# Patient Record
Sex: FEMALE | Race: WHITE | Employment: OTHER | URBAN - NONMETROPOLITAN AREA
[De-identification: names, ages, dates, MRNs, and addresses within clinical notes are randomized per-mention and may not be internally consistent; named-entity substitution may affect disease eponyms.]

---

## 2017-01-03 RX ORDER — SUCRALFATE 1 G/1
TABLET ORAL
Qty: 120 TABLET | Refills: 0 | Status: SHIPPED | OUTPATIENT
Start: 2017-01-03 | End: 2017-02-04 | Stop reason: SDUPTHER

## 2017-01-09 RX ORDER — ATORVASTATIN CALCIUM 20 MG/1
TABLET, FILM COATED ORAL
Qty: 90 TABLET | Refills: 0 | Status: SHIPPED | OUTPATIENT
Start: 2017-01-09 | End: 2017-03-22 | Stop reason: SDUPTHER

## 2017-01-23 RX ORDER — ERGOCALCIFEROL 1.25 MG/1
CAPSULE ORAL
Qty: 3 CAPSULE | Refills: 3 | Status: ON HOLD | OUTPATIENT
Start: 2017-01-23 | End: 2020-01-01 | Stop reason: ALTCHOICE

## 2017-01-26 ENCOUNTER — CARE COORDINATION (OUTPATIENT)
Dept: CARE COORDINATION | Age: 82
End: 2017-01-26

## 2017-01-26 ENCOUNTER — TELEPHONE (OUTPATIENT)
Dept: FAMILY MEDICINE CLINIC | Age: 82
End: 2017-01-26

## 2017-01-26 RX ORDER — M-VIT,TX,IRON,MINS/CALC/FOLIC 27MG-0.4MG
1 TABLET ORAL DAILY
Qty: 30 TABLET | Refills: 11 | Status: SHIPPED | OUTPATIENT
Start: 2017-01-26 | End: 2018-01-06 | Stop reason: SDUPTHER

## 2017-02-06 RX ORDER — SUCRALFATE 1 G/1
TABLET ORAL
Qty: 120 TABLET | Refills: 0 | Status: SHIPPED | OUTPATIENT
Start: 2017-02-06 | End: 2017-03-04 | Stop reason: SDUPTHER

## 2017-02-07 RX ORDER — LEVOTHYROXINE SODIUM 88 UG/1
TABLET ORAL
Qty: 90 TABLET | Refills: 0 | OUTPATIENT
Start: 2017-02-07

## 2017-02-09 RX ORDER — LEVOTHYROXINE SODIUM 88 UG/1
88 TABLET ORAL DAILY
Qty: 30 TABLET | Refills: 11 | Status: SHIPPED | OUTPATIENT
Start: 2017-02-09 | End: 2017-09-14

## 2017-02-13 RX ORDER — ISOSORBIDE MONONITRATE 30 MG/1
TABLET, EXTENDED RELEASE ORAL
Qty: 90 TABLET | Refills: 0 | Status: SHIPPED | OUTPATIENT
Start: 2017-02-13 | End: 2017-09-25 | Stop reason: SDUPTHER

## 2017-02-27 ENCOUNTER — CARE COORDINATION (OUTPATIENT)
Dept: CARE COORDINATION | Age: 82
End: 2017-02-27

## 2017-03-07 RX ORDER — SUCRALFATE 1 G/1
TABLET ORAL
Qty: 120 TABLET | Refills: 0 | Status: SHIPPED | OUTPATIENT
Start: 2017-03-07 | End: 2017-03-22 | Stop reason: SDUPTHER

## 2017-03-22 ENCOUNTER — OFFICE VISIT (OUTPATIENT)
Dept: FAMILY MEDICINE CLINIC | Age: 82
End: 2017-03-22

## 2017-03-22 VITALS
HEART RATE: 65 BPM | RESPIRATION RATE: 13 BRPM | WEIGHT: 137.2 LBS | SYSTOLIC BLOOD PRESSURE: 116 MMHG | BODY MASS INDEX: 29.6 KG/M2 | HEIGHT: 57 IN | TEMPERATURE: 98.4 F | DIASTOLIC BLOOD PRESSURE: 70 MMHG | OXYGEN SATURATION: 97 %

## 2017-03-22 DIAGNOSIS — R06.02 SOB (SHORTNESS OF BREATH): Primary | ICD-10-CM

## 2017-03-22 DIAGNOSIS — I62.03 SUBDURAL HEMATOMA, CHRONIC (HCC): Chronic | ICD-10-CM

## 2017-03-22 DIAGNOSIS — I50.9 CHRONIC CONGESTIVE HEART FAILURE, UNSPECIFIED CONGESTIVE HEART FAILURE TYPE: ICD-10-CM

## 2017-03-22 DIAGNOSIS — I48.0 PAF (PAROXYSMAL ATRIAL FIBRILLATION) (HCC): ICD-10-CM

## 2017-03-22 DIAGNOSIS — K44.9 HIATAL HERNIA: ICD-10-CM

## 2017-03-22 DIAGNOSIS — Z78.0 POST-MENOPAUSAL: ICD-10-CM

## 2017-03-22 DIAGNOSIS — E03.9 HYPOTHYROIDISM, UNSPECIFIED TYPE: Chronic | ICD-10-CM

## 2017-03-22 DIAGNOSIS — E78.5 HYPERLIPIDEMIA, UNSPECIFIED HYPERLIPIDEMIA TYPE: ICD-10-CM

## 2017-03-22 DIAGNOSIS — I25.10 CORONARY ARTERY DISEASE INVOLVING NATIVE CORONARY ARTERY OF NATIVE HEART WITHOUT ANGINA PECTORIS: Chronic | ICD-10-CM

## 2017-03-22 DIAGNOSIS — R53.81 PHYSICAL DECONDITIONING: ICD-10-CM

## 2017-03-22 DIAGNOSIS — K21.9 GASTROESOPHAGEAL REFLUX DISEASE, ESOPHAGITIS PRESENCE NOT SPECIFIED: ICD-10-CM

## 2017-03-22 PROCEDURE — 1090F PRES/ABSN URINE INCON ASSESS: CPT | Performed by: FAMILY MEDICINE

## 2017-03-22 PROCEDURE — G8427 DOCREV CUR MEDS BY ELIG CLIN: HCPCS | Performed by: FAMILY MEDICINE

## 2017-03-22 PROCEDURE — G8420 CALC BMI NORM PARAMETERS: HCPCS | Performed by: FAMILY MEDICINE

## 2017-03-22 PROCEDURE — 99214 OFFICE O/P EST MOD 30 MIN: CPT | Performed by: FAMILY MEDICINE

## 2017-03-22 PROCEDURE — 1036F TOBACCO NON-USER: CPT | Performed by: FAMILY MEDICINE

## 2017-03-22 PROCEDURE — 4040F PNEUMOC VAC/ADMIN/RCVD: CPT | Performed by: FAMILY MEDICINE

## 2017-03-22 PROCEDURE — 1123F ACP DISCUSS/DSCN MKR DOCD: CPT | Performed by: FAMILY MEDICINE

## 2017-03-22 PROCEDURE — G8598 ASA/ANTIPLAT THER USED: HCPCS | Performed by: FAMILY MEDICINE

## 2017-03-22 PROCEDURE — G8484 FLU IMMUNIZE NO ADMIN: HCPCS | Performed by: FAMILY MEDICINE

## 2017-03-22 RX ORDER — SUCRALFATE 1 G/1
TABLET ORAL
Qty: 120 TABLET | Refills: 0 | Status: SHIPPED | OUTPATIENT
Start: 2017-03-22 | End: 2017-05-09 | Stop reason: SDUPTHER

## 2017-03-22 RX ORDER — ATORVASTATIN CALCIUM 20 MG/1
TABLET, FILM COATED ORAL
Qty: 90 TABLET | Refills: 3 | Status: ON HOLD | OUTPATIENT
Start: 2017-03-22 | End: 2018-02-20 | Stop reason: HOSPADM

## 2017-03-22 ASSESSMENT — ENCOUNTER SYMPTOMS
SORE THROAT: 1
VOICE CHANGE: 1
SHORTNESS OF BREATH: 1
TROUBLE SWALLOWING: 0
GASTROINTESTINAL NEGATIVE: 1
COUGH: 1

## 2017-03-29 ENCOUNTER — CARE COORDINATION (OUTPATIENT)
Dept: CARE COORDINATION | Age: 82
End: 2017-03-29

## 2017-04-18 ENCOUNTER — TELEPHONE (OUTPATIENT)
Dept: FAMILY MEDICINE CLINIC | Age: 82
End: 2017-04-18

## 2017-04-18 RX ORDER — IBANDRONATE SODIUM 150 MG/1
150 TABLET, FILM COATED ORAL
Qty: 1 TABLET | Refills: 11 | Status: SHIPPED | OUTPATIENT
Start: 2017-04-18 | End: 2017-06-27

## 2017-04-21 ENCOUNTER — OFFICE VISIT (OUTPATIENT)
Dept: CARDIOLOGY | Age: 82
End: 2017-04-21

## 2017-04-21 VITALS
HEIGHT: 57 IN | BODY MASS INDEX: 29.04 KG/M2 | HEART RATE: 84 BPM | WEIGHT: 134.6 LBS | DIASTOLIC BLOOD PRESSURE: 60 MMHG | SYSTOLIC BLOOD PRESSURE: 150 MMHG

## 2017-04-21 DIAGNOSIS — I10 ESSENTIAL HYPERTENSION: ICD-10-CM

## 2017-04-21 DIAGNOSIS — E78.00 PURE HYPERCHOLESTEROLEMIA: ICD-10-CM

## 2017-04-21 DIAGNOSIS — I25.10 CORONARY ARTERY DISEASE INVOLVING NATIVE CORONARY ARTERY OF NATIVE HEART WITHOUT ANGINA PECTORIS: ICD-10-CM

## 2017-04-21 DIAGNOSIS — I50.32 CHRONIC DIASTOLIC CONGESTIVE HEART FAILURE (HCC): Primary | ICD-10-CM

## 2017-04-21 DIAGNOSIS — I48.20 CHRONIC ATRIAL FIBRILLATION (HCC): ICD-10-CM

## 2017-04-21 PROCEDURE — 4040F PNEUMOC VAC/ADMIN/RCVD: CPT | Performed by: INTERNAL MEDICINE

## 2017-04-21 PROCEDURE — G8427 DOCREV CUR MEDS BY ELIG CLIN: HCPCS | Performed by: INTERNAL MEDICINE

## 2017-04-21 PROCEDURE — G8598 ASA/ANTIPLAT THER USED: HCPCS | Performed by: INTERNAL MEDICINE

## 2017-04-21 PROCEDURE — 1036F TOBACCO NON-USER: CPT | Performed by: INTERNAL MEDICINE

## 2017-04-21 PROCEDURE — 1123F ACP DISCUSS/DSCN MKR DOCD: CPT | Performed by: INTERNAL MEDICINE

## 2017-04-21 PROCEDURE — 1090F PRES/ABSN URINE INCON ASSESS: CPT | Performed by: INTERNAL MEDICINE

## 2017-04-21 PROCEDURE — 99214 OFFICE O/P EST MOD 30 MIN: CPT | Performed by: INTERNAL MEDICINE

## 2017-04-21 PROCEDURE — G8420 CALC BMI NORM PARAMETERS: HCPCS | Performed by: INTERNAL MEDICINE

## 2017-05-05 ENCOUNTER — CARE COORDINATION (OUTPATIENT)
Dept: CARE COORDINATION | Age: 82
End: 2017-05-05

## 2017-05-09 RX ORDER — SUCRALFATE 1 G/1
TABLET ORAL
Qty: 120 TABLET | Refills: 0 | Status: SHIPPED | OUTPATIENT
Start: 2017-05-09 | End: 2017-05-09 | Stop reason: SDUPTHER

## 2017-05-09 RX ORDER — SUCRALFATE 1 G/1
TABLET ORAL
Qty: 360 TABLET | Refills: 0 | Status: SHIPPED | OUTPATIENT
Start: 2017-05-09 | End: 2017-08-03 | Stop reason: SDUPTHER

## 2017-05-24 ENCOUNTER — TELEPHONE (OUTPATIENT)
Dept: FAMILY MEDICINE CLINIC | Age: 82
End: 2017-05-24

## 2017-06-06 ENCOUNTER — CARE COORDINATION (OUTPATIENT)
Dept: CARE COORDINATION | Age: 82
End: 2017-06-06

## 2017-06-21 RX ORDER — SPIRONOLACTONE 25 MG/1
TABLET ORAL
Qty: 45 TABLET | Refills: 0 | Status: SHIPPED | OUTPATIENT
Start: 2017-06-21 | End: 2017-09-18 | Stop reason: SDUPTHER

## 2017-06-27 ENCOUNTER — CARE COORDINATION (OUTPATIENT)
Dept: CARE COORDINATION | Age: 82
End: 2017-06-27

## 2017-08-04 RX ORDER — SUCRALFATE 1 G/1
TABLET ORAL
Qty: 360 TABLET | Refills: 0 | Status: SHIPPED | OUTPATIENT
Start: 2017-08-04 | End: 2017-11-02 | Stop reason: SDUPTHER

## 2017-08-11 RX ORDER — BUMETANIDE 0.5 MG/1
TABLET ORAL
Qty: 90 TABLET | Refills: 0 | Status: SHIPPED | OUTPATIENT
Start: 2017-08-11 | End: 2017-09-25 | Stop reason: SDUPTHER

## 2017-08-18 RX ORDER — LEVOTHYROXINE SODIUM 0.07 MG/1
75 TABLET ORAL DAILY
Qty: 45 TABLET | Refills: 1 | Status: ON HOLD | OUTPATIENT
Start: 2017-08-18 | End: 2018-06-08

## 2017-09-14 ENCOUNTER — OFFICE VISIT (OUTPATIENT)
Dept: FAMILY MEDICINE CLINIC | Age: 82
End: 2017-09-14
Payer: MEDICARE

## 2017-09-14 VITALS
OXYGEN SATURATION: 97 % | BODY MASS INDEX: 26.41 KG/M2 | HEART RATE: 88 BPM | WEIGHT: 131 LBS | SYSTOLIC BLOOD PRESSURE: 128 MMHG | RESPIRATION RATE: 20 BRPM | HEIGHT: 59 IN | DIASTOLIC BLOOD PRESSURE: 58 MMHG

## 2017-09-14 DIAGNOSIS — R26.81 UNSTABLE GAIT: ICD-10-CM

## 2017-09-14 DIAGNOSIS — R06.02 SOB (SHORTNESS OF BREATH): ICD-10-CM

## 2017-09-14 DIAGNOSIS — K44.9 HIATAL HERNIA: ICD-10-CM

## 2017-09-14 DIAGNOSIS — I48.0 PAF (PAROXYSMAL ATRIAL FIBRILLATION) (HCC): ICD-10-CM

## 2017-09-14 DIAGNOSIS — I25.10 CORONARY ARTERY DISEASE INVOLVING NATIVE CORONARY ARTERY OF NATIVE HEART WITHOUT ANGINA PECTORIS: Chronic | ICD-10-CM

## 2017-09-14 DIAGNOSIS — E03.9 HYPOTHYROIDISM, UNSPECIFIED TYPE: Chronic | ICD-10-CM

## 2017-09-14 DIAGNOSIS — R29.898 WEAKNESS OF BOTH LOWER EXTREMITIES: ICD-10-CM

## 2017-09-14 DIAGNOSIS — K21.9 GASTROESOPHAGEAL REFLUX DISEASE, ESOPHAGITIS PRESENCE NOT SPECIFIED: Primary | ICD-10-CM

## 2017-09-14 DIAGNOSIS — I50.9 CHRONIC CONGESTIVE HEART FAILURE, UNSPECIFIED CONGESTIVE HEART FAILURE TYPE: ICD-10-CM

## 2017-09-14 DIAGNOSIS — I10 ESSENTIAL HYPERTENSION: ICD-10-CM

## 2017-09-14 PROCEDURE — 99214 OFFICE O/P EST MOD 30 MIN: CPT | Performed by: FAMILY MEDICINE

## 2017-09-14 PROCEDURE — 4040F PNEUMOC VAC/ADMIN/RCVD: CPT | Performed by: FAMILY MEDICINE

## 2017-09-14 PROCEDURE — G8417 CALC BMI ABV UP PARAM F/U: HCPCS | Performed by: FAMILY MEDICINE

## 2017-09-14 PROCEDURE — G8598 ASA/ANTIPLAT THER USED: HCPCS | Performed by: FAMILY MEDICINE

## 2017-09-14 PROCEDURE — 1090F PRES/ABSN URINE INCON ASSESS: CPT | Performed by: FAMILY MEDICINE

## 2017-09-14 PROCEDURE — 1036F TOBACCO NON-USER: CPT | Performed by: FAMILY MEDICINE

## 2017-09-14 PROCEDURE — 1123F ACP DISCUSS/DSCN MKR DOCD: CPT | Performed by: FAMILY MEDICINE

## 2017-09-14 PROCEDURE — G8427 DOCREV CUR MEDS BY ELIG CLIN: HCPCS | Performed by: FAMILY MEDICINE

## 2017-09-14 ASSESSMENT — ENCOUNTER SYMPTOMS
WHEEZING: 0
COUGH: 1
SHORTNESS OF BREATH: 1
NAUSEA: 1

## 2017-09-18 RX ORDER — SPIRONOLACTONE 25 MG/1
TABLET ORAL
Qty: 45 TABLET | Refills: 3 | Status: ON HOLD | OUTPATIENT
Start: 2017-09-18 | End: 2018-02-16 | Stop reason: CLARIF

## 2017-09-19 ENCOUNTER — TELEPHONE (OUTPATIENT)
Dept: FAMILY MEDICINE CLINIC | Age: 82
End: 2017-09-19

## 2017-09-25 ENCOUNTER — OFFICE VISIT (OUTPATIENT)
Dept: CARDIOLOGY CLINIC | Age: 82
End: 2017-09-25
Payer: MEDICARE

## 2017-09-25 ENCOUNTER — HOSPITAL ENCOUNTER (OUTPATIENT)
Age: 82
Discharge: HOME | End: 2017-09-25
Payer: MEDICARE

## 2017-09-25 VITALS
SYSTOLIC BLOOD PRESSURE: 118 MMHG | WEIGHT: 129.4 LBS | HEART RATE: 76 BPM | HEIGHT: 57 IN | DIASTOLIC BLOOD PRESSURE: 52 MMHG | BODY MASS INDEX: 27.91 KG/M2

## 2017-09-25 DIAGNOSIS — E78.00 PURE HYPERCHOLESTEROLEMIA: ICD-10-CM

## 2017-09-25 DIAGNOSIS — I48.0 PAF (PAROXYSMAL ATRIAL FIBRILLATION) (HCC): ICD-10-CM

## 2017-09-25 DIAGNOSIS — I50.32 CHRONIC DIASTOLIC CONGESTIVE HEART FAILURE (HCC): Primary | ICD-10-CM

## 2017-09-25 DIAGNOSIS — Z95.1 S/P CABG X 4: ICD-10-CM

## 2017-09-25 DIAGNOSIS — I50.32 CHRONIC DIASTOLIC CONGESTIVE HEART FAILURE (HCC): ICD-10-CM

## 2017-09-25 DIAGNOSIS — R06.02 SOB (SHORTNESS OF BREATH) ON EXERTION: ICD-10-CM

## 2017-09-25 DIAGNOSIS — I48.20 CHRONIC ATRIAL FIBRILLATION (HCC): ICD-10-CM

## 2017-09-25 DIAGNOSIS — I25.10 CORONARY ARTERY DISEASE INVOLVING NATIVE CORONARY ARTERY OF NATIVE HEART WITHOUT ANGINA PECTORIS: ICD-10-CM

## 2017-09-25 LAB
ANION GAP SERPL CALCULATED.3IONS-SCNC: 14 MEQ/L (ref 8–16)
BUN BLDV-MCNC: 18 MG/DL (ref 7–22)
CALCIUM SERPL-MCNC: 10.1 MG/DL (ref 8.5–10.5)
CHLORIDE BLD-SCNC: 90 MEQ/L (ref 98–111)
CO2: 30 MEQ/L (ref 23–33)
CREAT SERPL-MCNC: 1 MG/DL (ref 0.4–1.2)
GFR SERPL CREATININE-BSD FRML MDRD: 52 ML/MIN/1.73M2
GLUCOSE BLD-MCNC: 96 MG/DL (ref 70–108)
HCT VFR BLD CALC: 33.7 % (ref 37–47)
HEMOGLOBIN: 11.8 GM/DL (ref 12–16)
MAGNESIUM: 2 MG/DL (ref 1.6–2.4)
MCH RBC QN AUTO: 29.5 PG (ref 27–31)
MCHC RBC AUTO-ENTMCNC: 34.9 GM/DL (ref 33–37)
MCV RBC AUTO: 84.4 FL (ref 81–99)
PDW BLD-RTO: 16.2 % (ref 11.5–14.5)
PLATELET # BLD: 221 THOU/MM3 (ref 130–400)
PMV BLD AUTO: 8.4 MCM (ref 7.4–10.4)
POTASSIUM SERPL-SCNC: 4 MEQ/L (ref 3.5–5.2)
RBC # BLD: 3.99 MILL/MM3 (ref 4.2–5.4)
SODIUM BLD-SCNC: 134 MEQ/L (ref 135–145)
WBC # BLD: 6.4 THOU/MM3 (ref 4.8–10.8)

## 2017-09-25 PROCEDURE — 83735 ASSAY OF MAGNESIUM: CPT

## 2017-09-25 PROCEDURE — 1123F ACP DISCUSS/DSCN MKR DOCD: CPT | Performed by: INTERNAL MEDICINE

## 2017-09-25 PROCEDURE — G8417 CALC BMI ABV UP PARAM F/U: HCPCS | Performed by: INTERNAL MEDICINE

## 2017-09-25 PROCEDURE — G8598 ASA/ANTIPLAT THER USED: HCPCS | Performed by: INTERNAL MEDICINE

## 2017-09-25 PROCEDURE — 1036F TOBACCO NON-USER: CPT | Performed by: INTERNAL MEDICINE

## 2017-09-25 PROCEDURE — G8427 DOCREV CUR MEDS BY ELIG CLIN: HCPCS | Performed by: INTERNAL MEDICINE

## 2017-09-25 PROCEDURE — 80048 BASIC METABOLIC PNL TOTAL CA: CPT

## 2017-09-25 PROCEDURE — 99214 OFFICE O/P EST MOD 30 MIN: CPT | Performed by: INTERNAL MEDICINE

## 2017-09-25 PROCEDURE — 1090F PRES/ABSN URINE INCON ASSESS: CPT | Performed by: INTERNAL MEDICINE

## 2017-09-25 PROCEDURE — 85027 COMPLETE CBC AUTOMATED: CPT

## 2017-09-25 PROCEDURE — 36415 COLL VENOUS BLD VENIPUNCTURE: CPT

## 2017-09-25 PROCEDURE — 4040F PNEUMOC VAC/ADMIN/RCVD: CPT | Performed by: INTERNAL MEDICINE

## 2017-09-25 RX ORDER — ISOSORBIDE MONONITRATE 30 MG/1
TABLET, EXTENDED RELEASE ORAL
Qty: 90 TABLET | Refills: 3 | Status: SHIPPED | OUTPATIENT
Start: 2017-09-25

## 2017-09-25 RX ORDER — BUMETANIDE 0.5 MG/1
TABLET ORAL
Qty: 90 TABLET | Refills: 3 | Status: ON HOLD | OUTPATIENT
Start: 2017-09-25 | End: 2020-01-01 | Stop reason: SDUPTHER

## 2017-09-29 ENCOUNTER — TELEPHONE (OUTPATIENT)
Dept: CARDIOLOGY CLINIC | Age: 82
End: 2017-09-29

## 2017-09-29 NOTE — TELEPHONE ENCOUNTER
[facility] asking for clearance for EGD and Dilitation? Patient last seen 9-25-17. Please fax EKG with clearnce.

## 2017-10-18 NOTE — TELEPHONE ENCOUNTER
May proceed with low to mod risk  May cont ASA.  May stop ASA for 5 days only if the surgeon insist to stop

## 2017-11-03 RX ORDER — SUCRALFATE 1 G/1
TABLET ORAL
Qty: 360 TABLET | Refills: 0 | Status: ON HOLD | OUTPATIENT
Start: 2017-11-03 | End: 2018-02-20 | Stop reason: HOSPADM

## 2017-11-21 NOTE — PROCEDURES
[address] [address] Endoscopy     EGD Report          Patient: Zach Tariq  : 1930  Acct#: [de-identified]     BRIEF HISTORY AND INDICATIONS:    The patient is a 80 y.o.,  female with significant past medical history of GERD, hiatal hernia and chronic coughing. History of large hiatal hernia and pneumonia. PREMEDICATION:  TIVA anesthesia was used, see Anesthesia note for details. INSTRUMENT:  Olympus GIF H-180 gastroscope    The risks and benefits of upper endoscopy with biopsy and dilation were  described to the patient, including but not limited to bleeding, infection, poking a hole someplace requiring surgery to fix it, having reaction to medication, and death. The patient understood these risks and provided informed consent. The patient was placed in the left lateral decubitus position. Conscious sedation was administered . The patient was continuously monitored to ensure adequate sedation and patient safety. A forward-viewing Olympus endoscope was lubricated and inserted through the mouth into the oropharynx. Under direct visualization, the upper esophagus was intubated. The scope was advanced to the esophagus and stomach to first portion of duodenum. Scope was slowly withdrawn with good views of mucosal surfaces. The scope was retroflexed in the fundus. Findings and maneuvers are listed in impression below. The patient tolerated the procedure well. The scope was removed. The patient was removed to the recovery area. There were no immediate complications. ESOPHAGUS:  Large Hiatal hernia with a majority of the stomach above the diaphragm     STOMACH:  Normal.      DUODENUM:  The pylorus was normal and the duodenal bulb distended well and  appeared normal.  Unable to push the scope into the second portion because of the hiatal hernia. DILATION:  Using a 46 Italian over the wire American dilator, a dilation was performed. IMPRESSION:      1.   Large hiatal hernia with the majority of the stomach located above the diaphragm  . 2. Some mild food debris and pills in the fundus of the stomach  . 3.  Otherwise, normal to the first portion of the duodenum. 4.  Dilation over the wire with 51 Fr dilator of the esophagus . RECOMMENDATIONS:    1. Resume medications. 2. Highly consider Surgical Revision of the hiatal hernia, minimally invasive. 3. Follow up as needed with Dr. Lorri Georges.        Specimens: were not obtained    (The following sections must be completed)  Post-Sedation Vital Signs: Vital signs were reviewed and were stable after the procedure (see flow sheet for vitals)            Post-Sedation Exam: Lungs: clear to auscultation bilaterally and Cardiovascular: regular rate and rhythm           Complications: none        Yao Jimenez MD, [facility]

## 2017-11-21 NOTE — H&P
Facility-Administered Medications:     0.45 % sodium chloride infusion, , Intravenous, Continuous, Jeni Aguilar MD, Last Rate: 75 mL/hr at 11/22/17 0756  Prior to Admission medications    Medication Sig Start Date End Date Taking? Authorizing Provider   sucralfate (CARAFATE) 1 GM tablet TAKE 1 TABLET BY MOUTH FOUR TIMES DAILY BEFORE MEALS AND AT BEDTIME 11/3/17  Yes Orie Lights, DO   isosorbide mononitrate (IMDUR) 30 MG extended release tablet TAKE 1 TABLET BY MOUTH DAILY 9/25/17  Yes Rosy Casper MD   bumetanide (BUMEX) 0.5 MG tablet TAKE 1 TABLET BY MOUTH DAILY 9/25/17  Yes Rosy Casper MD   spironolactone (ALDACTONE) 25 MG tablet TAKE 1/2 TABLET BY MOUTH EVERY DAY 9/18/17  Yes Rosy Casper MD   levothyroxine (SYNTHROID) 75 MCG tablet Take 1 tablet by mouth Daily Indications: alternates with 88mcg every other week. 8/18/17  Yes Oanh Cavazos MD   atorvastatin (LIPITOR) 20 MG tablet TAKE 1 TABLET BY MOUTH NIGHTLY 3/22/17  Yes Orie Lights, DO   Multiple Vitamins-Minerals (THERAPEUTIC MULTIVITAMIN-MINERALS) tablet Take 1 tablet by mouth daily 1/26/17  Yes Orie Lights, DO   vitamin D (ERGOCALCIFEROL) 81655 UNITS CAPS capsule TAKE 1 CAPSULE BY MOUTH EVERY 28 DAYS ON THE 18TH OF EVERY MONTH 1/23/17  Yes Orie Lights, DO   citalopram (CELEXA) 40 MG tablet TAKE 1 TABLET BY MOUTH DAILY 12/29/16  Yes Orie Lights, DO   pantoprazole (PROTONIX) 40 MG tablet TAKE 1 TABLET BY MOUTH TWICE DAILY BEFORE MEALS 12/13/16  Yes Orie Lights, DO   bimatoprost (LUMIGAN) 0.01 % SOLN ophthalmic drops Place 1 drop into both eyes nightly   Yes Historical Provider, MD   calcium carbonate (OSCAL) 500 MG TABS tablet Take 500 mg by mouth daily   Yes Historical Provider, MD   magnesium oxide (MAG-OX) 400 (241.3 MG) MG TABS tablet Take 1 tablet by mouth daily. 1/13/15  Yes Brooklyn Hidalgo MD   polyethylene glycol [facility]) powder Take 17 g by mouth daily.    Yes Historical Provider, MD   Chlorpheniramine-Acetaminophen (CORICIDIN HBP COLD/FLU PO) Take by mouth Indications: takes as needed for cough     Historical Provider, MD   Acetaminophen 650 MG TABS Take 650 mg by mouth every 4 hours as needed 5/20/16   Harper Bocanegra MD   aspirin 81 MG EC tablet Take 81 mg by mouth daily. Historical Provider, MD     Additional information:       PHYSICAL:    vitals were not taken for this visit. Heart:  [x]Regular rate and rhythm  []Other:    Lungs:  [x]Clear    []Other:    Abdomen: [x]Soft    []Other:    Mental Status: [x]Alert & Oriented  []Other:      VITAL SIGNS   No data found. PLANNED PROCEDURE   [x]EGD  []Colonoscopy    Consent: I have discussed with the patient and/or the patient representative the indication, alternatives, and the possible risks and/or complications of the planned procedure and the anesthesia methods. The patient and/or patient representative appear to understand and agree to proceed. SEDATION ( TIVA ANESTHESIA, SEE ANESTHESIA NOTE FOR DETAILS)        ASA Classification: Class 3 - A patient with severe systemic disease that limits activity but is not incapacitating    Airway Assessment: normal    Monitoring and Safety: The patient will be placed on a cardiac monitor and vital signs, pulse oximetry and level of consciousness will be continuously evaluated throughout the procedure. The patient will be closely monitored until recovery from the medications is complete and the patient has returned to baseline status. Respiratory therapy will be on standby during the procedure. [x]Pre-procedure diagnostic studies complete and results available. Comment:    [x]Previous sedation/anesthesia experiences assessed. Comment:    [x]The patient is an appropriate candidate to undergo the planned procedure sedation and anesthesia.  (Refer to nursing sedation/analgesia documentation record)  [x]Formulation and discussion of sedation/procedure plan, risks, and expectations with patient and/or responsible adult completed. [x]Patient examined immediately prior to the procedure.  (Refer to nursing sedation/analgesia documentation record)    Kyle Pabon MD   Electronically signed 11/22/2017 at 9:15 AM

## 2017-11-22 ENCOUNTER — ANESTHESIA EVENT (OUTPATIENT)
Dept: ENDOSCOPY | Age: 82
End: 2017-11-22
Payer: MEDICARE

## 2017-11-22 ENCOUNTER — ANESTHESIA (OUTPATIENT)
Dept: ENDOSCOPY | Age: 82
End: 2017-11-22
Payer: MEDICARE

## 2017-11-22 ENCOUNTER — HOSPITAL ENCOUNTER (OUTPATIENT)
Age: 82
Setting detail: OUTPATIENT SURGERY
Discharge: HOME | End: 2017-11-22
Attending: INTERNAL MEDICINE | Admitting: INTERNAL MEDICINE
Payer: MEDICARE

## 2017-11-22 VITALS
OXYGEN SATURATION: 98 % | DIASTOLIC BLOOD PRESSURE: 61 MMHG | SYSTOLIC BLOOD PRESSURE: 122 MMHG | RESPIRATION RATE: 19 BRPM

## 2017-11-22 VITALS
RESPIRATION RATE: 18 BRPM | SYSTOLIC BLOOD PRESSURE: 116 MMHG | HEART RATE: 73 BPM | DIASTOLIC BLOOD PRESSURE: 73 MMHG | OXYGEN SATURATION: 94 % | TEMPERATURE: 97.5 F

## 2017-11-22 PROCEDURE — 6360000002 HC RX W HCPCS: Performed by: NURSE ANESTHETIST, CERTIFIED REGISTERED

## 2017-11-22 PROCEDURE — 3609017100 HC EGD: Performed by: INTERNAL MEDICINE

## 2017-11-22 PROCEDURE — 7100000000 HC PACU RECOVERY - FIRST 15 MIN: Performed by: INTERNAL MEDICINE

## 2017-11-22 PROCEDURE — 2500000003 HC RX 250 WO HCPCS: Performed by: NURSE ANESTHETIST, CERTIFIED REGISTERED

## 2017-11-22 PROCEDURE — 3700000001 HC ADD 15 MINUTES (ANESTHESIA): Performed by: INTERNAL MEDICINE

## 2017-11-22 PROCEDURE — 2580000003 HC RX 258: Performed by: INTERNAL MEDICINE

## 2017-11-22 PROCEDURE — 3609017700 HC EGD DILATION GASTRIC/DUODENAL STRICTURE: Performed by: INTERNAL MEDICINE

## 2017-11-22 PROCEDURE — 3700000000 HC ANESTHESIA ATTENDED CARE: Performed by: INTERNAL MEDICINE

## 2017-11-22 RX ORDER — SODIUM CHLORIDE 450 MG/100ML
INJECTION, SOLUTION INTRAVENOUS CONTINUOUS
Status: DISCONTINUED | OUTPATIENT
Start: 2017-11-22 | End: 2017-11-22 | Stop reason: HOSPADM

## 2017-11-22 RX ORDER — LIDOCAINE HYDROCHLORIDE 20 MG/ML
INJECTION, SOLUTION INFILTRATION; PERINEURAL PRN
Status: DISCONTINUED | OUTPATIENT
Start: 2017-11-22 | End: 2017-11-22 | Stop reason: SDUPTHER

## 2017-11-22 RX ORDER — PROPOFOL 10 MG/ML
INJECTION, EMULSION INTRAVENOUS PRN
Status: DISCONTINUED | OUTPATIENT
Start: 2017-11-22 | End: 2017-11-22 | Stop reason: SDUPTHER

## 2017-11-22 RX ADMIN — LIDOCAINE HYDROCHLORIDE 100 MG: 20 INJECTION, SOLUTION INFILTRATION; PERINEURAL at 09:16

## 2017-11-22 RX ADMIN — PROPOFOL 50 MG: 10 INJECTION, EMULSION INTRAVENOUS at 09:27

## 2017-11-22 RX ADMIN — SODIUM CHLORIDE: 4.5 INJECTION, SOLUTION INTRAVENOUS at 07:56

## 2017-11-22 RX ADMIN — PROPOFOL 50 MG: 10 INJECTION, EMULSION INTRAVENOUS at 09:16

## 2017-11-22 ASSESSMENT — ENCOUNTER SYMPTOMS: SHORTNESS OF BREATH: 1

## 2017-11-22 NOTE — ANESTHESIA PRE PROCEDURE
Department of Anesthesiology  Preprocedure Note       Name:  Rommel Yoder   Age:  80 y.o.  :  1930                                          MRN:  [id number]         Date:  2017      Surgeon: Henrry Cedeno):  Jaciel Yates MD    Procedure: Procedure(s):  EGD POSS DIL    Medications prior to admission:   Prior to Admission medications    Medication Sig Start Date End Date Taking? Authorizing Provider   sucralfate (CARAFATE) 1 GM tablet TAKE 1 TABLET BY MOUTH FOUR TIMES DAILY BEFORE MEALS AND AT BEDTIME 11/3/17  Yes Nickolas Hahn DO   isosorbide mononitrate (IMDUR) 30 MG extended release tablet TAKE 1 TABLET BY MOUTH DAILY 17  Yes Simon Pritchard MD   bumetanide (BUMEX) 0.5 MG tablet TAKE 1 TABLET BY MOUTH DAILY 17  Yes Simon Pritchard MD   spironolactone (ALDACTONE) 25 MG tablet TAKE 1/2 TABLET BY MOUTH EVERY DAY 17  Yes Simon Pritchard MD   levothyroxine (SYNTHROID) 75 MCG tablet Take 1 tablet by mouth Daily Indications: alternates with 88mcg every other week.  17  Yes Val Smith MD   atorvastatin (LIPITOR) 20 MG tablet TAKE 1 TABLET BY MOUTH NIGHTLY 3/22/17  Yes Nickolas Hahn DO   Multiple Vitamins-Minerals (THERAPEUTIC MULTIVITAMIN-MINERALS) tablet Take 1 tablet by mouth daily 17  Yes Nickolas Hahn DO   vitamin D (ERGOCALCIFEROL) 70512 UNITS CAPS capsule TAKE 1 CAPSULE BY MOUTH EVERY 28 DAYS ON THE  OF EVERY MONTH 17  Yes Nickolas Hahn DO   citalopram (CELEXA) 40 MG tablet TAKE 1 TABLET BY MOUTH DAILY 16  Yes Nickolas Hahn DO   pantoprazole (PROTONIX) 40 MG tablet TAKE 1 TABLET BY MOUTH TWICE DAILY BEFORE MEALS 16  Yes Nickolas Hahn DO   bimatoprost (LUMIGAN) 0.01 % SOLN ophthalmic drops Place 1 drop into both eyes nightly   Yes Historical Provider, MD   calcium carbonate (OSCAL) 500 MG TABS tablet Take 500 mg by mouth daily   Yes Historical Provider, MD   magnesium oxide (MAG-OX) 400 (241.3 MG) MG TABS tablet Take 1 tablet by mouth daily. 1/13/15  Yes Yi Coreas MD   polyethylene glycol [facility]) powder Take 17 g by mouth daily. Yes Historical Provider, MD   Chlorpheniramine-Acetaminophen (CORICIDIN HBP COLD/FLU PO) Take by mouth Indications: takes as needed for cough     Historical Provider, MD   Acetaminophen 650 MG TABS Take 650 mg by mouth every 4 hours as needed 5/20/16   Sowmya Grossman MD   aspirin 81 MG EC tablet Take 81 mg by mouth daily. Historical Provider, MD       Current medications:    No current facility-administered medications for this encounter. Allergies: Allergies   Allergen Reactions    Adhesive Tape     Lisinopril Other (See Comments)    Morphine     Narcan [Naloxone] Other (See Comments)       Problem List:    Patient Active Problem List   Diagnosis Code    hx of NSTEMI (non-ST elevated myocardial infarction) ([facility] I21.4    CAD (coronary artery disease) I25.10    Physical deconditioning R53.81    PAF (paroxysmal atrial fibrillation) ([facility]- chads of at least 4 ( ,TIA hx, age CHF) I55.0    CHF (congestive heart failure) ([facility]) chronic I50.9    Hyperlipidemia E78.5    Hip pain M25.559    Anemia D64.9    Hyponatremia- IMPROVING E87.1    ARF (acute renal failure) ([facility]) N17.9    Atrial fibrillation (HCC) I48.91    Easy bruising R23.8    Hypothyroidism E03.9    Hip pain, acute M25.559    Hematoma of the left foot T14. 8XXA    Hypomagnesemia  E83.42    Dehydration E86.0    Hyponatremia E87.1    ARNOLD (acute kidney injury) ([facility] N17.9    Falls W19. XXXA    Subcutaneous hematoma T14. 8XXA    Subdural hematoma, chronic (HCC) I62.03    Subdural hematoma (HCC) I62.00    Paroxysmal a-fib ([facility]) I48.0    Lower urinary tract infectious disease N39.0    Candida infection B37.9    Hypophosphatemia E83.39    Volume overload E87.70    Dyspnea on exertion R06.09    Acute on chronic diastolic CHF (congestive heart failure), NYHA class 1 ([facility]) I50.33    Hypothyroidism E03.9    Volume depletion E86.9    Dyspnea and respiratory abnormalities R06.00, R06.89    Pneumonia due to organism J18.9    Hyponatremia E87.1    Hypokalemia E87.6    Azotemia R79.89    VHD (valvular heart disease) I38    Tachycardia sinus versus atrial R00.0    Dizziness and at times vertigo when she gets up and walk R42    Junctional rhythm rate 54 and with underlying afib I49.8    Bradycardia R00.1    Dizzy spells R42    Lightheadedness R42    S/P CABG x 4- 2011 at [address] Z95.1    Coronary artery disease involving native coronary artery of native heart without angina pectoris I25.10    Acute pancreatitis K85.90    Leukocytosis D72.829    Chest pain R07.9    Hyperglycemia R73.9    Abdominal pain, epigastric R10.13    Accelerated hypertension I10    Calculus of gallbladder with acute cholecystitis K80.00    SIRS (systemic inflammatory response syndrome) (HCC) R65.10    Pancreatitis due to biliary obstruction K85.90, K83.1    Calculus of gallbladder with acute cholecystitis without obstruction K80.00    Acute biliary pancreatitis K85.10    Pneumonia of both lower lobes due to infectious organism J18.9    Pre-op evaluation Z01.818    Essential hypertension B71    Systolic congestive heart failure (HCC) I50.20    Weakness R53.1    Chronic systolic congestive heart failure (HCC) I50.22    Chronic atrial fibrillation (HCC) I48.2    SOB (shortness of breath) on exertion R06.02    Essential hypertension I10       Past Medical History:        Diagnosis Date    [facility]) [facility]     Atrial fibrillation ([facility]     Blood transfusion reaction     CAD (coronary artery disease)     Severe triple vessel disease    CKD (chronic kidney disease)     Diastolic congestive heart failure (HCC)     GERD (gastroesophageal reflux disease)     Hiatal hernia     History of blood transfusion     Hyperlipidemia     Hypertension     Hypoalbuminemia     Hypothyroidism 09/25/2017    CL 90 09/25/2017    CO2 30 09/25/2017    BUN 18 09/25/2017    CREATININE 1.0 09/25/2017    LABGLOM 52 09/25/2017    GLUCOSE 96 09/25/2017    GLUCOSE 109 11/08/2014    PROT 7.4 04/07/2017    CALCIUM 10.1 09/25/2017    BILITOT 1.0 04/07/2017    ALKPHOS 71 04/07/2017    AST 25 04/07/2017    ALT 14 04/07/2017       POC Tests: No results for input(s): POCGLU, POCNA, POCK, POCCL, POCBUN, POCHEMO, POCHCT in the last 72 hours. Coags:   Lab Results   Component Value Date    INR 1.00 05/07/2016    APTT 31.1 05/07/2016       HCG (If Applicable): No results found for: PREGTESTUR, PREGSERUM, HCG, HCGQUANT     ABGs: No results found for: PHART, PO2ART, CSU4OEH, HZN6OMY, BEART, E2RWXURP     Type & Screen (If Applicable):  Lab Results   Component Value Date    LABRH NEG 07/22/2014       Anesthesia Evaluation  Patient summary reviewed and Nursing notes reviewed no history of anesthetic complications:   Airway: Mallampati: II  TM distance: >3 FB   Neck ROM: limited  Mouth opening: > = 3 FB Dental: normal exam         Pulmonary:normal exam    (+) shortness of breath: chronic,                             Cardiovascular:  Exercise tolerance: poor (<4 METS),   (+) hypertension: no interval change, valvular problems/murmurs:, past MI:, CAD:, CABG/stent:, dysrhythmias: atrial fibrillation, CHF: systolic, GARZA:, hyperlipidemia      ECG reviewed      Echocardiogram reviewed                  Neuro/Psych:   (+) CVA:, neuromuscular disease:,             GI/Hepatic/Renal:   (+) hiatal hernia, GERD: well controlled, renal disease:,           Endo/Other:    (+) hypothyroidism::., .          Pt had no PAT visit       Abdominal:           Vascular: negative vascular ROS. Anesthesia Plan      MAC     ASA 3       Induction: intravenous. Anesthetic plan and risks discussed with patient. Plan discussed with attending.     Attending anesthesiologist reviewed and agrees with Pre Eval

## 2017-12-13 RX ORDER — PANTOPRAZOLE SODIUM 40 MG/1
TABLET, DELAYED RELEASE ORAL
Qty: 180 TABLET | Refills: 0 | Status: SHIPPED | OUTPATIENT
Start: 2017-12-13 | End: 2019-01-01

## 2017-12-29 ENCOUNTER — HOSPITAL ENCOUNTER (OUTPATIENT)
Age: 82
Discharge: HOME | End: 2017-12-29
Payer: MEDICARE

## 2017-12-29 ENCOUNTER — OFFICE VISIT (OUTPATIENT)
Dept: CARDIOLOGY CLINIC | Age: 82
End: 2017-12-29
Payer: MEDICARE

## 2017-12-29 ENCOUNTER — HOSPITAL ENCOUNTER (OUTPATIENT)
Dept: GENERAL RADIOLOGY | Age: 82
Discharge: HOME | End: 2017-12-29
Payer: MEDICARE

## 2017-12-29 VITALS
DIASTOLIC BLOOD PRESSURE: 64 MMHG | WEIGHT: 131 LBS | BODY MASS INDEX: 28.26 KG/M2 | HEART RATE: 83 BPM | SYSTOLIC BLOOD PRESSURE: 130 MMHG | HEIGHT: 57 IN

## 2017-12-29 DIAGNOSIS — J20.9 ACUTE BRONCHITIS, UNSPECIFIED ORGANISM: ICD-10-CM

## 2017-12-29 DIAGNOSIS — I25.10 CORONARY ARTERY DISEASE INVOLVING NATIVE CORONARY ARTERY OF NATIVE HEART WITHOUT ANGINA PECTORIS: Chronic | ICD-10-CM

## 2017-12-29 DIAGNOSIS — N30.00 ACUTE CYSTITIS WITHOUT HEMATURIA: ICD-10-CM

## 2017-12-29 DIAGNOSIS — R60.0 BILATERAL LEG EDEMA: ICD-10-CM

## 2017-12-29 DIAGNOSIS — Z95.1 S/P CABG X 4: ICD-10-CM

## 2017-12-29 DIAGNOSIS — Z01.818 PRE-OP EVALUATION: ICD-10-CM

## 2017-12-29 DIAGNOSIS — I48.20 CHRONIC ATRIAL FIBRILLATION (HCC): ICD-10-CM

## 2017-12-29 DIAGNOSIS — R06.02 SOB (SHORTNESS OF BREATH) ON EXERTION: ICD-10-CM

## 2017-12-29 DIAGNOSIS — E78.00 PURE HYPERCHOLESTEROLEMIA: ICD-10-CM

## 2017-12-29 DIAGNOSIS — I50.22 CHRONIC SYSTOLIC CONGESTIVE HEART FAILURE (HCC): ICD-10-CM

## 2017-12-29 DIAGNOSIS — I50.22 CHRONIC SYSTOLIC CONGESTIVE HEART FAILURE (HCC): Primary | ICD-10-CM

## 2017-12-29 LAB
ANION GAP SERPL CALCULATED.3IONS-SCNC: 11 MEQ/L (ref 8–16)
BACTERIA: ABNORMAL
BILIRUBIN URINE: NEGATIVE
BLOOD, URINE: NEGATIVE
BUN BLDV-MCNC: 24 MG/DL (ref 7–22)
CALCIUM SERPL-MCNC: 9.5 MG/DL (ref 8.5–10.5)
CASTS: ABNORMAL /LPF
CASTS: ABNORMAL /LPF
CHARACTER, URINE: ABNORMAL
CHLORIDE BLD-SCNC: 94 MEQ/L (ref 98–111)
CO2: 30 MEQ/L (ref 23–33)
COLOR: YELLOW
CREAT SERPL-MCNC: 0.9 MG/DL (ref 0.4–1.2)
CRYSTALS: ABNORMAL
EPITHELIAL CELLS, UA: ABNORMAL /HPF
GFR SERPL CREATININE-BSD FRML MDRD: 59 ML/MIN/1.73M2
GLUCOSE BLD-MCNC: 103 MG/DL (ref 70–108)
GLUCOSE, URINE: NEGATIVE MG/DL
HCT VFR BLD CALC: 33.2 % (ref 37–47)
HEMOGLOBIN: 11.3 GM/DL (ref 12–16)
KETONES, URINE: NEGATIVE
LEUKOCYTE EST, POC: ABNORMAL
MCH RBC QN AUTO: 29.6 PG (ref 27–31)
MCHC RBC AUTO-ENTMCNC: 34 GM/DL (ref 33–37)
MCV RBC AUTO: 86.9 FL (ref 81–99)
MISCELLANEOUS LAB TEST RESULT: ABNORMAL
NITRITE, URINE: NEGATIVE
PDW BLD-RTO: 16.6 % (ref 11.5–14.5)
PH UA: 7
PLATELET # BLD: 171 THOU/MM3 (ref 130–400)
PMV BLD AUTO: 8.9 MCM (ref 7.4–10.4)
POTASSIUM SERPL-SCNC: 4.1 MEQ/L (ref 3.5–5.2)
PROTEIN UA: ABNORMAL MG/DL
RBC # BLD: 3.83 MILL/MM3 (ref 4.2–5.4)
RBC URINE: ABNORMAL /HPF
RENAL EPITHELIAL, UA: ABNORMAL
SODIUM BLD-SCNC: 135 MEQ/L (ref 135–145)
SPECIFIC GRAVITY UA: 1.02 (ref 1–1.03)
UROBILINOGEN, URINE: 0.2 EU/DL
WBC # BLD: 7 THOU/MM3 (ref 4.8–10.8)
WBC UA: ABNORMAL /HPF
YEAST: ABNORMAL

## 2017-12-29 PROCEDURE — 80048 BASIC METABOLIC PNL TOTAL CA: CPT

## 2017-12-29 PROCEDURE — G8598 ASA/ANTIPLAT THER USED: HCPCS | Performed by: INTERNAL MEDICINE

## 2017-12-29 PROCEDURE — 85027 COMPLETE CBC AUTOMATED: CPT

## 2017-12-29 PROCEDURE — 4040F PNEUMOC VAC/ADMIN/RCVD: CPT | Performed by: INTERNAL MEDICINE

## 2017-12-29 PROCEDURE — 36415 COLL VENOUS BLD VENIPUNCTURE: CPT

## 2017-12-29 PROCEDURE — 1090F PRES/ABSN URINE INCON ASSESS: CPT | Performed by: INTERNAL MEDICINE

## 2017-12-29 PROCEDURE — G8427 DOCREV CUR MEDS BY ELIG CLIN: HCPCS | Performed by: INTERNAL MEDICINE

## 2017-12-29 PROCEDURE — 99214 OFFICE O/P EST MOD 30 MIN: CPT | Performed by: INTERNAL MEDICINE

## 2017-12-29 PROCEDURE — 1036F TOBACCO NON-USER: CPT | Performed by: INTERNAL MEDICINE

## 2017-12-29 PROCEDURE — 71020 XR CHEST STANDARD TWO VW: CPT

## 2017-12-29 PROCEDURE — 81001 URINALYSIS AUTO W/SCOPE: CPT

## 2017-12-29 PROCEDURE — 1123F ACP DISCUSS/DSCN MKR DOCD: CPT | Performed by: INTERNAL MEDICINE

## 2017-12-29 PROCEDURE — G8484 FLU IMMUNIZE NO ADMIN: HCPCS | Performed by: INTERNAL MEDICINE

## 2017-12-29 PROCEDURE — 93000 ELECTROCARDIOGRAM COMPLETE: CPT | Performed by: INTERNAL MEDICINE

## 2017-12-29 PROCEDURE — G8417 CALC BMI ABV UP PARAM F/U: HCPCS | Performed by: INTERNAL MEDICINE

## 2017-12-29 RX ORDER — LEVOFLOXACIN 500 MG/1
500 TABLET, FILM COATED ORAL DAILY
Qty: 7 TABLET | Refills: 0 | Status: SHIPPED | OUTPATIENT
Start: 2017-12-29 | End: 2018-01-05

## 2017-12-29 RX ORDER — AZITHROMYCIN 250 MG/1
500 TABLET, FILM COATED ORAL DAILY
Qty: 10 TABLET | Refills: 0 | Status: CANCELLED | OUTPATIENT
Start: 2017-12-29 | End: 2018-01-03

## 2017-12-29 RX ORDER — PREDNISONE 20 MG/1
20 TABLET ORAL DAILY
Qty: 5 TABLET | Refills: 0 | Status: SHIPPED | OUTPATIENT
Start: 2017-12-29 | End: 2018-01-03

## 2017-12-29 NOTE — PROGRESS NOTES
injury) ([facility] [address]    Falls    Subcutaneous hematoma    Subdural hematoma, chronic (HCC)    Subdural hematoma (HCC)    Paroxysmal a-fib (HCC)    Lower urinary tract infectious disease    Candida infection    Hypophosphatemia    Volume overload    Dyspnea on exertion    Acute on chronic diastolic CHF (congestive heart failure), NYHA class 1 (HCC)    Hypothyroidism    Volume depletion    Dyspnea and respiratory abnormalities    Pneumonia due to organism    Hyponatremia    Hypokalemia    Azotemia    VHD (valvular heart disease)    Tachycardia sinus versus atrial    Dizziness and at times vertigo when she gets up and walk    Junctional rhythm rate 54 and with underlying afib    Bradycardia    Dizzy spells    Lightheadedness    S/P CABG x 4- 2011 at [facility] Coronary artery disease involving native coronary artery of native heart without angina pectoris    Acute pancreatitis    Leukocytosis    Chest pain    Hyperglycemia    Abdominal pain, epigastric    Accelerated hypertension    Calculus of gallbladder with acute cholecystitis    SIRS (systemic inflammatory response syndrome) (HCC)    Pancreatitis due to biliary obstruction    Calculus of gallbladder with acute cholecystitis without obstruction    Acute biliary pancreatitis    Pneumonia of both lower lobes due to infectious organism    Pre-op evaluation    Essential hypertension    Systolic congestive heart failure (HCC)    Weakness    Chronic systolic congestive heart failure (HCC)    Chronic atrial fibrillation (HCC)    SOB (shortness of breath) on exertion    Essential hypertension    Bilateral leg edema +1    Acute bronchitis    Pre-op evaluation for tooth extraction    Acute cystitis       Past Surgical History:   Procedure Laterality Date    APPENDECTOMY      BACK SURGERY      TERESA HOLE  12/25/2014    drainage of left SDH    CARDIAC SURGERY      COLONOSCOPY      CORONARY ARTERY BYPASS GRAFT  9/27/11    LIMA to mononitrate (IMDUR) 30 MG extended release tablet TAKE 1 TABLET BY MOUTH DAILY 90 tablet 3    bumetanide (BUMEX) 0.5 MG tablet TAKE 1 TABLET BY MOUTH DAILY 90 tablet 3    spironolactone (ALDACTONE) 25 MG tablet TAKE 1/2 TABLET BY MOUTH EVERY DAY 45 tablet 3    levothyroxine (SYNTHROID) 75 MCG tablet Take 1 tablet by mouth Daily Indications: alternates with 88mcg every other week. 45 tablet 1    atorvastatin (LIPITOR) 20 MG tablet TAKE 1 TABLET BY MOUTH NIGHTLY 90 tablet 3    Multiple Vitamins-Minerals (THERAPEUTIC MULTIVITAMIN-MINERALS) tablet Take 1 tablet by mouth daily 30 tablet 11    vitamin D (ERGOCALCIFEROL) 66323 UNITS CAPS capsule TAKE 1 CAPSULE BY MOUTH EVERY 28 DAYS ON THE 18TH OF EVERY MONTH 3 capsule 3    citalopram (CELEXA) 40 MG tablet TAKE 1 TABLET BY MOUTH DAILY 90 tablet 11    Chlorpheniramine-Acetaminophen (CORICIDIN HBP COLD/FLU PO) Take by mouth Indications: takes as needed for cough       Acetaminophen 650 MG TABS Take 650 mg by mouth every 4 hours as needed 120 tablet 3    bimatoprost (LUMIGAN) 0.01 % SOLN ophthalmic drops Place 1 drop into both eyes nightly      calcium carbonate (OSCAL) 500 MG TABS tablet Take 500 mg by mouth daily      aspirin 81 MG EC tablet Take 81 mg by mouth daily.  magnesium oxide (MAG-OX) 400 (241.3 MG) MG TABS tablet Take 1 tablet by mouth daily. 30 tablet 3    polyethylene glycol (GLYCOLAX) powder Take 17 g by mouth daily. No current facility-administered medications for this visit. Review of Systems -     General ROS: negative  Psychological ROS: negative  Hematological and Lymphatic ROS: No history of blood clots or bleeding disorder.    Respiratory ROS: no cough, shortness of breath, or wheezing  Cardiovascular ROS: no chest pain or dyspnea on exertion  Gastrointestinal ROS: negative  Genito-Urinary ROS: no dysuria, trouble voiding, or hematuria  Musculoskeletal ROS: negative  Neurological ROS: no TIA or stroke symptoms  Dermatological ROS: negative      Blood pressure 130/64, pulse 83, height 4' 9.01\" (1.448 m), weight 131 lb (59.4 kg), not currently breastfeeding. Physical Examination:    General appearance - alert, well appearing, and in no distress  Mental status - alert, oriented to person, place, and time  Neck - supple, no significant adenopathy, no JVD, or carotid bruits  Chest - clear to auscultation, no wheezes, rales or rhonchi, symmetric air entry  Heart - normal rate, regular rhythm, normal S1, S2, no murmurs, rubs, clicks or gallops  Abdomen - soft, nontender, nondistended, no masses or organomegaly  Neurological - alert, oriented, normal speech, no focal findings or movement disorder noted  Musculoskeletal - no joint tenderness, deformity or swelling  Extremities - peripheral pulses normal, no pedal edema, no clubbing or cyanosis  Skin - normal coloration and turgor, no rashes, no suspicious skin lesions noted    Lab  No results for input(s): CKTOTAL, CKMB, CKMBINDEX, TROPONINI in the last 72 hours.   CBC:   Lab Results   Component Value Date    WBC 7.0 12/29/2017    RBC 3.83 12/29/2017    HGB 11.3 12/29/2017    HCT 33.2 12/29/2017    MCV 86.9 12/29/2017    MCH 29.6 12/29/2017    MCHC 34.0 12/29/2017    RDW 16.6 12/29/2017     12/29/2017    MPV 8.9 12/29/2017     BMP:    Lab Results   Component Value Date     12/29/2017    K 4.1 12/29/2017    CL 94 12/29/2017    CO2 30 12/29/2017    BUN 24 12/29/2017    LABALBU 4.2 04/07/2017    CREATININE 0.9 12/29/2017    CALCIUM 9.5 12/29/2017    LABGLOM 59 12/29/2017    GLUCOSE 103 12/29/2017    GLUCOSE 109 11/08/2014     Hepatic Function Panel:    Lab Results   Component Value Date    ALKPHOS 71 04/07/2017    ALT 14 04/07/2017    AST 25 04/07/2017    PROT 7.4 04/07/2017    BILITOT 1.0 04/07/2017    BILIDIR <0.2 04/07/2017    LABALBU 4.2 04/07/2017     Magnesium:    Lab Results   Component Value Date    MG 2.0 09/25/2017     Warfarin PT/INR:    No components found for: Renetta Schultz  HgBA1c:    Lab Results   Component Value Date    LABA1C 4.9 05/11/2016     FLP:    Lab Results   Component Value Date    TRIG 53 04/07/2017    HDL 51 04/07/2017    LDLCALC 64 04/07/2017    LABVLDL 19 11/08/2014     TSH:    Lab Results   Component Value Date    TSH 3.430 04/07/2017     ECHO EF 55%    EKG  atrial fibrillation with slow Vent response rate 55 bpm  Low voltage in precordial leads.    - Old  Extensive anterior-lateral and  Old  Inferior infarct  -Left axis secondary to infarct -consider anterior fascicular block. -  Nonspecific T-abnormality. ABNORMAL       CONCLUSION:  1. This is an abnormal Holter monitor finding with underlying atrial  fibrillation and junctional rhythm. The junctional rhythm with  average heart rate of 55 beats per minute, ranging from 45 to 83  beats per minute. 2. No significant pause of more than 1.6 seconds noted. 3. Rare ventricular ectopic beats noted. Rare supraventricular  ectopic beats noted. RECOMMENDATION: The patient is on low dose Lopressor 12.5 twice a day. I  believe it is prudent to stop the Lopressor at this level and consider down  the line repeating Holter monitor. At this level, the patient does not need  a pacemaker, but down the line, the patient is bound to end up in a  pacemaker, so repeating down the line the Holter monitor will be of paramount  importance. Roberto Charles M.D.  D: 11/14/2014 23:16     EKG 5/7/15Undetermined   Tachycardia  - occasional ectopic ventricular beat     -Anterior infarct -age undetermined  -Old inferior-apical infarct  -Left axis secondary to infarct -consider anterior fascicular block. Appears more sinus tachy or atrial tachy  ABNORMAL -    Minimum heart rate was 53 beats per minute, maximum  heart rate was 130 beats per minute. Average heart rate was 77 beats per  minute.  Predominant rhythm is intermittent atrial fibrillation rhythm with  episodes of rapid ventricular with  controlled ventricular rate. No significant tachyarrhythmia. There was 1 episode which was composed of only 8 beats.         Roberto Espino M.D.  Citlali Finleynisha  D: 06/07/2016 17:53 T: 06/07/2016 20:04 als          Assessment      1. Chronic systolic congestive heart failure (HCC)  CBC    Basic Metabolic Panel    XR CHEST STANDARD (2 VW)    Urinalysis    predniSONE (DELTASONE) 20 MG tablet   2. Acute bronchitis, wheezy, unspecified organism  CBC    Basic Metabolic Panel    XR CHEST STANDARD (2 VW)    Urinalysis    predniSONE (DELTASONE) 20 MG tablet   3. Acute cystitis without hematuria     4. Pre-op evaluation for tooth extraction  CBC    Basic Metabolic Panel    XR CHEST STANDARD (2 VW)    Urinalysis    predniSONE (DELTASONE) 20 MG tablet   5. Bilateral leg edema +1  CBC    Basic Metabolic Panel    XR CHEST STANDARD (2 VW)    Urinalysis    predniSONE (DELTASONE) 20 MG tablet   6. Coronary artery disease involving native coronary artery of native heart without angina pectoris  EKG 12 Lead    CBC    Basic Metabolic Panel    XR CHEST STANDARD (2 VW)    Urinalysis    predniSONE (DELTASONE) 20 MG tablet   7. Chronic atrial fibrillation (HCC)  CBC    Basic Metabolic Panel    XR CHEST STANDARD (2 VW)    Urinalysis    predniSONE (DELTASONE) 20 MG tablet   8. Pure hypercholesterolemia  CBC    Basic Metabolic Panel    XR CHEST STANDARD (2 VW)    Urinalysis    predniSONE (DELTASONE) 20 MG tablet   9. SOB (shortness of breath) on exertion  CBC    Basic Metabolic Panel    XR CHEST STANDARD (2 VW)    Urinalysis    predniSONE (DELTASONE) 20 MG tablet   10.  S/P CABG x 4- 2011 at [place]  CBC    Basic Metabolic Panel    XR CHEST STANDARD (2 VW)    Urinalysis    predniSONE (DELTASONE) 20 MG tablet        Depression on celexa recently started and improving      Pre-op  Gallstone pancreatitis  PAF - currently afib cvr- chads2 = 5 (HTN,chf,age,tia) - not on anticoagulation due to fall hx and SDH hx    Hx CAD - s/p CABG x4 2011 at

## 2018-01-08 RX ORDER — DIPHENOXYLATE HYDROCHLORIDE AND ATROPINE SULFATE 2.5; .025 MG/1; MG/1
1 TABLET ORAL DAILY
Qty: 30 TABLET | Refills: 0 | Status: SHIPPED | OUTPATIENT
Start: 2018-01-08 | End: 2018-02-05 | Stop reason: SDUPTHER

## 2018-02-05 RX ORDER — DIPHENOXYLATE HYDROCHLORIDE AND ATROPINE SULFATE 2.5; .025 MG/1; MG/1
1 TABLET ORAL DAILY
Qty: 30 TABLET | Refills: 0 | Status: ON HOLD | OUTPATIENT
Start: 2018-02-05 | End: 2020-01-01

## 2018-02-06 RX ORDER — SUCRALFATE 1 G/1
TABLET ORAL
Qty: 360 TABLET | Refills: 0 | Status: ON HOLD | OUTPATIENT
Start: 2018-02-06 | End: 2018-08-24

## 2018-02-15 ENCOUNTER — APPOINTMENT (OUTPATIENT)
Dept: GENERAL RADIOLOGY | Age: 83
DRG: 189 | End: 2018-02-15
Payer: MEDICARE

## 2018-02-15 ENCOUNTER — HOSPITAL ENCOUNTER (INPATIENT)
Age: 83
LOS: 5 days | Discharge: NURSING, REHAB OR LONG-TERM CARE | DRG: 189 | End: 2018-02-20
Attending: INTERNAL MEDICINE | Admitting: INTERNAL MEDICINE
Payer: MEDICARE

## 2018-02-15 ENCOUNTER — APPOINTMENT (OUTPATIENT)
Dept: CT IMAGING | Age: 83
DRG: 189 | End: 2018-02-15
Payer: MEDICARE

## 2018-02-15 DIAGNOSIS — E87.1 HYPONATREMIA: ICD-10-CM

## 2018-02-15 DIAGNOSIS — R09.02 HYPOXIA: Primary | ICD-10-CM

## 2018-02-15 DIAGNOSIS — R50.9 FEVER, UNSPECIFIED FEVER CAUSE: ICD-10-CM

## 2018-02-15 DIAGNOSIS — I50.9 CONGESTIVE HEART FAILURE, UNSPECIFIED CONGESTIVE HEART FAILURE CHRONICITY, UNSPECIFIED CONGESTIVE HEART FAILURE TYPE: ICD-10-CM

## 2018-02-15 PROBLEM — I50.43 CHF (CONGESTIVE HEART FAILURE), NYHA CLASS I, ACUTE ON CHRONIC, COMBINED (HCC): Status: ACTIVE | Noted: 2018-02-15

## 2018-02-15 LAB
ALBUMIN SERPL-MCNC: 3.8 G/DL (ref 3.5–5.1)
ALLEN TEST: POSITIVE
ALP BLD-CCNC: 58 U/L (ref 38–126)
ALT SERPL-CCNC: 15 U/L (ref 11–66)
ANION GAP SERPL CALCULATED.3IONS-SCNC: 15 MEQ/L (ref 8–16)
ANISOCYTOSIS: ABNORMAL
AST SERPL-CCNC: 27 U/L (ref 5–40)
BACTERIA: ABNORMAL /HPF
BASE EXCESS (CALCULATED): 3.7 MMOL/L (ref -2.5–2.5)
BASOPHILS # BLD: 0.3 %
BASOPHILS ABSOLUTE: 0 THOU/MM3 (ref 0–0.1)
BILIRUB SERPL-MCNC: 0.8 MG/DL (ref 0.3–1.2)
BILIRUBIN URINE: NEGATIVE
BLOOD, URINE: ABNORMAL
BUN BLDV-MCNC: 27 MG/DL (ref 7–22)
CALCIUM SERPL-MCNC: 9 MG/DL (ref 8.5–10.5)
CASTS 2: ABNORMAL /LPF
CASTS UA: ABNORMAL /LPF
CHARACTER, URINE: CLEAR
CHLORIDE BLD-SCNC: 90 MEQ/L (ref 98–111)
CO2: 25 MEQ/L (ref 23–33)
COLLECTED BY:: ABNORMAL
COLOR: YELLOW
CREAT SERPL-MCNC: 0.9 MG/DL (ref 0.4–1.2)
CRYSTALS, UA: ABNORMAL
DEVICE: ABNORMAL
EOSINOPHIL # BLD: 0.1 %
EOSINOPHILS ABSOLUTE: 0 THOU/MM3 (ref 0–0.4)
EPITHELIAL CELLS, UA: ABNORMAL /HPF
FLU A ANTIGEN: NEGATIVE
FLU B ANTIGEN: NEGATIVE
GFR SERPL CREATININE-BSD FRML MDRD: 59 ML/MIN/1.73M2
GLUCOSE BLD-MCNC: 127 MG/DL (ref 70–108)
GLUCOSE URINE: NEGATIVE MG/DL
HCO3: 28 MMOL/L (ref 23–28)
HCT VFR BLD CALC: 33.8 % (ref 37–47)
HEMOGLOBIN: 11.5 GM/DL (ref 12–16)
IFIO2: 3
INR BLD: 1.1 (ref 0.85–1.13)
KETONES, URINE: NEGATIVE
LACTIC ACID: 1.2 MMOL/L (ref 0.5–2.2)
LEUKOCYTE ESTERASE, URINE: NEGATIVE
LYMPHOCYTES # BLD: 9.6 %
LYMPHOCYTES ABSOLUTE: 0.9 THOU/MM3 (ref 1–4.8)
MCH RBC QN AUTO: 29.1 PG (ref 27–31)
MCHC RBC AUTO-ENTMCNC: 33.9 GM/DL (ref 33–37)
MCV RBC AUTO: 85.9 FL (ref 81–99)
MISCELLANEOUS 2: ABNORMAL
MONOCYTES # BLD: 8 %
MONOCYTES ABSOLUTE: 0.7 THOU/MM3 (ref 0.4–1.3)
NITRITE, URINE: NEGATIVE
NUCLEATED RED BLOOD CELLS: 0 /100 WBC
O2 SATURATION: 96 %
OSMOLALITY CALCULATION: 267.5 MOSMOL/KG (ref 275–300)
PCO2: 38 MMHG (ref 35–45)
PDW BLD-RTO: 16.8 % (ref 11.5–14.5)
PH BLOOD GAS: 7.47 (ref 7.35–7.45)
PH UA: 6.5
PLATELET # BLD: 119 THOU/MM3 (ref 130–400)
PMV BLD AUTO: 9 FL (ref 7.4–10.4)
PO2: 75 MMHG (ref 71–104)
POTASSIUM SERPL-SCNC: 3.8 MEQ/L (ref 3.5–5.2)
PRO-BNP: ABNORMAL PG/ML (ref 0–1800)
PROTEIN UA: ABNORMAL
RBC # BLD: 3.94 MILL/MM3 (ref 4.2–5.4)
RBC URINE: ABNORMAL /HPF
RENAL EPITHELIAL, UA: ABNORMAL
SEG NEUTROPHILS: 82 %
SEGMENTED NEUTROPHILS ABSOLUTE COUNT: 7.5 THOU/MM3 (ref 1.8–7.7)
SODIUM BLD-SCNC: 130 MEQ/L (ref 135–145)
SOURCE, BLOOD GAS: ABNORMAL
SPECIFIC GRAVITY, URINE: 1.01 (ref 1–1.03)
TOTAL PROTEIN: 6.7 G/DL (ref 6.1–8)
TROPONIN T: < 0.01 NG/ML
UROBILINOGEN, URINE: 0.2 EU/DL
WBC # BLD: 9.1 THOU/MM3 (ref 4.8–10.8)
WBC UA: ABNORMAL /HPF
YEAST: ABNORMAL

## 2018-02-15 PROCEDURE — 36600 WITHDRAWAL OF ARTERIAL BLOOD: CPT

## 2018-02-15 PROCEDURE — 6360000002 HC RX W HCPCS: Performed by: INTERNAL MEDICINE

## 2018-02-15 PROCEDURE — 93005 ELECTROCARDIOGRAM TRACING: CPT | Performed by: INTERNAL MEDICINE

## 2018-02-15 PROCEDURE — 85025 COMPLETE CBC W/AUTO DIFF WBC: CPT

## 2018-02-15 PROCEDURE — 80053 COMPREHEN METABOLIC PANEL: CPT

## 2018-02-15 PROCEDURE — 82803 BLOOD GASES ANY COMBINATION: CPT

## 2018-02-15 PROCEDURE — 81001 URINALYSIS AUTO W/SCOPE: CPT

## 2018-02-15 PROCEDURE — 87040 BLOOD CULTURE FOR BACTERIA: CPT

## 2018-02-15 PROCEDURE — 2580000003 HC RX 258: Performed by: INTERNAL MEDICINE

## 2018-02-15 PROCEDURE — 6370000000 HC RX 637 (ALT 250 FOR IP): Performed by: INTERNAL MEDICINE

## 2018-02-15 PROCEDURE — 94640 AIRWAY INHALATION TREATMENT: CPT

## 2018-02-15 PROCEDURE — 2140000000 HC CCU INTERMEDIATE R&B

## 2018-02-15 PROCEDURE — 84484 ASSAY OF TROPONIN QUANT: CPT

## 2018-02-15 PROCEDURE — 83605 ASSAY OF LACTIC ACID: CPT

## 2018-02-15 PROCEDURE — 71250 CT THORAX DX C-: CPT

## 2018-02-15 PROCEDURE — 83880 ASSAY OF NATRIURETIC PEPTIDE: CPT

## 2018-02-15 PROCEDURE — 96375 TX/PRO/DX INJ NEW DRUG ADDON: CPT

## 2018-02-15 PROCEDURE — 96374 THER/PROPH/DIAG INJ IV PUSH: CPT

## 2018-02-15 PROCEDURE — 71046 X-RAY EXAM CHEST 2 VIEWS: CPT

## 2018-02-15 PROCEDURE — 6360000002 HC RX W HCPCS: Performed by: HOSPITALIST

## 2018-02-15 PROCEDURE — 99223 1ST HOSP IP/OBS HIGH 75: CPT | Performed by: HOSPITALIST

## 2018-02-15 PROCEDURE — 99285 EMERGENCY DEPT VISIT HI MDM: CPT

## 2018-02-15 PROCEDURE — 85610 PROTHROMBIN TIME: CPT

## 2018-02-15 PROCEDURE — 36415 COLL VENOUS BLD VENIPUNCTURE: CPT

## 2018-02-15 PROCEDURE — 87804 INFLUENZA ASSAY W/OPTIC: CPT

## 2018-02-15 RX ORDER — ATORVASTATIN CALCIUM 20 MG/1
20 TABLET, FILM COATED ORAL NIGHTLY
Status: DISCONTINUED | OUTPATIENT
Start: 2018-02-16 | End: 2018-02-20 | Stop reason: HOSPADM

## 2018-02-15 RX ORDER — FUROSEMIDE 10 MG/ML
20 INJECTION INTRAMUSCULAR; INTRAVENOUS ONCE
Status: COMPLETED | OUTPATIENT
Start: 2018-02-15 | End: 2018-02-15

## 2018-02-15 RX ORDER — ACETAMINOPHEN 325 MG/1
650 TABLET ORAL EVERY 4 HOURS PRN
Status: DISCONTINUED | OUTPATIENT
Start: 2018-02-15 | End: 2018-02-20 | Stop reason: HOSPADM

## 2018-02-15 RX ORDER — LATANOPROST 50 UG/ML
1 SOLUTION/ DROPS OPHTHALMIC NIGHTLY
Status: DISCONTINUED | OUTPATIENT
Start: 2018-02-16 | End: 2018-02-20 | Stop reason: HOSPADM

## 2018-02-15 RX ORDER — LEVOTHYROXINE SODIUM 0.07 MG/1
75 TABLET ORAL DAILY
Status: COMPLETED | OUTPATIENT
Start: 2018-02-16 | End: 2018-02-17

## 2018-02-15 RX ORDER — IPRATROPIUM BROMIDE AND ALBUTEROL SULFATE 2.5; .5 MG/3ML; MG/3ML
1 SOLUTION RESPIRATORY (INHALATION) ONCE
Status: COMPLETED | OUTPATIENT
Start: 2018-02-15 | End: 2018-02-15

## 2018-02-15 RX ORDER — SODIUM CHLORIDE 0.9 % (FLUSH) 0.9 %
10 SYRINGE (ML) INJECTION PRN
Status: DISCONTINUED | OUTPATIENT
Start: 2018-02-15 | End: 2018-02-20 | Stop reason: HOSPADM

## 2018-02-15 RX ORDER — ASPIRIN 81 MG/1
81 TABLET ORAL DAILY
Status: DISCONTINUED | OUTPATIENT
Start: 2018-02-16 | End: 2018-02-18

## 2018-02-15 RX ORDER — ONDANSETRON 2 MG/ML
4 INJECTION INTRAMUSCULAR; INTRAVENOUS EVERY 6 HOURS PRN
Status: DISCONTINUED | OUTPATIENT
Start: 2018-02-15 | End: 2018-02-20 | Stop reason: HOSPADM

## 2018-02-15 RX ORDER — ERGOCALCIFEROL 1.25 MG/1
50000 CAPSULE ORAL WEEKLY
Status: DISCONTINUED | OUTPATIENT
Start: 2018-02-15 | End: 2018-02-16 | Stop reason: ALTCHOICE

## 2018-02-15 RX ORDER — SPIRONOLACTONE 25 MG/1
25 TABLET ORAL DAILY
Status: DISCONTINUED | OUTPATIENT
Start: 2018-02-16 | End: 2018-02-16 | Stop reason: ALTCHOICE

## 2018-02-15 RX ORDER — DILTIAZEM HYDROCHLORIDE 5 MG/ML
10 INJECTION INTRAVENOUS ONCE
Status: COMPLETED | OUTPATIENT
Start: 2018-02-16 | End: 2018-02-16

## 2018-02-15 RX ORDER — CITALOPRAM 40 MG/1
40 TABLET ORAL DAILY
Status: DISCONTINUED | OUTPATIENT
Start: 2018-02-16 | End: 2018-02-16

## 2018-02-15 RX ORDER — ISOSORBIDE MONONITRATE 30 MG/1
30 TABLET, EXTENDED RELEASE ORAL DAILY
Status: DISCONTINUED | OUTPATIENT
Start: 2018-02-16 | End: 2018-02-20 | Stop reason: HOSPADM

## 2018-02-15 RX ORDER — FUROSEMIDE 10 MG/ML
40 INJECTION INTRAMUSCULAR; INTRAVENOUS 2 TIMES DAILY
Status: DISCONTINUED | OUTPATIENT
Start: 2018-02-16 | End: 2018-02-16

## 2018-02-15 RX ORDER — SUCRALFATE 1 G/1
1 TABLET ORAL
Status: DISCONTINUED | OUTPATIENT
Start: 2018-02-16 | End: 2018-02-20 | Stop reason: HOSPADM

## 2018-02-15 RX ORDER — AZITHROMYCIN 250 MG/1
500 TABLET, FILM COATED ORAL ONCE
Status: COMPLETED | OUTPATIENT
Start: 2018-02-15 | End: 2018-02-15

## 2018-02-15 RX ORDER — DIGOXIN 0.25 MG/ML
125 INJECTION INTRAMUSCULAR; INTRAVENOUS ONCE
Status: COMPLETED | OUTPATIENT
Start: 2018-02-15 | End: 2018-02-15

## 2018-02-15 RX ORDER — SODIUM CHLORIDE 0.9 % (FLUSH) 0.9 %
10 SYRINGE (ML) INJECTION EVERY 12 HOURS SCHEDULED
Status: DISCONTINUED | OUTPATIENT
Start: 2018-02-15 | End: 2018-02-20 | Stop reason: HOSPADM

## 2018-02-15 RX ORDER — PANTOPRAZOLE SODIUM 40 MG/10ML
40 INJECTION, POWDER, LYOPHILIZED, FOR SOLUTION INTRAVENOUS DAILY
Status: DISCONTINUED | OUTPATIENT
Start: 2018-02-16 | End: 2018-02-16

## 2018-02-15 RX ADMIN — FUROSEMIDE 20 MG: 10 INJECTION, SOLUTION INTRAMUSCULAR; INTRAVENOUS at 21:20

## 2018-02-15 RX ADMIN — DIGOXIN 125 MCG: 0.25 INJECTION INTRAMUSCULAR; INTRAVENOUS at 23:14

## 2018-02-15 RX ADMIN — WATER 1 G: 1 INJECTION INTRAMUSCULAR; INTRAVENOUS; SUBCUTANEOUS at 21:25

## 2018-02-15 RX ADMIN — IPRATROPIUM BROMIDE AND ALBUTEROL SULFATE 1 AMPULE: .5; 3 SOLUTION RESPIRATORY (INHALATION) at 20:14

## 2018-02-15 RX ADMIN — AZITHROMYCIN 500 MG: 250 TABLET, FILM COATED ORAL at 21:20

## 2018-02-15 ASSESSMENT — ENCOUNTER SYMPTOMS
NAUSEA: 0
CONSTIPATION: 0
RHINORRHEA: 0
CHEST TIGHTNESS: 0
COUGH: 1
BLOOD IN STOOL: 0
BACK PAIN: 0
VOMITING: 0
SHORTNESS OF BREATH: 1
ABDOMINAL PAIN: 0
DIARRHEA: 0
SORE THROAT: 0
WHEEZING: 0

## 2018-02-16 PROBLEM — E44.0 MODERATE MALNUTRITION (HCC): Status: ACTIVE | Noted: 2018-02-16

## 2018-02-16 PROBLEM — D69.6 THROMBOCYTOPENIA (HCC): Status: ACTIVE | Noted: 2018-02-16

## 2018-02-16 PROBLEM — J11.1 INFLUENZA: Status: ACTIVE | Noted: 2018-02-16

## 2018-02-16 PROBLEM — D69.1 THROMBOCYTOPATHIA (HCC): Status: ACTIVE | Noted: 2018-02-16

## 2018-02-16 PROBLEM — I50.32 CHRONIC DIASTOLIC HEART FAILURE (HCC): Status: ACTIVE | Noted: 2018-02-15

## 2018-02-16 PROBLEM — J20.8 ACUTE VIRAL BRONCHITIS: Status: ACTIVE | Noted: 2017-12-29

## 2018-02-16 PROBLEM — J96.01 ACUTE RESPIRATORY FAILURE WITH HYPOXIA (HCC): Status: ACTIVE | Noted: 2018-02-16

## 2018-02-16 LAB
ANION GAP SERPL CALCULATED.3IONS-SCNC: 14 MEQ/L (ref 8–16)
BUN BLDV-MCNC: 26 MG/DL (ref 7–22)
CALCIUM SERPL-MCNC: 8.6 MG/DL (ref 8.5–10.5)
CHLORIDE BLD-SCNC: 89 MEQ/L (ref 98–111)
CO2: 27 MEQ/L (ref 23–33)
CREAT SERPL-MCNC: 1 MG/DL (ref 0.4–1.2)
EKG ATRIAL RATE: 82 BPM
EKG Q-T INTERVAL: 368 MS
EKG QRS DURATION: 124 MS
EKG QTC CALCULATION (BAZETT): 442 MS
EKG R AXIS: -106 DEGREES
EKG T AXIS: 88 DEGREES
EKG VENTRICULAR RATE: 87 BPM
GFR SERPL CREATININE-BSD FRML MDRD: 52 ML/MIN/1.73M2
GLUCOSE BLD-MCNC: 123 MG/DL (ref 70–108)
HCT VFR BLD CALC: 32.5 % (ref 37–47)
HEMOGLOBIN: 11.2 GM/DL (ref 12–16)
LV EF: 53 %
LVEF MODALITY: NORMAL
MAGNESIUM: 1.6 MG/DL (ref 1.6–2.4)
MCH RBC QN AUTO: 29.6 PG (ref 27–31)
MCHC RBC AUTO-ENTMCNC: 34.6 GM/DL (ref 33–37)
MCV RBC AUTO: 85.5 FL (ref 81–99)
PDW BLD-RTO: 16.9 % (ref 11.5–14.5)
PHOSPHORUS: 2.9 MG/DL (ref 2.4–4.7)
PLATELET # BLD: 106 THOU/MM3 (ref 130–400)
PMV BLD AUTO: 9.1 FL (ref 7.4–10.4)
POTASSIUM SERPL-SCNC: 3.2 MEQ/L (ref 3.5–5.2)
PRO-BNP: ABNORMAL PG/ML (ref 0–1800)
RBC # BLD: 3.8 MILL/MM3 (ref 4.2–5.4)
SODIUM BLD-SCNC: 130 MEQ/L (ref 135–145)
TROPONIN T: 0.01 NG/ML
TROPONIN T: < 0.01 NG/ML
TSH SERPL DL<=0.05 MIU/L-ACNC: 3.05 UIU/ML (ref 0.4–4.2)
WBC # BLD: 8 THOU/MM3 (ref 4.8–10.8)

## 2018-02-16 PROCEDURE — 84100 ASSAY OF PHOSPHORUS: CPT

## 2018-02-16 PROCEDURE — 94640 AIRWAY INHALATION TREATMENT: CPT

## 2018-02-16 PROCEDURE — 1200000003 HC TELEMETRY R&B

## 2018-02-16 PROCEDURE — 6370000000 HC RX 637 (ALT 250 FOR IP): Performed by: INTERNAL MEDICINE

## 2018-02-16 PROCEDURE — 2580000003 HC RX 258: Performed by: HOSPITALIST

## 2018-02-16 PROCEDURE — 6370000000 HC RX 637 (ALT 250 FOR IP): Performed by: HOSPITALIST

## 2018-02-16 PROCEDURE — 94660 CPAP INITIATION&MGMT: CPT

## 2018-02-16 PROCEDURE — 6360000002 HC RX W HCPCS: Performed by: HOSPITALIST

## 2018-02-16 PROCEDURE — 2500000003 HC RX 250 WO HCPCS: Performed by: HOSPITALIST

## 2018-02-16 PROCEDURE — 83735 ASSAY OF MAGNESIUM: CPT

## 2018-02-16 PROCEDURE — 36415 COLL VENOUS BLD VENIPUNCTURE: CPT

## 2018-02-16 PROCEDURE — 93306 TTE W/DOPPLER COMPLETE: CPT

## 2018-02-16 PROCEDURE — 6360000002 HC RX W HCPCS: Performed by: INTERNAL MEDICINE

## 2018-02-16 PROCEDURE — 84484 ASSAY OF TROPONIN QUANT: CPT

## 2018-02-16 PROCEDURE — 85027 COMPLETE CBC AUTOMATED: CPT

## 2018-02-16 PROCEDURE — C9113 INJ PANTOPRAZOLE SODIUM, VIA: HCPCS | Performed by: HOSPITALIST

## 2018-02-16 PROCEDURE — 93010 ELECTROCARDIOGRAM REPORT: CPT | Performed by: INTERNAL MEDICINE

## 2018-02-16 PROCEDURE — 2580000003 HC RX 258: Performed by: INTERNAL MEDICINE

## 2018-02-16 PROCEDURE — 99233 SBSQ HOSP IP/OBS HIGH 50: CPT | Performed by: INTERNAL MEDICINE

## 2018-02-16 PROCEDURE — 2700000000 HC OXYGEN THERAPY PER DAY

## 2018-02-16 PROCEDURE — 84443 ASSAY THYROID STIM HORMONE: CPT

## 2018-02-16 PROCEDURE — 83880 ASSAY OF NATRIURETIC PEPTIDE: CPT

## 2018-02-16 PROCEDURE — 80048 BASIC METABOLIC PNL TOTAL CA: CPT

## 2018-02-16 RX ORDER — POTASSIUM CHLORIDE 20 MEQ/1
40 TABLET, EXTENDED RELEASE ORAL PRN
Status: DISCONTINUED | OUTPATIENT
Start: 2018-02-16 | End: 2018-02-20 | Stop reason: HOSPADM

## 2018-02-16 RX ORDER — POTASSIUM CHLORIDE 20 MEQ/1
40 TABLET, EXTENDED RELEASE ORAL ONCE
Status: COMPLETED | OUTPATIENT
Start: 2018-02-16 | End: 2018-02-16

## 2018-02-16 RX ORDER — MAGNESIUM SULFATE IN WATER 40 MG/ML
2 INJECTION, SOLUTION INTRAVENOUS ONCE
Status: COMPLETED | OUTPATIENT
Start: 2018-02-16 | End: 2018-02-16

## 2018-02-16 RX ORDER — POTASSIUM CHLORIDE 7.45 MG/ML
10 INJECTION INTRAVENOUS PRN
Status: DISCONTINUED | OUTPATIENT
Start: 2018-02-16 | End: 2018-02-20 | Stop reason: HOSPADM

## 2018-02-16 RX ORDER — SODIUM CHLORIDE 9 MG/ML
INJECTION, SOLUTION INTRAVENOUS CONTINUOUS
Status: DISCONTINUED | OUTPATIENT
Start: 2018-02-16 | End: 2018-02-17

## 2018-02-16 RX ORDER — ERGOCALCIFEROL 1.25 MG/1
50000 CAPSULE ORAL
Status: DISCONTINUED | OUTPATIENT
Start: 2018-02-18 | End: 2018-02-20 | Stop reason: HOSPADM

## 2018-02-16 RX ORDER — POTASSIUM CHLORIDE 20MEQ/15ML
40 LIQUID (ML) ORAL PRN
Status: DISCONTINUED | OUTPATIENT
Start: 2018-02-16 | End: 2018-02-20 | Stop reason: HOSPADM

## 2018-02-16 RX ORDER — IPRATROPIUM BROMIDE AND ALBUTEROL SULFATE 2.5; .5 MG/3ML; MG/3ML
1 SOLUTION RESPIRATORY (INHALATION)
Status: DISCONTINUED | OUTPATIENT
Start: 2018-02-16 | End: 2018-02-20 | Stop reason: HOSPADM

## 2018-02-16 RX ORDER — LEVOTHYROXINE SODIUM 88 UG/1
88 TABLET ORAL DAILY
Status: DISCONTINUED | OUTPATIENT
Start: 2018-02-18 | End: 2018-02-17

## 2018-02-16 RX ORDER — SPIRONOLACTONE 25 MG/1
12.5 TABLET ORAL DAILY
Status: DISCONTINUED | OUTPATIENT
Start: 2018-02-16 | End: 2018-02-16

## 2018-02-16 RX ORDER — PANTOPRAZOLE SODIUM 40 MG/1
40 TABLET, DELAYED RELEASE ORAL
Status: DISCONTINUED | OUTPATIENT
Start: 2018-02-17 | End: 2018-02-20 | Stop reason: HOSPADM

## 2018-02-16 RX ORDER — METOPROLOL TARTRATE 50 MG/1
50 TABLET, FILM COATED ORAL 2 TIMES DAILY
Status: DISCONTINUED | OUTPATIENT
Start: 2018-02-16 | End: 2018-02-16

## 2018-02-16 RX ORDER — OSELTAMIVIR PHOSPHATE 30 MG/1
30 CAPSULE ORAL 2 TIMES DAILY
Status: DISCONTINUED | OUTPATIENT
Start: 2018-02-16 | End: 2018-02-20 | Stop reason: HOSPADM

## 2018-02-16 RX ORDER — PREDNISONE 20 MG/1
40 TABLET ORAL DAILY
Status: DISCONTINUED | OUTPATIENT
Start: 2018-02-16 | End: 2018-02-20 | Stop reason: HOSPADM

## 2018-02-16 RX ORDER — 0.9 % SODIUM CHLORIDE 0.9 %
500 INTRAVENOUS SOLUTION INTRAVENOUS ONCE
Status: COMPLETED | OUTPATIENT
Start: 2018-02-16 | End: 2018-02-16

## 2018-02-16 RX ORDER — SPIRONOLACTONE 25 MG/1
12.5 TABLET ORAL DAILY
Status: ON HOLD | COMMUNITY
End: 2020-01-01 | Stop reason: SDUPTHER

## 2018-02-16 RX ADMIN — LEVOTHYROXINE SODIUM 75 MCG: 75 TABLET ORAL at 06:48

## 2018-02-16 RX ADMIN — OSELTAMIVIR PHOSPHATE 30 MG: 30 CAPSULE ORAL at 21:39

## 2018-02-16 RX ADMIN — SODIUM CHLORIDE 500 ML: 9 INJECTION, SOLUTION INTRAVENOUS at 13:04

## 2018-02-16 RX ADMIN — IPRATROPIUM BROMIDE AND ALBUTEROL SULFATE 1 AMPULE: .5; 3 SOLUTION RESPIRATORY (INHALATION) at 21:19

## 2018-02-16 RX ADMIN — SODIUM CHLORIDE: 9 INJECTION, SOLUTION INTRAVENOUS at 21:28

## 2018-02-16 RX ADMIN — IPRATROPIUM BROMIDE AND ALBUTEROL SULFATE 1 AMPULE: .5; 3 SOLUTION RESPIRATORY (INHALATION) at 17:10

## 2018-02-16 RX ADMIN — DILTIAZEM HYDROCHLORIDE 5 MG/HR: 5 INJECTION INTRAVENOUS at 00:53

## 2018-02-16 RX ADMIN — PANTOPRAZOLE SODIUM 40 MG: 40 INJECTION, POWDER, FOR SOLUTION INTRAVENOUS at 10:16

## 2018-02-16 RX ADMIN — LATANOPROST 1 DROP: 50 SOLUTION OPHTHALMIC at 01:01

## 2018-02-16 RX ADMIN — SUCRALFATE 1 G: 1 TABLET ORAL at 18:07

## 2018-02-16 RX ADMIN — POTASSIUM CHLORIDE 40 MEQ: 1500 TABLET, EXTENDED RELEASE ORAL at 10:44

## 2018-02-16 RX ADMIN — Medication 10 ML: at 01:08

## 2018-02-16 RX ADMIN — SUCRALFATE 1 G: 1 TABLET ORAL at 21:38

## 2018-02-16 RX ADMIN — OSELTAMIVIR PHOSPHATE 30 MG: 30 CAPSULE ORAL at 14:31

## 2018-02-16 RX ADMIN — SPIRONOLACTONE 12.5 MG: 25 TABLET, FILM COATED ORAL at 09:56

## 2018-02-16 RX ADMIN — ENOXAPARIN SODIUM 40 MG: 40 INJECTION SUBCUTANEOUS at 10:15

## 2018-02-16 RX ADMIN — ATORVASTATIN CALCIUM 20 MG: 20 TABLET, FILM COATED ORAL at 21:38

## 2018-02-16 RX ADMIN — PREDNISONE 40 MG: 20 TABLET ORAL at 14:31

## 2018-02-16 RX ADMIN — DILTIAZEM HYDROCHLORIDE 10 MG: 5 INJECTION INTRAVENOUS at 00:53

## 2018-02-16 RX ADMIN — FUROSEMIDE 40 MG: 10 INJECTION, SOLUTION INTRAMUSCULAR; INTRAVENOUS at 09:57

## 2018-02-16 RX ADMIN — ATORVASTATIN CALCIUM 20 MG: 20 TABLET, FILM COATED ORAL at 00:51

## 2018-02-16 RX ADMIN — METOPROLOL TARTRATE 50 MG: 50 TABLET, FILM COATED ORAL at 09:55

## 2018-02-16 RX ADMIN — MAGNESIUM SULFATE HEPTAHYDRATE 2 G: 40 INJECTION, SOLUTION INTRAVENOUS at 09:55

## 2018-02-16 RX ADMIN — SUCRALFATE 1 G: 1 TABLET ORAL at 00:51

## 2018-02-16 RX ADMIN — CITALOPRAM 40 MG: 40 TABLET, FILM COATED ORAL at 09:57

## 2018-02-16 RX ADMIN — ISOSORBIDE MONONITRATE 30 MG: 30 TABLET ORAL at 09:57

## 2018-02-16 RX ADMIN — SUCRALFATE 1 G: 1 TABLET ORAL at 13:12

## 2018-02-16 RX ADMIN — ASPIRIN 81 MG: 81 TABLET, COATED ORAL at 09:57

## 2018-02-16 RX ADMIN — Medication 10 ML: at 10:16

## 2018-02-16 RX ADMIN — FUROSEMIDE 40 MG: 10 INJECTION, SOLUTION INTRAMUSCULAR; INTRAVENOUS at 00:51

## 2018-02-16 RX ADMIN — ACETAMINOPHEN 650 MG: 325 TABLET ORAL at 10:15

## 2018-02-16 RX ADMIN — LATANOPROST 1 DROP: 50 SOLUTION OPHTHALMIC at 21:40

## 2018-02-16 RX ADMIN — SUCRALFATE 1 G: 1 TABLET ORAL at 09:56

## 2018-02-16 ASSESSMENT — PAIN SCALES - GENERAL
PAINLEVEL_OUTOF10: 0

## 2018-02-16 NOTE — ED PROVIDER NOTES
ms  QRS duration: 124 ms  QTc: 442 ms  P-R-T axes: *, 254, 88  Sinus rhythm with AV dissociation and wide QRS rhythm with premature supraventricular complexes  Inferior infarct, age undetermined  Anterolateral infarct, age undetermined  No STEMI    RADIOLOGY: non-plain film images(s) such as CT, Ultrasound and MRI are read by the radiologist.    XR CHEST STANDARD (2 VW)   Final Result      Large hiatal hernia and possible opacity in the right lower lobe correlate for pneumonia or portion of hernia. Consider chest CT for further evaluation if clinically indicated. Cardiomegaly. Age indeterminate infralateral right rib fracture. Correlate for point tenderness. **This report has been created using voice recognition software. It may contain minor errors which are inherent in voice recognition technology. **      Final report electronically signed by Dr. Julisa Boogie on 2/15/2018 8:25 PM          LABS:   Labs Reviewed   CBC WITH AUTO DIFFERENTIAL - Abnormal; Notable for the following:        Result Value    RBC 3.94 (*)     Hemoglobin 11.5 (*)     Hematocrit 33.8 (*)     RDW 16.8 (*)     Platelets 892 (*)     Lymphocytes # 0.9 (*)     All other components within normal limits   BRAIN NATRIURETIC PEPTIDE - Abnormal; Notable for the following:     Pro-BNP 78975.0 (*)     All other components within normal limits   COMPREHENSIVE METABOLIC PANEL - Abnormal; Notable for the following:     Glucose 127 (*)     BUN 27 (*)     Sodium 130 (*)     Chloride 90 (*)     All other components within normal limits   BLOOD GAS, ARTERIAL - Abnormal; Notable for the following:     pH, Blood Gas 7.47 (*)     Base Excess (Calculated) 3.7 (*)     All other components within normal limits   OSMOLALITY - Abnormal; Notable for the following:     Osmolality Calc 267.5 (*)     All other components within normal limits   GLOMERULAR FILTRATION RATE, ESTIMATED - Abnormal; Notable for the following:     Est, Glom Filt Rate 59 (*)     All other for and in the presence of Rubi Renner MD.    Signed by: Nory Donaldson, 02/15/18 9:29 PM    Provider:  I personally performed the services described in the documentation, reviewed and edited the documentation which was dictated to the scribe in my presence, and it accurately records my words and actions.     Rubi Renner MD 2/15/18 9:29 PM        Rubi Renner MD  02/15/18 [id number]

## 2018-02-16 NOTE — H&P
History & Physical        Patient:  Toño Escamilla  YOB: 1930    MRN: [id number]     Acct: [de-identified]    PCP: Bety Masterson DO    Date of Admission: 2/15/2018    Date of Service: Pt seen/examined on 02/15/2018 and Admitted to Inpatient with expected LOS greater than two midnights due to medical therapy. Chief Complaint:  SOB    History Of Present Illness:      80 y.o. female who presented to [address] [place] [address] with main complaint of SOB, as per daughter at the bedside she had a cough for the past week but today she said she could not breath. She denied other symptoms such as chest pain, palpitations. In the ED she was found to have [place] BNP of 30003 and therefore, admitted for further CHF-E management.      Past Medical History:          Diagnosis Date    ARNOLD (acute kidney injury) ([address]     Atrial fibrillation ([address]     Blood transfusion reaction     CAD (coronary artery disease)     Severe triple vessel disease    CKD (chronic kidney disease)     Diastolic congestive heart failure (HCC)     GERD (gastroesophageal reflux disease)     Hiatal hernia     History of blood transfusion     Hyperlipidemia     Hypertension     Hypoalbuminemia     Hypothyroidism     Mild protein-calorie malnutrition (HCC)     NSTEMI (non-ST elevated myocardial infarction) ([address] 7/2014    Pancreatitis due to biliary obstruction     Pneumonia, organism unspecified(486)     Scoliosis     SDH (subdural hematoma) ([address]     Tarkio hole decompresson on 12/25/2014    Thyroid disease     Unspecified cerebral artery occlusion with cerebral infarction     Uterine cancer ([address] 1966       Past Surgical History:          Procedure Laterality Date    APPENDECTOMY      BACK SURGERY      TERESA HOLE  12/25/2014    drainage of left SDH    CARDIAC SURGERY      COLONOSCOPY      CORONARY ARTERY BYPASS GRAFT  9/27/11    LIMA to LAD, SVG to 1st Marginal, Distal Right Coronary Artery, 1st Diagonal    DILATATION, ESOPHAGUS      ENDOSCOPY, COLON, DIAGNOSTIC      EYE SURGERY      bilateral cataracts    HYSTERECTOMY      MS EGD BALLOON DILATION ESOPHAGUS <30 MM DIAM N/A 11/22/2017    EGD ESOPHAGOGASTRODUODENOSCOPY DILATATION performed by Sang Brannon MD at [facility]    MS ESOPHAGOGASTRODUODENOSCOPY TRANSORAL DIAGNOSTIC  11/22/2017    EGD ESOPHAGOGASTRODUODENOSCOPY performed by Sang Brannon MD at [facility] Left     SKIN BIOPSY      VASCULAR SURGERY      cabg harvests from left leg       Medications Prior to Admission:      Prior to Admission medications    Medication Sig Start Date End Date Taking? Authorizing Provider   sucralfate (CARAFATE) 1 GM tablet TAKE 1 TABLET BY MOUTH FOUR TIMES DAILY BEFORE MEALS AND AT BEDTIME 2/6/18   Jordan Ramirez, DO   Multiple Vitamin (MULTI-VITAMINS) TABS TAKE 1 TABLET BY MOUTH DAILY 2/5/18   Jordan Ramirez, DO   pantoprazole (PROTONIX) 40 MG tablet TAKE 1 TABLET BY MOUTH TWICE DAILY BEFORE MEALS 12/13/17   Jordan Ramirez, DO   sucralfate (CARAFATE) 1 GM tablet TAKE 1 TABLET BY MOUTH FOUR TIMES DAILY BEFORE MEALS AND AT BEDTIME 11/3/17   Jordan Ramirez DO   isosorbide mononitrate (IMDUR) 30 MG extended release tablet TAKE 1 TABLET BY MOUTH DAILY 9/25/17   Ajay Au MD   bumetanide (BUMEX) 0.5 MG tablet TAKE 1 TABLET BY MOUTH DAILY 9/25/17   Ajay Au MD   spironolactone (ALDACTONE) 25 MG tablet TAKE 1/2 TABLET BY MOUTH EVERY DAY 9/18/17   Ajay Au MD   levothyroxine (SYNTHROID) 75 MCG tablet Take 1 tablet by mouth Daily Indications: alternates with 88mcg every other week.  8/18/17   Dawson Arauz MD   atorvastatin (LIPITOR) 20 MG tablet TAKE 1 TABLET BY MOUTH NIGHTLY 3/22/17   Jordan Ramirez DO   vitamin D (ERGOCALCIFEROL) 81073 UNITS CAPS capsule TAKE 1 CAPSULE BY MOUTH EVERY 28 DAYS ON THE 18TH OF EVERY MONTH 1/23/17   Jordan Ramirez DO   citalopram (CELEXA) 40 MG tablet TAKE 1 may contain minor errors which are inherent in voice recognition technology. **      Final report electronically signed by Dr. Amy Hermosillo on 2/15/2018 8:25 PM      CT CHEST WO CONTRAST    (Results Pending)            DVT prophylaxis: [x] Lovenox                                 [] SCDs                                 [] SQ Heparin                                 [] Encourage ambulation           [] Already on Anticoagulation    Code Status: Prior      PT/OT Eval Status: Pending    Disposition:    [x] Home       [] TCU       [] Rehab       [] Psych       [] SNF       [] [place]       [] Other-    ASSESSMENT:    C/Lakeisha Mason 110[address] Problems    Diagnosis Date Noted    Congestive heart failure of unknown etiology ([facility] [address] [I50.9] 02/15/2018       PLAN:    1. Acute on Chronic systolic CHF exacerbation  -Will proceed with aggressive IV diuresis with Lasix 40 mg IV BID with close monitoring of electrolytes and kidney function.   -Strict In/Out, daily wights  -Echo  -Consultation with primary cardiologist Dr. Salomón Moore requested  -Recommend BB prior to discharge  -Will consider NIMV to improve hypoxia and tachycardia    2. Tachycardia  -NIMV if persistent  -Will give a bolus of Digoxin    3. Suspect possible CAP  -Will obtain CT chest, she received abx in the Ed. If CT is positive we will continue abx and add Duonebs    4. Large Hiatal hernia  -PPI    Thank you Navdeep Glasgow DO for the opportunity to be involved in this patient's care.     Electronically signed by Elaine Rhodes MD on 2/15/2018 at 10:03 PM

## 2018-02-16 NOTE — PROGRESS NOTES
Hospital Medicine Progress Note     Date:  2/16/2018    PCP: Ganesh Marmolejo DO (Tel: [phone])    Date of Admission: 2/15/2018      Brief hospital course: Pleasant 88F with hx of chronic diastolic heart failure, atrial fibrillation (not previously on anticoagulation), who presents with complaints of cough, shortness of breath, fever, chills and diffuse body aches. She reports having had lower extremity swelling. She was hypoxic on presentation and was started on supplemental oxygen via NC. Chest imaging did not reveal any acute findings consistent with pneumonia or CHF. She was initially started on diuretics for possible CHF; however, clinical presentation not consistent with CHF. There is significant concern for underlying influenza, despite negative influenza screen with rapid influenza test which carries only about 50% sensitivity. She was then started on tamiflu and this was explained to patient and daughter who is HPOA. She did go into afib with RVR, initially started on cardizem infusion which was later discontinued as she was hypotensive. She was transitioned to PO metoprolol for rate control. Assessment:  Principal Problem:    Influenza bronchitis, suspected  Active Problems:    Acute respiratory failure with hypoxia (HCC)    Atrial fibrillation with rapid ventricular response (HCC)    Acute viral bronchitis    CAD (coronary artery disease)    PAF (paroxysmal atrial fibrillation) (HCC)- chads of at least 4 ( ,TIA hx, age CHF)    Hyponatremia    Hypokalemia    Chronic diastolic heart failure (HCC)    Moderate malnutrition (HCC)    Thrombocytopenia (HCC)  Resolved Problems:    * No resolved hospital problems. *        Plan:  1. Suspected influenza bronchitis. Started on tamiflu, breathing rx, steroid and gentle IV fluid. 2. Hypotension, secondary to medications - was on cardizem infusion, received metoprolol for rate control this morning.  Saline bolus, then maintenance IV fluid ordered

## 2018-02-16 NOTE — ED NOTES
Bed: 002A  Expected date: 2/15/18  Expected time: 7:16 PM  Means of arrival: [facility]  Comments:     Vi Parikh  02/15/18 1929

## 2018-02-16 NOTE — PROGRESS NOTES
[address]  OCCUPATIONAL THERAPY MISSED TREATMENT NOTE  [facility] CCU-STEPDOWN 3B  [id number]      Date: 2018  Patient Name: Fabian Esquivel        CSN: [id number]   : 1930  (80 y.o.)  Gender: female   Referring Practitioner: Dr. Madison Dunn   Diagnosis: CHF         REASON FOR MISSED TREATMENT:  Missed Treat. Pt had low BP earlier nurse reported she needed to recheck prior to session. Nurse having to clean Pt up prior to recheck, will check back at a later date.

## 2018-02-17 PROBLEM — I95.2 HYPOTENSION DUE TO DRUGS: Status: RESOLVED | Noted: 2018-02-17 | Resolved: 2018-02-17

## 2018-02-17 PROBLEM — I95.2 HYPOTENSION DUE TO DRUGS: Status: ACTIVE | Noted: 2018-02-17

## 2018-02-17 LAB
ALBUMIN SERPL-MCNC: 3.6 G/DL (ref 3.5–5.1)
ALP BLD-CCNC: 58 U/L (ref 38–126)
ALT SERPL-CCNC: 21 U/L (ref 11–66)
ANION GAP SERPL CALCULATED.3IONS-SCNC: 13 MEQ/L (ref 8–16)
ANISOCYTOSIS: ABNORMAL
AST SERPL-CCNC: 37 U/L (ref 5–40)
BANDED NEUTROPHILS ABSOLUTE COUNT: 0.2 THOU/MM3
BANDS PRESENT: 4 %
BASOPHILS # BLD: 0 %
BASOPHILS ABSOLUTE: 0 THOU/MM3 (ref 0–0.1)
BILIRUB SERPL-MCNC: 0.4 MG/DL (ref 0.3–1.2)
BUN BLDV-MCNC: 33 MG/DL (ref 7–22)
CALCIUM SERPL-MCNC: 8.7 MG/DL (ref 8.5–10.5)
CHLORIDE BLD-SCNC: 98 MEQ/L (ref 98–111)
CO2: 25 MEQ/L (ref 23–33)
CREAT SERPL-MCNC: 1.1 MG/DL (ref 0.4–1.2)
DIFFERENTIAL, MANUAL: NORMAL
DIGOXIN LEVEL: < 0.3 NG/ML (ref 0.5–2)
EOSINOPHIL # BLD: 0 %
EOSINOPHILS ABSOLUTE: 0 THOU/MM3 (ref 0–0.4)
GFR SERPL CREATININE-BSD FRML MDRD: 47 ML/MIN/1.73M2
GLUCOSE BLD-MCNC: 145 MG/DL (ref 70–108)
HCT VFR BLD CALC: 34 % (ref 37–47)
HEMOGLOBIN: 11.2 GM/DL (ref 12–16)
LYMPHOCYTES # BLD: 13 %
LYMPHOCYTES ABSOLUTE: 0.5 THOU/MM3 (ref 1–4.8)
MAGNESIUM: 2.4 MG/DL (ref 1.6–2.4)
MCH RBC QN AUTO: 28.4 PG (ref 27–31)
MCHC RBC AUTO-ENTMCNC: 32.9 GM/DL (ref 33–37)
MCV RBC AUTO: 86.4 FL (ref 81–99)
MONOCYTES # BLD: 2 %
MONOCYTES ABSOLUTE: 0.1 THOU/MM3 (ref 0.4–1.3)
NUCLEATED RED BLOOD CELLS: 0 /100 WBC
PDW BLD-RTO: 17.8 % (ref 11.5–14.5)
PHOSPHORUS: 3.7 MG/DL (ref 2.4–4.7)
PLATELET # BLD: 109 THOU/MM3 (ref 130–400)
PLATELET ESTIMATE: ABNORMAL
PMV BLD AUTO: 9.6 FL (ref 7.4–10.4)
POIKILOCYTES: SLIGHT
POTASSIUM SERPL-SCNC: 4.2 MEQ/L (ref 3.5–5.2)
RBC # BLD: 3.93 MILL/MM3 (ref 4.2–5.4)
SEG NEUTROPHILS: 81 %
SEGMENTED NEUTROPHILS ABSOLUTE COUNT: 3.1 THOU/MM3 (ref 1.8–7.7)
SODIUM BLD-SCNC: 136 MEQ/L (ref 135–145)
TOTAL PROTEIN: 6.5 G/DL (ref 6.1–8)
WBC # BLD: 3.8 THOU/MM3 (ref 4.8–10.8)

## 2018-02-17 PROCEDURE — 6370000000 HC RX 637 (ALT 250 FOR IP): Performed by: HOSPITALIST

## 2018-02-17 PROCEDURE — 36415 COLL VENOUS BLD VENIPUNCTURE: CPT

## 2018-02-17 PROCEDURE — 94640 AIRWAY INHALATION TREATMENT: CPT

## 2018-02-17 PROCEDURE — 6370000000 HC RX 637 (ALT 250 FOR IP): Performed by: INTERNAL MEDICINE

## 2018-02-17 PROCEDURE — 84100 ASSAY OF PHOSPHORUS: CPT

## 2018-02-17 PROCEDURE — G8988 SELF CARE GOAL STATUS: HCPCS

## 2018-02-17 PROCEDURE — 80053 COMPREHEN METABOLIC PANEL: CPT

## 2018-02-17 PROCEDURE — 2140000000 HC CCU INTERMEDIATE R&B

## 2018-02-17 PROCEDURE — 97535 SELF CARE MNGMENT TRAINING: CPT

## 2018-02-17 PROCEDURE — 97530 THERAPEUTIC ACTIVITIES: CPT

## 2018-02-17 PROCEDURE — G8987 SELF CARE CURRENT STATUS: HCPCS

## 2018-02-17 PROCEDURE — 2700000000 HC OXYGEN THERAPY PER DAY

## 2018-02-17 PROCEDURE — 97166 OT EVAL MOD COMPLEX 45 MIN: CPT

## 2018-02-17 PROCEDURE — 80162 ASSAY OF DIGOXIN TOTAL: CPT

## 2018-02-17 PROCEDURE — 99233 SBSQ HOSP IP/OBS HIGH 50: CPT | Performed by: INTERNAL MEDICINE

## 2018-02-17 PROCEDURE — 83735 ASSAY OF MAGNESIUM: CPT

## 2018-02-17 PROCEDURE — 6360000002 HC RX W HCPCS: Performed by: INTERNAL MEDICINE

## 2018-02-17 PROCEDURE — 1200000003 HC TELEMETRY R&B

## 2018-02-17 PROCEDURE — 85025 COMPLETE CBC W/AUTO DIFF WBC: CPT

## 2018-02-17 PROCEDURE — 94669 MECHANICAL CHEST WALL OSCILL: CPT

## 2018-02-17 RX ORDER — SODIUM CHLORIDE 9 MG/ML
INJECTION, SOLUTION INTRAVENOUS CONTINUOUS
Status: ACTIVE | OUTPATIENT
Start: 2018-02-17 | End: 2018-02-17

## 2018-02-17 RX ORDER — GUAIFENESIN 600 MG/1
600 TABLET, EXTENDED RELEASE ORAL 2 TIMES DAILY
Status: DISCONTINUED | OUTPATIENT
Start: 2018-02-17 | End: 2018-02-20 | Stop reason: HOSPADM

## 2018-02-17 RX ORDER — LEVOTHYROXINE SODIUM 0.07 MG/1
75 TABLET ORAL DAILY
Status: DISCONTINUED | OUTPATIENT
Start: 2018-02-18 | End: 2018-02-20 | Stop reason: HOSPADM

## 2018-02-17 RX ORDER — PROMETHAZINE HYDROCHLORIDE AND CODEINE PHOSPHATE 6.25; 1 MG/5ML; MG/5ML
5 SYRUP ORAL EVERY 4 HOURS PRN
Status: DISCONTINUED | OUTPATIENT
Start: 2018-02-17 | End: 2018-02-20 | Stop reason: HOSPADM

## 2018-02-17 RX ADMIN — IPRATROPIUM BROMIDE AND ALBUTEROL SULFATE 1 AMPULE: .5; 3 SOLUTION RESPIRATORY (INHALATION) at 08:46

## 2018-02-17 RX ADMIN — IPRATROPIUM BROMIDE AND ALBUTEROL SULFATE 1 AMPULE: .5; 3 SOLUTION RESPIRATORY (INHALATION) at 21:36

## 2018-02-17 RX ADMIN — OSELTAMIVIR PHOSPHATE 30 MG: 30 CAPSULE ORAL at 09:16

## 2018-02-17 RX ADMIN — METOPROLOL TARTRATE 25 MG: 25 TABLET ORAL at 09:16

## 2018-02-17 RX ADMIN — ENOXAPARIN SODIUM 30 MG: 30 INJECTION SUBCUTANEOUS at 09:17

## 2018-02-17 RX ADMIN — SUCRALFATE 1 G: 1 TABLET ORAL at 09:16

## 2018-02-17 RX ADMIN — ASPIRIN 81 MG: 81 TABLET, COATED ORAL at 09:16

## 2018-02-17 RX ADMIN — PREDNISONE 40 MG: 20 TABLET ORAL at 09:16

## 2018-02-17 RX ADMIN — SUCRALFATE 1 G: 1 TABLET ORAL at 19:29

## 2018-02-17 RX ADMIN — ATORVASTATIN CALCIUM 20 MG: 20 TABLET, FILM COATED ORAL at 19:27

## 2018-02-17 RX ADMIN — IPRATROPIUM BROMIDE AND ALBUTEROL SULFATE 1 AMPULE: .5; 3 SOLUTION RESPIRATORY (INHALATION) at 14:11

## 2018-02-17 RX ADMIN — GUAIFENESIN 600 MG: 600 TABLET, EXTENDED RELEASE ORAL at 15:31

## 2018-02-17 RX ADMIN — SUCRALFATE 1 G: 1 TABLET ORAL at 15:32

## 2018-02-17 RX ADMIN — OSELTAMIVIR PHOSPHATE 30 MG: 30 CAPSULE ORAL at 19:29

## 2018-02-17 RX ADMIN — IPRATROPIUM BROMIDE AND ALBUTEROL SULFATE 1 AMPULE: .5; 3 SOLUTION RESPIRATORY (INHALATION) at 17:52

## 2018-02-17 RX ADMIN — LEVOTHYROXINE SODIUM 75 MCG: 75 TABLET ORAL at 06:37

## 2018-02-17 RX ADMIN — LATANOPROST 1 DROP: 50 SOLUTION OPHTHALMIC at 19:27

## 2018-02-17 RX ADMIN — PANTOPRAZOLE SODIUM 40 MG: 40 TABLET, DELAYED RELEASE ORAL at 06:37

## 2018-02-17 RX ADMIN — ISOSORBIDE MONONITRATE 30 MG: 30 TABLET ORAL at 11:50

## 2018-02-17 RX ADMIN — SUCRALFATE 1 G: 1 TABLET ORAL at 11:50

## 2018-02-17 RX ADMIN — METOPROLOL TARTRATE 25 MG: 25 TABLET ORAL at 19:29

## 2018-02-17 ASSESSMENT — PAIN SCALES - GENERAL: PAINLEVEL_OUTOF10: 0

## 2018-02-17 NOTE — PROGRESS NOTES
Score: 17  AM-PAC Inpatient ADL T-Scale Score : 37.26  ADL Inpatient CMS 0-100% Score: 50.11  ADL Inpatient CMS G-Code Modifier : CK

## 2018-02-18 ENCOUNTER — APPOINTMENT (OUTPATIENT)
Dept: GENERAL RADIOLOGY | Age: 83
DRG: 189 | End: 2018-02-18
Payer: MEDICARE

## 2018-02-18 ENCOUNTER — APPOINTMENT (OUTPATIENT)
Dept: CT IMAGING | Age: 83
DRG: 189 | End: 2018-02-18
Payer: MEDICARE
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
## 2018-02-18 PROBLEM — S01.81XA LACERATION OF TEMPLE: Status: ACTIVE | Noted: 2018-02-18

## 2018-02-18 PROBLEM — J15.9 BACTERIAL PNEUMONIA: Status: ACTIVE | Noted: 2018-02-18
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
## 2018-02-18 PROBLEM — S40.021S: Status: ACTIVE | Noted: 2018-02-18

## 2018-02-18 LAB
ALBUMIN SERPL-MCNC: 3.6 G/DL (ref 3.5–5.1)
ALP BLD-CCNC: 64 U/L (ref 38–126)
ALT SERPL-CCNC: 26 U/L (ref 11–66)
ANION GAP SERPL CALCULATED.3IONS-SCNC: 10 MEQ/L (ref 8–16)
ANISOCYTOSIS: ABNORMAL
AST SERPL-CCNC: 41 U/L (ref 5–40)
BASOPHILS # BLD: 0.3 %
BASOPHILS ABSOLUTE: 0 THOU/MM3 (ref 0–0.1)
BILIRUB SERPL-MCNC: 0.3 MG/DL (ref 0.3–1.2)
BUN BLDV-MCNC: 36 MG/DL (ref 7–22)
CALCIUM SERPL-MCNC: 9.5 MG/DL (ref 8.5–10.5)
CHLORIDE BLD-SCNC: 98 MEQ/L (ref 98–111)
CO2: 27 MEQ/L (ref 23–33)
CREAT SERPL-MCNC: 1 MG/DL (ref 0.4–1.2)
EOSINOPHIL # BLD: 0.7 %
EOSINOPHILS ABSOLUTE: 0 THOU/MM3 (ref 0–0.4)
GFR SERPL CREATININE-BSD FRML MDRD: 52 ML/MIN/1.73M2
GLUCOSE BLD-MCNC: 130 MG/DL (ref 70–108)
HCT VFR BLD CALC: 32.6 % (ref 37–47)
HEMOGLOBIN: 11 GM/DL (ref 12–16)
LYMPHOCYTES # BLD: 9 %
LYMPHOCYTES ABSOLUTE: 0.6 THOU/MM3 (ref 1–4.8)
MAGNESIUM: 2.3 MG/DL (ref 1.6–2.4)
MCH RBC QN AUTO: 29.4 PG (ref 27–31)
MCHC RBC AUTO-ENTMCNC: 33.8 GM/DL (ref 33–37)
MCV RBC AUTO: 87.1 FL (ref 81–99)
MONOCYTES # BLD: 9.3 %
MONOCYTES ABSOLUTE: 0.6 THOU/MM3 (ref 0.4–1.3)
NUCLEATED RED BLOOD CELLS: 0 /100 WBC
PDW BLD-RTO: 17.2 % (ref 11.5–14.5)
PHOSPHORUS: 2.4 MG/DL (ref 2.4–4.7)
PLATELET # BLD: 143 THOU/MM3 (ref 130–400)
PMV BLD AUTO: 10.2 FL (ref 7.4–10.4)
POTASSIUM SERPL-SCNC: 4.3 MEQ/L (ref 3.5–5.2)
RBC # BLD: 3.74 MILL/MM3 (ref 4.2–5.4)
SEG NEUTROPHILS: 80.7 %
SEGMENTED NEUTROPHILS ABSOLUTE COUNT: 5.6 THOU/MM3 (ref 1.8–7.7)
SODIUM BLD-SCNC: 135 MEQ/L (ref 135–145)
TOTAL PROTEIN: 6.6 G/DL (ref 6.1–8)
WBC # BLD: 6.9 THOU/MM3 (ref 4.8–10.8)

## 2018-02-18 PROCEDURE — 85025 COMPLETE CBC W/AUTO DIFF WBC: CPT

## 2018-02-18 PROCEDURE — 71045 X-RAY EXAM CHEST 1 VIEW: CPT

## 2018-02-18 PROCEDURE — 6370000000 HC RX 637 (ALT 250 FOR IP): Performed by: INTERNAL MEDICINE

## 2018-02-18 PROCEDURE — 94669 MECHANICAL CHEST WALL OSCILL: CPT

## 2018-02-18 PROCEDURE — 99233 SBSQ HOSP IP/OBS HIGH 50: CPT | Performed by: INTERNAL MEDICINE

## 2018-02-18 PROCEDURE — 36415 COLL VENOUS BLD VENIPUNCTURE: CPT

## 2018-02-18 PROCEDURE — 6360000002 HC RX W HCPCS: Performed by: INTERNAL MEDICINE

## 2018-02-18 PROCEDURE — 73030 X-RAY EXAM OF SHOULDER: CPT

## 2018-02-18 PROCEDURE — G8979 MOBILITY GOAL STATUS: HCPCS

## 2018-02-18 PROCEDURE — 70491 CT SOFT TISSUE NECK W/DYE: CPT

## 2018-02-18 PROCEDURE — 80053 COMPREHEN METABOLIC PANEL: CPT

## 2018-02-18 PROCEDURE — 99221 1ST HOSP IP/OBS SF/LOW 40: CPT | Performed by: SURGERY

## 2018-02-18 PROCEDURE — 2700000000 HC OXYGEN THERAPY PER DAY

## 2018-02-18 PROCEDURE — 94640 AIRWAY INHALATION TREATMENT: CPT

## 2018-02-18 PROCEDURE — 76376 3D RENDER W/INTRP POSTPROCES: CPT

## 2018-02-18 PROCEDURE — 70450 CT HEAD/BRAIN W/O DYE: CPT

## 2018-02-18 PROCEDURE — 84100 ASSAY OF PHOSPHORUS: CPT

## 2018-02-18 PROCEDURE — 1200000003 HC TELEMETRY R&B

## 2018-02-18 PROCEDURE — G8978 MOBILITY CURRENT STATUS: HCPCS

## 2018-02-18 PROCEDURE — 6360000004 HC RX CONTRAST MEDICATION: Performed by: INTERNAL MEDICINE

## 2018-02-18 PROCEDURE — 97163 PT EVAL HIGH COMPLEX 45 MIN: CPT

## 2018-02-18 PROCEDURE — 83735 ASSAY OF MAGNESIUM: CPT

## 2018-02-18 PROCEDURE — 2580000003 HC RX 258: Performed by: HOSPITALIST

## 2018-02-18 PROCEDURE — 6370000000 HC RX 637 (ALT 250 FOR IP): Performed by: HOSPITALIST

## 2018-02-18 RX ORDER — ASPIRIN 81 MG/1
81 TABLET ORAL DAILY
Status: DISCONTINUED | OUTPATIENT
Start: 2018-02-18 | End: 2018-02-20 | Stop reason: HOSPADM

## 2018-02-18 RX ORDER — AMOXICILLIN AND CLAVULANATE POTASSIUM 500; 125 MG/1; MG/1
1 TABLET, FILM COATED ORAL EVERY 12 HOURS SCHEDULED
Status: DISCONTINUED | OUTPATIENT
Start: 2018-02-18 | End: 2018-02-20 | Stop reason: HOSPADM

## 2018-02-18 RX ADMIN — IPRATROPIUM BROMIDE AND ALBUTEROL SULFATE 1 AMPULE: .5; 3 SOLUTION RESPIRATORY (INHALATION) at 12:58

## 2018-02-18 RX ADMIN — IPRATROPIUM BROMIDE AND ALBUTEROL SULFATE 1 AMPULE: .5; 3 SOLUTION RESPIRATORY (INHALATION) at 20:53

## 2018-02-18 RX ADMIN — PANTOPRAZOLE SODIUM 40 MG: 40 TABLET, DELAYED RELEASE ORAL at 07:30

## 2018-02-18 RX ADMIN — Medication 10 ML: at 09:44

## 2018-02-18 RX ADMIN — OSELTAMIVIR PHOSPHATE 30 MG: 30 CAPSULE ORAL at 20:32

## 2018-02-18 RX ADMIN — LEVOTHYROXINE SODIUM 75 MCG: 75 TABLET ORAL at 07:30

## 2018-02-18 RX ADMIN — ISOSORBIDE MONONITRATE 30 MG: 30 TABLET ORAL at 09:41

## 2018-02-18 RX ADMIN — PREDNISONE 40 MG: 20 TABLET ORAL at 09:41

## 2018-02-18 RX ADMIN — OSELTAMIVIR PHOSPHATE 30 MG: 30 CAPSULE ORAL at 09:41

## 2018-02-18 RX ADMIN — Medication 10 ML: at 20:48

## 2018-02-18 RX ADMIN — GUAIFENESIN 600 MG: 600 TABLET, EXTENDED RELEASE ORAL at 09:41

## 2018-02-18 RX ADMIN — SUCRALFATE 1 G: 1 TABLET ORAL at 07:30

## 2018-02-18 RX ADMIN — IPRATROPIUM BROMIDE AND ALBUTEROL SULFATE 1 AMPULE: .5; 3 SOLUTION RESPIRATORY (INHALATION) at 17:35

## 2018-02-18 RX ADMIN — SUCRALFATE 1 G: 1 TABLET ORAL at 20:32

## 2018-02-18 RX ADMIN — METOPROLOL TARTRATE 25 MG: 25 TABLET ORAL at 20:32

## 2018-02-18 RX ADMIN — SUCRALFATE 1 G: 1 TABLET ORAL at 12:06

## 2018-02-18 RX ADMIN — IPRATROPIUM BROMIDE AND ALBUTEROL SULFATE 1 AMPULE: .5; 3 SOLUTION RESPIRATORY (INHALATION) at 07:56

## 2018-02-18 RX ADMIN — SUCRALFATE 1 G: 1 TABLET ORAL at 16:09

## 2018-02-18 RX ADMIN — AMOXICILLIN AND CLAVULANATE POTASSIUM 1 TABLET: 500; 125 TABLET, FILM COATED ORAL at 20:32

## 2018-02-18 RX ADMIN — AMOXICILLIN AND CLAVULANATE POTASSIUM 1 TABLET: 500; 125 TABLET, FILM COATED ORAL at 12:07

## 2018-02-18 RX ADMIN — ENOXAPARIN SODIUM 30 MG: 30 INJECTION SUBCUTANEOUS at 12:06

## 2018-02-18 RX ADMIN — ASPIRIN 81 MG: 81 TABLET, COATED ORAL at 12:06

## 2018-02-18 RX ADMIN — METOPROLOL TARTRATE 25 MG: 25 TABLET ORAL at 09:41

## 2018-02-18 RX ADMIN — LATANOPROST 1 DROP: 50 SOLUTION OPHTHALMIC at 20:48

## 2018-02-18 RX ADMIN — ATORVASTATIN CALCIUM 20 MG: 20 TABLET, FILM COATED ORAL at 20:32

## 2018-02-18 RX ADMIN — Medication 10 ML: at 20:32

## 2018-02-18 RX ADMIN — ERGOCALCIFEROL 50000 UNITS: 1.25 CAPSULE ORAL at 09:41

## 2018-02-18 RX ADMIN — IOPAMIDOL 80 ML: 755 INJECTION, SOLUTION INTRAVENOUS at 06:20

## 2018-02-18 RX ADMIN — ACETAMINOPHEN 650 MG: 325 TABLET ORAL at 09:41

## 2018-02-18 RX ADMIN — GUAIFENESIN 600 MG: 600 TABLET, EXTENDED RELEASE ORAL at 20:32

## 2018-02-18 ASSESSMENT — PAIN SCALES - GENERAL: PAINLEVEL_OUTOF10: 2

## 2018-02-18 NOTE — PROGRESS NOTES
performed by Elio Taylor MD at [facility]    AL ESOPHAGOGASTRODUODENOSCOPY TRANSORAL DIAGNOSTIC  11/22/2017    EGD ESOPHAGOGASTRODUODENOSCOPY performed by Elio Taylor MD at [address] [place] [address] Left     SKIN BIOPSY      VASCULAR SURGERY      cabg harvests from left leg       Restrictions/Precautions:  Restrictions/Precautions: General Precautions, Fall Risk            Position Activity Restriction  Other position/activity restrictions: droplet, confused, telesitter         Subjective:  Chart Reviewed: Yes  Patient assessed for rehabilitation services?: Yes  Family / Caregiver Present: Yes  Subjective: Pt in bed, agreeable to PT.RN approved session laos. Pt had fallen getting out of her chair this am on her own. x-rays all negative. Has telesitter now    General:  Overall Orientation Status: Impaired  Follows Commands: Within Functional Limits    Vision: Impaired  Vision Exceptions: Wears glasses at all times    Hearing: Exceptions to [facility]  Hearing Exceptions: Hard of hearing/hearing concerns         Pain:  Denies.           Social/Functional History:    Lives With: Family  Type of Home: House  Home Layout: One level  Home Access: Stairs to enter without rails  Entrance Stairs - Number of Steps: 2 ADAM  Home Equipment: Rolling walker, 4 wheeled walker     Bathroom Shower/Tub: Tub/Shower unit  Bathroom Toilet: Handicap height  Bathroom Accessibility: Accessible    Receives Help From: Family  ADL Assistance: Needs assistance  Homemaking Assistance: Needs assistance  Homemaking Responsibilities: No  Ambulation Assistance: Independent  Transfer Assistance: Independent    Active : No  Occupation: Retired  Additional Comments: uses RW at all times     Objective:       RLE AROM: WFL         LLE AROM : WFL              Strength RLE: Exception  Comment: grossly 4-/5    Strength LLE: Exception  Comment: grossly 4-/5          Sensation  Overall Sensation Status: WFL       Balance  Sitting -

## 2018-02-18 NOTE — CONSULTS
sutures. She does have obvious soft tissue  contusion. The patient is on Lovenox,  Discussed with the nurse, I believe  it is okay to continue due to atrial fib. We will follow. DENVER RIVERA [facility], MD    D: 02/18/2018 11:40:20       T: 02/18/2018 11:44:01     [id number]  Job#: [id number]     Doc#: [id number]    CC:

## 2018-02-18 NOTE — FLOWSHEET NOTE
At approximately 0528. Patient's chair alarmed  sounded, and smack was heard on the floor. Staff immediately ran to room and found patient on the floor face down. Patient was alert and oriented to that of am assessment. Patient was assisted up to bed. Patient's right temple was bleeding, with approximate 2cm laceration, edges of wound approximated and secured  with white medical tape. Right shoulder was found to have a bruise,  Call was made to hospitalist and STAT CT of the head was ordered as well as right shoulder xray. Patient was taken directly to CT for exams. In CT, a purple color  was seen on neck, and order was given from Hospitalist for CTA  Of neck and cervical spine. Patient returned to Unit, where she was seen by hospitalist. General Surgery Consulted, phone call placed and consult accepted. House Supervisor, and patient's daughter notified. Patient's daughter Valencia Ibrahim gave permission to use all 4 side-rails as a safety precaution for her mother.

## 2018-02-19 LAB
ALBUMIN SERPL-MCNC: 3.2 G/DL (ref 3.5–5.1)
ALP BLD-CCNC: 51 U/L (ref 38–126)
ALT SERPL-CCNC: 26 U/L (ref 11–66)
ANION GAP SERPL CALCULATED.3IONS-SCNC: 8 MEQ/L (ref 8–16)
ANISOCYTOSIS: ABNORMAL
AST SERPL-CCNC: 34 U/L (ref 5–40)
BASOPHILS # BLD: 0.2 %
BASOPHILS ABSOLUTE: 0 THOU/MM3 (ref 0–0.1)
BILIRUB SERPL-MCNC: 0.4 MG/DL (ref 0.3–1.2)
BUN BLDV-MCNC: 28 MG/DL (ref 7–22)
CALCIUM SERPL-MCNC: 9.4 MG/DL (ref 8.5–10.5)
CHLORIDE BLD-SCNC: 102 MEQ/L (ref 98–111)
CO2: 26 MEQ/L (ref 23–33)
CREAT SERPL-MCNC: 0.8 MG/DL (ref 0.4–1.2)
EOSINOPHIL # BLD: 0 %
EOSINOPHILS ABSOLUTE: 0 THOU/MM3 (ref 0–0.4)
GFR SERPL CREATININE-BSD FRML MDRD: 68 ML/MIN/1.73M2
GLUCOSE BLD-MCNC: 130 MG/DL (ref 70–108)
HCT VFR BLD CALC: 31.8 % (ref 37–47)
HEMOGLOBIN: 10.7 GM/DL (ref 12–16)
LYMPHOCYTES # BLD: 9.3 %
LYMPHOCYTES ABSOLUTE: 0.5 THOU/MM3 (ref 1–4.8)
MAGNESIUM: 2.2 MG/DL (ref 1.6–2.4)
MCH RBC QN AUTO: 29.1 PG (ref 27–31)
MCHC RBC AUTO-ENTMCNC: 33.5 GM/DL (ref 33–37)
MCV RBC AUTO: 86.7 FL (ref 81–99)
MONOCYTES # BLD: 8.5 %
MONOCYTES ABSOLUTE: 0.5 THOU/MM3 (ref 0.4–1.3)
NUCLEATED RED BLOOD CELLS: 0 /100 WBC
PDW BLD-RTO: 17.4 % (ref 11.5–14.5)
PHOSPHORUS: 2.5 MG/DL (ref 2.4–4.7)
PLATELET # BLD: 131 THOU/MM3 (ref 130–400)
PMV BLD AUTO: 9.5 FL (ref 7.4–10.4)
POTASSIUM SERPL-SCNC: 4.6 MEQ/L (ref 3.5–5.2)
RBC # BLD: 3.67 MILL/MM3 (ref 4.2–5.4)
SEG NEUTROPHILS: 82 %
SEGMENTED NEUTROPHILS ABSOLUTE COUNT: 4.6 THOU/MM3 (ref 1.8–7.7)
SODIUM BLD-SCNC: 136 MEQ/L (ref 135–145)
TOTAL PROTEIN: 6.2 G/DL (ref 6.1–8)
WBC # BLD: 5.6 THOU/MM3 (ref 4.8–10.8)

## 2018-02-19 PROCEDURE — 6370000000 HC RX 637 (ALT 250 FOR IP): Performed by: INTERNAL MEDICINE

## 2018-02-19 PROCEDURE — 84100 ASSAY OF PHOSPHORUS: CPT

## 2018-02-19 PROCEDURE — 2700000000 HC OXYGEN THERAPY PER DAY

## 2018-02-19 PROCEDURE — 6370000000 HC RX 637 (ALT 250 FOR IP): Performed by: HOSPITALIST

## 2018-02-19 PROCEDURE — 94669 MECHANICAL CHEST WALL OSCILL: CPT

## 2018-02-19 PROCEDURE — 2580000003 HC RX 258: Performed by: HOSPITALIST

## 2018-02-19 PROCEDURE — 80053 COMPREHEN METABOLIC PANEL: CPT

## 2018-02-19 PROCEDURE — 36415 COLL VENOUS BLD VENIPUNCTURE: CPT

## 2018-02-19 PROCEDURE — 83735 ASSAY OF MAGNESIUM: CPT

## 2018-02-19 PROCEDURE — APPSS45 APP SPLIT SHARED TIME 31-45 MINUTES: Performed by: PHYSICIAN ASSISTANT

## 2018-02-19 PROCEDURE — 6360000002 HC RX W HCPCS: Performed by: INTERNAL MEDICINE

## 2018-02-19 PROCEDURE — 92610 EVALUATE SWALLOWING FUNCTION: CPT

## 2018-02-19 PROCEDURE — 99233 SBSQ HOSP IP/OBS HIGH 50: CPT | Performed by: SURGERY

## 2018-02-19 PROCEDURE — 1200000003 HC TELEMETRY R&B

## 2018-02-19 PROCEDURE — 99232 SBSQ HOSP IP/OBS MODERATE 35: CPT | Performed by: INTERNAL MEDICINE

## 2018-02-19 PROCEDURE — 85025 COMPLETE CBC W/AUTO DIFF WBC: CPT

## 2018-02-19 PROCEDURE — 94640 AIRWAY INHALATION TREATMENT: CPT

## 2018-02-19 PROCEDURE — 97535 SELF CARE MNGMENT TRAINING: CPT

## 2018-02-19 RX ADMIN — SUCRALFATE 1 G: 1 TABLET ORAL at 09:40

## 2018-02-19 RX ADMIN — OSELTAMIVIR PHOSPHATE 30 MG: 30 CAPSULE ORAL at 20:45

## 2018-02-19 RX ADMIN — METOPROLOL TARTRATE 25 MG: 25 TABLET ORAL at 09:40

## 2018-02-19 RX ADMIN — LEVOTHYROXINE SODIUM 75 MCG: 75 TABLET ORAL at 05:34

## 2018-02-19 RX ADMIN — Medication 10 ML: at 09:41

## 2018-02-19 RX ADMIN — ENOXAPARIN SODIUM 30 MG: 30 INJECTION SUBCUTANEOUS at 11:52

## 2018-02-19 RX ADMIN — ISOSORBIDE MONONITRATE 30 MG: 30 TABLET ORAL at 09:40

## 2018-02-19 RX ADMIN — LATANOPROST 1 DROP: 50 SOLUTION OPHTHALMIC at 20:45

## 2018-02-19 RX ADMIN — GUAIFENESIN 600 MG: 600 TABLET, EXTENDED RELEASE ORAL at 20:45

## 2018-02-19 RX ADMIN — PREDNISONE 40 MG: 20 TABLET ORAL at 09:40

## 2018-02-19 RX ADMIN — GUAIFENESIN 600 MG: 600 TABLET, EXTENDED RELEASE ORAL at 09:40

## 2018-02-19 RX ADMIN — SUCRALFATE 1 G: 1 TABLET ORAL at 16:56

## 2018-02-19 RX ADMIN — AMOXICILLIN AND CLAVULANATE POTASSIUM 1 TABLET: 500; 125 TABLET, FILM COATED ORAL at 20:45

## 2018-02-19 RX ADMIN — ATORVASTATIN CALCIUM 20 MG: 20 TABLET, FILM COATED ORAL at 20:45

## 2018-02-19 RX ADMIN — SUCRALFATE 1 G: 1 TABLET ORAL at 11:52

## 2018-02-19 RX ADMIN — OSELTAMIVIR PHOSPHATE 30 MG: 30 CAPSULE ORAL at 09:40

## 2018-02-19 RX ADMIN — METOPROLOL TARTRATE 25 MG: 25 TABLET ORAL at 20:45

## 2018-02-19 RX ADMIN — IPRATROPIUM BROMIDE AND ALBUTEROL SULFATE 1 AMPULE: .5; 3 SOLUTION RESPIRATORY (INHALATION) at 15:42

## 2018-02-19 RX ADMIN — ASPIRIN 81 MG: 81 TABLET, COATED ORAL at 09:40

## 2018-02-19 RX ADMIN — AMOXICILLIN AND CLAVULANATE POTASSIUM 1 TABLET: 500; 125 TABLET, FILM COATED ORAL at 09:40

## 2018-02-19 RX ADMIN — IPRATROPIUM BROMIDE AND ALBUTEROL SULFATE 1 AMPULE: .5; 3 SOLUTION RESPIRATORY (INHALATION) at 11:01

## 2018-02-19 RX ADMIN — PANTOPRAZOLE SODIUM 40 MG: 40 TABLET, DELAYED RELEASE ORAL at 06:10

## 2018-02-19 RX ADMIN — IPRATROPIUM BROMIDE AND ALBUTEROL SULFATE 1 AMPULE: .5; 3 SOLUTION RESPIRATORY (INHALATION) at 20:15

## 2018-02-19 RX ADMIN — SUCRALFATE 1 G: 1 TABLET ORAL at 20:45

## 2018-02-19 NOTE — CARE COORDINATION
2/19/18, 10:53 AM  Nabila Obando day: 4  Location: [facility]25/025-A Reason for admit: Congestive heart failure of unknown etiology ([facility] [address] [I50.9]  CHF (congestive heart failure), NYHA class I, acute on chronic, combined ([facility] [address] [I50.43]   Clinical update: INT, po antibiotics, confusion and telesitter remain, per report the patient fell yesterday, CT head/neck (-) for any acute findings, weaning O2 today, PT/OT following. Trauma services signed off. Plan is for a swallow eval today. Pneumonia Core measures:  Education-Stop light education given. Discharge plan: Pt from home with her daughter. TCU eval ordered but they recommend and ECF.  following.
aspirin  81 mg Oral Daily    atorvastatin  20 mg Oral Nightly    latanoprost  1 drop Both Eyes Nightly    citalopram  40 mg Oral Daily    isosorbide mononitrate  30 mg Oral Daily    levothyroxine  75 mcg Oral Daily    magnesium oxide  400 mg Oral Daily    sucralfate  1 g Oral 4x Daily WC    sodium chloride flush  10 mL Intravenous 2 times per day    enoxaparin  40 mg Subcutaneous Daily    furosemide  40 mg Intravenous BID    pantoprazole  40 mg Intravenous Daily     Continuous Infusions:   diltiazem (CARDIZEM) 125 mg in dextrose 5% 125 mL infusion 5 mg/hr (02/16/18 0053)      Pertinent Info/Orders/Treatment Plan:  Telemetry, monitor vitals, monitor labs: BNP >19,000, troponin 0.013, Na+ 130, K+ 3.2 with replacement protocol, Cardizem IV At Fib RVR, add BB and wean Cardizem, oxygen 3L, diuresis with accurate I&O, daily weights, Echo pending, Palliative care came and updated code status    Diet: DIET CARDIAC;   DVT Prophylaxis: Lovenox with SCD's ordered  Smoking status:  reports that she has never smoked. She has never used smokeless tobacco.   Influenza Vaccination Screening Completed: yes  Pneumonia Vaccination Screening Completed: yes  Core measures: VTE  PCP: Claudean Speller, DO  Readmission:   no  Risk Score: 29     Discharge Planning  Current Residence:  Private Residence  Living Arrangements:  Family Members, Children   Support Systems:  Family Members, Children  Current Services PTA:     Potential Assistance Needed:  Skilled Nursing Facility  Potential Assistance Purchasing Medications:  No  Does patient want to participate in local refill/ meds to beds program?  N/A  Type of Home Care Services:  None  Patient expects to be discharged to:  home  Expected Discharge date:  02/19/18  Follow Up Appointment: Best Day/ Time: Wednesday PM    Discharge Plan: Unable to speak to patient d/t dementia; daughter here earlier to visit and gone now.  Phone call placed to daughter Luis Lane [phone] to tori

## 2018-02-19 NOTE — PROGRESS NOTES
[address]  Daily Progress Note  Pt Name: Annika Lazar Record Number: [id number]  Date of Birth 2/9/1930   Today's Date: 2/19/2018    HD: # 4    \" I feel well. \"    ASSESSMENT  1. Active Hospital Problems    Diagnosis Date Noted    Fall during current hospitalization, 2/18/2018 [G82. Aylin Blackman, Y92.239] 02/18/2018    Laceration of right temple, occured in hospital 2/18/2018 Genia Moultonel 02/18/2018    Right shoulder bruise during hospitalization 2/18/2018 [S40.021S] 02/18/2018    Bacterial pneumonia, suspected [J15.9] 02/18/2018    Hypoxia [R09.02]     Moderate malnutrition ([address] [E44.0] 02/16/2018     Class: Acute    Influenza bronchitis, suspected [J11.1] 02/16/2018    Acute respiratory failure with hypoxia (HCC) [J96.01] 02/16/2018    Thrombocytopenia (HCC) [D69.6] 02/16/2018    Chronic diastolic heart failure (HCC) [I50.32] 02/15/2018    Acute viral bronchitis [J20.8] 12/29/2017    Atrial fibrillation with rapid ventricular response (HCC) [I48.91] 10/29/2014    PAF (paroxysmal atrial fibrillation) (HCC)- chads of at least 4 ( ,TIA hx, age CHF) [I48.0] 08/25/2014    CAD (coronary artery disease) [I25.10] 07/23/2014         PLAN    Trauma Service will sign off today  Patient's Steri-Strips in place over laceration, no dehiscence or seepage noted  CT neck negative for acute process s/p fall  Medicine managing other problems     SUBJECTIVE  Patient continues on 3B. She states she is doing well today and denies any discomfort. She was only given one dose of Tylenol for pain status post fall and she complains of no further pain. The Steri-Strips are well in place over right temporal laceration. Medicine is managing patient's other medical problems, trauma service will sign off. Thank you for the consult and please call with any further questions or concerns.      Wt Readings from Last 3 Encounters:   02/17/18 122 lb 11.2 oz (55.7 kg)   12/29/17 131 lb (59.4 kg)   09/25/17 129 lb 6.4 oz (58.7 kg)     Temp Readings from Last 3 Encounters:   02/19/18 97.6 °F (36.4 °C) (Axillary)   11/22/17 97.5 °F (36.4 °C) (Oral)   03/22/17 98.4 °F (36.9 °C) (Oral)     BP Readings from Last 3 Encounters:   02/19/18 (!) 167/78   12/29/17 130/64   11/22/17 116/73     Pulse Readings from Last 3 Encounters:   02/19/18 75   12/29/17 83   11/22/17 73       24 HR INTAKE/OUTPUT :   Intake/Output Summary (Last 24 hours) at 02/19/18 0949  Last data filed at 02/18/18 1358   Gross per 24 hour   Intake              240 ml   Output                0 ml   Net              240 ml     DIET CARDIAC; Dietary Nutrition Supplements: Frozen Oral Supplement    OBJECTIVE  CURRENT VITALS BP (!) 167/78   Pulse 75   Temp 97.6 °F (36.4 °C) (Axillary)   Resp 18   Ht 4' 8\" (1.422 m)   Wt 122 lb 11.2 oz (55.7 kg)   LMP  (Exact Date)   SpO2 92%   BMI 27.51 kg/m²   GENERAL: alert, cooperative, no distress  LUNGS: CTAB, no wheezes rales or rhonchi  HEART: ecchymosis over her right thorax extending to right axilla, no crepitus, non-tender to palpation, normal rate, rhythm, no ectopy  WOUNDS: healing well, no dehiscence of right temporal laceration, right shoulder ecchymosis, right periorbital ecchymosis, right thorax/axilla ecchymosis  EXTREMITY: no cyanosis and no clubbing, OLIVA ×4      LABS  CBC : Recent Labs      02/17/18   0525  02/18/18   0352  02/19/18   0433   WBC  3.8*  6.9  5.6   HGB  11.2*  11.0*  10.7*   HCT  34.0*  32.6*  31.8*   MCV  86.4  87.1  86.7   PLT  109*  143  131     BMP: Recent Labs      02/17/18   0525  02/18/18   0525  02/19/18   0433   NA  136  135  136   K  4.2  4.3  4.6   CL  98  98  102   CO2  25  27  26   BUN  33*  36*  28*   CREATININE  1.1  1.0  0.8     COAGS:   Recent Labs      02/17/18   0525  02/18/18   0525  02/19/18   0433   PROT  6.5  6.6  6.2     Pancreas/HFP:  No results for input(s): LIPASE, AMYLASE in the last 72 hours.   Recent Labs      02/17/18   0943 normal.  The hypopharynx is normal.    The epiglottis is normal.  The oral cavity and floor of mouth are normal.        The thyroid gland, submandibular glands and parotid glands are within    acceptable limits.       There is no cervical adenopathy.       There is now bandlike parenchymal opacity in the right lung apex new since    February 15, 2018. No suspicious osseous lesions are present.        Mild mucosal thickening in the ethmoid sinuses. The paranasal sinuses are    clear. The mastoids are clear.         There are no gross abnormalities in the brain parenchyma.       Signed by Corinna Sanchez DO on 02/18/18 2130       Narrative   PROCEDURE: CT SOFT TISSUE NECK W CONTRAST       CLINICAL INFORMATION: SWELLING, NECK, fall.       COMPARISON: CT chest February 15, 2018.       TECHNIQUE: 3 mm axial images were obtained through the neck soft tissues after the administration of intravenous contrast. Sagittal and coronal reconstructions were obtained.       All CT scans at this facility use dose modulation, iterative reconstruction, and/or weight-based dosing when appropriate to reduce radiation dose to as low as reasonably achievable.        FINDINGS:       There is subcutaneous fat stranding starting in the lateral right orbit and extending down over the right zygomatic bone. There is no discrete fluid collection and there is no soft tissue gas. This is shown on axial images 1 through 21.       Further down laterally there is subcutaneous fat stranding with no focal fluid collection or soft tissue gas over the right lateral neck beginning at the inferior margin of the right parotid gland and extending inferiorly for approximately 3.5 cm. This    is shown on axial images 32 through 45.       There is mild thickening and medial deviation of the left false vocal cord axial image 46.       The soft tissues of the nasopharynx are normal.  The oropharynx is within appropriate limits.  The tonsils appear normal.  The hypopharynx is normal. The epiglottis is normal.  The oral cavity and floor of mouth are normal.        The thyroid gland, submandibular glands and parotid glands are within acceptable limits.       There is no cervical adenopathy.       There is now bandlike parenchymal opacity in the right lung apex new since February 15, 2018. No suspicious osseous lesions are present.        Mild mucosal thickening in the ethmoid sinuses. The paranasal sinuses are clear. The mastoids are clear.         There are no gross abnormalities in the brain parenchyma.           Impression   1.   Subcutaneous fat stranding likely ecchymosis right lateral orbit extending down over the right zygomatic bone. No evidence of focal fluid collection such as a hematoma, or soft tissue gas. 2.  Subcutaneous fat stranding likely ecchymosis beginning at the inferior margin of the right parotid gland extending inferiorly for at least 3.5 cm. No evidence of focal fluid collection such as a hematoma, or soft tissue gas.       3.  Bandlike parenchymal opacity in the right upper lobe consistent with atelectasis versus pneumonia. This is new since a recent CT scan of the chest from February 15, 2018.           **This report has been created using voice recognition software. It may contain minor errors which are inherent in voice recognition technology. **       Final report electronically signed by Dr. Tj Valle on 2/18/2018 6:59 AM       Electronically signed by Naya Gonsalez PA-C on 2/19/2018 at 10:15 AM Patient seen and examined independently by me. Above discussed and I agree with CNP. Labs, cultures, and radiographs where available were reviewed. See orders for the updated patient care plan.     Luis Manuel Isaac MD, R Denisple approximated with SS ecchymosis stable pt stii confused discussed with nurse will sign off  2/19/2018   10:33 AM

## 2018-02-19 NOTE — FLOWSHEET NOTE
Subjective: The patient shared that they hope to get better. Objective: The patients body language and demeanor was peaceful and very                            comfortable with my presence. The pt did discuss their physical                            concerns and adjustments do to their illness. Assessment:  Because the patient was going through a life adjustment, I offered                           emotional support, nurtured hope, provided words of encouragement and                           a prayer of healing/comfort to the pt. The patient was thankful for the                           support but is still would like continual emotional and spiritual care. Plan:              I shared with the patient that we will continue to follow up with the patient                          and I gave a spiritual care contact card in the event that there was an                          immediate need during their hospitalization.  Palliative Care will                          follow up with the pt as well.      02/19/18 3538   Encounter Summary   Services provided to: Patient   Referral/Consult From: Jeffrey   Continue Visiting Yes  (2/19/18 Palliative Care pt. )   Complexity of Encounter Moderate   Length of Encounter 15 minutes   Routine   Type Initial   Assessment Approachable   Intervention Nurtured hope   Outcome Expressed gratitude   Spiritual/Muslim   Type Spiritual support

## 2018-02-19 NOTE — PROGRESS NOTES
task incorporated to cross midline and reach outside JEFFERY and > 90 degrees for paper towels X 3 events, min SOB at times     Functional Mobility  Functional - Mobility Device: Rolling Walker  Activity: To/from bathroom  Assist Level: Contact guard assistance  Functional Mobility Comments: min cues for walker safety and placement                              Activity Tolerance:  Activity Tolerance: Patient Tolerated treatment well  Activity Tolerance: min SOB    Assessment:     Performance deficits / Impairments: Decreased functional mobility , Decreased ADL status, Decreased strength, Decreased endurance, Decreased high-level IADLs, Decreased safe awareness  Prognosis: Fair  Discharge Recommendations: Continue to assess pending progress, Patient would benefit from continued therapy after discharge    Patient Education:  Patient Education: OT POC, OT Role, Transfer safety, Walker safety, self cares. Barriers to Learning: Confusion     Equipment Recommendations:  Equipment Needed: No    Safety:  Safety Devices in place: Yes  Type of devices: All fall risk precautions in place, Call light within reach, Gait belt, Patient at risk for falls, Left in chair, Chair alarm in place, Nurse notified    Plan:  Times per week: 3-5x  Current Treatment Recommendations: Strengthening, Endurance Training, Patient/Caregiver Education & Training, Self-Care / ADL, Functional Mobility Training, Equipment Evaluation, Education, & procurement    Goals:  Patient goals : Go To TCU then home    Short term goals  Time Frame for Short term goals: 2 weeks  Short term goal 1: Pt will complete BUE light resistive exercises with min vc for technique to increase indep and safety with all self cares and functional mobility. Short term goal 2: Pt will complete standing tolerance x 4 minutes with SBA and 1-2 hand release to increase indep with grooming tasks.    Short term goal 3: Pt will complete functional moblilty to/from  and [facility] distances with

## 2018-02-19 NOTE — PROGRESS NOTES
[address] [address]  SPEECH THERAPY  STRZ CCU-STEPDOWN 3B  Bedside Swallowing Evaluation      SLP Individual Minutes  Time In: 0930  Time Out: 5256  Minutes: 12          Date: 2018  Patient Name: Keke Sierra      CSN: [id number]   : 1930  (80 y.o.)  Gender: female   Referring Physician:  Dr Carrington Wisdom  Diagnosis: SOB  Secondary Diagnosis:  Dysphagia  History of Present Illness/Injury:Pt is a 88F with hx of chronic diastolic heart failure, atrial fibrillation (not previously on anticoagulation), who presents with complaints of cough, shortness of breath, fever, chills and diffuse body aches. She reports having had lower extremity swelling. She was hypoxic on presentation and was started on supplemental oxygen via NC. Chest imaging did not reveal any acute findings consistent with pneumonia or CHF. Concern for possible aspiration with report of coughing at meals.   Speech to assess swallowing function and determine safest diet textures.       Past Medical History:   Diagnosis Date    ARNOLD (acute kidney injury) ([address]     Atrial fibrillation ([address]     Blood transfusion reaction     CAD (coronary artery disease)     Severe triple vessel disease    CKD (chronic kidney disease)     Diastolic congestive heart failure (HCC)     GERD (gastroesophageal reflux disease)     Hiatal hernia     History of blood transfusion     Hyperlipidemia     Hypertension     Hypoalbuminemia     Hypothyroidism     Mild protein-calorie malnutrition ([address]     NSTEMI (non-ST elevated myocardial infarction) ([address] 2014    Pancreatitis due to biliary obstruction     Pneumonia, organism unspecified(486)     Scoliosis     SDH (subdural hematoma) (HCC)     Dania hole decompresson on 2014    Thyroid disease     Unspecified cerebral artery occlusion with cerebral infarction     Uterine cancer ([address] 1966       Pain:  0/10    Current Diet: Regular and thin    Respiratory Status: [x] Independent     Behavioral

## 2018-02-19 NOTE — PROGRESS NOTES
mcg Oral Daily    vitamin D  50,000 Units Oral Q28 Days    potassium (CARDIAC) replacement protocol   Other RX Placeholder    phosphorus replacement protocol   Other RX Placeholder    pantoprazole  40 mg Oral QAM AC    ipratropium-albuterol  1 ampule Inhalation Q4H WA    predniSONE  40 mg Oral Daily    oseltamivir  30 mg Oral BID    metoprolol tartrate  25 mg Oral BID    atorvastatin  20 mg Oral Nightly    latanoprost  1 drop Both Eyes Nightly    isosorbide mononitrate  30 mg Oral Daily    sucralfate  1 g Oral 4x Daily WC    sodium chloride flush  10 mL Intravenous 2 times per day       Infusions:          PRN Meds: promethazine-codeine, magnesium sulfate, potassium chloride **OR** potassium chloride **OR** potassium chloride, sodium chloride flush, acetaminophen, magnesium hydroxide, ondansetron    Labs/imaging:  CBC:   Recent Labs      02/17/18   0525  02/18/18   0352  02/19/18   0433   WBC  3.8*  6.9  5.6   HGB  11.2*  11.0*  10.7*   PLT  109*  143  131         BMP:    Recent Labs      02/17/18   0525  02/18/18   0525  02/19/18   0433   NA  136  135  136   K  4.2  4.3  4.6   CL  98  98  102   CO2  25  27  26   BUN  33*  36*  28*   CREATININE  1.1  1.0  0.8   GLUCOSE  145*  130*  130*         Hepatic:   Recent Labs      02/17/18   0525  02/18/18   0525  02/19/18   0433   AST  37  41*  34   ALT  21  26  26   BILITOT  0.4  0.3  0.4   ALKPHOS  58  64  51       INR:   No results for input(s): INR in the last 72 hours.       Bertha Roth MD  -------------------------------  Rounding hospitalist

## 2018-02-20 VITALS
WEIGHT: 129.1 LBS | HEART RATE: 72 BPM | BODY MASS INDEX: 29.04 KG/M2 | OXYGEN SATURATION: 95 % | DIASTOLIC BLOOD PRESSURE: 64 MMHG | RESPIRATION RATE: 16 BRPM | HEIGHT: 56 IN | TEMPERATURE: 97.9 F | SYSTOLIC BLOOD PRESSURE: 141 MMHG

## 2018-02-20 PROBLEM — E44.0 MODERATE MALNUTRITION (HCC): Status: RESOLVED | Noted: 2018-02-16 | Resolved: 2018-02-20

## 2018-02-20 PROCEDURE — 6370000000 HC RX 637 (ALT 250 FOR IP): Performed by: INTERNAL MEDICINE

## 2018-02-20 PROCEDURE — 6370000000 HC RX 637 (ALT 250 FOR IP): Performed by: HOSPITALIST

## 2018-02-20 PROCEDURE — 92526 ORAL FUNCTION THERAPY: CPT

## 2018-02-20 PROCEDURE — 2580000003 HC RX 258: Performed by: HOSPITALIST

## 2018-02-20 PROCEDURE — 94640 AIRWAY INHALATION TREATMENT: CPT

## 2018-02-20 PROCEDURE — 97535 SELF CARE MNGMENT TRAINING: CPT

## 2018-02-20 PROCEDURE — 97530 THERAPEUTIC ACTIVITIES: CPT

## 2018-02-20 PROCEDURE — 99239 HOSP IP/OBS DSCHRG MGMT >30: CPT | Performed by: HOSPITALIST

## 2018-02-20 RX ORDER — ATORVASTATIN CALCIUM 20 MG/1
20 TABLET, FILM COATED ORAL NIGHTLY
Qty: 30 TABLET | Refills: 3 | Status: SHIPPED | OUTPATIENT
Start: 2018-02-20

## 2018-02-20 RX ORDER — GUAIFENESIN 600 MG/1
600 TABLET, EXTENDED RELEASE ORAL 2 TIMES DAILY
Qty: 20 TABLET | Refills: 0 | Status: SHIPPED | OUTPATIENT
Start: 2018-02-20 | End: 2019-01-01

## 2018-02-20 RX ORDER — AMOXICILLIN AND CLAVULANATE POTASSIUM 500; 125 MG/1; MG/1
1 TABLET, FILM COATED ORAL EVERY 12 HOURS SCHEDULED
Qty: 9 TABLET | Refills: 0 | Status: SHIPPED | OUTPATIENT
Start: 2018-02-20 | End: 2018-02-25

## 2018-02-20 RX ORDER — ALBUTEROL SULFATE 90 UG/1
2 AEROSOL, METERED RESPIRATORY (INHALATION) EVERY 6 HOURS PRN
Qty: 1 INHALER | Refills: 3 | Status: ON HOLD | OUTPATIENT
Start: 2018-02-20 | End: 2020-01-01

## 2018-02-20 RX ADMIN — PANTOPRAZOLE SODIUM 40 MG: 40 TABLET, DELAYED RELEASE ORAL at 06:49

## 2018-02-20 RX ADMIN — AMOXICILLIN AND CLAVULANATE POTASSIUM 1 TABLET: 500; 125 TABLET, FILM COATED ORAL at 09:37

## 2018-02-20 RX ADMIN — GUAIFENESIN 600 MG: 600 TABLET, EXTENDED RELEASE ORAL at 09:37

## 2018-02-20 RX ADMIN — Medication 10 ML: at 09:39

## 2018-02-20 RX ADMIN — OSELTAMIVIR PHOSPHATE 30 MG: 30 CAPSULE ORAL at 09:37

## 2018-02-20 RX ADMIN — IPRATROPIUM BROMIDE AND ALBUTEROL SULFATE 1 AMPULE: .5; 3 SOLUTION RESPIRATORY (INHALATION) at 08:47

## 2018-02-20 RX ADMIN — SUCRALFATE 1 G: 1 TABLET ORAL at 09:37

## 2018-02-20 RX ADMIN — METOPROLOL TARTRATE 25 MG: 25 TABLET ORAL at 09:37

## 2018-02-20 RX ADMIN — LEVOTHYROXINE SODIUM 75 MCG: 75 TABLET ORAL at 06:49

## 2018-02-20 RX ADMIN — ISOSORBIDE MONONITRATE 30 MG: 30 TABLET ORAL at 09:37

## 2018-02-20 RX ADMIN — ASPIRIN 81 MG: 81 TABLET, COATED ORAL at 09:37

## 2018-02-20 RX ADMIN — SUCRALFATE 1 G: 1 TABLET ORAL at 12:22

## 2018-02-20 RX ADMIN — PREDNISONE 40 MG: 20 TABLET ORAL at 09:37

## 2018-02-20 NOTE — PROGRESS NOTES
CLINICAL PHARMACY: DISCHARGE MED RECONCILIATION/REVIEW    [facility]) Select Patient?: Yes  Total # of Interventions Recommended: 0   -   Total # Interventions Accepted: 0  Intervention Severity:   - Level 1 Intervention Present?: No   - Level 2 #: 0   - Level 3 #: 0   Time Spent (min): 15    Additional Documentation:

## 2018-02-20 NOTE — DISCHARGE SUMMARY
Hospital Discharge Summary    Patient's PCP: Bety Masterson DO  Admit Date: 2/15/2018   Discharge Date: 2/20/2018    Admitting Physician: Dr. Keyla Payton MD  Discharge Physician: Dr. Mitra Rodriguez   Consults: Trauma    Brief HPI/Brief hospital course:     88F with hx of chronic diastolic heart failure, atrial fibrillation (not previously on anticoagulation), who presents with complaints of cough, shortness of breath, fever, chills and diffuse body aches. She reports having had lower extremity swelling. She was hypoxic on presentation and was started on supplemental oxygen via NC. Chest imaging did not reveal any acute findings consistent with pneumonia or CHF. She was initially started on diuretics for possible CHF; however, clinical presentation not consistent with CHF. There is significant concern for underlying influenza, despite negative influenza screen with rapid influenza test which carries only about 50% sensitivity. She was then started on tamiflu and this was explained to patient and daughter who is HPOA. She did go into afib with RVR, initially started on cardizem infusion which was later discontinued as she was hypotensive. She was transitioned to PO metoprolol for rate control. Following inpatient admission, she sustained a fall as she was trying to ambulate in room without calling for assistance; multiple imaging obtained - no acute intracranial bleed. She does have multiple bruises as well as possible ecchymosis right lateral orbit extending down over right zygomatic bone; no evidence of focal fluid collection such as hematoma or soft tissue gas.  There was also noted right upper lung infiltrates, for which we then initiated antibiotics for possible pneumonia with augmentin      Problem list      Influenza bronchitis, suspected--    Acute respiratory failure with hypoxia (HCC)-resolved -weaned to RA    Bacterial pneumonia, suspected RUL- treated with augmentin    Atrial fibrillation Your Medications      These medications were sent to [facility]  [place], [place] - [address]  [place] [phone]  [address], [place] [address]    Hours:  24-hours Phone:  [phone]   · albuterol sulfate  (90 Base) MCG/ACT inhaler  · amoxicillin-clavulanate 500-125 MG per tablet  · atorvastatin 20 MG tablet  · guaiFENesin 600 MG extended release tablet  · metoprolol tartrate 25 MG tablet         Activity: activity as tolerated  Diet: Dietary Nutrition Supplements: Frozen Oral Supplement  DIET CARDIAC; Disposition: SNF  Discharged Condition: Stable  Follow Up:   [address] [address]  [phone]              Code status:  Limited         Total time spent on discharge, finalizing medications, referrals and arranging outpatient follow up was more than 45 minutes      Thank you Dr. Dennys Garg DO for the opportunity to be involved in this patients care.

## 2018-02-20 NOTE — PROGRESS NOTES
Nutrition Assessment    Type and Reason for Visit: Reassess    Nutrition Recommendations:Consider MVI. Malnutrition Assessment:  · Malnutrition Status: At risk for malnutrition    Nutrition Diagnosis:   · Problem: Altered nutrition-related lab values  · Etiology: related to Cardiac dysfunction     Signs and symptoms:  as evidenced by Lab values    Nutrition Assessment:  · Subjective Assessment: Pt. seen & has a telesitter. She mentions good appetite. Denies difficulty chewing /swallowing foods. Nurse mentions pt. consumed all of eggs, toast, 2 juices, & banana at breakfast. Pt. mentions she feels constipated. & I alerted Siria Valentin. Labs include: (2/19) Hemoglobin 10.7. She mentions she likes the magic cups,ONS  intake N/A. · Wound Type: Stage I  · Current Nutrition Therapies:  · Oral Diet Orders: Cardiac   · Oral Diet intake: 26-50%, 51-75%, %  · Oral Nutrition Supplement (ONS) Orders: Frozen Oral Supplement (BID)  · ONS intake: Unable to assess  · Anthropometric Measures:  · Ht: 4' 8\" (142.2 cm)   · Current Body Wt: 129 lb 3 oz (58.6 kg) (no edema per RN)  · Admission Body Wt: 126 lb 6.4 oz (57.3 kg) (2/15, +2 edema)  · Usual Body Wt:  (per pt ~132#; per EMR: 9/17: 129# 6.4 oz, 3/22/17: 137# 3.2 oz (unsure of edema))  · % Weight Change: wt. up 3# since 2/15, no edema now & po is improving,     · Ideal Body Wt: 90 lb (40.8 kg), % Ideal Body 143%  ·    · BMI Classification: BMI 25.0 - 29.9 Overweight  · Comparative Standards (Estimated Nutrition Needs):  · Estimated Daily Total Kcal: ~1350  · Estimated Daily Protein (g): 45-54 gm     Estimated Intake vs Estimated Needs: Intake Improving    Nutrition Risk Level:  Moderate    Nutrition Interventions:   Continue current diet, Continue current ONS  (S) Continued Inpatient Monitoring, Education Completed (2/16 Encouraged ONS, & Reviewed Slashing Sodium Handout,(2/20) Reinforced ONS,  Pt. mental status appears to fluctuate, has a telesitter & does not always

## 2018-02-20 NOTE — PROGRESS NOTES
[facility]  INPATIENT SPEECH THERAPY  STRZ CCU-STEPDOWN 3B    TIME   SLP Individual Minutes  Time In: 1150  Time Out: 1200  Minutes: 10  Timed Code Treatment Minutes: 0 Minutes       [x]Daily Note  []Progress Note  []Discharge Note    Date: 2018  Patient Name: Bolivar Reagan        MRN: [id number]    : 1930  (80 y.o.)  Gender: female   Primary Provider: Aurora Crespo MD  Admitting Diagnosis:  Dr. Alissa Sterling   Secondary Diagnosis: Dysphagia  Precautions: Fall risk  Swallowing Status/Diet: Regular with thin  Swallowing Strategies: General swallowing precautions  DATE of last MBS:  BSE,18  Pain:  0/10    Subjective: Pt alert with confusion; RN reports mentation at baseline. Seen upright in chair upon arrival. Cooperative with clinician. SHORT TERM GOAL #1:  Goal 1: Pt will tolerate regular and thin diet without overt aspiration to ensure safe and adequate nutrition and hydration  INTERVENTIONS: Diet analysis facilitated at lunch with meal tray of established diet level. Physical assist provided with recliner to place feet on floor to permit 90 degree hip flexion to maximize postural placement. Decreased meal navigation and manipulation with utensils exhibited, resulting in mod assist from clinician. Oral phase prolonged, however, pt successful for formation of cohesive bolus for AP transit. Min residuals present, cleared upon liquid assist. Thin viscosity via straw with no overt s/s aspiration x4 trials. With SLP present, pt consumed 25% presented meal with refusal for further intake; poor appetite at date. Recommend continuation of regular with thin liquids with further focus on maximize nutrition/hydration measures to negate potential weight loss. TIMMY Hylton instructed to provide supervision as necessary with PO intake due to deficits seen with meal tray set-up.      SHORT TERM GOAL #2:  Goal 2: Monitor pulmonary status and complete further instrumental assessment as

## 2018-02-20 NOTE — PLAN OF CARE
Problem: Discharge Planning:  Intervention: Assess knowledge level of healthcare   [facility] Palliative Care           Progress Note    Patient Name:  Wily Shen  Medical Record Number:  [id number]  YOB: 1930    Date:  2/16/2018  Time:  2:09 PM  Completed By:  Terry Esparza RN    Reason for Palliative Care Evaluation:  Goals, code status    Current Issues:  []  Pain  [x]  Fatigue  []  Nausea  [x]  Anxiety  []  Depression  []  Shortness of Breath  []  Constipation  []  Appetite  []  Other:    Advance Directives  [] [facility] DNR Form  [] Living Will  [] Medical POA    Current Code Status  [x] Full Resuscitation  [] DNR-Comfort Care-Arrest  [] DNR-Comfort Care  [] Limited   [] No CPR   [] No shock   [] No ET intubation/reintubation   [] No resuscitative medications   [] Other limitation:    Performance Status:  30    Family/Patient Discussion:  Pt sitting up in bed and is alert and oriented to surroundings. It is reported by staff and family that pt is not thinking clearly at intervals. Spoke with pt and daughter about goals and code status. Pt does not offer any conversation during interaction. Daughter states that recently she had gone on vacation and had people come in and care for her mother for 2 weeks. \"Mom prior to me leaving was not doing well, she has slowed down. While I was gone she did well for the people caring for her. Since I've been home she is not eating and drinking well again. \" Daughter offers that at times pt is not clear mentally. We discuss dementia, daughter states that pt has never been diagnosed with this, but she has seen gradual changes over time. We also discuss infection, exhaustion, distress as possible contributors to current condition. We review goals of care including code status options. Daughter states that she got a call during night, \"but I didn't understand everything. I just want to give Mom a chance. \" We reviewed pt current status and options for care including
Problem: Impaired respiratory status  Goal: Lung sounds clear or within normal limits for patient  Outcome: Ongoing  Pt has clear/diminished lung sounds t/o  Pt is on 1 lpm via nc. No signs of respiratory distress noted  Will continue with therapy as ordered to improve aeration.
Problem: Impaired respiratory status  Goal: Lung sounds clear or within normal limits for patient  Outcome: Ongoing  Pt has expiratory wheezes post Tx. Pt is on room air at this time and shows no signs of respiratory distress  Will continue with therapy as ordered to improve lung sounds.
Problem: Impaired respiratory status  Goal: Lung sounds clear or within normal limits for patient  Outcome: Ongoing  Treatment will be continued as ordered to improve aeration.
communicates pain   Outcome: Completed Date Met: 02/17/18  No complaints pain. Problem: Skin Integrity - Impaired:  Goal: Absence of new skin breakdown  Absence of new skin breakdown   Outcome: Completed Date Met: 02/17/18      Problem: Tissue Perfusion - Cardiopulmonary, Altered:  Goal: Absence of angina  Absence of angina   Outcome: Completed Date Met: 02/17/18    Goal: Hemodynamic stability will improve  Hemodynamic stability will improve   Outcome: Completed Date Met: 02/17/18      Problem: Nutrition  Goal: Optimal nutrition therapy  Outcome: Ongoing  Poor appetite. Encouraging to eat with eat meal.    Comments: Care plan reviewed with patient. Patient verbalizes understanding of the care plan and contributed to goal setting.
fall. PRN tylenol given.        Problem: Nutrition  Goal: Optimal nutrition therapy  Outcome: Ongoing  Appetite improved. Encouraging to eat at meal time. Magic cups given with meals.     Comments: Care plan reviewed with patient.   Daughter aware of plan of care

## 2018-02-21 ENCOUNTER — TELEPHONE (OUTPATIENT)
Dept: FAMILY MEDICINE CLINIC | Age: 83
End: 2018-02-21

## 2018-02-21 LAB
BLOOD CULTURE, ROUTINE: NORMAL
BLOOD CULTURE, ROUTINE: NORMAL

## 2018-02-21 NOTE — TELEPHONE ENCOUNTER
[facility]    Call within 2 business days of discharge: Yes    Patient: Hortensia Root Patient : 1930   MRN: [id number]  Reason for Admission: There are no discharge diagnoses documented for the most recent discharge. Discharge Date: 18 RARS: [facility] Risk Score: 12     Spoke with: [address] [place] [address] [place]: [unfilled]    Non-face-to-face services provided:  Scheduled appointment with PCP-3/14/18 with Dr. Julio Samaniego. Patient [facility] residing at [facility]     Have the medications prescribed at discharge been filled? N/A  Does the patient understand what the medications are for? N/A  Has the patient experienced any new symptoms or have previous symptoms worsened? No  Is the patient experiencing any pain? NO If yes, is the pain well controlled? N/A  We want to be sure the patient has the best recovery possible. Does the patient understand all the discharge instructions? Yes  Did someone talk to the patient and/or family about the patient needs prior to being discharged? Yes  Did the patient's doctor communicate well? Yes  Did the patient's nurse communicate well? Yes  Was the patient satisfied with the services and care received at [address] [place] [address]?  Yes, was upset initially due to mixed diagnosis          Follow Up  Future Appointments  Date Time Provider Carloz Perez   3/6/2018 1:45 PM Dorothy Bhat MD [facility]   3/14/2018 2:00 PM Maite Hill DO [address] [place] [address]       Adán Mcginnis LPN

## 2018-02-22 DIAGNOSIS — F32.89 OTHER DEPRESSION: ICD-10-CM

## 2018-02-22 RX ORDER — CITALOPRAM 40 MG/1
TABLET ORAL
Qty: 90 TABLET | Refills: 0 | Status: SHIPPED | OUTPATIENT
Start: 2018-02-22 | End: 2020-01-01 | Stop reason: SDUPTHER

## 2018-03-06 ENCOUNTER — OFFICE VISIT (OUTPATIENT)
Dept: CARDIOLOGY CLINIC | Age: 83
End: 2018-03-06
Payer: MEDICARE

## 2018-03-06 ENCOUNTER — HOSPITAL ENCOUNTER (OUTPATIENT)
Dept: NON INVASIVE DIAGNOSTICS | Age: 83
Discharge: HOME | End: 2018-03-06
Payer: COMMERCIAL

## 2018-03-06 VITALS
SYSTOLIC BLOOD PRESSURE: 105 MMHG | HEIGHT: 57 IN | BODY MASS INDEX: 26.97 KG/M2 | HEART RATE: 69 BPM | WEIGHT: 125 LBS | DIASTOLIC BLOOD PRESSURE: 55 MMHG

## 2018-03-06 DIAGNOSIS — I50.32 CHRONIC DIASTOLIC CONGESTIVE HEART FAILURE (HCC): ICD-10-CM

## 2018-03-06 DIAGNOSIS — Z95.1 S/P CABG X 4: ICD-10-CM

## 2018-03-06 DIAGNOSIS — I48.0 PAF (PAROXYSMAL ATRIAL FIBRILLATION) (HCC): ICD-10-CM

## 2018-03-06 DIAGNOSIS — E78.00 PURE HYPERCHOLESTEROLEMIA: ICD-10-CM

## 2018-03-06 DIAGNOSIS — I25.10 CORONARY ARTERY DISEASE INVOLVING NATIVE CORONARY ARTERY OF NATIVE HEART WITHOUT ANGINA PECTORIS: Primary | Chronic | ICD-10-CM

## 2018-03-06 DIAGNOSIS — I25.10 CORONARY ARTERY DISEASE INVOLVING NATIVE CORONARY ARTERY OF NATIVE HEART WITHOUT ANGINA PECTORIS: Chronic | ICD-10-CM

## 2018-03-06 PROCEDURE — 93225 XTRNL ECG REC<48 HRS REC: CPT

## 2018-03-06 PROCEDURE — G8484 FLU IMMUNIZE NO ADMIN: HCPCS | Performed by: INTERNAL MEDICINE

## 2018-03-06 PROCEDURE — G8598 ASA/ANTIPLAT THER USED: HCPCS | Performed by: INTERNAL MEDICINE

## 2018-03-06 PROCEDURE — 1090F PRES/ABSN URINE INCON ASSESS: CPT | Performed by: INTERNAL MEDICINE

## 2018-03-06 PROCEDURE — 99214 OFFICE O/P EST MOD 30 MIN: CPT | Performed by: INTERNAL MEDICINE

## 2018-03-06 PROCEDURE — 1111F DSCHRG MED/CURRENT MED MERGE: CPT | Performed by: INTERNAL MEDICINE

## 2018-03-06 PROCEDURE — 1036F TOBACCO NON-USER: CPT | Performed by: INTERNAL MEDICINE

## 2018-03-06 PROCEDURE — 4040F PNEUMOC VAC/ADMIN/RCVD: CPT | Performed by: INTERNAL MEDICINE

## 2018-03-06 PROCEDURE — G8427 DOCREV CUR MEDS BY ELIG CLIN: HCPCS | Performed by: INTERNAL MEDICINE

## 2018-03-06 PROCEDURE — 1123F ACP DISCUSS/DSCN MKR DOCD: CPT | Performed by: INTERNAL MEDICINE

## 2018-03-06 PROCEDURE — 93226 XTRNL ECG REC<48 HR SCAN A/R: CPT

## 2018-03-06 PROCEDURE — G8417 CALC BMI ABV UP PARAM F/U: HCPCS | Performed by: INTERNAL MEDICINE

## 2018-03-06 NOTE — PROGRESS NOTES
Chief Complaint   Patient presents with    Check-Up     5 month     Congestive Heart Failure    Hypertension    Atrial Fibrillation    Coronary Artery Disease   Seen and evaluated in the hospital for NSTEMI and CHF- treated for both had cath   And discharged med RX    Patient here to follow up from [facility] changed her levothyroxine to 75 mg daily and she is no longer alternating with 88mg,     In 5/2016 gallstones in her pancreas        Pt here for 5 month follow up. Denied cp or edema    Lost 5 lb compared to previous visi    Sob on exertion stable    Occasional dizziness on getting up      Chronic cough due to hiatal hernia  Marked hital hernia with heart burn and horssness of voice and cough      Patient was told she had a hiatal hernia from Dr. Marck Youssef. On protonix and carafate    During the recent admission for pancreatitis  hospitlist started lopressor 50 bid and decreased T4 from 75/88 to 75 qd  Pat feel weak and HR was low at home   Stopped lopressor for 5 days and still HR good went up from 51 to 88      Patient Seen, Chart, Consults notes, Labs, Radiology studies reviewed.         Patient Active Problem List   Diagnosis    hx of NSTEMI (non-ST elevated myocardial infarction) ([address]    CAD (coronary artery disease)    Physical deconditioning    PAF (paroxysmal atrial fibrillation) ([address]- chads of at least 4 ( ,TIA hx, age CHF)   [facility] CHF (congestive heart failure) ([facility]) chronic    Hyperlipidemia    Hip pain    Anemia    Hyponatremia- IMPROVING    ARF (acute renal failure) (HCC)    Atrial fibrillation with rapid ventricular response (HCC)    Easy bruising    Hypothyroidism    Hip pain, acute    Hematoma of the left foot    Hypomagnesemia     Dehydration    Hyponatremia    ARNOLD (acute kidney injury) ([address]    Falls    Subcutaneous hematoma    Subdural hematoma, chronic (HCC)    Subdural hematoma (HCC)    Paroxysmal a-fib (HCC)    Lower urinary tract infectious disease    Candida infection    Hypophosphatemia    Volume overload    Dyspnea on exertion    Acute on chronic diastolic CHF (congestive heart failure), NYHA class 1 (HCC)    Hypothyroidism    Volume depletion    Dyspnea and respiratory abnormalities    Pneumonia due to organism    Azotemia    VHD (valvular heart disease)    Tachycardia sinus versus atrial    Dizziness and at times vertigo when she gets up and walk    Junctional rhythm rate 54 and with underlying afib    Bradycardia    Dizzy spells    Lightheadedness    S/P CABG x 4- [facility] Coronary artery disease involving native coronary artery of native heart without angina pectoris    Acute pancreatitis    Leukocytosis    Chest pain    Hyperglycemia    Abdominal pain, epigastric    Accelerated hypertension    Calculus of gallbladder with acute cholecystitis    SIRS (systemic inflammatory response syndrome) (HCC)    Pancreatitis due to biliary obstruction    Calculus of gallbladder with acute cholecystitis without obstruction    Acute biliary pancreatitis    Pneumonia of both lower lobes due to infectious organism    Pre-op evaluation    Essential hypertension    Systolic congestive heart failure (HCC)    Weakness    Chronic systolic congestive heart failure (HCC)    Chronic atrial fibrillation (HCC)    SOB (shortness of breath) on exertion    Essential hypertension    Bilateral leg edema +1    Acute viral bronchitis    Pre-op evaluation for tooth extraction    Acute cystitis    Chronic diastolic heart failure (HCC)    Influenza bronchitis, suspected    Acute respiratory failure with hypoxia (HCC)    Thrombocytopenia [facility]    Fall during current hospitalization, 2/18/2018    Laceration of right temple, occured in hospital 2/18/2018    Right shoulder bruise during hospitalization 2/18/2018    Bacterial pneumonia, suspected    Hypoxia       Past Surgical History:   Procedure Laterality Date    APPENDECTOMY      26 02/19/2018    AST 34 02/19/2018    PROT 6.2 02/19/2018    BILITOT 0.4 02/19/2018    BILIDIR <0.2 04/07/2017    LABALBU 3.2 02/19/2018     Magnesium:    Lab Results   Component Value Date    MG 2.2 02/19/2018     Warfarin PT/INR:    No components found for: PTPATWAR,  PTINRWAR  HgBA1c:    Lab Results   Component Value Date    LABA1C 4.9 05/11/2016     FLP:    Lab Results   Component Value Date    TRIG 53 04/07/2017    HDL 51 04/07/2017    LDLCALC 64 04/07/2017    LABVLDL 19 11/08/2014     TSH:    Lab Results   Component Value Date    TSH 3.050 02/16/2018     ECHO EF 55%    EKG  atrial fibrillation with slow Vent response rate 55 bpm  Low voltage in precordial leads.    - Old  Extensive anterior-lateral and  Old  Inferior infarct  -Left axis secondary to infarct -consider anterior fascicular block. -  Nonspecific T-abnormality. ABNORMAL       CONCLUSION:  1. This is an abnormal Holter monitor finding with underlying atrial  fibrillation and junctional rhythm. The junctional rhythm with  average heart rate of 55 beats per minute, ranging from 45 to 83  beats per minute. 2. No significant pause of more than 1.6 seconds noted. 3. Rare ventricular ectopic beats noted. Rare supraventricular  ectopic beats noted. RECOMMENDATION: The patient is on low dose Lopressor 12.5 twice a day. I  believe it is prudent to stop the Lopressor at this level and consider down  the line repeating Holter monitor. At this level, the patient does not need  a pacemaker, but down the line, the patient is bound to end up in a  pacemaker, so repeating down the line the Holter monitor will be of paramount  importance. Roberto Rubio M.D.  D: 11/14/2014 23:16     EKG 5/7/15Undetermined   Tachycardia  - occasional ectopic ventricular beat     -Anterior infarct -age undetermined  -Old inferior-apical infarct  -Left axis secondary to infarct -consider anterior fascicular block.    Appears more sinus tachy or atrial tachy  ABNORMAL decrease lasix dose from 40 to 20 post DC from [facility] and NSTEMI  NSTEMI  Anemia-FOBT ( hgb was 10 gm % at [facility] on 07/15 /14 and on admission was 13)  Abn EKG with deep TW in the anterolateral leads  Recent bowel obstruction - s/p laparoscopy   Hx CAD - s/p CABG x4- 2011 at [facility]  Hx atrial fibrillation/PAF  HLP  Hx TIA  Hx GERD and hiatal hernia  Hx uterine cancer    Plan     Under F/u with GI for symptomatic Hiatal hernia    The current  Lab and meds reviewed and doing well    Continue the current treatment and with constant vigilance to changes in symptoms and also any potential side effects. Return for care or seek medical attention immediately if symptoms got worse and/or develop new symptoms. Dizziness chronic and holter better rate better after Stop lopressor 25 bid  If she need BB -she need Pacer  Holter reviewed   lopressor recently started by hospitlist 25 BID  holter  monitor for 48 hrs and see      Coronary artery disease, seems to be stable.  Denies angina or change in breathing pattern  CAD  Recently started on celexa  By pcp and could not take ranexa with it due to QTC prologation  NO recent Hx of CP  Hence stop ranexa and cont celexa  If there is cp consider imdur      PAF  Recommend no apixaban due hematoma on leg and freq fall recent subdural hematoma after fall  Cont  asa   AFIB PAF on rate control and apixaban  No OA any more per pat and daughter      Tachycardia sinus versus atrial tachy - rate controlled now with lopressor and syntheriod adjustment- resolved  cont  lopressor 25 bid  Cont   syntheriod 75/88 alternate  TSH WNL  holter- afib with Junctional Ryhthem of 45 to 83 bpm with average HR of 55 11/7/14  NO pacer needed at this time  Need close f/u  holter better and junctional resolved      Congestive heart failure: no evidence of fluid overload today, no recent hospitalization for CHF  Double the bumex for 4 days the   Cont  Bumex 0.5 qd fron qd  Cont aldacton to 12.5 po qd    Hyponitremia-corrected well     Hyperlipidemia: on statins, followed periodically. Patient need periodic lipid and liver profile. Discussed use, benefit, and side effects of prescribed medications. All patient questions answered. Pt voiced understanding. Instructed to continue current medications, diet and exercise. Continue risk factor modification and medical management. Patient agreed with treatment plan. Follow up as directed.     RTC in 3 months    [facility]

## 2018-03-13 NOTE — PROCEDURES
[address] [place] [address]                                  HOLTER MONITOR    PATIENT NAME: Olga Castleman                   :        1930  MED REC NO:   [id number]                           ROOM:  ACCOUNT NO:   [de-identified]                           ADMIT DATE: 2018  PROVIDER:     Jamison Payne. DALI Clark STUDY:  2018    INDICATION:  Cardiac arrhythmia. DURATION:  48 hours. FINDINGS:  The patient is in complete atrial fibrillation. Average heart  rate 65 beats per minute, ranging from 35 to 127 beats per minute. Maximum  R to R interval is 2.3 seconds at 02:40 a.m. There are 28 ventricular  ectopic beats, all isolated. No supraventricular ectopic beats. Diary presented and no entry noted. CONCLUSION:  The patient is in complete atrial fibrillation. Average heart  rate 65 beats per minute ranging from 35 to 127 beats per minute, 35 beats  per minute happened at 04:53 a.m. No significant pause of more than 2.3  seconds, which happened at 02:40 a.m. Rare ventricular ectopic beats,  total of 28, all isolated. No other form of arrhythmia. Needs clinical  correlation. The patient's average heart rate is good. It seems to be  well controlled atrial fibrillation. The pause of 2.3 seconds and minimum  heart of 35 beats per minute was happening in the night, probably while the  patient was sleeping and so at this level, the patient's heart rate seems  to be well controlled AFib. Needs clinical correlation. Shaun Ledesma.  Ramo Cruz M.D.    D: 2018 21:03:44       T: 2018 22:37:45     [email or web address]  Job#: [id number]     Doc#: [id number]    CC:

## 2018-03-14 ENCOUNTER — OFFICE VISIT (OUTPATIENT)
Dept: FAMILY MEDICINE CLINIC | Age: 83
End: 2018-03-14
Payer: MEDICARE

## 2018-03-14 VITALS
HEIGHT: 56 IN | HEART RATE: 68 BPM | BODY MASS INDEX: 28.25 KG/M2 | SYSTOLIC BLOOD PRESSURE: 116 MMHG | OXYGEN SATURATION: 94 % | RESPIRATION RATE: 20 BRPM | WEIGHT: 125.6 LBS | DIASTOLIC BLOOD PRESSURE: 56 MMHG

## 2018-03-14 DIAGNOSIS — R60.0 BILATERAL LEG EDEMA: ICD-10-CM

## 2018-03-14 DIAGNOSIS — I48.0 PAROXYSMAL A-FIB (HCC): ICD-10-CM

## 2018-03-14 DIAGNOSIS — I50.20 SYSTOLIC CONGESTIVE HEART FAILURE, UNSPECIFIED CONGESTIVE HEART FAILURE CHRONICITY: ICD-10-CM

## 2018-03-14 DIAGNOSIS — E78.49 OTHER HYPERLIPIDEMIA: ICD-10-CM

## 2018-03-14 DIAGNOSIS — I25.10 CORONARY ARTERY DISEASE INVOLVING NATIVE CORONARY ARTERY OF NATIVE HEART WITHOUT ANGINA PECTORIS: Primary | Chronic | ICD-10-CM

## 2018-03-14 DIAGNOSIS — D69.6 THROMBOCYTOPENIA (HCC): ICD-10-CM

## 2018-03-14 DIAGNOSIS — R53.81 PHYSICAL DECONDITIONING: ICD-10-CM

## 2018-03-14 DIAGNOSIS — I48.0 PAF (PAROXYSMAL ATRIAL FIBRILLATION) (HCC): ICD-10-CM

## 2018-03-14 DIAGNOSIS — E03.9 HYPOTHYROIDISM, UNSPECIFIED TYPE: Chronic | ICD-10-CM

## 2018-03-14 PROCEDURE — 1090F PRES/ABSN URINE INCON ASSESS: CPT | Performed by: FAMILY MEDICINE

## 2018-03-14 PROCEDURE — 4040F PNEUMOC VAC/ADMIN/RCVD: CPT | Performed by: FAMILY MEDICINE

## 2018-03-14 PROCEDURE — 1036F TOBACCO NON-USER: CPT | Performed by: FAMILY MEDICINE

## 2018-03-14 PROCEDURE — G8427 DOCREV CUR MEDS BY ELIG CLIN: HCPCS | Performed by: FAMILY MEDICINE

## 2018-03-14 PROCEDURE — G8598 ASA/ANTIPLAT THER USED: HCPCS | Performed by: FAMILY MEDICINE

## 2018-03-14 PROCEDURE — G0444 DEPRESSION SCREEN ANNUAL: HCPCS | Performed by: FAMILY MEDICINE

## 2018-03-14 PROCEDURE — 1111F DSCHRG MED/CURRENT MED MERGE: CPT | Performed by: FAMILY MEDICINE

## 2018-03-14 PROCEDURE — G8417 CALC BMI ABV UP PARAM F/U: HCPCS | Performed by: FAMILY MEDICINE

## 2018-03-14 PROCEDURE — G8482 FLU IMMUNIZE ORDER/ADMIN: HCPCS | Performed by: FAMILY MEDICINE

## 2018-03-14 PROCEDURE — 99214 OFFICE O/P EST MOD 30 MIN: CPT | Performed by: FAMILY MEDICINE

## 2018-03-14 PROCEDURE — 1123F ACP DISCUSS/DSCN MKR DOCD: CPT | Performed by: FAMILY MEDICINE

## 2018-03-14 ASSESSMENT — PATIENT HEALTH QUESTIONNAIRE - PHQ9
6. FEELING BAD ABOUT YOURSELF - OR THAT YOU ARE A FAILURE OR HAVE LET YOURSELF OR YOUR FAMILY DOWN: 0
3. TROUBLE FALLING OR STAYING ASLEEP: 0
10. IF YOU CHECKED OFF ANY PROBLEMS, HOW DIFFICULT HAVE THESE PROBLEMS MADE IT FOR YOU TO DO YOUR WORK, TAKE CARE OF THINGS AT HOME, OR GET ALONG WITH OTHER PEOPLE: 0
SUM OF ALL RESPONSES TO PHQ9 QUESTIONS 1 & 2: 0
8. MOVING OR SPEAKING SO SLOWLY THAT OTHER PEOPLE COULD HAVE NOTICED. OR THE OPPOSITE, BEING SO FIGETY OR RESTLESS THAT YOU HAVE BEEN MOVING AROUND A LOT MORE THAN USUAL: 0
SUM OF ALL RESPONSES TO PHQ QUESTIONS 1-9: 0
1. LITTLE INTEREST OR PLEASURE IN DOING THINGS: 0
9. THOUGHTS THAT YOU WOULD BE BETTER OFF DEAD, OR OF HURTING YOURSELF: 0
2. FEELING DOWN, DEPRESSED OR HOPELESS: 0
7. TROUBLE CONCENTRATING ON THINGS, SUCH AS READING THE NEWSPAPER OR WATCHING TELEVISION: 0
5. POOR APPETITE OR OVEREATING: 0
4. FEELING TIRED OR HAVING LITTLE ENERGY: 0

## 2018-03-14 NOTE — PROGRESS NOTES
02/16/2018       Lab Results   Component Value Date    WBC 5.6 02/19/2018    HGB 10.7 (L) 02/19/2018    HCT 31.8 (L) 02/19/2018    MCV 86.7 02/19/2018     02/19/2018         Health Maintenance   Topic Date Due    DTaP/Tdap/Td vaccine (1 - Tdap) 02/09/1949    Shingles Vaccine (1 of 2 - 2 Dose Series) 02/09/1980    TSH testing  02/16/2019    Potassium monitoring  02/19/2019    Creatinine monitoring  02/19/2019    Flu vaccine  Completed    Pneumococcal low/med risk  Completed       Immunization History   Administered Date(s) Administered    Influenza Vaccine, unspecified formulation 10/01/2016    Influenza Virus Vaccine 10/01/2015    Influenza, High Dose 10/18/2017    PPD Test 07/26/2014, 11/03/2014, 03/19/2015, 05/19/2016    Pneumococcal 13-valent Conjugate ([id number]) 10/07/2015    Pneumococcal Polysaccharide ([id number]) 10/01/2015           Review of Systems   Constitutional: Negative. HENT: Negative. Respiratory: Positive for shortness of breath. Cardiovascular: Negative. Gastrointestinal: Negative. Musculoskeletal: Negative. All other systems reviewed and are negative. Objective:   Physical Exam   Constitutional: She is oriented to person, place, and time. She appears well-developed and well-nourished. HENT:   Head: Normocephalic and atraumatic. Right Ear: Tympanic membrane normal.   Left Ear: Tympanic membrane normal.   Mouth/Throat: Oropharynx is clear and moist and mucous membranes are normal.   Cardiovascular: Normal rate, regular rhythm and normal heart sounds. No murmur heard. Pulmonary/Chest: Effort normal and breath sounds normal.   Abdominal: Soft. Bowel sounds are normal.   Musculoskeletal: She exhibits no edema. Neurological: She is alert and oriented to person, place, and time. Skin: Skin is warm and dry. Psychiatric: She has a normal mood and affect. Her behavior is normal.   Nursing note and vitals reviewed. Assessment:      1.  Coronary

## 2018-03-20 ASSESSMENT — ENCOUNTER SYMPTOMS
GASTROINTESTINAL NEGATIVE: 1
SHORTNESS OF BREATH: 1

## 2018-03-22 DIAGNOSIS — R53.81 PHYSICAL DECONDITIONING: ICD-10-CM

## 2018-03-22 DIAGNOSIS — R53.1 GENERAL WEAKNESS: Primary | ICD-10-CM

## 2018-04-11 PROBLEM — Z01.818 PRE-OP EVALUATION: Status: RESOLVED | Noted: 2017-12-29 | Resolved: 2018-04-11

## 2018-06-07 ENCOUNTER — APPOINTMENT (OUTPATIENT)
Dept: GENERAL RADIOLOGY | Age: 83
DRG: 291 | End: 2018-06-07
Payer: MEDICARE

## 2018-06-07 ENCOUNTER — HOSPITAL ENCOUNTER (INPATIENT)
Age: 83
LOS: 11 days | Discharge: NURSING, REHAB OR LONG-TERM CARE | DRG: 291 | End: 2018-06-18
Attending: INTERNAL MEDICINE | Admitting: HOSPITALIST
Payer: MEDICARE

## 2018-06-07 ENCOUNTER — APPOINTMENT (OUTPATIENT)
Dept: CT IMAGING | Age: 83
DRG: 291 | End: 2018-06-07
Payer: MEDICARE
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
## 2018-06-07 DIAGNOSIS — N39.0 URINARY TRACT INFECTION WITHOUT HEMATURIA, SITE UNSPECIFIED: ICD-10-CM

## 2018-06-07 DIAGNOSIS — S09.90XA CLOSED HEAD INJURY, INITIAL ENCOUNTER: ICD-10-CM

## 2018-06-07 PROBLEM — N17.9 ACUTE KIDNEY INJURY (HCC): Status: ACTIVE | Noted: 2018-06-07

## 2018-06-07 LAB
ALBUMIN SERPL-MCNC: 3.8 G/DL (ref 3.5–5.1)
ALP BLD-CCNC: 58 U/L (ref 38–126)
ALT SERPL-CCNC: 17 U/L (ref 11–66)
ANION GAP SERPL CALCULATED.3IONS-SCNC: 10 MEQ/L (ref 8–16)
ANISOCYTOSIS: ABNORMAL
AST SERPL-CCNC: 30 U/L (ref 5–40)
BACTERIA: ABNORMAL /HPF
BASOPHILS # BLD: 0.6 %
BASOPHILS ABSOLUTE: 0.1 THOU/MM3 (ref 0–0.1)
BILIRUB SERPL-MCNC: 0.7 MG/DL (ref 0.3–1.2)
BILIRUBIN URINE: NEGATIVE
BLOOD, URINE: NEGATIVE
BUN BLDV-MCNC: 40 MG/DL (ref 7–22)
CALCIUM SERPL-MCNC: 9.1 MG/DL (ref 8.5–10.5)
CASTS 2: ABNORMAL /LPF
CASTS UA: ABNORMAL /LPF
CHARACTER, URINE: ABNORMAL
CHLORIDE BLD-SCNC: 98 MEQ/L (ref 98–111)
CO2: 28 MEQ/L (ref 23–33)
COLOR: YELLOW
CREAT SERPL-MCNC: 1.4 MG/DL (ref 0.4–1.2)
CRYSTALS, UA: ABNORMAL
EKG ATRIAL RATE: 49 BPM
EKG Q-T INTERVAL: 500 MS
EKG QRS DURATION: 118 MS
EKG QTC CALCULATION (BAZETT): 478 MS
EKG R AXIS: -98 DEGREES
EKG T AXIS: 84 DEGREES
EKG VENTRICULAR RATE: 55 BPM
EOSINOPHIL # BLD: 0.7 %
EOSINOPHILS ABSOLUTE: 0.1 THOU/MM3 (ref 0–0.4)
EPITHELIAL CELLS, UA: ABNORMAL /HPF
GFR SERPL CREATININE-BSD FRML MDRD: 35 ML/MIN/1.73M2
GLUCOSE BLD-MCNC: 126 MG/DL (ref 70–108)
GLUCOSE URINE: NEGATIVE MG/DL
HCT VFR BLD CALC: 32.8 % (ref 37–47)
HEMOGLOBIN: 11 GM/DL (ref 12–16)
INR BLD: 1.08 (ref 0.85–1.13)
KETONES, URINE: NEGATIVE
LEUKOCYTE ESTERASE, URINE: ABNORMAL
LYMPHOCYTES # BLD: 16.3 %
LYMPHOCYTES ABSOLUTE: 1.4 THOU/MM3 (ref 1–4.8)
MCH RBC QN AUTO: 30 PG (ref 27–31)
MCHC RBC AUTO-ENTMCNC: 33.5 GM/DL (ref 33–37)
MCV RBC AUTO: 89.5 FL (ref 81–99)
MISCELLANEOUS 2: ABNORMAL
MONOCYTES # BLD: 9.5 %
MONOCYTES ABSOLUTE: 0.8 THOU/MM3 (ref 0.4–1.3)
NITRITE, URINE: NEGATIVE
NUCLEATED RED BLOOD CELLS: 0 /100 WBC
OSMOLALITY CALCULATION: 283.2 MOSMOL/KG (ref 275–300)
PDW BLD-RTO: 15.1 % (ref 11.5–14.5)
PH UA: 6
PLATELET # BLD: 163 THOU/MM3 (ref 130–400)
PMV BLD AUTO: 8.5 FL (ref 7.4–10.4)
POTASSIUM SERPL-SCNC: 4.8 MEQ/L (ref 3.5–5.2)
PROTEIN UA: NEGATIVE
RBC # BLD: 3.67 MILL/MM3 (ref 4.2–5.4)
RBC URINE: ABNORMAL /HPF
RENAL EPITHELIAL, UA: ABNORMAL
SEG NEUTROPHILS: 72.9 %
SEGMENTED NEUTROPHILS ABSOLUTE COUNT: 6.2 THOU/MM3 (ref 1.8–7.7)
SODIUM BLD-SCNC: 136 MEQ/L (ref 135–145)
SPECIFIC GRAVITY, URINE: 1.01 (ref 1–1.03)
TOTAL PROTEIN: 6.4 G/DL (ref 6.1–8)
TROPONIN T: < 0.01 NG/ML
UROBILINOGEN, URINE: 0.2 EU/DL
WBC # BLD: 8.5 THOU/MM3 (ref 4.8–10.8)
WBC UA: ABNORMAL /HPF
YEAST: ABNORMAL

## 2018-06-07 PROCEDURE — 93010 ELECTROCARDIOGRAM REPORT: CPT | Performed by: INTERNAL MEDICINE

## 2018-06-07 PROCEDURE — 70450 CT HEAD/BRAIN W/O DYE: CPT

## 2018-06-07 PROCEDURE — 93005 ELECTROCARDIOGRAM TRACING: CPT | Performed by: INTERNAL MEDICINE

## 2018-06-07 PROCEDURE — 6370000000 HC RX 637 (ALT 250 FOR IP): Performed by: HOSPITALIST

## 2018-06-07 PROCEDURE — 73590 X-RAY EXAM OF LOWER LEG: CPT

## 2018-06-07 PROCEDURE — 84484 ASSAY OF TROPONIN QUANT: CPT

## 2018-06-07 PROCEDURE — 80053 COMPREHEN METABOLIC PANEL: CPT

## 2018-06-07 PROCEDURE — 73502 X-RAY EXAM HIP UNI 2-3 VIEWS: CPT

## 2018-06-07 PROCEDURE — 87077 CULTURE AEROBIC IDENTIFY: CPT

## 2018-06-07 PROCEDURE — 70486 CT MAXILLOFACIAL W/O DYE: CPT

## 2018-06-07 PROCEDURE — 85610 PROTHROMBIN TIME: CPT

## 2018-06-07 PROCEDURE — 99222 1ST HOSP IP/OBS MODERATE 55: CPT | Performed by: HOSPITALIST

## 2018-06-07 PROCEDURE — 87186 SC STD MICRODIL/AGAR DIL: CPT

## 2018-06-07 PROCEDURE — 36415 COLL VENOUS BLD VENIPUNCTURE: CPT

## 2018-06-07 PROCEDURE — 87184 SC STD DISK METHOD PER PLATE: CPT

## 2018-06-07 PROCEDURE — 99285 EMERGENCY DEPT VISIT HI MDM: CPT

## 2018-06-07 PROCEDURE — 87086 URINE CULTURE/COLONY COUNT: CPT

## 2018-06-07 PROCEDURE — 2580000003 HC RX 258: Performed by: HOSPITALIST

## 2018-06-07 PROCEDURE — 85025 COMPLETE CBC W/AUTO DIFF WBC: CPT

## 2018-06-07 PROCEDURE — 1200000003 HC TELEMETRY R&B

## 2018-06-07 PROCEDURE — 72125 CT NECK SPINE W/O DYE: CPT

## 2018-06-07 PROCEDURE — 81001 URINALYSIS AUTO W/SCOPE: CPT

## 2018-06-07 RX ORDER — ISOSORBIDE MONONITRATE 30 MG/1
30 TABLET, EXTENDED RELEASE ORAL DAILY
Status: DISCONTINUED | OUTPATIENT
Start: 2018-06-07 | End: 2018-06-18 | Stop reason: HOSPADM

## 2018-06-07 RX ORDER — LATANOPROST 50 UG/ML
1 SOLUTION/ DROPS OPHTHALMIC NIGHTLY
Status: DISCONTINUED | OUTPATIENT
Start: 2018-06-07 | End: 2018-06-18 | Stop reason: HOSPADM

## 2018-06-07 RX ORDER — ASPIRIN 81 MG/1
81 TABLET ORAL DAILY
Status: DISCONTINUED | OUTPATIENT
Start: 2018-06-07 | End: 2018-06-18 | Stop reason: HOSPADM

## 2018-06-07 RX ORDER — ERGOCALCIFEROL 1.25 MG/1
50000 CAPSULE ORAL WEEKLY
Status: DISCONTINUED | OUTPATIENT
Start: 2018-06-07 | End: 2018-06-07 | Stop reason: ALTCHOICE

## 2018-06-07 RX ORDER — ATORVASTATIN CALCIUM 20 MG/1
20 TABLET, FILM COATED ORAL NIGHTLY
Status: DISCONTINUED | OUTPATIENT
Start: 2018-06-07 | End: 2018-06-18 | Stop reason: HOSPADM

## 2018-06-07 RX ORDER — POTASSIUM CHLORIDE 20 MEQ/1
40 TABLET, EXTENDED RELEASE ORAL PRN
Status: DISCONTINUED | OUTPATIENT
Start: 2018-06-07 | End: 2018-06-18 | Stop reason: HOSPADM

## 2018-06-07 RX ORDER — ONDANSETRON 2 MG/ML
4 INJECTION INTRAMUSCULAR; INTRAVENOUS EVERY 6 HOURS PRN
Status: DISCONTINUED | OUTPATIENT
Start: 2018-06-07 | End: 2018-06-18 | Stop reason: HOSPADM

## 2018-06-07 RX ORDER — POLYETHYLENE GLYCOL 3350 17 G/17G
17 POWDER, FOR SOLUTION ORAL DAILY
Status: DISCONTINUED | OUTPATIENT
Start: 2018-06-07 | End: 2018-06-18 | Stop reason: HOSPADM

## 2018-06-07 RX ORDER — ACETAMINOPHEN 325 MG/1
650 TABLET ORAL EVERY 4 HOURS PRN
Status: DISCONTINUED | OUTPATIENT
Start: 2018-06-07 | End: 2018-06-18 | Stop reason: HOSPADM

## 2018-06-07 RX ORDER — CITALOPRAM 40 MG/1
40 TABLET ORAL DAILY
Status: DISCONTINUED | OUTPATIENT
Start: 2018-06-07 | End: 2018-06-18 | Stop reason: HOSPADM

## 2018-06-07 RX ORDER — GUAIFENESIN 600 MG/1
600 TABLET, EXTENDED RELEASE ORAL 2 TIMES DAILY
Status: DISCONTINUED | OUTPATIENT
Start: 2018-06-07 | End: 2018-06-18 | Stop reason: HOSPADM

## 2018-06-07 RX ORDER — SODIUM CHLORIDE 0.9 % (FLUSH) 0.9 %
10 SYRINGE (ML) INJECTION EVERY 12 HOURS SCHEDULED
Status: DISCONTINUED | OUTPATIENT
Start: 2018-06-07 | End: 2018-06-18 | Stop reason: HOSPADM

## 2018-06-07 RX ORDER — SODIUM CHLORIDE 9 MG/ML
INJECTION, SOLUTION INTRAVENOUS CONTINUOUS
Status: DISCONTINUED | OUTPATIENT
Start: 2018-06-07 | End: 2018-06-09

## 2018-06-07 RX ORDER — ALBUTEROL SULFATE 90 UG/1
2 AEROSOL, METERED RESPIRATORY (INHALATION) EVERY 6 HOURS PRN
Status: DISCONTINUED | OUTPATIENT
Start: 2018-06-07 | End: 2018-06-18 | Stop reason: HOSPADM

## 2018-06-07 RX ORDER — SUCRALFATE 1 G/1
1 TABLET ORAL
Status: DISCONTINUED | OUTPATIENT
Start: 2018-06-07 | End: 2018-06-18 | Stop reason: HOSPADM

## 2018-06-07 RX ORDER — LEVOTHYROXINE SODIUM 0.07 MG/1
75 TABLET ORAL DAILY
Status: DISCONTINUED | OUTPATIENT
Start: 2018-06-07 | End: 2018-06-08

## 2018-06-07 RX ORDER — CALCIUM CARBONATE 500(1250)
500 TABLET ORAL DAILY
Status: DISCONTINUED | OUTPATIENT
Start: 2018-06-07 | End: 2018-06-08

## 2018-06-07 RX ORDER — SODIUM CHLORIDE 0.9 % (FLUSH) 0.9 %
10 SYRINGE (ML) INJECTION PRN
Status: DISCONTINUED | OUTPATIENT
Start: 2018-06-07 | End: 2018-06-18 | Stop reason: HOSPADM

## 2018-06-07 RX ORDER — POTASSIUM CHLORIDE 7.45 MG/ML
10 INJECTION INTRAVENOUS PRN
Status: DISCONTINUED | OUTPATIENT
Start: 2018-06-07 | End: 2018-06-18 | Stop reason: HOSPADM

## 2018-06-07 RX ORDER — PANTOPRAZOLE SODIUM 40 MG/1
40 TABLET, DELAYED RELEASE ORAL
Status: DISCONTINUED | OUTPATIENT
Start: 2018-06-08 | End: 2018-06-18 | Stop reason: HOSPADM

## 2018-06-07 RX ORDER — DOCUSATE SODIUM 100 MG/1
100 CAPSULE, LIQUID FILLED ORAL 2 TIMES DAILY
Status: DISCONTINUED | OUTPATIENT
Start: 2018-06-07 | End: 2018-06-18 | Stop reason: HOSPADM

## 2018-06-07 RX ORDER — SULFAMETHOXAZOLE AND TRIMETHOPRIM 800; 160 MG/1; MG/1
1 TABLET ORAL 2 TIMES DAILY
Qty: 14 TABLET | Refills: 0 | Status: SHIPPED | OUTPATIENT
Start: 2018-06-07 | End: 2018-06-18

## 2018-06-07 RX ORDER — POTASSIUM CHLORIDE 20MEQ/15ML
40 LIQUID (ML) ORAL PRN
Status: DISCONTINUED | OUTPATIENT
Start: 2018-06-07 | End: 2018-06-18 | Stop reason: HOSPADM

## 2018-06-07 RX ORDER — SULFAMETHOXAZOLE AND TRIMETHOPRIM 800; 160 MG/1; MG/1
1 TABLET ORAL ONCE
Status: DISCONTINUED | OUTPATIENT
Start: 2018-06-07 | End: 2018-06-07 | Stop reason: HOSPADM

## 2018-06-07 RX ADMIN — Medication 10 ML: at 22:19

## 2018-06-07 RX ADMIN — ATORVASTATIN CALCIUM 20 MG: 20 TABLET, FILM COATED ORAL at 22:18

## 2018-06-07 RX ADMIN — SODIUM CHLORIDE: 9 INJECTION, SOLUTION INTRAVENOUS at 22:12

## 2018-06-07 RX ADMIN — GUAIFENESIN 600 MG: 600 TABLET, EXTENDED RELEASE ORAL at 22:18

## 2018-06-07 RX ADMIN — SUCRALFATE 1 G: 1 TABLET ORAL at 22:18

## 2018-06-07 RX ADMIN — LATANOPROST 1 DROP: 50 SOLUTION/ DROPS OPHTHALMIC at 22:18

## 2018-06-07 ASSESSMENT — PAIN DESCRIPTION - PAIN TYPE: TYPE: ACUTE PAIN

## 2018-06-07 ASSESSMENT — ENCOUNTER SYMPTOMS
DIARRHEA: 0
EYE PAIN: 0
RHINORRHEA: 0
VOMITING: 0
ABDOMINAL PAIN: 0
SHORTNESS OF BREATH: 0
WHEEZING: 0
EYE DISCHARGE: 0
SORE THROAT: 0
COUGH: 0
BACK PAIN: 0
NAUSEA: 0

## 2018-06-07 ASSESSMENT — PAIN SCALES - GENERAL
PAINLEVEL_OUTOF10: 2
PAINLEVEL_OUTOF10: 2

## 2018-06-07 ASSESSMENT — PAIN DESCRIPTION - ORIENTATION: ORIENTATION: RIGHT

## 2018-06-07 ASSESSMENT — PAIN DESCRIPTION - LOCATION: LOCATION: FACE

## 2018-06-08 LAB
ANION GAP SERPL CALCULATED.3IONS-SCNC: 11 MEQ/L (ref 8–16)
BUN BLDV-MCNC: 31 MG/DL (ref 7–22)
CALCIUM SERPL-MCNC: 8.9 MG/DL (ref 8.5–10.5)
CHLORIDE BLD-SCNC: 98 MEQ/L (ref 98–111)
CO2: 28 MEQ/L (ref 23–33)
CREAT SERPL-MCNC: 1.2 MG/DL (ref 0.4–1.2)
GFR SERPL CREATININE-BSD FRML MDRD: 42 ML/MIN/1.73M2
GLUCOSE BLD-MCNC: 100 MG/DL (ref 70–108)
POTASSIUM REFLEX MAGNESIUM: 4.3 MEQ/L (ref 3.5–5.2)
SODIUM BLD-SCNC: 137 MEQ/L (ref 135–145)

## 2018-06-08 PROCEDURE — 6370000000 HC RX 637 (ALT 250 FOR IP): Performed by: HOSPITALIST

## 2018-06-08 PROCEDURE — 80048 BASIC METABOLIC PNL TOTAL CA: CPT

## 2018-06-08 PROCEDURE — 97166 OT EVAL MOD COMPLEX 45 MIN: CPT

## 2018-06-08 PROCEDURE — 97110 THERAPEUTIC EXERCISES: CPT

## 2018-06-08 PROCEDURE — 99233 SBSQ HOSP IP/OBS HIGH 50: CPT | Performed by: FAMILY MEDICINE

## 2018-06-08 PROCEDURE — G8988 SELF CARE GOAL STATUS: HCPCS

## 2018-06-08 PROCEDURE — G8978 MOBILITY CURRENT STATUS: HCPCS

## 2018-06-08 PROCEDURE — 1200000003 HC TELEMETRY R&B

## 2018-06-08 PROCEDURE — G8987 SELF CARE CURRENT STATUS: HCPCS

## 2018-06-08 PROCEDURE — G8979 MOBILITY GOAL STATUS: HCPCS

## 2018-06-08 PROCEDURE — 97162 PT EVAL MOD COMPLEX 30 MIN: CPT

## 2018-06-08 PROCEDURE — 2580000003 HC RX 258: Performed by: HOSPITALIST

## 2018-06-08 PROCEDURE — 6360000002 HC RX W HCPCS: Performed by: HOSPITALIST

## 2018-06-08 PROCEDURE — 97535 SELF CARE MNGMENT TRAINING: CPT

## 2018-06-08 PROCEDURE — 36415 COLL VENOUS BLD VENIPUNCTURE: CPT

## 2018-06-08 RX ORDER — LEVOTHYROXINE SODIUM 88 UG/1
88 TABLET ORAL EVERY OTHER DAY
Status: DISCONTINUED | OUTPATIENT
Start: 2018-06-09 | End: 2018-06-08

## 2018-06-08 RX ORDER — OYSTER SHELL CALCIUM WITH VITAMIN D 500; 200 MG/1; [IU]/1
1 TABLET, FILM COATED ORAL DAILY
Status: DISCONTINUED | OUTPATIENT
Start: 2018-06-09 | End: 2018-06-18 | Stop reason: HOSPADM

## 2018-06-08 RX ORDER — LEVOTHYROXINE SODIUM 88 UG/1
88 TABLET ORAL EVERY OTHER DAY
Status: ON HOLD | COMMUNITY
End: 2018-07-13 | Stop reason: HOSPADM

## 2018-06-08 RX ORDER — LEVOTHYROXINE SODIUM 0.07 MG/1
75 TABLET ORAL EVERY OTHER DAY
Status: ON HOLD | COMMUNITY
End: 2018-07-13 | Stop reason: HOSPADM

## 2018-06-08 RX ORDER — LEVOTHYROXINE SODIUM 88 UG/1
88 TABLET ORAL EVERY OTHER DAY
Status: DISCONTINUED | OUTPATIENT
Start: 2018-06-09 | End: 2018-06-18 | Stop reason: HOSPADM

## 2018-06-08 RX ORDER — LEVOTHYROXINE SODIUM 0.07 MG/1
75 TABLET ORAL EVERY OTHER DAY
Status: DISCONTINUED | OUTPATIENT
Start: 2018-06-10 | End: 2018-06-18 | Stop reason: HOSPADM

## 2018-06-08 RX ORDER — LEVOTHYROXINE SODIUM 0.07 MG/1
75 TABLET ORAL EVERY OTHER DAY
Status: DISCONTINUED | OUTPATIENT
Start: 2018-06-08 | End: 2018-06-08

## 2018-06-08 RX ORDER — ALBUTEROL SULFATE 0.63 MG/3ML
1 SOLUTION RESPIRATORY (INHALATION) 4 TIMES DAILY PRN
Status: ON HOLD | COMMUNITY
End: 2020-01-01

## 2018-06-08 RX ORDER — ACETAMINOPHEN 325 MG/1
650 TABLET ORAL EVERY 4 HOURS PRN
COMMUNITY

## 2018-06-08 RX ADMIN — ATORVASTATIN CALCIUM 20 MG: 20 TABLET, FILM COATED ORAL at 20:01

## 2018-06-08 RX ADMIN — DOCUSATE SODIUM 100 MG: 100 CAPSULE, LIQUID FILLED ORAL at 20:01

## 2018-06-08 RX ADMIN — ENOXAPARIN SODIUM 30 MG: 30 INJECTION SUBCUTANEOUS at 10:01

## 2018-06-08 RX ADMIN — ISOSORBIDE MONONITRATE 30 MG: 30 TABLET ORAL at 09:20

## 2018-06-08 RX ADMIN — GUAIFENESIN 600 MG: 600 TABLET, EXTENDED RELEASE ORAL at 20:01

## 2018-06-08 RX ADMIN — SUCRALFATE 1 G: 1 TABLET ORAL at 11:00

## 2018-06-08 RX ADMIN — CITALOPRAM 40 MG: 40 TABLET, FILM COATED ORAL at 09:20

## 2018-06-08 RX ADMIN — CALCIUM 500 MG: 500 TABLET ORAL at 09:20

## 2018-06-08 RX ADMIN — LATANOPROST 1 DROP: 50 SOLUTION/ DROPS OPHTHALMIC at 20:02

## 2018-06-08 RX ADMIN — METOPROLOL TARTRATE 12.5 MG: 25 TABLET ORAL at 09:20

## 2018-06-08 RX ADMIN — PANTOPRAZOLE SODIUM 40 MG: 40 TABLET, DELAYED RELEASE ORAL at 15:59

## 2018-06-08 RX ADMIN — LEVOTHYROXINE SODIUM 75 MCG: 75 TABLET ORAL at 09:20

## 2018-06-08 RX ADMIN — MAGNESIUM GLUCONATE 500 MG ORAL TABLET 400 MG: 500 TABLET ORAL at 09:20

## 2018-06-08 RX ADMIN — SUCRALFATE 1 G: 1 TABLET ORAL at 20:01

## 2018-06-08 RX ADMIN — GUAIFENESIN 600 MG: 600 TABLET, EXTENDED RELEASE ORAL at 09:20

## 2018-06-08 RX ADMIN — SUCRALFATE 1 G: 1 TABLET ORAL at 05:17

## 2018-06-08 RX ADMIN — ASPIRIN 81 MG: 81 TABLET ORAL at 09:20

## 2018-06-08 RX ADMIN — PANTOPRAZOLE SODIUM 40 MG: 40 TABLET, DELAYED RELEASE ORAL at 05:17

## 2018-06-08 RX ADMIN — CEFTRIAXONE SODIUM 1 G: 1 INJECTION, POWDER, FOR SOLUTION INTRAMUSCULAR; INTRAVENOUS at 00:30

## 2018-06-08 ASSESSMENT — PAIN DESCRIPTION - LOCATION: LOCATION: FACE

## 2018-06-08 ASSESSMENT — PAIN DESCRIPTION - DESCRIPTORS: DESCRIPTORS: ACHING

## 2018-06-08 ASSESSMENT — PAIN DESCRIPTION - ORIENTATION: ORIENTATION: RIGHT

## 2018-06-08 ASSESSMENT — PAIN DESCRIPTION - PAIN TYPE
TYPE: ACUTE PAIN
TYPE: ACUTE PAIN

## 2018-06-08 ASSESSMENT — PAIN SCALES - GENERAL: PAINLEVEL_OUTOF10: 2

## 2018-06-08 ASSESSMENT — PAIN DESCRIPTION - FREQUENCY: FREQUENCY: INTERMITTENT

## 2018-06-08 ASSESSMENT — PAIN SCALES - WONG BAKER: WONGBAKER_NUMERICALRESPONSE: 4

## 2018-06-09 ENCOUNTER — APPOINTMENT (OUTPATIENT)
Dept: GENERAL RADIOLOGY | Age: 83
DRG: 291 | End: 2018-06-09
Payer: MEDICARE

## 2018-06-09 LAB
ANION GAP SERPL CALCULATED.3IONS-SCNC: 13 MEQ/L (ref 8–16)
BUN BLDV-MCNC: 27 MG/DL (ref 7–22)
CALCIUM SERPL-MCNC: 8.8 MG/DL (ref 8.5–10.5)
CHLORIDE BLD-SCNC: 100 MEQ/L (ref 98–111)
CO2: 23 MEQ/L (ref 23–33)
CREAT SERPL-MCNC: 1 MG/DL (ref 0.4–1.2)
GFR SERPL CREATININE-BSD FRML MDRD: 52 ML/MIN/1.73M2
GLUCOSE BLD-MCNC: 108 MG/DL (ref 70–108)
HCT VFR BLD CALC: 30.9 % (ref 37–47)
HEMOGLOBIN: 10.3 GM/DL (ref 12–16)
MCH RBC QN AUTO: 30.2 PG (ref 27–31)
MCHC RBC AUTO-ENTMCNC: 33.3 GM/DL (ref 33–37)
MCV RBC AUTO: 90.5 FL (ref 81–99)
ORGANISM: ABNORMAL
PDW BLD-RTO: 15.1 % (ref 11.5–14.5)
PLATELET # BLD: 134 THOU/MM3 (ref 130–400)
PMV BLD AUTO: 8.8 FL (ref 7.4–10.4)
POTASSIUM SERPL-SCNC: 4.6 MEQ/L (ref 3.5–5.2)
RBC # BLD: 3.41 MILL/MM3 (ref 4.2–5.4)
SODIUM BLD-SCNC: 136 MEQ/L (ref 135–145)
URINE CULTURE REFLEX: ABNORMAL
WBC # BLD: 5.9 THOU/MM3 (ref 4.8–10.8)

## 2018-06-09 PROCEDURE — 6370000000 HC RX 637 (ALT 250 FOR IP): Performed by: HOSPITALIST

## 2018-06-09 PROCEDURE — 80048 BASIC METABOLIC PNL TOTAL CA: CPT

## 2018-06-09 PROCEDURE — 6370000000 HC RX 637 (ALT 250 FOR IP): Performed by: FAMILY MEDICINE

## 2018-06-09 PROCEDURE — 6360000002 HC RX W HCPCS: Performed by: HOSPITALIST

## 2018-06-09 PROCEDURE — 85027 COMPLETE CBC AUTOMATED: CPT

## 2018-06-09 PROCEDURE — 1200000003 HC TELEMETRY R&B

## 2018-06-09 PROCEDURE — 99232 SBSQ HOSP IP/OBS MODERATE 35: CPT | Performed by: FAMILY MEDICINE

## 2018-06-09 PROCEDURE — 36415 COLL VENOUS BLD VENIPUNCTURE: CPT

## 2018-06-09 PROCEDURE — 71046 X-RAY EXAM CHEST 2 VIEWS: CPT

## 2018-06-09 PROCEDURE — 2580000003 HC RX 258: Performed by: HOSPITALIST

## 2018-06-09 RX ORDER — LIDOCAINE 50 MG/G
2 PATCH TOPICAL DAILY
Status: DISCONTINUED | OUTPATIENT
Start: 2018-06-09 | End: 2018-06-17

## 2018-06-09 RX ADMIN — PANTOPRAZOLE SODIUM 40 MG: 40 TABLET, DELAYED RELEASE ORAL at 07:04

## 2018-06-09 RX ADMIN — LEVOTHYROXINE SODIUM 88 MCG: 88 TABLET ORAL at 08:42

## 2018-06-09 RX ADMIN — MAGNESIUM GLUCONATE 500 MG ORAL TABLET 400 MG: 500 TABLET ORAL at 08:42

## 2018-06-09 RX ADMIN — ATORVASTATIN CALCIUM 20 MG: 20 TABLET, FILM COATED ORAL at 19:55

## 2018-06-09 RX ADMIN — SUCRALFATE 1 G: 1 TABLET ORAL at 12:10

## 2018-06-09 RX ADMIN — ISOSORBIDE MONONITRATE 30 MG: 30 TABLET ORAL at 08:42

## 2018-06-09 RX ADMIN — METOPROLOL TARTRATE 12.5 MG: 25 TABLET ORAL at 08:42

## 2018-06-09 RX ADMIN — SUCRALFATE 1 G: 1 TABLET ORAL at 15:47

## 2018-06-09 RX ADMIN — CITALOPRAM 40 MG: 40 TABLET, FILM COATED ORAL at 08:42

## 2018-06-09 RX ADMIN — GUAIFENESIN 600 MG: 600 TABLET, EXTENDED RELEASE ORAL at 08:42

## 2018-06-09 RX ADMIN — SUCRALFATE 1 G: 1 TABLET ORAL at 19:55

## 2018-06-09 RX ADMIN — DOCUSATE SODIUM 100 MG: 100 CAPSULE, LIQUID FILLED ORAL at 08:43

## 2018-06-09 RX ADMIN — ENOXAPARIN SODIUM 30 MG: 30 INJECTION SUBCUTANEOUS at 08:43

## 2018-06-09 RX ADMIN — CEFTRIAXONE SODIUM 1 G: 1 INJECTION, POWDER, FOR SOLUTION INTRAMUSCULAR; INTRAVENOUS at 00:30

## 2018-06-09 RX ADMIN — Medication 10 ML: at 19:55

## 2018-06-09 RX ADMIN — LATANOPROST 1 DROP: 50 SOLUTION/ DROPS OPHTHALMIC at 19:55

## 2018-06-09 RX ADMIN — GUAIFENESIN 600 MG: 600 TABLET, EXTENDED RELEASE ORAL at 19:55

## 2018-06-09 RX ADMIN — CALCIUM CARBONATE-VITAMIN D TAB 500 MG-200 UNIT 1 TABLET: 500-200 TAB at 08:42

## 2018-06-09 RX ADMIN — PANTOPRAZOLE SODIUM 40 MG: 40 TABLET, DELAYED RELEASE ORAL at 15:43

## 2018-06-09 RX ADMIN — METOPROLOL TARTRATE 12.5 MG: 25 TABLET ORAL at 19:54

## 2018-06-09 RX ADMIN — ASPIRIN 81 MG: 81 TABLET ORAL at 08:42

## 2018-06-09 RX ADMIN — POLYETHYLENE GLYCOL 3350 17 G: 17 POWDER, FOR SOLUTION ORAL at 08:43

## 2018-06-09 RX ADMIN — SUCRALFATE 1 G: 1 TABLET ORAL at 07:04

## 2018-06-10 LAB
ALBUMIN SERPL-MCNC: 3.8 G/DL (ref 3.5–5.1)
ALP BLD-CCNC: 53 U/L (ref 38–126)
ALT SERPL-CCNC: 16 U/L (ref 11–66)
ANION GAP SERPL CALCULATED.3IONS-SCNC: 12 MEQ/L (ref 8–16)
AST SERPL-CCNC: 26 U/L (ref 5–40)
BILIRUB SERPL-MCNC: 0.8 MG/DL (ref 0.3–1.2)
BUN BLDV-MCNC: 18 MG/DL (ref 7–22)
CALCIUM SERPL-MCNC: 9.4 MG/DL (ref 8.5–10.5)
CHLORIDE BLD-SCNC: 96 MEQ/L (ref 98–111)
CO2: 25 MEQ/L (ref 23–33)
CREAT SERPL-MCNC: 1 MG/DL (ref 0.4–1.2)
FOLATE: 14.7 NG/ML (ref 4.8–24.2)
GFR SERPL CREATININE-BSD FRML MDRD: 52 ML/MIN/1.73M2
GLUCOSE BLD-MCNC: 122 MG/DL (ref 70–108)
POTASSIUM SERPL-SCNC: 4.5 MEQ/L (ref 3.5–5.2)
SODIUM BLD-SCNC: 133 MEQ/L (ref 135–145)
TOTAL PROTEIN: 6.6 G/DL (ref 6.1–8)
TSH SERPL DL<=0.05 MIU/L-ACNC: 3.74 UIU/ML (ref 0.4–4.2)
VITAMIN B-12: 1040 PG/ML (ref 211–911)
VITAMIN D 25-HYDROXY: 45 NG/ML (ref 30–100)

## 2018-06-10 PROCEDURE — 99232 SBSQ HOSP IP/OBS MODERATE 35: CPT | Performed by: FAMILY MEDICINE

## 2018-06-10 PROCEDURE — 6370000000 HC RX 637 (ALT 250 FOR IP): Performed by: FAMILY MEDICINE

## 2018-06-10 PROCEDURE — 2580000003 HC RX 258: Performed by: HOSPITALIST

## 2018-06-10 PROCEDURE — 36415 COLL VENOUS BLD VENIPUNCTURE: CPT

## 2018-06-10 PROCEDURE — 6360000002 HC RX W HCPCS: Performed by: HOSPITALIST

## 2018-06-10 PROCEDURE — A4614 HAND-HELD PEFR METER: HCPCS

## 2018-06-10 PROCEDURE — 82746 ASSAY OF FOLIC ACID SERUM: CPT

## 2018-06-10 PROCEDURE — 1200000003 HC TELEMETRY R&B

## 2018-06-10 PROCEDURE — 6370000000 HC RX 637 (ALT 250 FOR IP): Performed by: HOSPITALIST

## 2018-06-10 PROCEDURE — 82607 VITAMIN B-12: CPT

## 2018-06-10 PROCEDURE — 84443 ASSAY THYROID STIM HORMONE: CPT

## 2018-06-10 PROCEDURE — 82306 VITAMIN D 25 HYDROXY: CPT

## 2018-06-10 PROCEDURE — 80053 COMPREHEN METABOLIC PANEL: CPT

## 2018-06-10 RX ADMIN — SUCRALFATE 1 G: 1 TABLET ORAL at 21:34

## 2018-06-10 RX ADMIN — METOPROLOL TARTRATE 12.5 MG: 25 TABLET ORAL at 21:34

## 2018-06-10 RX ADMIN — DOCUSATE SODIUM 100 MG: 100 CAPSULE, LIQUID FILLED ORAL at 21:37

## 2018-06-10 RX ADMIN — Medication 10 ML: at 08:54

## 2018-06-10 RX ADMIN — DOCUSATE SODIUM 100 MG: 100 CAPSULE, LIQUID FILLED ORAL at 08:54

## 2018-06-10 RX ADMIN — Medication 10 ML: at 21:34

## 2018-06-10 RX ADMIN — LATANOPROST 1 DROP: 50 SOLUTION/ DROPS OPHTHALMIC at 21:34

## 2018-06-10 RX ADMIN — PANTOPRAZOLE SODIUM 40 MG: 40 TABLET, DELAYED RELEASE ORAL at 06:13

## 2018-06-10 RX ADMIN — ONDANSETRON 4 MG: 2 INJECTION INTRAMUSCULAR; INTRAVENOUS at 13:56

## 2018-06-10 RX ADMIN — LEVOTHYROXINE SODIUM 75 MCG: 75 TABLET ORAL at 06:12

## 2018-06-10 RX ADMIN — CALCIUM CARBONATE-VITAMIN D TAB 500 MG-200 UNIT 1 TABLET: 500-200 TAB at 08:54

## 2018-06-10 RX ADMIN — ISOSORBIDE MONONITRATE 30 MG: 30 TABLET ORAL at 08:54

## 2018-06-10 RX ADMIN — GUAIFENESIN 600 MG: 600 TABLET, EXTENDED RELEASE ORAL at 08:54

## 2018-06-10 RX ADMIN — MAGNESIUM GLUCONATE 500 MG ORAL TABLET 400 MG: 500 TABLET ORAL at 08:54

## 2018-06-10 RX ADMIN — ASPIRIN 81 MG: 81 TABLET ORAL at 08:54

## 2018-06-10 RX ADMIN — SUCRALFATE 1 G: 1 TABLET ORAL at 06:13

## 2018-06-10 RX ADMIN — CEFTRIAXONE SODIUM 1 G: 1 INJECTION, POWDER, FOR SOLUTION INTRAMUSCULAR; INTRAVENOUS at 03:30

## 2018-06-10 RX ADMIN — SUCRALFATE 1 G: 1 TABLET ORAL at 17:38

## 2018-06-10 RX ADMIN — PANTOPRAZOLE SODIUM 40 MG: 40 TABLET, DELAYED RELEASE ORAL at 17:16

## 2018-06-10 RX ADMIN — POLYETHYLENE GLYCOL 3350 17 G: 17 POWDER, FOR SOLUTION ORAL at 09:00

## 2018-06-10 RX ADMIN — ATORVASTATIN CALCIUM 20 MG: 20 TABLET, FILM COATED ORAL at 21:34

## 2018-06-10 RX ADMIN — ENOXAPARIN SODIUM 30 MG: 30 INJECTION SUBCUTANEOUS at 08:54

## 2018-06-10 RX ADMIN — GUAIFENESIN 600 MG: 600 TABLET, EXTENDED RELEASE ORAL at 21:34

## 2018-06-10 RX ADMIN — CITALOPRAM 40 MG: 40 TABLET, FILM COATED ORAL at 08:54

## 2018-06-10 RX ADMIN — METOPROLOL TARTRATE 12.5 MG: 25 TABLET ORAL at 08:54

## 2018-06-10 ASSESSMENT — PAIN SCALES - GENERAL: PAINLEVEL_OUTOF10: 0

## 2018-06-11 LAB
ANION GAP SERPL CALCULATED.3IONS-SCNC: 12 MEQ/L (ref 8–16)
BUN BLDV-MCNC: 15 MG/DL (ref 7–22)
CALCIUM SERPL-MCNC: 8.9 MG/DL (ref 8.5–10.5)
CHLORIDE BLD-SCNC: 94 MEQ/L (ref 98–111)
CO2: 22 MEQ/L (ref 23–33)
CREAT SERPL-MCNC: 1 MG/DL (ref 0.4–1.2)
GFR SERPL CREATININE-BSD FRML MDRD: 52 ML/MIN/1.73M2
GLUCOSE BLD-MCNC: 122 MG/DL (ref 70–108)
HCT VFR BLD CALC: 30.5 % (ref 37–47)
HEMOGLOBIN: 10.2 GM/DL (ref 12–16)
POTASSIUM SERPL-SCNC: 4.6 MEQ/L (ref 3.5–5.2)
SODIUM BLD-SCNC: 128 MEQ/L (ref 135–145)

## 2018-06-11 PROCEDURE — 36415 COLL VENOUS BLD VENIPUNCTURE: CPT

## 2018-06-11 PROCEDURE — 80048 BASIC METABOLIC PNL TOTAL CA: CPT

## 2018-06-11 PROCEDURE — 6370000000 HC RX 637 (ALT 250 FOR IP): Performed by: HOSPITALIST

## 2018-06-11 PROCEDURE — 97116 GAIT TRAINING THERAPY: CPT

## 2018-06-11 PROCEDURE — 1200000003 HC TELEMETRY R&B

## 2018-06-11 PROCEDURE — 2580000003 HC RX 258: Performed by: HOSPITALIST

## 2018-06-11 PROCEDURE — 85014 HEMATOCRIT: CPT

## 2018-06-11 PROCEDURE — 99232 SBSQ HOSP IP/OBS MODERATE 35: CPT | Performed by: INTERNAL MEDICINE

## 2018-06-11 PROCEDURE — 6360000002 HC RX W HCPCS: Performed by: HOSPITALIST

## 2018-06-11 PROCEDURE — 6370000000 HC RX 637 (ALT 250 FOR IP): Performed by: FAMILY MEDICINE

## 2018-06-11 PROCEDURE — 97110 THERAPEUTIC EXERCISES: CPT

## 2018-06-11 PROCEDURE — 85018 HEMOGLOBIN: CPT

## 2018-06-11 RX ADMIN — CITALOPRAM 40 MG: 40 TABLET, FILM COATED ORAL at 10:45

## 2018-06-11 RX ADMIN — METOPROLOL TARTRATE 12.5 MG: 25 TABLET ORAL at 19:39

## 2018-06-11 RX ADMIN — Medication 10 ML: at 19:39

## 2018-06-11 RX ADMIN — SUCRALFATE 1 G: 1 TABLET ORAL at 06:07

## 2018-06-11 RX ADMIN — MAGNESIUM GLUCONATE 500 MG ORAL TABLET 400 MG: 500 TABLET ORAL at 10:44

## 2018-06-11 RX ADMIN — LATANOPROST 1 DROP: 50 SOLUTION/ DROPS OPHTHALMIC at 19:40

## 2018-06-11 RX ADMIN — ISOSORBIDE MONONITRATE 30 MG: 30 TABLET ORAL at 10:44

## 2018-06-11 RX ADMIN — CEFTRIAXONE SODIUM 1 G: 1 INJECTION, POWDER, FOR SOLUTION INTRAMUSCULAR; INTRAVENOUS at 22:47

## 2018-06-11 RX ADMIN — PANTOPRAZOLE SODIUM 40 MG: 40 TABLET, DELAYED RELEASE ORAL at 17:04

## 2018-06-11 RX ADMIN — PANTOPRAZOLE SODIUM 40 MG: 40 TABLET, DELAYED RELEASE ORAL at 06:09

## 2018-06-11 RX ADMIN — DOCUSATE SODIUM 100 MG: 100 CAPSULE, LIQUID FILLED ORAL at 19:40

## 2018-06-11 RX ADMIN — CALCIUM CARBONATE-VITAMIN D TAB 500 MG-200 UNIT 1 TABLET: 500-200 TAB at 10:44

## 2018-06-11 RX ADMIN — SUCRALFATE 1 G: 1 TABLET ORAL at 19:38

## 2018-06-11 RX ADMIN — SUCRALFATE 1 G: 1 TABLET ORAL at 17:04

## 2018-06-11 RX ADMIN — ASPIRIN 81 MG: 81 TABLET ORAL at 10:45

## 2018-06-11 RX ADMIN — POLYETHYLENE GLYCOL 3350 17 G: 17 POWDER, FOR SOLUTION ORAL at 10:45

## 2018-06-11 RX ADMIN — CEFTRIAXONE SODIUM 1 G: 1 INJECTION, POWDER, FOR SOLUTION INTRAMUSCULAR; INTRAVENOUS at 00:00

## 2018-06-11 RX ADMIN — ENOXAPARIN SODIUM 30 MG: 30 INJECTION SUBCUTANEOUS at 10:45

## 2018-06-11 RX ADMIN — GUAIFENESIN 600 MG: 600 TABLET, EXTENDED RELEASE ORAL at 19:39

## 2018-06-11 RX ADMIN — DOCUSATE SODIUM 100 MG: 100 CAPSULE, LIQUID FILLED ORAL at 10:45

## 2018-06-11 RX ADMIN — METOPROLOL TARTRATE 12.5 MG: 25 TABLET ORAL at 10:45

## 2018-06-11 RX ADMIN — ATORVASTATIN CALCIUM 20 MG: 20 TABLET, FILM COATED ORAL at 19:39

## 2018-06-11 RX ADMIN — Medication 10 ML: at 19:38

## 2018-06-11 RX ADMIN — Medication 10 ML: at 10:30

## 2018-06-11 RX ADMIN — GUAIFENESIN 600 MG: 600 TABLET, EXTENDED RELEASE ORAL at 10:45

## 2018-06-11 RX ADMIN — SUCRALFATE 1 G: 1 TABLET ORAL at 10:44

## 2018-06-11 RX ADMIN — LEVOTHYROXINE SODIUM 88 MCG: 88 TABLET ORAL at 10:44

## 2018-06-11 ASSESSMENT — PAIN SCALES - GENERAL
PAINLEVEL_OUTOF10: 0
PAINLEVEL_OUTOF10: 0

## 2018-06-12 LAB
ANION GAP SERPL CALCULATED.3IONS-SCNC: 13 MEQ/L (ref 8–16)
BUN BLDV-MCNC: 17 MG/DL (ref 7–22)
CALCIUM SERPL-MCNC: 8.6 MG/DL (ref 8.5–10.5)
CHLORIDE BLD-SCNC: 91 MEQ/L (ref 98–111)
CO2: 23 MEQ/L (ref 23–33)
CREAT SERPL-MCNC: 1 MG/DL (ref 0.4–1.2)
GFR SERPL CREATININE-BSD FRML MDRD: 52 ML/MIN/1.73M2
GLUCOSE BLD-MCNC: 120 MG/DL (ref 70–108)
HCT VFR BLD CALC: 29.4 % (ref 37–47)
HEMOGLOBIN: 9.9 GM/DL (ref 12–16)
MAGNESIUM: 1.9 MG/DL (ref 1.6–2.4)
MCH RBC QN AUTO: 30 PG (ref 27–31)
MCHC RBC AUTO-ENTMCNC: 33.6 GM/DL (ref 33–37)
MCV RBC AUTO: 89.5 FL (ref 81–99)
PDW BLD-RTO: 14.9 % (ref 11.5–14.5)
PLATELET # BLD: 133 THOU/MM3 (ref 130–400)
PMV BLD AUTO: 9.1 FL (ref 7.4–10.4)
POTASSIUM SERPL-SCNC: 4.8 MEQ/L (ref 3.5–5.2)
RBC # BLD: 3.29 MILL/MM3 (ref 4.2–5.4)
SODIUM BLD-SCNC: 127 MEQ/L (ref 135–145)
TOTAL CK: 56 U/L (ref 30–135)
WBC # BLD: 6 THOU/MM3 (ref 4.8–10.8)

## 2018-06-12 PROCEDURE — 2580000003 HC RX 258: Performed by: INTERNAL MEDICINE

## 2018-06-12 PROCEDURE — 85027 COMPLETE CBC AUTOMATED: CPT

## 2018-06-12 PROCEDURE — 1200000003 HC TELEMETRY R&B

## 2018-06-12 PROCEDURE — 82550 ASSAY OF CK (CPK): CPT

## 2018-06-12 PROCEDURE — 99232 SBSQ HOSP IP/OBS MODERATE 35: CPT | Performed by: INTERNAL MEDICINE

## 2018-06-12 PROCEDURE — 6370000000 HC RX 637 (ALT 250 FOR IP): Performed by: FAMILY MEDICINE

## 2018-06-12 PROCEDURE — 80048 BASIC METABOLIC PNL TOTAL CA: CPT

## 2018-06-12 PROCEDURE — 36415 COLL VENOUS BLD VENIPUNCTURE: CPT

## 2018-06-12 PROCEDURE — 83735 ASSAY OF MAGNESIUM: CPT

## 2018-06-12 PROCEDURE — 6370000000 HC RX 637 (ALT 250 FOR IP): Performed by: HOSPITALIST

## 2018-06-12 PROCEDURE — 2580000003 HC RX 258: Performed by: HOSPITALIST

## 2018-06-12 PROCEDURE — 6360000002 HC RX W HCPCS: Performed by: HOSPITALIST

## 2018-06-12 RX ORDER — SODIUM CHLORIDE 9 MG/ML
INJECTION, SOLUTION INTRAVENOUS CONTINUOUS
Status: DISCONTINUED | OUTPATIENT
Start: 2018-06-12 | End: 2018-06-13

## 2018-06-12 RX ADMIN — CALCIUM CARBONATE-VITAMIN D TAB 500 MG-200 UNIT 1 TABLET: 500-200 TAB at 09:59

## 2018-06-12 RX ADMIN — ATORVASTATIN CALCIUM 20 MG: 20 TABLET, FILM COATED ORAL at 21:28

## 2018-06-12 RX ADMIN — SODIUM CHLORIDE: 9 INJECTION, SOLUTION INTRAVENOUS at 09:30

## 2018-06-12 RX ADMIN — CITALOPRAM 40 MG: 40 TABLET, FILM COATED ORAL at 09:59

## 2018-06-12 RX ADMIN — PANTOPRAZOLE SODIUM 40 MG: 40 TABLET, DELAYED RELEASE ORAL at 06:29

## 2018-06-12 RX ADMIN — SUCRALFATE 1 G: 1 TABLET ORAL at 06:30

## 2018-06-12 RX ADMIN — SUCRALFATE 1 G: 1 TABLET ORAL at 10:29

## 2018-06-12 RX ADMIN — ACETAMINOPHEN 650 MG: 325 TABLET ORAL at 00:44

## 2018-06-12 RX ADMIN — SUCRALFATE 1 G: 1 TABLET ORAL at 21:28

## 2018-06-12 RX ADMIN — CEFTRIAXONE SODIUM 1 G: 1 INJECTION, POWDER, FOR SOLUTION INTRAMUSCULAR; INTRAVENOUS at 23:14

## 2018-06-12 RX ADMIN — GUAIFENESIN 600 MG: 600 TABLET, EXTENDED RELEASE ORAL at 10:00

## 2018-06-12 RX ADMIN — MAGNESIUM GLUCONATE 500 MG ORAL TABLET 400 MG: 500 TABLET ORAL at 09:59

## 2018-06-12 RX ADMIN — SUCRALFATE 1 G: 1 TABLET ORAL at 18:10

## 2018-06-12 RX ADMIN — ENOXAPARIN SODIUM 30 MG: 30 INJECTION SUBCUTANEOUS at 10:00

## 2018-06-12 RX ADMIN — METOPROLOL TARTRATE 12.5 MG: 25 TABLET ORAL at 10:00

## 2018-06-12 RX ADMIN — LATANOPROST 1 DROP: 50 SOLUTION/ DROPS OPHTHALMIC at 21:29

## 2018-06-12 RX ADMIN — GUAIFENESIN 600 MG: 600 TABLET, EXTENDED RELEASE ORAL at 21:28

## 2018-06-12 RX ADMIN — METOPROLOL TARTRATE 12.5 MG: 25 TABLET ORAL at 21:28

## 2018-06-12 RX ADMIN — ISOSORBIDE MONONITRATE 30 MG: 30 TABLET ORAL at 09:59

## 2018-06-12 RX ADMIN — PANTOPRAZOLE SODIUM 40 MG: 40 TABLET, DELAYED RELEASE ORAL at 18:10

## 2018-06-12 RX ADMIN — Medication 10 ML: at 10:00

## 2018-06-12 RX ADMIN — ASPIRIN 81 MG: 81 TABLET ORAL at 10:00

## 2018-06-12 RX ADMIN — LEVOTHYROXINE SODIUM 75 MCG: 75 TABLET ORAL at 06:29

## 2018-06-12 RX ADMIN — SODIUM CHLORIDE: 9 INJECTION, SOLUTION INTRAVENOUS at 19:25

## 2018-06-12 RX ADMIN — POLYETHYLENE GLYCOL 3350 17 G: 17 POWDER, FOR SOLUTION ORAL at 10:00

## 2018-06-12 RX ADMIN — DOCUSATE SODIUM 100 MG: 100 CAPSULE, LIQUID FILLED ORAL at 09:59

## 2018-06-12 ASSESSMENT — PAIN SCALES - GENERAL
PAINLEVEL_OUTOF10: 0
PAINLEVEL_OUTOF10: 0
PAINLEVEL_OUTOF10: 6
PAINLEVEL_OUTOF10: 0

## 2018-06-13 ENCOUNTER — APPOINTMENT (OUTPATIENT)
Dept: GENERAL RADIOLOGY | Age: 83
DRG: 291 | End: 2018-06-13
Payer: MEDICARE

## 2018-06-13 ENCOUNTER — APPOINTMENT (OUTPATIENT)
Dept: CT IMAGING | Age: 83
DRG: 291 | End: 2018-06-13
Payer: MEDICARE

## 2018-06-13 LAB
ANION GAP SERPL CALCULATED.3IONS-SCNC: 14 MEQ/L (ref 8–16)
BUN BLDV-MCNC: 16 MG/DL (ref 7–22)
CALCIUM SERPL-MCNC: 8.6 MG/DL (ref 8.5–10.5)
CHLORIDE BLD-SCNC: 95 MEQ/L (ref 98–111)
CO2: 22 MEQ/L (ref 23–33)
CREAT SERPL-MCNC: 0.9 MG/DL (ref 0.4–1.2)
GFR SERPL CREATININE-BSD FRML MDRD: 59 ML/MIN/1.73M2
GLUCOSE BLD-MCNC: 123 MG/DL (ref 70–108)
POTASSIUM SERPL-SCNC: 4.5 MEQ/L (ref 3.5–5.2)
SODIUM BLD-SCNC: 131 MEQ/L (ref 135–145)

## 2018-06-13 PROCEDURE — 6370000000 HC RX 637 (ALT 250 FOR IP): Performed by: HOSPITALIST

## 2018-06-13 PROCEDURE — 94640 AIRWAY INHALATION TREATMENT: CPT

## 2018-06-13 PROCEDURE — 71045 X-RAY EXAM CHEST 1 VIEW: CPT

## 2018-06-13 PROCEDURE — 71250 CT THORAX DX C-: CPT

## 2018-06-13 PROCEDURE — 6360000002 HC RX W HCPCS: Performed by: INTERNAL MEDICINE

## 2018-06-13 PROCEDURE — 80048 BASIC METABOLIC PNL TOTAL CA: CPT

## 2018-06-13 PROCEDURE — 6370000000 HC RX 637 (ALT 250 FOR IP): Performed by: FAMILY MEDICINE

## 2018-06-13 PROCEDURE — 2580000003 HC RX 258: Performed by: HOSPITALIST

## 2018-06-13 PROCEDURE — 36415 COLL VENOUS BLD VENIPUNCTURE: CPT

## 2018-06-13 PROCEDURE — 1200000003 HC TELEMETRY R&B

## 2018-06-13 PROCEDURE — 97110 THERAPEUTIC EXERCISES: CPT

## 2018-06-13 PROCEDURE — 97530 THERAPEUTIC ACTIVITIES: CPT

## 2018-06-13 PROCEDURE — 6360000002 HC RX W HCPCS: Performed by: HOSPITALIST

## 2018-06-13 PROCEDURE — 99232 SBSQ HOSP IP/OBS MODERATE 35: CPT | Performed by: INTERNAL MEDICINE

## 2018-06-13 PROCEDURE — 2580000003 HC RX 258: Performed by: INTERNAL MEDICINE

## 2018-06-13 RX ORDER — FUROSEMIDE 10 MG/ML
10 INJECTION INTRAMUSCULAR; INTRAVENOUS ONCE
Status: DISCONTINUED | OUTPATIENT
Start: 2018-06-13 | End: 2018-06-13

## 2018-06-13 RX ORDER — FUROSEMIDE 10 MG/ML
20 INJECTION INTRAMUSCULAR; INTRAVENOUS ONCE
Status: COMPLETED | OUTPATIENT
Start: 2018-06-13 | End: 2018-06-13

## 2018-06-13 RX ADMIN — PANTOPRAZOLE SODIUM 40 MG: 40 TABLET, DELAYED RELEASE ORAL at 17:46

## 2018-06-13 RX ADMIN — Medication 2 PUFF: at 08:46

## 2018-06-13 RX ADMIN — ISOSORBIDE MONONITRATE 30 MG: 30 TABLET ORAL at 10:22

## 2018-06-13 RX ADMIN — Medication 10 ML: at 20:29

## 2018-06-13 RX ADMIN — GUAIFENESIN 600 MG: 600 TABLET, EXTENDED RELEASE ORAL at 10:21

## 2018-06-13 RX ADMIN — MAGNESIUM GLUCONATE 500 MG ORAL TABLET 400 MG: 500 TABLET ORAL at 10:22

## 2018-06-13 RX ADMIN — LATANOPROST 1 DROP: 50 SOLUTION/ DROPS OPHTHALMIC at 20:28

## 2018-06-13 RX ADMIN — ATORVASTATIN CALCIUM 20 MG: 20 TABLET, FILM COATED ORAL at 20:28

## 2018-06-13 RX ADMIN — ASPIRIN 81 MG: 81 TABLET ORAL at 10:22

## 2018-06-13 RX ADMIN — SUCRALFATE 1 G: 1 TABLET ORAL at 17:46

## 2018-06-13 RX ADMIN — LEVOTHYROXINE SODIUM 88 MCG: 88 TABLET ORAL at 10:22

## 2018-06-13 RX ADMIN — SUCRALFATE 1 G: 1 TABLET ORAL at 06:00

## 2018-06-13 RX ADMIN — METOPROLOL TARTRATE 12.5 MG: 25 TABLET ORAL at 20:28

## 2018-06-13 RX ADMIN — CALCIUM CARBONATE-VITAMIN D TAB 500 MG-200 UNIT 1 TABLET: 500-200 TAB at 10:22

## 2018-06-13 RX ADMIN — FUROSEMIDE 20 MG: 10 INJECTION, SOLUTION INTRAMUSCULAR; INTRAVENOUS at 19:06

## 2018-06-13 RX ADMIN — POLYETHYLENE GLYCOL 3350 17 G: 17 POWDER, FOR SOLUTION ORAL at 10:21

## 2018-06-13 RX ADMIN — PANTOPRAZOLE SODIUM 40 MG: 40 TABLET, DELAYED RELEASE ORAL at 06:00

## 2018-06-13 RX ADMIN — SUCRALFATE 1 G: 1 TABLET ORAL at 20:28

## 2018-06-13 RX ADMIN — CEFTRIAXONE 2 G: 2 INJECTION, POWDER, FOR SOLUTION INTRAMUSCULAR; INTRAVENOUS at 23:28

## 2018-06-13 RX ADMIN — DOCUSATE SODIUM 100 MG: 100 CAPSULE, LIQUID FILLED ORAL at 10:21

## 2018-06-13 RX ADMIN — ENOXAPARIN SODIUM 30 MG: 30 INJECTION SUBCUTANEOUS at 10:21

## 2018-06-13 RX ADMIN — METOPROLOL TARTRATE 12.5 MG: 25 TABLET ORAL at 10:21

## 2018-06-13 RX ADMIN — GUAIFENESIN 600 MG: 600 TABLET, EXTENDED RELEASE ORAL at 20:28

## 2018-06-13 RX ADMIN — SODIUM CHLORIDE: 9 INJECTION, SOLUTION INTRAVENOUS at 10:11

## 2018-06-13 RX ADMIN — Medication 2 PUFF: at 16:07

## 2018-06-13 RX ADMIN — SUCRALFATE 1 G: 1 TABLET ORAL at 10:21

## 2018-06-13 RX ADMIN — CITALOPRAM 40 MG: 40 TABLET, FILM COATED ORAL at 10:37

## 2018-06-13 ASSESSMENT — PAIN SCALES - GENERAL: PAINLEVEL_OUTOF10: 0

## 2018-06-14 ENCOUNTER — APPOINTMENT (OUTPATIENT)
Dept: CT IMAGING | Age: 83
DRG: 291 | End: 2018-06-14
Payer: MEDICARE

## 2018-06-14 PROBLEM — E43 SEVERE MALNUTRITION (HCC): Status: ACTIVE | Noted: 2018-06-14

## 2018-06-14 LAB
ANION GAP SERPL CALCULATED.3IONS-SCNC: 16 MEQ/L (ref 8–16)
BUN BLDV-MCNC: 19 MG/DL (ref 7–22)
CALCIUM SERPL-MCNC: 8.9 MG/DL (ref 8.5–10.5)
CHLORIDE BLD-SCNC: 92 MEQ/L (ref 98–111)
CO2: 20 MEQ/L (ref 23–33)
CREAT SERPL-MCNC: 0.9 MG/DL (ref 0.4–1.2)
GFR SERPL CREATININE-BSD FRML MDRD: 59 ML/MIN/1.73M2
GLUCOSE BLD-MCNC: 115 MG/DL (ref 70–108)
HCT VFR BLD CALC: 32.1 % (ref 37–47)
HEMOGLOBIN: 10.7 GM/DL (ref 12–16)
MAGNESIUM: 2 MG/DL (ref 1.6–2.4)
MCH RBC QN AUTO: 29.9 PG (ref 27–31)
MCHC RBC AUTO-ENTMCNC: 33.3 GM/DL (ref 33–37)
MCV RBC AUTO: 89.8 FL (ref 81–99)
PDW BLD-RTO: 15 % (ref 11.5–14.5)
PLATELET # BLD: 157 THOU/MM3 (ref 130–400)
PMV BLD AUTO: 8.9 FL (ref 7.4–10.4)
POTASSIUM SERPL-SCNC: 4.3 MEQ/L (ref 3.5–5.2)
RBC # BLD: 3.57 MILL/MM3 (ref 4.2–5.4)
SODIUM BLD-SCNC: 128 MEQ/L (ref 135–145)
SODIUM BLD-SCNC: 132 MEQ/L (ref 135–145)
WBC # BLD: 9.1 THOU/MM3 (ref 4.8–10.8)

## 2018-06-14 PROCEDURE — 6370000000 HC RX 637 (ALT 250 FOR IP): Performed by: FAMILY MEDICINE

## 2018-06-14 PROCEDURE — 6360000002 HC RX W HCPCS: Performed by: INTERNAL MEDICINE

## 2018-06-14 PROCEDURE — 36415 COLL VENOUS BLD VENIPUNCTURE: CPT

## 2018-06-14 PROCEDURE — 99232 SBSQ HOSP IP/OBS MODERATE 35: CPT | Performed by: INTERNAL MEDICINE

## 2018-06-14 PROCEDURE — 6370000000 HC RX 637 (ALT 250 FOR IP): Performed by: INTERNAL MEDICINE

## 2018-06-14 PROCEDURE — 6360000002 HC RX W HCPCS: Performed by: HOSPITALIST

## 2018-06-14 PROCEDURE — 85027 COMPLETE CBC AUTOMATED: CPT

## 2018-06-14 PROCEDURE — 2580000003 HC RX 258: Performed by: HOSPITALIST

## 2018-06-14 PROCEDURE — 92610 EVALUATE SWALLOWING FUNCTION: CPT

## 2018-06-14 PROCEDURE — 6370000000 HC RX 637 (ALT 250 FOR IP): Performed by: HOSPITALIST

## 2018-06-14 PROCEDURE — 84295 ASSAY OF SERUM SODIUM: CPT

## 2018-06-14 PROCEDURE — 83735 ASSAY OF MAGNESIUM: CPT

## 2018-06-14 PROCEDURE — 1200000003 HC TELEMETRY R&B

## 2018-06-14 PROCEDURE — 70450 CT HEAD/BRAIN W/O DYE: CPT

## 2018-06-14 PROCEDURE — 80048 BASIC METABOLIC PNL TOTAL CA: CPT

## 2018-06-14 PROCEDURE — 94640 AIRWAY INHALATION TREATMENT: CPT

## 2018-06-14 RX ORDER — SODIUM CHLORIDE 1000 MG
1 TABLET, SOLUBLE MISCELLANEOUS 2 TIMES DAILY WITH MEALS
Status: DISCONTINUED | OUTPATIENT
Start: 2018-06-14 | End: 2018-06-16

## 2018-06-14 RX ORDER — BUMETANIDE 1 MG/1
0.5 TABLET ORAL DAILY
Status: DISCONTINUED | OUTPATIENT
Start: 2018-06-14 | End: 2018-06-18 | Stop reason: HOSPADM

## 2018-06-14 RX ORDER — ALBUTEROL SULFATE 2.5 MG/3ML
2.5 SOLUTION RESPIRATORY (INHALATION) ONCE
Status: COMPLETED | OUTPATIENT
Start: 2018-06-14 | End: 2018-06-14

## 2018-06-14 RX ORDER — ALBUTEROL SULFATE 0.63 MG/3ML
1 SOLUTION RESPIRATORY (INHALATION) 4 TIMES DAILY PRN
Status: CANCELLED | OUTPATIENT
Start: 2018-06-14

## 2018-06-14 RX ORDER — HALOPERIDOL 5 MG/ML
0.5 INJECTION INTRAMUSCULAR EVERY 8 HOURS
Status: DISCONTINUED | OUTPATIENT
Start: 2018-06-14 | End: 2018-06-15

## 2018-06-14 RX ORDER — ALBUTEROL SULFATE 2.5 MG/3ML
2.5 SOLUTION RESPIRATORY (INHALATION) EVERY 6 HOURS PRN
Status: DISCONTINUED | OUTPATIENT
Start: 2018-06-14 | End: 2018-06-18 | Stop reason: HOSPADM

## 2018-06-14 RX ADMIN — GUAIFENESIN 600 MG: 600 TABLET, EXTENDED RELEASE ORAL at 20:52

## 2018-06-14 RX ADMIN — SODIUM CHLORIDE TAB 1 GM 1 G: 1 TAB at 17:34

## 2018-06-14 RX ADMIN — SUCRALFATE 1 G: 1 TABLET ORAL at 11:08

## 2018-06-14 RX ADMIN — SUCRALFATE 1 G: 1 TABLET ORAL at 17:34

## 2018-06-14 RX ADMIN — HALOPERIDOL LACTATE 0.5 MG: 5 INJECTION, SOLUTION INTRAMUSCULAR at 20:53

## 2018-06-14 RX ADMIN — ENOXAPARIN SODIUM 30 MG: 30 INJECTION SUBCUTANEOUS at 11:24

## 2018-06-14 RX ADMIN — Medication 10 ML: at 11:24

## 2018-06-14 RX ADMIN — ALBUTEROL SULFATE 2.5 MG: 2.5 SOLUTION RESPIRATORY (INHALATION) at 12:15

## 2018-06-14 RX ADMIN — METOPROLOL TARTRATE 12.5 MG: 25 TABLET ORAL at 11:08

## 2018-06-14 RX ADMIN — CITALOPRAM 40 MG: 40 TABLET, FILM COATED ORAL at 11:08

## 2018-06-14 RX ADMIN — CALCIUM CARBONATE-VITAMIN D TAB 500 MG-200 UNIT 1 TABLET: 500-200 TAB at 11:08

## 2018-06-14 RX ADMIN — PANTOPRAZOLE SODIUM 40 MG: 40 TABLET, DELAYED RELEASE ORAL at 17:34

## 2018-06-14 RX ADMIN — LATANOPROST 1 DROP: 50 SOLUTION/ DROPS OPHTHALMIC at 20:52

## 2018-06-14 RX ADMIN — ISOSORBIDE MONONITRATE 30 MG: 30 TABLET ORAL at 11:08

## 2018-06-14 RX ADMIN — ASPIRIN 81 MG: 81 TABLET ORAL at 11:08

## 2018-06-14 RX ADMIN — Medication 2 PUFF: at 16:00

## 2018-06-14 RX ADMIN — SUCRALFATE 1 G: 1 TABLET ORAL at 20:52

## 2018-06-14 RX ADMIN — ACETAMINOPHEN 650 MG: 325 TABLET ORAL at 22:03

## 2018-06-14 RX ADMIN — Medication 10 ML: at 20:55

## 2018-06-14 RX ADMIN — DOCUSATE SODIUM 100 MG: 100 CAPSULE, LIQUID FILLED ORAL at 20:55

## 2018-06-14 RX ADMIN — METOPROLOL TARTRATE 12.5 MG: 25 TABLET ORAL at 20:52

## 2018-06-14 RX ADMIN — ATORVASTATIN CALCIUM 20 MG: 20 TABLET, FILM COATED ORAL at 20:52

## 2018-06-14 RX ADMIN — BUMETANIDE 0.5 MG: 1 TABLET ORAL at 11:12

## 2018-06-14 RX ADMIN — GUAIFENESIN 600 MG: 600 TABLET, EXTENDED RELEASE ORAL at 11:08

## 2018-06-14 RX ADMIN — MAGNESIUM GLUCONATE 500 MG ORAL TABLET 400 MG: 500 TABLET ORAL at 11:08

## 2018-06-14 RX ADMIN — LEVOTHYROXINE SODIUM 75 MCG: 75 TABLET ORAL at 06:12

## 2018-06-14 RX ADMIN — SODIUM CHLORIDE TAB 1 GM 1 G: 1 TAB at 13:46

## 2018-06-14 ASSESSMENT — PAIN SCALES - GENERAL
PAINLEVEL_OUTOF10: 0
PAINLEVEL_OUTOF10: 0
PAINLEVEL_OUTOF10: 5
PAINLEVEL_OUTOF10: 0

## 2018-06-15 LAB
ANION GAP SERPL CALCULATED.3IONS-SCNC: 14 MEQ/L (ref 8–16)
BUN BLDV-MCNC: 20 MG/DL (ref 7–22)
CALCIUM SERPL-MCNC: 9 MG/DL (ref 8.5–10.5)
CHLORIDE BLD-SCNC: 96 MEQ/L (ref 98–111)
CO2: 24 MEQ/L (ref 23–33)
CREAT SERPL-MCNC: 0.8 MG/DL (ref 0.4–1.2)
GFR SERPL CREATININE-BSD FRML MDRD: 68 ML/MIN/1.73M2
GLUCOSE BLD-MCNC: 99 MG/DL (ref 70–108)
POTASSIUM SERPL-SCNC: 4 MEQ/L (ref 3.5–5.2)
SODIUM BLD-SCNC: 134 MEQ/L (ref 135–145)

## 2018-06-15 PROCEDURE — 6360000002 HC RX W HCPCS: Performed by: HOSPITALIST

## 2018-06-15 PROCEDURE — 6360000002 HC RX W HCPCS: Performed by: INTERNAL MEDICINE

## 2018-06-15 PROCEDURE — 6370000000 HC RX 637 (ALT 250 FOR IP): Performed by: HOSPITALIST

## 2018-06-15 PROCEDURE — 2580000003 HC RX 258: Performed by: HOSPITALIST

## 2018-06-15 PROCEDURE — 94660 CPAP INITIATION&MGMT: CPT

## 2018-06-15 PROCEDURE — 36415 COLL VENOUS BLD VENIPUNCTURE: CPT

## 2018-06-15 PROCEDURE — 94640 AIRWAY INHALATION TREATMENT: CPT

## 2018-06-15 PROCEDURE — 6370000000 HC RX 637 (ALT 250 FOR IP): Performed by: FAMILY MEDICINE

## 2018-06-15 PROCEDURE — 6370000000 HC RX 637 (ALT 250 FOR IP): Performed by: INTERNAL MEDICINE

## 2018-06-15 PROCEDURE — 1200000003 HC TELEMETRY R&B

## 2018-06-15 PROCEDURE — 80048 BASIC METABOLIC PNL TOTAL CA: CPT

## 2018-06-15 PROCEDURE — 99233 SBSQ HOSP IP/OBS HIGH 50: CPT | Performed by: INTERNAL MEDICINE

## 2018-06-15 RX ORDER — ALBUTEROL SULFATE 2.5 MG/3ML
2.5 SOLUTION RESPIRATORY (INHALATION) EVERY 6 HOURS PRN
Status: DISCONTINUED | OUTPATIENT
Start: 2018-06-15 | End: 2018-06-15

## 2018-06-15 RX ORDER — ALBUTEROL SULFATE 2.5 MG/3ML
2.5 SOLUTION RESPIRATORY (INHALATION)
Status: DISCONTINUED | OUTPATIENT
Start: 2018-06-15 | End: 2018-06-16

## 2018-06-15 RX ORDER — HALOPERIDOL 5 MG/ML
0.5 INJECTION INTRAMUSCULAR DAILY PRN
Status: DISCONTINUED | OUTPATIENT
Start: 2018-06-15 | End: 2018-06-18 | Stop reason: HOSPADM

## 2018-06-15 RX ADMIN — POLYETHYLENE GLYCOL 3350 17 G: 17 POWDER, FOR SOLUTION ORAL at 15:15

## 2018-06-15 RX ADMIN — ALBUTEROL SULFATE 2.5 MG: 2.5 SOLUTION RESPIRATORY (INHALATION) at 09:58

## 2018-06-15 RX ADMIN — LATANOPROST 1 DROP: 50 SOLUTION/ DROPS OPHTHALMIC at 22:07

## 2018-06-15 RX ADMIN — ENOXAPARIN SODIUM 30 MG: 30 INJECTION SUBCUTANEOUS at 12:25

## 2018-06-15 RX ADMIN — Medication 10 ML: at 12:25

## 2018-06-15 RX ADMIN — Medication 10 ML: at 22:07

## 2018-06-15 RX ADMIN — METOPROLOL TARTRATE 12.5 MG: 25 TABLET ORAL at 20:04

## 2018-06-15 RX ADMIN — SUCRALFATE 1 G: 1 TABLET ORAL at 20:04

## 2018-06-15 RX ADMIN — MAGNESIUM GLUCONATE 500 MG ORAL TABLET 400 MG: 500 TABLET ORAL at 15:09

## 2018-06-15 RX ADMIN — HALOPERIDOL LACTATE 0.5 MG: 5 INJECTION, SOLUTION INTRAMUSCULAR at 04:40

## 2018-06-15 RX ADMIN — GUAIFENESIN 600 MG: 600 TABLET, EXTENDED RELEASE ORAL at 15:08

## 2018-06-15 RX ADMIN — ALBUTEROL SULFATE 2.5 MG: 2.5 SOLUTION RESPIRATORY (INHALATION) at 14:05

## 2018-06-15 RX ADMIN — CALCIUM CARBONATE-VITAMIN D TAB 500 MG-200 UNIT 1 TABLET: 500-200 TAB at 15:09

## 2018-06-15 RX ADMIN — BUMETANIDE 0.5 MG: 1 TABLET ORAL at 15:09

## 2018-06-15 RX ADMIN — PANTOPRAZOLE SODIUM 40 MG: 40 TABLET, DELAYED RELEASE ORAL at 07:06

## 2018-06-15 RX ADMIN — SUCRALFATE 1 G: 1 TABLET ORAL at 07:06

## 2018-06-15 RX ADMIN — CITALOPRAM 40 MG: 40 TABLET, FILM COATED ORAL at 15:09

## 2018-06-15 RX ADMIN — SODIUM CHLORIDE TAB 1 GM 1 G: 1 TAB at 16:02

## 2018-06-15 RX ADMIN — GUAIFENESIN 600 MG: 600 TABLET, EXTENDED RELEASE ORAL at 22:06

## 2018-06-15 RX ADMIN — PANTOPRAZOLE SODIUM 40 MG: 40 TABLET, DELAYED RELEASE ORAL at 16:02

## 2018-06-15 RX ADMIN — SUCRALFATE 1 G: 1 TABLET ORAL at 15:08

## 2018-06-15 RX ADMIN — DOCUSATE SODIUM 100 MG: 100 CAPSULE, LIQUID FILLED ORAL at 15:09

## 2018-06-15 RX ADMIN — ISOSORBIDE MONONITRATE 30 MG: 30 TABLET ORAL at 15:05

## 2018-06-15 RX ADMIN — LEVOTHYROXINE SODIUM 88 MCG: 88 TABLET ORAL at 15:08

## 2018-06-15 RX ADMIN — ASPIRIN 81 MG: 81 TABLET ORAL at 15:09

## 2018-06-15 RX ADMIN — ALBUTEROL SULFATE 2.5 MG: 2.5 SOLUTION RESPIRATORY (INHALATION) at 19:59

## 2018-06-15 RX ADMIN — DOCUSATE SODIUM 100 MG: 100 CAPSULE, LIQUID FILLED ORAL at 22:06

## 2018-06-15 RX ADMIN — ATORVASTATIN CALCIUM 20 MG: 20 TABLET, FILM COATED ORAL at 22:06

## 2018-06-15 ASSESSMENT — PAIN SCALES - WONG BAKER: WONGBAKER_NUMERICALRESPONSE: 0

## 2018-06-16 LAB
ANION GAP SERPL CALCULATED.3IONS-SCNC: 11 MEQ/L (ref 8–16)
BUN BLDV-MCNC: 20 MG/DL (ref 7–22)
CALCIUM SERPL-MCNC: 8.6 MG/DL (ref 8.5–10.5)
CHLORIDE BLD-SCNC: 98 MEQ/L (ref 98–111)
CO2: 25 MEQ/L (ref 23–33)
CREAT SERPL-MCNC: 0.9 MG/DL (ref 0.4–1.2)
GFR SERPL CREATININE-BSD FRML MDRD: 59 ML/MIN/1.73M2
GLUCOSE BLD-MCNC: 87 MG/DL (ref 70–108)
MAGNESIUM: 1.8 MG/DL (ref 1.6–2.4)
POTASSIUM SERPL-SCNC: 3.7 MEQ/L (ref 3.5–5.2)
SODIUM BLD-SCNC: 134 MEQ/L (ref 135–145)

## 2018-06-16 PROCEDURE — 1200000003 HC TELEMETRY R&B

## 2018-06-16 PROCEDURE — 6370000000 HC RX 637 (ALT 250 FOR IP): Performed by: HOSPITALIST

## 2018-06-16 PROCEDURE — 6370000000 HC RX 637 (ALT 250 FOR IP): Performed by: INTERNAL MEDICINE

## 2018-06-16 PROCEDURE — 83735 ASSAY OF MAGNESIUM: CPT

## 2018-06-16 PROCEDURE — 36415 COLL VENOUS BLD VENIPUNCTURE: CPT

## 2018-06-16 PROCEDURE — 2580000003 HC RX 258: Performed by: HOSPITALIST

## 2018-06-16 PROCEDURE — 6370000000 HC RX 637 (ALT 250 FOR IP): Performed by: FAMILY MEDICINE

## 2018-06-16 PROCEDURE — 6360000002 HC RX W HCPCS: Performed by: HOSPITALIST

## 2018-06-16 PROCEDURE — 99232 SBSQ HOSP IP/OBS MODERATE 35: CPT | Performed by: INTERNAL MEDICINE

## 2018-06-16 PROCEDURE — 94660 CPAP INITIATION&MGMT: CPT

## 2018-06-16 PROCEDURE — 2700000000 HC OXYGEN THERAPY PER DAY

## 2018-06-16 PROCEDURE — 80048 BASIC METABOLIC PNL TOTAL CA: CPT

## 2018-06-16 RX ORDER — SODIUM CHLORIDE 1000 MG
1 TABLET, SOLUBLE MISCELLANEOUS DAILY
Status: DISCONTINUED | OUTPATIENT
Start: 2018-06-16 | End: 2018-06-17

## 2018-06-16 RX ADMIN — Medication 10 ML: at 08:20

## 2018-06-16 RX ADMIN — ATORVASTATIN CALCIUM 20 MG: 20 TABLET, FILM COATED ORAL at 20:30

## 2018-06-16 RX ADMIN — MAGNESIUM GLUCONATE 500 MG ORAL TABLET 400 MG: 500 TABLET ORAL at 08:16

## 2018-06-16 RX ADMIN — DOCUSATE SODIUM 100 MG: 100 CAPSULE, LIQUID FILLED ORAL at 20:29

## 2018-06-16 RX ADMIN — BUMETANIDE 0.5 MG: 1 TABLET ORAL at 08:17

## 2018-06-16 RX ADMIN — METOPROLOL TARTRATE 12.5 MG: 25 TABLET ORAL at 08:17

## 2018-06-16 RX ADMIN — METOPROLOL TARTRATE 12.5 MG: 25 TABLET ORAL at 20:30

## 2018-06-16 RX ADMIN — Medication 10 ML: at 20:30

## 2018-06-16 RX ADMIN — SUCRALFATE 1 G: 1 TABLET ORAL at 06:54

## 2018-06-16 RX ADMIN — ASPIRIN 81 MG: 81 TABLET ORAL at 08:17

## 2018-06-16 RX ADMIN — DOCUSATE SODIUM 100 MG: 100 CAPSULE, LIQUID FILLED ORAL at 08:16

## 2018-06-16 RX ADMIN — PANTOPRAZOLE SODIUM 40 MG: 40 TABLET, DELAYED RELEASE ORAL at 16:18

## 2018-06-16 RX ADMIN — SODIUM CHLORIDE TAB 1 GM 1 G: 1 TAB at 12:14

## 2018-06-16 RX ADMIN — POLYETHYLENE GLYCOL 3350 17 G: 17 POWDER, FOR SOLUTION ORAL at 08:20

## 2018-06-16 RX ADMIN — GUAIFENESIN 600 MG: 600 TABLET, EXTENDED RELEASE ORAL at 08:16

## 2018-06-16 RX ADMIN — LEVOTHYROXINE SODIUM 75 MCG: 75 TABLET ORAL at 06:54

## 2018-06-16 RX ADMIN — CITALOPRAM 40 MG: 40 TABLET, FILM COATED ORAL at 08:16

## 2018-06-16 RX ADMIN — SUCRALFATE 1 G: 1 TABLET ORAL at 00:13

## 2018-06-16 RX ADMIN — GUAIFENESIN 600 MG: 600 TABLET, EXTENDED RELEASE ORAL at 20:29

## 2018-06-16 RX ADMIN — SUCRALFATE 1 G: 1 TABLET ORAL at 16:18

## 2018-06-16 RX ADMIN — SUCRALFATE 1 G: 1 TABLET ORAL at 12:13

## 2018-06-16 RX ADMIN — ISOSORBIDE MONONITRATE 30 MG: 30 TABLET ORAL at 08:16

## 2018-06-16 RX ADMIN — SUCRALFATE 1 G: 1 TABLET ORAL at 20:29

## 2018-06-16 RX ADMIN — ENOXAPARIN SODIUM 30 MG: 30 INJECTION SUBCUTANEOUS at 08:20

## 2018-06-16 RX ADMIN — LATANOPROST 1 DROP: 50 SOLUTION/ DROPS OPHTHALMIC at 20:30

## 2018-06-16 RX ADMIN — CALCIUM CARBONATE-VITAMIN D TAB 500 MG-200 UNIT 1 TABLET: 500-200 TAB at 08:16

## 2018-06-16 RX ADMIN — PANTOPRAZOLE SODIUM 40 MG: 40 TABLET, DELAYED RELEASE ORAL at 06:54

## 2018-06-16 ASSESSMENT — PAIN SCALES - GENERAL
PAINLEVEL_OUTOF10: 0

## 2018-06-17 LAB
ANION GAP SERPL CALCULATED.3IONS-SCNC: 13 MEQ/L (ref 8–16)
BUN BLDV-MCNC: 19 MG/DL (ref 7–22)
CALCIUM SERPL-MCNC: 8.7 MG/DL (ref 8.5–10.5)
CHLORIDE BLD-SCNC: 95 MEQ/L (ref 98–111)
CO2: 28 MEQ/L (ref 23–33)
CREAT SERPL-MCNC: 0.8 MG/DL (ref 0.4–1.2)
GFR SERPL CREATININE-BSD FRML MDRD: 68 ML/MIN/1.73M2
GLUCOSE BLD-MCNC: 110 MG/DL (ref 70–108)
POTASSIUM SERPL-SCNC: 4.3 MEQ/L (ref 3.5–5.2)
SODIUM BLD-SCNC: 136 MEQ/L (ref 135–145)

## 2018-06-17 PROCEDURE — 36415 COLL VENOUS BLD VENIPUNCTURE: CPT

## 2018-06-17 PROCEDURE — 6370000000 HC RX 637 (ALT 250 FOR IP): Performed by: HOSPITALIST

## 2018-06-17 PROCEDURE — 2580000003 HC RX 258: Performed by: HOSPITALIST

## 2018-06-17 PROCEDURE — 6370000000 HC RX 637 (ALT 250 FOR IP): Performed by: FAMILY MEDICINE

## 2018-06-17 PROCEDURE — 99232 SBSQ HOSP IP/OBS MODERATE 35: CPT | Performed by: INTERNAL MEDICINE

## 2018-06-17 PROCEDURE — 80048 BASIC METABOLIC PNL TOTAL CA: CPT

## 2018-06-17 PROCEDURE — 6360000002 HC RX W HCPCS: Performed by: HOSPITALIST

## 2018-06-17 PROCEDURE — 1200000003 HC TELEMETRY R&B

## 2018-06-17 PROCEDURE — 6370000000 HC RX 637 (ALT 250 FOR IP): Performed by: INTERNAL MEDICINE

## 2018-06-17 RX ORDER — SPIRONOLACTONE 25 MG/1
12.5 TABLET ORAL DAILY
Status: DISCONTINUED | OUTPATIENT
Start: 2018-06-18 | End: 2018-06-18 | Stop reason: HOSPADM

## 2018-06-17 RX ORDER — ACETAMINOPHEN 325 MG/1
650 TABLET ORAL EVERY 6 HOURS
Status: DISCONTINUED | OUTPATIENT
Start: 2018-06-17 | End: 2018-06-18 | Stop reason: HOSPADM

## 2018-06-17 RX ORDER — LIDOCAINE 50 MG/G
1 PATCH TOPICAL NIGHTLY
Status: DISCONTINUED | OUTPATIENT
Start: 2018-06-17 | End: 2018-06-18 | Stop reason: HOSPADM

## 2018-06-17 RX ORDER — SODIUM CHLORIDE 1000 MG
1 TABLET, SOLUBLE MISCELLANEOUS 2 TIMES DAILY WITH MEALS
Status: DISCONTINUED | OUTPATIENT
Start: 2018-06-17 | End: 2018-06-17

## 2018-06-17 RX ADMIN — ENOXAPARIN SODIUM 30 MG: 30 INJECTION SUBCUTANEOUS at 09:17

## 2018-06-17 RX ADMIN — SUCRALFATE 1 G: 1 TABLET ORAL at 17:29

## 2018-06-17 RX ADMIN — GUAIFENESIN 600 MG: 600 TABLET, EXTENDED RELEASE ORAL at 09:17

## 2018-06-17 RX ADMIN — DOCUSATE SODIUM 100 MG: 100 CAPSULE, LIQUID FILLED ORAL at 21:19

## 2018-06-17 RX ADMIN — BUMETANIDE 0.5 MG: 1 TABLET ORAL at 09:17

## 2018-06-17 RX ADMIN — CALCIUM CARBONATE-VITAMIN D TAB 500 MG-200 UNIT 1 TABLET: 500-200 TAB at 09:19

## 2018-06-17 RX ADMIN — LATANOPROST 1 DROP: 50 SOLUTION/ DROPS OPHTHALMIC at 21:19

## 2018-06-17 RX ADMIN — Medication 10 ML: at 09:20

## 2018-06-17 RX ADMIN — Medication 10 ML: at 21:21

## 2018-06-17 RX ADMIN — POLYETHYLENE GLYCOL 3350 17 G: 17 POWDER, FOR SOLUTION ORAL at 09:18

## 2018-06-17 RX ADMIN — GUAIFENESIN 600 MG: 600 TABLET, EXTENDED RELEASE ORAL at 21:18

## 2018-06-17 RX ADMIN — CITALOPRAM 40 MG: 40 TABLET, FILM COATED ORAL at 09:17

## 2018-06-17 RX ADMIN — SUCRALFATE 1 G: 1 TABLET ORAL at 11:00

## 2018-06-17 RX ADMIN — PANTOPRAZOLE SODIUM 40 MG: 40 TABLET, DELAYED RELEASE ORAL at 09:17

## 2018-06-17 RX ADMIN — MAGNESIUM GLUCONATE 500 MG ORAL TABLET 400 MG: 500 TABLET ORAL at 09:20

## 2018-06-17 RX ADMIN — ATORVASTATIN CALCIUM 20 MG: 20 TABLET, FILM COATED ORAL at 21:16

## 2018-06-17 RX ADMIN — METOPROLOL TARTRATE 12.5 MG: 25 TABLET ORAL at 21:16

## 2018-06-17 RX ADMIN — SUCRALFATE 1 G: 1 TABLET ORAL at 21:18

## 2018-06-17 RX ADMIN — METOPROLOL TARTRATE 12.5 MG: 25 TABLET ORAL at 09:17

## 2018-06-17 RX ADMIN — LEVOTHYROXINE SODIUM 88 MCG: 88 TABLET ORAL at 09:18

## 2018-06-17 RX ADMIN — DOCUSATE SODIUM 100 MG: 100 CAPSULE, LIQUID FILLED ORAL at 09:19

## 2018-06-17 RX ADMIN — PANTOPRAZOLE SODIUM 40 MG: 40 TABLET, DELAYED RELEASE ORAL at 17:29

## 2018-06-17 RX ADMIN — ACETAMINOPHEN 650 MG: 325 TABLET ORAL at 09:17

## 2018-06-17 RX ADMIN — SODIUM CHLORIDE TAB 1 GM 1 G: 1 TAB at 09:18

## 2018-06-17 RX ADMIN — ASPIRIN 81 MG: 81 TABLET ORAL at 09:17

## 2018-06-17 RX ADMIN — SUCRALFATE 1 G: 1 TABLET ORAL at 09:18

## 2018-06-17 RX ADMIN — ISOSORBIDE MONONITRATE 30 MG: 30 TABLET ORAL at 09:17

## 2018-06-17 ASSESSMENT — PAIN SCALES - GENERAL
PAINLEVEL_OUTOF10: 3
PAINLEVEL_OUTOF10: 0

## 2018-06-18 ENCOUNTER — HOSPITAL ENCOUNTER (INPATIENT)
Age: 83
LOS: 26 days | Discharge: NURSING, REHAB OR LONG-TERM CARE | DRG: 091 | End: 2018-07-14
Attending: FAMILY MEDICINE | Admitting: FAMILY MEDICINE
Payer: MEDICARE

## 2018-06-18 VITALS
OXYGEN SATURATION: 93 % | BODY MASS INDEX: 28.75 KG/M2 | HEIGHT: 56 IN | SYSTOLIC BLOOD PRESSURE: 165 MMHG | HEART RATE: 66 BPM | RESPIRATION RATE: 18 BRPM | TEMPERATURE: 98.1 F | DIASTOLIC BLOOD PRESSURE: 77 MMHG | WEIGHT: 127.8 LBS

## 2018-06-18 DIAGNOSIS — M25.552 PAIN OF LEFT HIP JOINT: Primary | ICD-10-CM

## 2018-06-18 DIAGNOSIS — M25.552 ACUTE PAIN OF LEFT HIP: ICD-10-CM
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
## 2018-06-18 LAB
ANION GAP SERPL CALCULATED.3IONS-SCNC: 13 MEQ/L (ref 8–16)
BUN BLDV-MCNC: 19 MG/DL (ref 7–22)
CALCIUM SERPL-MCNC: 9.4 MG/DL (ref 8.5–10.5)
CHLORIDE BLD-SCNC: 95 MEQ/L (ref 98–111)
CO2: 27 MEQ/L (ref 23–33)
CREAT SERPL-MCNC: 0.7 MG/DL (ref 0.4–1.2)
GFR SERPL CREATININE-BSD FRML MDRD: 79 ML/MIN/1.73M2
GLUCOSE BLD-MCNC: 115 MG/DL (ref 70–108)
POTASSIUM SERPL-SCNC: 4.1 MEQ/L (ref 3.5–5.2)
SODIUM BLD-SCNC: 135 MEQ/L (ref 135–145)

## 2018-06-18 PROCEDURE — 6370000000 HC RX 637 (ALT 250 FOR IP): Performed by: FAMILY MEDICINE

## 2018-06-18 PROCEDURE — 2580000003 HC RX 258: Performed by: HOSPITALIST

## 2018-06-18 PROCEDURE — 6370000000 HC RX 637 (ALT 250 FOR IP): Performed by: INTERNAL MEDICINE

## 2018-06-18 PROCEDURE — 36415 COLL VENOUS BLD VENIPUNCTURE: CPT

## 2018-06-18 PROCEDURE — 0220000000 HC SKILLED NURSING FACILITY

## 2018-06-18 PROCEDURE — 97116 GAIT TRAINING THERAPY: CPT

## 2018-06-18 PROCEDURE — 1290000000 HC SEMI PRIVATE OTHER R&B

## 2018-06-18 PROCEDURE — 6360000002 HC RX W HCPCS: Performed by: HOSPITALIST

## 2018-06-18 PROCEDURE — 92523 SPEECH SOUND LANG COMPREHEN: CPT

## 2018-06-18 PROCEDURE — 6370000000 HC RX 637 (ALT 250 FOR IP): Performed by: HOSPITALIST

## 2018-06-18 PROCEDURE — 80048 BASIC METABOLIC PNL TOTAL CA: CPT

## 2018-06-18 PROCEDURE — 99239 HOSP IP/OBS DSCHRG MGMT >30: CPT | Performed by: INTERNAL MEDICINE

## 2018-06-18 PROCEDURE — 97530 THERAPEUTIC ACTIVITIES: CPT

## 2018-06-18 RX ORDER — ALBUTEROL SULFATE 90 UG/1
2 AEROSOL, METERED RESPIRATORY (INHALATION) EVERY 6 HOURS PRN
Status: DISCONTINUED | OUTPATIENT
Start: 2018-06-18 | End: 2018-07-03

## 2018-06-18 RX ORDER — LIDOCAINE 50 MG/G
1 PATCH TOPICAL NIGHTLY
Status: CANCELLED | OUTPATIENT
Start: 2018-06-18

## 2018-06-18 RX ORDER — LEVOTHYROXINE SODIUM 0.07 MG/1
75 TABLET ORAL EVERY OTHER DAY
Status: DISCONTINUED | OUTPATIENT
Start: 2018-06-20 | End: 2018-06-30

## 2018-06-18 RX ORDER — POLYETHYLENE GLYCOL 3350 17 G/17G
17 POWDER, FOR SOLUTION ORAL DAILY
Status: DISCONTINUED | OUTPATIENT
Start: 2018-06-19 | End: 2018-07-14 | Stop reason: HOSPADM

## 2018-06-18 RX ORDER — ASPIRIN 81 MG/1
81 TABLET ORAL DAILY
Status: DISCONTINUED | OUTPATIENT
Start: 2018-06-19 | End: 2018-07-14 | Stop reason: HOSPADM

## 2018-06-18 RX ORDER — PANTOPRAZOLE SODIUM 40 MG/1
40 TABLET, DELAYED RELEASE ORAL
Status: CANCELLED | OUTPATIENT
Start: 2018-06-18

## 2018-06-18 RX ORDER — LATANOPROST 50 UG/ML
1 SOLUTION/ DROPS OPHTHALMIC NIGHTLY
Status: CANCELLED | OUTPATIENT
Start: 2018-06-18

## 2018-06-18 RX ORDER — SUCRALFATE 1 G/1
1 TABLET ORAL
Status: CANCELLED | OUTPATIENT
Start: 2018-06-18

## 2018-06-18 RX ORDER — ISOSORBIDE MONONITRATE 30 MG/1
30 TABLET, EXTENDED RELEASE ORAL DAILY
Status: CANCELLED | OUTPATIENT
Start: 2018-06-19

## 2018-06-18 RX ORDER — ALBUTEROL SULFATE 90 UG/1
2 AEROSOL, METERED RESPIRATORY (INHALATION) EVERY 6 HOURS PRN
Status: CANCELLED | OUTPATIENT
Start: 2018-06-18

## 2018-06-18 RX ORDER — LATANOPROST 50 UG/ML
1 SOLUTION/ DROPS OPHTHALMIC NIGHTLY
Status: DISCONTINUED | OUTPATIENT
Start: 2018-06-18 | End: 2018-07-14 | Stop reason: HOSPADM

## 2018-06-18 RX ORDER — BUMETANIDE 1 MG/1
0.5 TABLET ORAL DAILY
Status: CANCELLED | OUTPATIENT
Start: 2018-06-19

## 2018-06-18 RX ORDER — DOCUSATE SODIUM 100 MG/1
100 CAPSULE, LIQUID FILLED ORAL 2 TIMES DAILY
Status: DISCONTINUED | OUTPATIENT
Start: 2018-06-18 | End: 2018-07-14 | Stop reason: HOSPADM

## 2018-06-18 RX ORDER — POLYETHYLENE GLYCOL 3350 17 G/17G
17 POWDER, FOR SOLUTION ORAL DAILY
Status: CANCELLED | OUTPATIENT
Start: 2018-06-19

## 2018-06-18 RX ORDER — DOCUSATE SODIUM 100 MG/1
100 CAPSULE, LIQUID FILLED ORAL 2 TIMES DAILY
Status: CANCELLED | OUTPATIENT
Start: 2018-06-18

## 2018-06-18 RX ORDER — OYSTER SHELL CALCIUM WITH VITAMIN D 500; 200 MG/1; [IU]/1
1 TABLET, FILM COATED ORAL DAILY
Status: DISCONTINUED | OUTPATIENT
Start: 2018-06-19 | End: 2018-07-14 | Stop reason: HOSPADM

## 2018-06-18 RX ORDER — ALBUTEROL SULFATE 2.5 MG/3ML
2.5 SOLUTION RESPIRATORY (INHALATION) EVERY 6 HOURS PRN
Status: DISCONTINUED | OUTPATIENT
Start: 2018-06-18 | End: 2018-07-03

## 2018-06-18 RX ORDER — ERGOCALCIFEROL 1.25 MG/1
50000 CAPSULE ORAL ONCE
Status: COMPLETED | OUTPATIENT
Start: 2018-06-18 | End: 2018-06-18

## 2018-06-18 RX ORDER — ATORVASTATIN CALCIUM 20 MG/1
20 TABLET, FILM COATED ORAL NIGHTLY
Status: CANCELLED | OUTPATIENT
Start: 2018-06-18

## 2018-06-18 RX ORDER — LEVOTHYROXINE SODIUM 0.07 MG/1
75 TABLET ORAL EVERY OTHER DAY
Status: CANCELLED | OUTPATIENT
Start: 2018-06-20

## 2018-06-18 RX ORDER — ASPIRIN 81 MG/1
81 TABLET ORAL DAILY
Status: CANCELLED | OUTPATIENT
Start: 2018-06-19

## 2018-06-18 RX ORDER — ACETAMINOPHEN 325 MG/1
650 TABLET ORAL EVERY 6 HOURS
Status: CANCELLED | OUTPATIENT
Start: 2018-06-18

## 2018-06-18 RX ORDER — BUMETANIDE 1 MG/1
0.5 TABLET ORAL DAILY
Status: DISCONTINUED | OUTPATIENT
Start: 2018-06-19 | End: 2018-07-14 | Stop reason: HOSPADM

## 2018-06-18 RX ORDER — OYSTER SHELL CALCIUM WITH VITAMIN D 500; 200 MG/1; [IU]/1
1 TABLET, FILM COATED ORAL DAILY
Status: CANCELLED | OUTPATIENT
Start: 2018-06-19

## 2018-06-18 RX ORDER — SPIRONOLACTONE 25 MG/1
12.5 TABLET ORAL DAILY
Status: CANCELLED | OUTPATIENT
Start: 2018-06-19

## 2018-06-18 RX ORDER — CITALOPRAM 40 MG/1
40 TABLET ORAL DAILY
Status: CANCELLED | OUTPATIENT
Start: 2018-06-19

## 2018-06-18 RX ORDER — ISOSORBIDE MONONITRATE 30 MG/1
30 TABLET, EXTENDED RELEASE ORAL DAILY
Status: DISCONTINUED | OUTPATIENT
Start: 2018-06-19 | End: 2018-07-14 | Stop reason: HOSPADM

## 2018-06-18 RX ORDER — CITALOPRAM 40 MG/1
40 TABLET ORAL DAILY
Status: DISCONTINUED | OUTPATIENT
Start: 2018-06-19 | End: 2018-07-14 | Stop reason: HOSPADM

## 2018-06-18 RX ORDER — LEVOTHYROXINE SODIUM 88 UG/1
88 TABLET ORAL EVERY OTHER DAY
Status: DISCONTINUED | OUTPATIENT
Start: 2018-06-19 | End: 2018-06-30

## 2018-06-18 RX ORDER — ATORVASTATIN CALCIUM 20 MG/1
20 TABLET, FILM COATED ORAL NIGHTLY
Status: DISCONTINUED | OUTPATIENT
Start: 2018-06-18 | End: 2018-07-14 | Stop reason: HOSPADM

## 2018-06-18 RX ORDER — SUCRALFATE 1 G/1
1 TABLET ORAL
Status: DISCONTINUED | OUTPATIENT
Start: 2018-06-19 | End: 2018-07-14 | Stop reason: HOSPADM

## 2018-06-18 RX ORDER — ALBUTEROL SULFATE 2.5 MG/3ML
2.5 SOLUTION RESPIRATORY (INHALATION) EVERY 6 HOURS PRN
Status: CANCELLED | OUTPATIENT
Start: 2018-06-18

## 2018-06-18 RX ORDER — SPIRONOLACTONE 25 MG/1
12.5 TABLET ORAL DAILY
Status: DISCONTINUED | OUTPATIENT
Start: 2018-06-19 | End: 2018-07-14 | Stop reason: HOSPADM

## 2018-06-18 RX ORDER — ACETAMINOPHEN 325 MG/1
650 TABLET ORAL EVERY 6 HOURS
Status: DISCONTINUED | OUTPATIENT
Start: 2018-06-18 | End: 2018-07-14 | Stop reason: HOSPADM

## 2018-06-18 RX ORDER — GUAIFENESIN 600 MG/1
600 TABLET, EXTENDED RELEASE ORAL 2 TIMES DAILY
Status: DISCONTINUED | OUTPATIENT
Start: 2018-06-18 | End: 2018-07-14 | Stop reason: HOSPADM

## 2018-06-18 RX ORDER — LEVOTHYROXINE SODIUM 88 UG/1
88 TABLET ORAL EVERY OTHER DAY
Status: CANCELLED | OUTPATIENT
Start: 2018-06-19

## 2018-06-18 RX ORDER — GUAIFENESIN 600 MG/1
600 TABLET, EXTENDED RELEASE ORAL 2 TIMES DAILY
Status: CANCELLED | OUTPATIENT
Start: 2018-06-18

## 2018-06-18 RX ORDER — LIDOCAINE 50 MG/G
1 PATCH TOPICAL NIGHTLY
Status: DISCONTINUED | OUTPATIENT
Start: 2018-06-18 | End: 2018-07-12

## 2018-06-18 RX ORDER — PANTOPRAZOLE SODIUM 40 MG/1
40 TABLET, DELAYED RELEASE ORAL
Status: DISCONTINUED | OUTPATIENT
Start: 2018-06-19 | End: 2018-07-14 | Stop reason: HOSPADM

## 2018-06-18 RX ADMIN — MAGNESIUM GLUCONATE 500 MG ORAL TABLET 400 MG: 500 TABLET ORAL at 08:34

## 2018-06-18 RX ADMIN — PANTOPRAZOLE SODIUM 40 MG: 40 TABLET, DELAYED RELEASE ORAL at 06:07

## 2018-06-18 RX ADMIN — GUAIFENESIN 600 MG: 600 TABLET, EXTENDED RELEASE ORAL at 23:11

## 2018-06-18 RX ADMIN — DOCUSATE SODIUM 100 MG: 100 CAPSULE, LIQUID FILLED ORAL at 23:11

## 2018-06-18 RX ADMIN — ASPIRIN 81 MG: 81 TABLET ORAL at 08:35

## 2018-06-18 RX ADMIN — Medication 10 ML: at 08:34

## 2018-06-18 RX ADMIN — PANTOPRAZOLE SODIUM 40 MG: 40 TABLET, DELAYED RELEASE ORAL at 17:31

## 2018-06-18 RX ADMIN — CALCIUM CARBONATE-VITAMIN D TAB 500 MG-200 UNIT 1 TABLET: 500-200 TAB at 08:35

## 2018-06-18 RX ADMIN — ENOXAPARIN SODIUM 30 MG: 30 INJECTION SUBCUTANEOUS at 08:35

## 2018-06-18 RX ADMIN — GUAIFENESIN 600 MG: 600 TABLET, EXTENDED RELEASE ORAL at 08:34

## 2018-06-18 RX ADMIN — SUCRALFATE 1 G: 1 TABLET ORAL at 06:07

## 2018-06-18 RX ADMIN — LATANOPROST 1 DROP: 50 SOLUTION OPHTHALMIC at 23:12

## 2018-06-18 RX ADMIN — ATORVASTATIN CALCIUM 20 MG: 20 TABLET, FILM COATED ORAL at 23:11

## 2018-06-18 RX ADMIN — CITALOPRAM 40 MG: 40 TABLET, FILM COATED ORAL at 08:35

## 2018-06-18 RX ADMIN — LEVOTHYROXINE SODIUM 75 MCG: 75 TABLET ORAL at 06:07

## 2018-06-18 RX ADMIN — ISOSORBIDE MONONITRATE 30 MG: 30 TABLET ORAL at 08:34

## 2018-06-18 RX ADMIN — SUCRALFATE 1 G: 1 TABLET ORAL at 17:31

## 2018-06-18 RX ADMIN — METOPROLOL TARTRATE 12.5 MG: 25 TABLET ORAL at 08:35

## 2018-06-18 RX ADMIN — ACETAMINOPHEN 650 MG: 325 TABLET ORAL at 08:34

## 2018-06-18 RX ADMIN — ERGOCALCIFEROL 50000 UNITS: 1.25 CAPSULE ORAL at 17:31

## 2018-06-18 RX ADMIN — DOCUSATE SODIUM 100 MG: 100 CAPSULE, LIQUID FILLED ORAL at 08:34

## 2018-06-18 RX ADMIN — SPIRONOLACTONE 12.5 MG: 25 TABLET ORAL at 08:35

## 2018-06-18 RX ADMIN — METOPROLOL TARTRATE 12.5 MG: 25 TABLET ORAL at 23:11

## 2018-06-18 RX ADMIN — ACETAMINOPHEN 650 MG: 325 TABLET ORAL at 23:11

## 2018-06-18 RX ADMIN — BUMETANIDE 0.5 MG: 1 TABLET ORAL at 08:34

## 2018-06-18 RX ADMIN — SUCRALFATE 1 G: 1 TABLET ORAL at 12:50

## 2018-06-18 ASSESSMENT — PAIN SCALES - GENERAL
PAINLEVEL_OUTOF10: 0
PAINLEVEL_OUTOF10: 2
PAINLEVEL_OUTOF10: 0
PAINLEVEL_OUTOF10: 5
PAINLEVEL_OUTOF10: 0

## 2018-06-18 ASSESSMENT — PAIN DESCRIPTION - ORIENTATION: ORIENTATION: RIGHT

## 2018-06-18 ASSESSMENT — PAIN DESCRIPTION - LOCATION: LOCATION: FACE;LEG

## 2018-06-18 ASSESSMENT — PAIN DESCRIPTION - PAIN TYPE: TYPE: ACUTE PAIN

## 2018-06-19 ENCOUNTER — APPOINTMENT (OUTPATIENT)
Dept: GENERAL RADIOLOGY | Age: 83
DRG: 091 | End: 2018-06-19
Attending: FAMILY MEDICINE
Payer: MEDICARE

## 2018-06-19 PROBLEM — R29.6 RECURRENT FALLS: Status: ACTIVE | Noted: 2018-06-19

## 2018-06-19 PROBLEM — I67.9 CEREBROVASCULAR DISEASE: Status: ACTIVE | Noted: 2018-06-19

## 2018-06-19 PROBLEM — N18.2 CKD (CHRONIC KIDNEY DISEASE) STAGE 2, GFR 60-89 ML/MIN: Status: ACTIVE | Noted: 2018-06-19

## 2018-06-19 PROBLEM — I63.9 CVA (CEREBRAL VASCULAR ACCIDENT) (HCC): Status: ACTIVE | Noted: 2018-06-19

## 2018-06-19 PROBLEM — E66.3 OVERWEIGHT (BMI 25.0-29.9): Status: ACTIVE | Noted: 2018-06-19

## 2018-06-19 PROBLEM — M47.812 DJD (DEGENERATIVE JOINT DISEASE), CERVICAL: Status: ACTIVE | Noted: 2018-06-19

## 2018-06-19 PROBLEM — E43 SEVERE MALNUTRITION (HCC): Status: ACTIVE | Noted: 2018-06-19

## 2018-06-19 LAB
ANISOCYTOSIS: ABNORMAL
BASOPHILS # BLD: 0.4 %
BASOPHILS ABSOLUTE: 0 THOU/MM3 (ref 0–0.1)
EKG ATRIAL RATE: 64 BPM
EKG Q-T INTERVAL: 478 MS
EKG QRS DURATION: 120 MS
EKG QTC CALCULATION (BAZETT): 485 MS
EKG R AXIS: -94 DEGREES
EKG T AXIS: 114 DEGREES
EKG VENTRICULAR RATE: 62 BPM
EOSINOPHIL # BLD: 1.4 %
EOSINOPHILS ABSOLUTE: 0.1 THOU/MM3 (ref 0–0.4)
GLUCOSE BLD-MCNC: 190 MG/DL (ref 70–108)
HCT VFR BLD CALC: 31.4 % (ref 37–47)
HEMOGLOBIN: 10.5 GM/DL (ref 12–16)
LYMPHOCYTES # BLD: 14 %
LYMPHOCYTES ABSOLUTE: 1.1 THOU/MM3 (ref 1–4.8)
MCH RBC QN AUTO: 30.1 PG (ref 27–31)
MCHC RBC AUTO-ENTMCNC: 33.4 GM/DL (ref 33–37)
MCV RBC AUTO: 90 FL (ref 81–99)
MONOCYTES # BLD: 8.8 %
MONOCYTES ABSOLUTE: 0.7 THOU/MM3 (ref 0.4–1.3)
NUCLEATED RED BLOOD CELLS: 0 /100 WBC
PDW BLD-RTO: 16.4 % (ref 11.5–14.5)
PLATELET # BLD: 211 THOU/MM3 (ref 130–400)
PMV BLD AUTO: 8.1 FL (ref 7.4–10.4)
PRO-BNP: ABNORMAL PG/ML (ref 0–1800)
RBC # BLD: 3.48 MILL/MM3 (ref 4.2–5.4)
SEG NEUTROPHILS: 75.4 %
SEGMENTED NEUTROPHILS ABSOLUTE COUNT: 6 THOU/MM3 (ref 1.8–7.7)
TROPONIN T: < 0.01 NG/ML
WBC # BLD: 8 THOU/MM3 (ref 4.8–10.8)

## 2018-06-19 PROCEDURE — 92523 SPEECH SOUND LANG COMPREHEN: CPT

## 2018-06-19 PROCEDURE — 83880 ASSAY OF NATRIURETIC PEPTIDE: CPT

## 2018-06-19 PROCEDURE — 97163 PT EVAL HIGH COMPLEX 45 MIN: CPT

## 2018-06-19 PROCEDURE — 6370000000 HC RX 637 (ALT 250 FOR IP): Performed by: INTERNAL MEDICINE

## 2018-06-19 PROCEDURE — 85025 COMPLETE CBC W/AUTO DIFF WBC: CPT

## 2018-06-19 PROCEDURE — 71045 X-RAY EXAM CHEST 1 VIEW: CPT

## 2018-06-19 PROCEDURE — 82948 REAGENT STRIP/BLOOD GLUCOSE: CPT

## 2018-06-19 PROCEDURE — 84238 ASSAY NONENDOCRINE RECEPTOR: CPT

## 2018-06-19 PROCEDURE — 6360000002 HC RX W HCPCS: Performed by: FAMILY MEDICINE

## 2018-06-19 PROCEDURE — 2500000003 HC RX 250 WO HCPCS: Performed by: INTERNAL MEDICINE

## 2018-06-19 PROCEDURE — 93005 ELECTROCARDIOGRAM TRACING: CPT | Performed by: FAMILY MEDICINE

## 2018-06-19 PROCEDURE — 92610 EVALUATE SWALLOWING FUNCTION: CPT

## 2018-06-19 PROCEDURE — 94640 AIRWAY INHALATION TREATMENT: CPT

## 2018-06-19 PROCEDURE — 84484 ASSAY OF TROPONIN QUANT: CPT

## 2018-06-19 PROCEDURE — 1290000000 HC SEMI PRIVATE OTHER R&B

## 2018-06-19 PROCEDURE — 97535 SELF CARE MNGMENT TRAINING: CPT

## 2018-06-19 PROCEDURE — 97530 THERAPEUTIC ACTIVITIES: CPT

## 2018-06-19 PROCEDURE — 93010 ELECTROCARDIOGRAM REPORT: CPT | Performed by: INTERNAL MEDICINE

## 2018-06-19 PROCEDURE — 36415 COLL VENOUS BLD VENIPUNCTURE: CPT

## 2018-06-19 PROCEDURE — 6360000002 HC RX W HCPCS: Performed by: INTERNAL MEDICINE

## 2018-06-19 PROCEDURE — 97166 OT EVAL MOD COMPLEX 45 MIN: CPT

## 2018-06-19 RX ORDER — BUMETANIDE 0.25 MG/ML
1 INJECTION, SOLUTION INTRAMUSCULAR; INTRAVENOUS ONCE
Status: DISCONTINUED | OUTPATIENT
Start: 2018-06-19 | End: 2018-06-19 | Stop reason: CLARIF

## 2018-06-19 RX ORDER — FUROSEMIDE 10 MG/ML
40 INJECTION INTRAMUSCULAR; INTRAVENOUS ONCE
Status: COMPLETED | OUTPATIENT
Start: 2018-06-19 | End: 2018-06-19

## 2018-06-19 RX ORDER — SENNA PLUS 8.6 MG/1
1 TABLET ORAL NIGHTLY PRN
Status: DISCONTINUED | OUTPATIENT
Start: 2018-06-19 | End: 2018-07-14 | Stop reason: HOSPADM

## 2018-06-19 RX ADMIN — LEVOTHYROXINE SODIUM 88 MCG: 88 TABLET ORAL at 06:06

## 2018-06-19 RX ADMIN — ASPIRIN 81 MG: 81 TABLET ORAL at 09:54

## 2018-06-19 RX ADMIN — SPIRONOLACTONE 12.5 MG: 25 TABLET ORAL at 09:53

## 2018-06-19 RX ADMIN — METOPROLOL TARTRATE 12.5 MG: 25 TABLET ORAL at 21:06

## 2018-06-19 RX ADMIN — PANTOPRAZOLE SODIUM 40 MG: 40 TABLET, DELAYED RELEASE ORAL at 17:46

## 2018-06-19 RX ADMIN — ACETAMINOPHEN 650 MG: 325 TABLET ORAL at 17:46

## 2018-06-19 RX ADMIN — ACETAMINOPHEN 650 MG: 325 TABLET ORAL at 09:53

## 2018-06-19 RX ADMIN — METOPROLOL TARTRATE 12.5 MG: 25 TABLET ORAL at 09:54

## 2018-06-19 RX ADMIN — ATORVASTATIN CALCIUM 20 MG: 20 TABLET, FILM COATED ORAL at 21:05

## 2018-06-19 RX ADMIN — FUROSEMIDE 40 MG: 10 INJECTION, SOLUTION INTRAMUSCULAR; INTRAVENOUS at 18:50

## 2018-06-19 RX ADMIN — GUAIFENESIN 600 MG: 600 TABLET, EXTENDED RELEASE ORAL at 09:54

## 2018-06-19 RX ADMIN — DOCUSATE SODIUM 100 MG: 100 CAPSULE, LIQUID FILLED ORAL at 09:53

## 2018-06-19 RX ADMIN — BUMETANIDE 0.5 MG: 1 TABLET ORAL at 09:54

## 2018-06-19 RX ADMIN — SUCRALFATE 1 G: 1 TABLET ORAL at 13:41

## 2018-06-19 RX ADMIN — ALBUTEROL SULFATE 2.5 MG: 2.5 SOLUTION RESPIRATORY (INHALATION) at 18:38

## 2018-06-19 RX ADMIN — ISOSORBIDE MONONITRATE 30 MG: 30 TABLET ORAL at 09:54

## 2018-06-19 RX ADMIN — DOCUSATE SODIUM 100 MG: 100 CAPSULE, LIQUID FILLED ORAL at 21:06

## 2018-06-19 RX ADMIN — SUCRALFATE 1 G: 1 TABLET ORAL at 21:05

## 2018-06-19 RX ADMIN — POLYETHYLENE GLYCOL (3350) 17 G: 17 POWDER, FOR SOLUTION ORAL at 09:54

## 2018-06-19 RX ADMIN — MAGNESIUM GLUCONATE 500 MG ORAL TABLET 400 MG: 500 TABLET ORAL at 09:54

## 2018-06-19 RX ADMIN — LATANOPROST 1 DROP: 50 SOLUTION OPHTHALMIC at 23:06

## 2018-06-19 RX ADMIN — SUCRALFATE 1 G: 1 TABLET ORAL at 17:46

## 2018-06-19 RX ADMIN — Medication 5 UNITS: at 15:54

## 2018-06-19 RX ADMIN — ACETAMINOPHEN 650 MG: 325 TABLET ORAL at 21:06

## 2018-06-19 RX ADMIN — ACETAMINOPHEN 650 MG: 325 TABLET ORAL at 04:03

## 2018-06-19 RX ADMIN — CALCIUM CARBONATE-VITAMIN D TAB 500 MG-200 UNIT 1 TABLET: 500-200 TAB at 09:54

## 2018-06-19 RX ADMIN — CITALOPRAM 40 MG: 40 TABLET, FILM COATED ORAL at 09:54

## 2018-06-19 RX ADMIN — GUAIFENESIN 600 MG: 600 TABLET, EXTENDED RELEASE ORAL at 21:06

## 2018-06-19 RX ADMIN — SUCRALFATE 1 G: 1 TABLET ORAL at 06:06

## 2018-06-19 RX ADMIN — PANTOPRAZOLE SODIUM 40 MG: 40 TABLET, DELAYED RELEASE ORAL at 06:06

## 2018-06-19 ASSESSMENT — PAIN SCALES - GENERAL
PAINLEVEL_OUTOF10: 0
PAINLEVEL_OUTOF10: 3
PAINLEVEL_OUTOF10: 2
PAINLEVEL_OUTOF10: 2
PAINLEVEL_OUTOF10: 0

## 2018-06-19 ASSESSMENT — PAIN SCALES - WONG BAKER: WONGBAKER_NUMERICALRESPONSE: 2

## 2018-06-19 NOTE — FLOWSHEET NOTE
06/19/18 1911   Provider Notification   Reason for Communication New orders   Provider Name Dr. Damian Griffin   Provider Notification Physician   Method of Communication Call   Response See orders   Notification Time 1845   New onset of dyspnea at rest, crackles noted in bilateral lung bases. STAT orders received. Respiratory therapist at bedside.

## 2018-06-19 NOTE — PROGRESS NOTES
handrails)  Sit to Supine: Unable to assess (not formally assessed; anticipate CGA to Gay to raise BLE onto bed)  Scooting: Contact guard assistance (to scoot EOB and back in bedside chair)    Transfers  Sit to Stand: Contact guard assistance, Minimal Assistance (CGA from EOB and bedside chair; minAx1 from standard toilet seat; verbal cues for hand placement)  Stand to sit: Contact guard assistance (verbal cues for hand placement for safety)     Ambulation 1  Surface: level tile  Device: Rolling Walker  Assistance: Contact guard assistance  Quality of Gait: decreased lew and velocity, shortened B step length, fair- B heel strike, fair B foot clearance, mild path deviations (typically to the L), kyphotic posture  Distance: 15'x2  Comments: pt c/o dizziness and lightheadedness with ambulation and demonstrates SOB; verbal cues for deep breathing and to take rest breaks until dizziness/lightheadedness subside; O2 sats monitored following ambulation, ranging from 83-90 with deep breathing; pt able to increase O2 sats to 90 within 30 seconds with deep breathing     ELDERLY MOBILITY SCALE   LYING TO SITTING 1 - Needs help of 1 person   SITTING TO LYING 1 - Needs help of 1 person   SIT TO STAND 1 - Needs help of 1 person (verbal or physical)   STANDING 1 - Stands but requires support   GAIT 0 - Needs physical help to walk or constant supervision   TIMED WALK (6 meters) 2 - 16 - 30 seconds   FUNCTIONAL REACH 2 - 10 - 20 cm       TOTAL SCORE 8/20     Scores under 10 - generally these patients are dependent for mobility and require help with basic ADL's.   Scores between 10 and 13 - generally these patients are borderline in terms of safe mobility and ADL independence  Scores over 14 - generally these patients are able to perform mobility independently and safely and are independent in basic ADL's.    *Results are general interpretations that do not take into account cognition, safety awareness and other factors that may

## 2018-06-19 NOTE — H&P
(LOPRESSOR) tablet 12.5 mg, 12.5 mg, Oral, BID  pantoprazole (PROTONIX) tablet 40 mg, 40 mg, Oral, BID AC  polyethylene glycol (GLYCOLAX) packet 17 g, 17 g, Oral, Daily  spironolactone (ALDACTONE) tablet 12.5 mg, 12.5 mg, Oral, Daily  sucralfate (CARAFATE) tablet 1 g, 1 g, Oral, 4x Daily AC & HS     Resident is taking an antipsychotic medication? Yes,celexa     If yes, was Gradual Dose Reduction (GDR) attempted? No     No- GDR is clinically contraindicated due to the fact that tapering the medication  would not achieve the desired therapeutic effects and the current dose is  necessary to maintain or improve the resident's function, well-being, safety, and  quality of life. Social History:  Social History     Social History    Marital status:       Spouse name: N/A    Number of children: 3    Years of education: N/A     Occupational History          Social History Main Topics    Smoking status: Never Smoker    Smokeless tobacco: Never Used    Alcohol use No    Drug use: No    Sexual activity: No     Other Topics Concern    Not on file     Social History Narrative    No narrative on file           Family History:       Problem Relation Age of Onset    Heart Disease Mother     Heart Disease Father     Cancer Brother      lymphoma    Heart Disease Brother     Heart Disease Daughter     Heart Disease Son     Heart Disease Brother        Review of Systems:  CONSTITUTIONAL:  positive for  fatigue and malaise  EYES:  positive for  glasses  HEENT:  negative for  nasal congestion  RESPIRATORY:  positive for  dyspnea  CARDIOVASCULAR:  negative for  chest pain  GASTROINTESTINAL:  negative for abdominal pain  GENITOURINARY:  negative for dysuria  SKIN:  negative  HEMATOLOGIC/LYMPHATIC:  positive for easy bruising  MUSCULOSKELETAL:  positive for  myalgias, arthralgias, stiff joints, decreased range of motion, muscle weakness and bone pain  NEUROLOGICAL:  positive for gait problems and

## 2018-06-19 NOTE — PROGRESS NOTES
[address]  INPATIENT OCCUPATIONAL THERAPY  [facility] TCU 8E  EVALUATION    Time:  Time In: 1101  Time Out: 1143  Timed Code Treatment Minutes: 25 Minutes  Minutes: 42          Date: 2018  Patient Name: Claudetta Pole,   Gender: female      MRN: [id number]  : 1930  (80 y.o.)  Referring Practitioner: Leah Chan MD  Diagnosis: s/p fall   Additional Pertinent Hx: Pt admitted 6-7 after a fall OOB at [facility]. Pt fell onto right side ,presetning with soreness throughout that side.  CT of head and neck with no acute process, xrays of right tibia/fibula and hip/pelvis  negative    Restrictions/Precautions:  General Precautions, Fall Risk                    Other position/activity restrictions: confused       Past Medical History:   Diagnosis Date    ARNOLD (acute kidney injury) ([address]     Atrial fibrillation ([address]     Blood transfusion reaction     CAD (coronary artery disease)     Severe triple vessel disease    Cerebrovascular disease 2018    CKD (chronic kidney disease)     Diastolic congestive heart failure (HCC)     DJD (degenerative joint disease), cervical 2018    GERD (gastroesophageal reflux disease)     Hiatal hernia     History of blood transfusion     Hyperlipidemia     Hypertension     Hypoalbuminemia     Hypothyroidism     Mild protein-calorie malnutrition (HCC)     NSTEMI (non-ST elevated myocardial infarction) ([address] 2014    Pancreatitis due to biliary obstruction     Pneumonia, organism unspecified(486)     Scoliosis     SDH (subdural hematoma) ([address]     Teresa hole decompresson on 2014    Thyroid disease     Unspecified cerebral artery occlusion with cerebral infarction     Uterine cancer ([address] 1966     Past Surgical History:   Procedure Laterality Date    APPENDECTOMY      BACK SURGERY      TERESA HOLE  2014    drainage of left SDH    CARDIAC SURGERY      COLONOSCOPY      CORONARY ARTERY BYPASS GRAFT  11    LIMA to LAD, SVG to 1st Marginal, Distal Right Coronary Artery, 1st Diagonal    DILATATION, ESOPHAGUS      ENDOSCOPY, COLON, DIAGNOSTIC      EYE SURGERY      bilateral cataracts    HYSTERECTOMY      MT EGD BALLOON DILATION ESOPHAGUS <30 MM DIAM N/A 11/22/2017    EGD ESOPHAGOGASTRODUODENOSCOPY DILATATION performed by Barbie Butterfield MD at [address] End[facility]    MT ESOPHAGOGASTRODUODENOSCOPY TRANSORAL DIAGNOSTIC  11/22/2017    EGD ESOPHAGOGASTRODUODENOSCOPY performed by Barbie Butterfield MD at [facility] Left     SKIN BIOPSY      VASCULAR SURGERY      cabg harvests from left leg           Subjective  Chart Reviewed: Yes (completed medical chart reivew)  Patient assessed for rehabilitation services?: Yes  Family / Caregiver Present: No    Subjective: Pt sleeping in recliner upon arrival. Easily awoken. Pt lethargic throughout, states she is \"so tired\". Requiring cues to attend to task/conversation at times, then reporting \"I almost fell asleep\". General:  Overall Orientation Status: Impaired  Orientation Level: Disoriented to situation, Disoriented to place    Vision: Impaired  Vision Exceptions: Wears glasses for reading    Hearing: Within functional limits  Hearing Exceptions: Hard of hearing/hearing concerns         Pain:  Pain Assessment  Patient Currently in Pain: Yes  Walters-Robison Pain Rating: Hurts a little bit       Social/Functional History:  Lives With: Alone  Type of Home: Assisted living  Home Layout: One level  Home Access: Level entry  Home Equipment: Rolling walker     Bathroom Shower/Tub: Walk-in shower  Bathroom Toilet: Handicap height  Bathroom Accessibility: Accessible    Receives Help From: Family, Personal care attendant  ADL Assistance: Independent  Homemaking Assistance: Needs assistance       Ambulation Assistance: Independent  Transfer Assistance: Independent          Additional Comments: Pt reports living at [place] [address] [facility] for \"a couple months. \" Pt using  PTA for mobility.  Getting SOB noted throughout session, before/after activity. Sp02: measured at rest was 93%, after walking from bathroom to chair was 89%. Pt reporting fatigue several times, requiring motivation to participate. Treatment Initiated:  OT tiffanieal completed     Assessment:  Assessment: Pt s/p fall on right side/ no fx, presents with decreased balance, endurance, strength, coordination, and cognitiona which limits her ability to safely and independently complete ADLs and functional mobility. Pt would benefit from skilled OT services to improve strength, endurance, balance, and coordination and icrease pt's safety and independence in ADL routine. Performance deficits / Impairments: Decreased safe awareness, Decreased balance, Decreased ADL status, Decreased endurance, Decreased strength, Decreased functional mobility , Decreased cognition, Decreased coordination  Prognosis: Fair  Discharge Recommendations: Patient would benefit from continued therapy after discharge, Subacute/Skilled Nursing Facility    Clinical Decision Making: Clinical Decision making was of Moderate Complexity as the result of analysis of data from a detailed assessment, a consideration of several treatment options, the presence of comorbidities affecting the plan of care and the need for minimal to moderate modifications or assistance required to complete the evaluation. Patient Education:  Patient Education: safety with transfers, walker safety     Equipment Recommendations: Other: Continue to monitor need for equipment. Pt owns RW. Safety:  Safety Devices in place: Yes  Type of devices:  All fall risk precautions in place, Call light within reach, Chair alarm in place, Patient at risk for falls, Gait belt, Left in chair    Plan:  Times per week: 6x  Current Treatment Recommendations: Strengthening, Endurance Training, Patient/Caregiver Education & Training, Self-Care / ADL, Functional Mobility Training, Balance Training, Safety Education & Training    Goals:  Patient goals : feel less tired     Short term goals  Time Frame for Short term goals: 1 week   Short term goal 1: Pt will complete functional transfers to/from chair/toilet with CGA and 0-2 cues for safety to increase safety with toileting routine. Short term goal 2: Pt will complete dynamic standing > 3 min with SBA and 1-2 hand release to increase ability to reach into closet or cupbaords for ADL objects   Short term goal 3: Pt will complete functional mobilty > HH distances with CGA and RW and 0-2 cues for safety to increase endurance needed for ADL routine. Short term goal 4: Pt will complete light UE AROM HEP with 0-2 cues for technique to increase strength needed for bathing. Short term goal 5: Pt will complete LE ADLs with Min A and LHAE prn. Long term goals  Time Frame for Long term goals : 2 weeks  Long term goal 1: Pt will complete BADL routine with Mod A and AE prn and 0-2 cues for sequencing/attending to task. Evaluation Complexity: Based on the findings of patient history, examination, clinical presentation, and decision making during this evaluation, this patient is of medium complexity.

## 2018-06-20 LAB
ANION GAP SERPL CALCULATED.3IONS-SCNC: 11 MEQ/L (ref 8–16)
BILIRUBIN URINE: NEGATIVE
BLOOD, URINE: NEGATIVE
BUN BLDV-MCNC: 23 MG/DL (ref 7–22)
CALCIUM SERPL-MCNC: 9.4 MG/DL (ref 8.5–10.5)
CHARACTER, URINE: CLEAR
CHLORIDE BLD-SCNC: 93 MEQ/L (ref 98–111)
CO2: 30 MEQ/L (ref 23–33)
COLOR: YELLOW
CREAT SERPL-MCNC: 0.9 MG/DL (ref 0.4–1.2)
GFR SERPL CREATININE-BSD FRML MDRD: 59 ML/MIN/1.73M2
GLUCOSE BLD-MCNC: 115 MG/DL (ref 70–108)
GLUCOSE URINE: NEGATIVE MG/DL
KETONES, URINE: NEGATIVE
LEUKOCYTE ESTERASE, URINE: NEGATIVE
NITRITE, URINE: NEGATIVE
PH UA: 7.5
POTASSIUM SERPL-SCNC: 4 MEQ/L (ref 3.5–5.2)
PROTEIN UA: NEGATIVE
SODIUM BLD-SCNC: 134 MEQ/L (ref 135–145)
SOLUBLE TRANSFERRIN RECEPT: 4.2 MG/L (ref 1.9–4.4)
SPECIFIC GRAVITY, URINE: 1.01 (ref 1–1.03)
TROPONIN T: < 0.01 NG/ML
UROBILINOGEN, URINE: 0.2 EU/DL

## 2018-06-20 PROCEDURE — 97530 THERAPEUTIC ACTIVITIES: CPT

## 2018-06-20 PROCEDURE — 80048 BASIC METABOLIC PNL TOTAL CA: CPT

## 2018-06-20 PROCEDURE — 97110 THERAPEUTIC EXERCISES: CPT

## 2018-06-20 PROCEDURE — 36415 COLL VENOUS BLD VENIPUNCTURE: CPT

## 2018-06-20 PROCEDURE — 6370000000 HC RX 637 (ALT 250 FOR IP): Performed by: FAMILY MEDICINE

## 2018-06-20 PROCEDURE — 97116 GAIT TRAINING THERAPY: CPT

## 2018-06-20 PROCEDURE — 84484 ASSAY OF TROPONIN QUANT: CPT

## 2018-06-20 PROCEDURE — 6370000000 HC RX 637 (ALT 250 FOR IP): Performed by: INTERNAL MEDICINE

## 2018-06-20 PROCEDURE — 1290000000 HC SEMI PRIVATE OTHER R&B

## 2018-06-20 PROCEDURE — 97535 SELF CARE MNGMENT TRAINING: CPT

## 2018-06-20 PROCEDURE — 81003 URINALYSIS AUTO W/O SCOPE: CPT

## 2018-06-20 RX ADMIN — SUCRALFATE 1 G: 1 TABLET ORAL at 22:33

## 2018-06-20 RX ADMIN — ISOSORBIDE MONONITRATE 30 MG: 30 TABLET ORAL at 09:30

## 2018-06-20 RX ADMIN — ASPIRIN 81 MG: 81 TABLET ORAL at 09:30

## 2018-06-20 RX ADMIN — ACETAMINOPHEN 650 MG: 325 TABLET ORAL at 22:39

## 2018-06-20 RX ADMIN — ACETAMINOPHEN 650 MG: 325 TABLET ORAL at 05:32

## 2018-06-20 RX ADMIN — LATANOPROST 1 DROP: 50 SOLUTION OPHTHALMIC at 22:34

## 2018-06-20 RX ADMIN — ATORVASTATIN CALCIUM 20 MG: 20 TABLET, FILM COATED ORAL at 22:34

## 2018-06-20 RX ADMIN — SUCRALFATE 1 G: 1 TABLET ORAL at 17:32

## 2018-06-20 RX ADMIN — SUCRALFATE 1 G: 1 TABLET ORAL at 05:32

## 2018-06-20 RX ADMIN — SENNA 8.6 MG: 8.6 TABLET, COATED ORAL at 22:34

## 2018-06-20 RX ADMIN — ACETAMINOPHEN 650 MG: 325 TABLET ORAL at 09:30

## 2018-06-20 RX ADMIN — GUAIFENESIN 600 MG: 600 TABLET, EXTENDED RELEASE ORAL at 22:33

## 2018-06-20 RX ADMIN — PANTOPRAZOLE SODIUM 40 MG: 40 TABLET, DELAYED RELEASE ORAL at 05:32

## 2018-06-20 RX ADMIN — BUMETANIDE 0.5 MG: 1 TABLET ORAL at 09:30

## 2018-06-20 RX ADMIN — METOPROLOL TARTRATE 12.5 MG: 25 TABLET ORAL at 22:34

## 2018-06-20 RX ADMIN — SPIRONOLACTONE 12.5 MG: 25 TABLET ORAL at 09:30

## 2018-06-20 RX ADMIN — METOPROLOL TARTRATE 12.5 MG: 25 TABLET ORAL at 09:30

## 2018-06-20 RX ADMIN — ACETAMINOPHEN 650 MG: 325 TABLET ORAL at 17:31

## 2018-06-20 RX ADMIN — PANTOPRAZOLE SODIUM 40 MG: 40 TABLET, DELAYED RELEASE ORAL at 17:31

## 2018-06-20 ASSESSMENT — PAIN SCALES - GENERAL
PAINLEVEL_OUTOF10: 0
PAINLEVEL_OUTOF10: 1
PAINLEVEL_OUTOF10: 2

## 2018-06-20 NOTE — PROGRESS NOTES
[address] [address]  INPATIENT PHYSICAL THERAPY  DAILY NOTE  [facility] TCU 8E - 8E-61/061-A    Time In: 1305  Time Out: 1528  Timed Code Treatment Minutes: 68 Minutes  Minutes: 68          Date: 2018  Patient Name: Aurelia Bueno,  Gender:  female        MRN: [id number]  : 1930  (80 y.o.)  Referral Date : 18  Referring Practitioner: Ordering: Bonita Son MD  Attending: JOSE ROBERTO Alcantar MD  Diagnosis: s/p fall  Additional Pertinent Hx: Pt admitted 6-7 after a fall OOB at [facility]. Pt fell onto right side ,presetning with soreness throughout that side. CT of head and neck with no acute process, xrays of right tibia/fibula and hip/pelvis  negative. To TCU on .      Past Medical History:   Diagnosis Date    ARNOLD (acute kidney injury) ([address]     Atrial fibrillation ([address]     Blood transfusion reaction     CAD (coronary artery disease)     Severe triple vessel disease    Cerebrovascular disease 2018    CKD (chronic kidney disease)     Diastolic congestive heart failure (HCC)     DJD (degenerative joint disease), cervical 2018    GERD (gastroesophageal reflux disease)     Hiatal hernia     History of blood transfusion     Hyperlipidemia     Hypertension     Hypoalbuminemia     Hypothyroidism     Mild protein-calorie malnutrition (HCC)     NSTEMI (non-ST elevated myocardial infarction) ([address] 2014    Pancreatitis due to biliary obstruction     Pneumonia, organism unspecified(486)     Scoliosis     SDH (subdural hematoma) ([address]     Teresa hole decompresson on 2014    Thyroid disease     Unspecified cerebral artery occlusion with cerebral infarction     Uterine cancer ([address] 1966     Past Surgical History:   Procedure Laterality Date    APPENDECTOMY      BACK SURGERY      TERESA HOLE  2014    drainage of left SDH    CARDIAC SURGERY      COLONOSCOPY      CORONARY ARTERY BYPASS GRAFT  11    LIMA to LAD, SVG to 1st Marginal, Distal Right Coronary Artery, 1st Diagonal    DILATATION, ESOPHAGUS      ENDOSCOPY, COLON, DIAGNOSTIC      EYE SURGERY      bilateral cataracts    HYSTERECTOMY      [place] EGD BALLOON DILATION ESOPHAGUS <30 MM DIAM N/A 11/22/2017    EGD ESOPHAGOGASTRODUODENOSCOPY DILATATION performed by Azam Arredondo MD at [facility]    AZ ESOPHAGOGASTRODUODENOSCOPY TRANSORAL DIAGNOSTIC  11/22/2017    EGD ESOPHAGOGASTRODUODENOSCOPY performed by Azam Arredondo MD at [address] [place] [address] Left     SKIN BIOPSY      VASCULAR SURGERY      cabg harvests from left leg       Restrictions/Precautions:  General Precautions, Fall Risk      Other position/activity restrictions: confused       Prior Level of Function:  ADL Assistance: Independent  Homemaking Assistance: Needs assistance  Ambulation Assistance: Independent  Transfer Assistance: Independent  Additional Comments: Pt reports living at [place] [address] [facility] for \"a couple months. \" Pt using RW PTA for mobility. Getting assistance for meals. Indep with ADLs. Subjective:  Response To Previous Treatment: Patient with no complaints from previous session. Family / Caregiver Present: No  Comments: pt. very quiet throughout session  Subjective: Pt resting in bed upon arrival, pleasant, and somewhat agreeable to PT session. Pt very confused    Pain: no pain reported   .    Social/Functional:  Lives With: Alone  Type of Home: Assisted living  Home Layout: One level  Home Access: Level entry  Home Equipment: Rolling walker     Objective:  Rolling to Left: Contact guard assistance (with vc's and min a of rail)  Supine to Sit: Contact guard assistance (hob up ~20 degrees with min a of rail into bed and min a with trunk and b le's on mat)  Sit to Supine: Minimal assistance ( with b le's mat and bed)  Scooting: Contact guard assistance (vc to scoot to edge of sitting surfaces)    Transfers  Sit to Stand: Contact guard assistance;Minimal Assistance (fluctuated from cga-> min a vc for hand placement eob, w/c per week: 6x  Times per day: Daily  Specific instructions for Next Treatment: gait, balance, endurance, therex  Current Treatment Recommendations: Strengthening, Balance Training, Functional Mobility Training, Transfer Training, Endurance Training, Gait Training, Home Exercise Program, Safety Education & Training, Patient/Caregiver Education & Training    Goals:  Patient goals : get stronger    Short term goals  Time Frame for Short term goals: 10 days  Short term goal 1: Pt to perform all bed mobility at [facility] for getting in and out of bed. Short term goal 2: Pt to transfer from various surfaces at [facility] for getting to/from chairs and bed. Short term goal 3: Pt to ambulate 48' with RW at [facility] for increased mobility at [address] [place] [address]. Short term goal 4: Pt to improve Elderly Mobility Index to 11/20 for improved functional mobility    Long term goals  Time Frame for Long term goals : 3 weeks  Long term goal 1: Pt to perform all bed mobility at S for getting in and out of bed. Long term goal 2: Pt to transfer from various surfaces at S for getting to/from chairs and bed. Long term goal 3: Pt to ambulate 100' with RW at [facility] for increased mobility at [address] [place] [address]. Long term goal 4: Pt to improve Elderly Mobility Index to 14 for improved functional mobility. Long term goal 5: Pt to perform car transfer at [facility] for safe transfer to [address] [place] [address].

## 2018-06-20 NOTE — PROGRESS NOTES
[address]  INPATIENT OCCUPATIONAL THERAPY  [facility] TCU 8E  DAILY NOTE    Time:  Time In: 0845  Time Out: 9254  Timed Code Treatment Minutes: 43 Minutes  Minutes: 42          Date: 2018  Patient Name: Darcy Oneal,   Gender: female      Room: [facility]61/061-A  MRN: [id number]  : 1930  (80 y.o.)  Referring Practitioner: Rachelle Reyes MD  Diagnosis: s/p fall   Additional Pertinent Hx: Pt admitted 6-7 after a fall OOB at [facility]. Pt fell onto right side ,presetning with soreness throughout that side.  CT of head and neck with no acute process, xrays of right tibia/fibula and hip/pelvis  negative    Past Medical History:   Diagnosis Date    ARNOLD (acute kidney injury) ([address]     Atrial fibrillation ([address]     Blood transfusion reaction     CAD (coronary artery disease)     Severe triple vessel disease    Cerebrovascular disease 2018    CKD (chronic kidney disease)     Diastolic congestive heart failure (HCC)     DJD (degenerative joint disease), cervical 2018    GERD (gastroesophageal reflux disease)     Hiatal hernia     History of blood transfusion     Hyperlipidemia     Hypertension     Hypoalbuminemia     Hypothyroidism     Mild protein-calorie malnutrition (HCC)     NSTEMI (non-ST elevated myocardial infarction) ([address] 2014    Pancreatitis due to biliary obstruction     Pneumonia, organism unspecified(486)     Scoliosis     SDH (subdural hematoma) ([address]     Teresa hole decompresson on 2014    Thyroid disease     Unspecified cerebral artery occlusion with cerebral infarction     Uterine cancer ([address] 1966     Past Surgical History:   Procedure Laterality Date    APPENDECTOMY      BACK SURGERY      TERESA HOLE  2014    drainage of left SDH    CARDIAC SURGERY      COLONOSCOPY      CORONARY ARTERY BYPASS GRAFT  11    LIMA to LAD, SVG to 1st Marginal, Distal Right Coronary Artery, 1st Diagonal    DILATATION, ESOPHAGUS      ENDOSCOPY, COLON, DIAGNOSTIC  EYE SURGERY      bilateral cataracts    HYSTERECTOMY      [place] EGD BALLOON DILATION ESOPHAGUS <30 MM DIAM N/A 11/22/2017    EGD ESOPHAGOGASTRODUODENOSCOPY DILATATION performed by Stefania Sam MD at [facility]    MA ESOPHAGOGASTRODUODENOSCOPY TRANSORAL DIAGNOSTIC  11/22/2017    EGD ESOPHAGOGASTRODUODENOSCOPY performed by Stefania Sam MD at [address] [place] [address] Left     SKIN BIOPSY      VASCULAR SURGERY      cabg harvests from left leg       Restrictions/Precautions:  General Precautions, Fall Risk                    Other position/activity restrictions: confused       Prior Level of Function:  ADL Assistance: Independent  Homemaking Assistance: Needs assistance  Ambulation Assistance: Independent  Transfer Assistance: Independent  Additional Comments: Pt reports living at [place] [address] [facility] for \"a couple months. \" Pt using RW PTA for mobility. Getting assistance for meals. Indep with ADLs. Subjective       Subjective: Patient lying in bed upon arrival- pt very difficulty to arouse this AM. Pt very lethargic throught session having difficulty keeping eyes opened - stating \"I'm so tired. \"     Overall Orientation Status: Impaired         Pain:          Objective  Overall Cognitive Status: Exceptions  Arousal/Alertness: Inconsistent responses to stimuli  Attention Span: Difficulty attending to directions  Memory: Decreased recall of recent events  Safety Judgement: Decreased awareness of need for safety  Insights: Decreased awareness of deficits  Initiation: Requires cues for all  Sequencing: Requires cues for all      ADL  Grooming: Stand by assistance; Increased time to complete (seated EOB to comb hair, wash face, and apply chapstick)  LE Dressing: Maximum assistance (to don B socks seated EOB)          Bed mobility  Rolling to Left: Contact guard assistance  Rolling to Right: Contact guard assistance  Supine to Sit: Contact guard assistance  Sit to Supine: Minimal assistance (BLEs into bed)  Scooting: Contact guard assistance    Transfers  Sit to stand: Contact guard assistance (from EOB)  Stand to sit: Contact guard assistance (to EOB)  Transfer Comments: Minimal cueing provided for proper hand placement during transfers       Balance  Sitting Balance: Stand by assistance  Standing Balance: Minimal assistance (Gay-CGA)     Time: ~1 minute 15 seconds x1 trial; ~45 seconds x1 trial  Activity: Static standing using BUE support on walker - requiring Gay-CGA for balance with no LOB noted. Pt slightly \"shaky\" at times during standing. Required lengthy seated rest break after each trial of standing. Completed to increase activity tolerance required for ADLs       Type of ROM/Therapeutic Exercise: AROM  Comment: Pt completed BUE AROM exercises x8-10 reps x1 set in all joints/planes seated upright in bed. Pt completed at very slow pace and very shaky with movement. Required lengthy rest breaks throughout exercises. Pt required cueing for redirection to task as pt had difficulty keeping eyes open. Completed to help increase activity tolerance required for ADLs          Activity Tolerance:  Activity Tolerance: Patient limited by fatigue    Assessment:     Performance deficits / Impairments: Decreased safe awareness, Decreased balance, Decreased ADL status, Decreased endurance, Decreased strength, Decreased functional mobility , Decreased cognition, Decreased coordination  Prognosis: Fair  Discharge Recommendations: Patient would benefit from continued therapy after discharge, [address]    Patient Education:  Patient Education: safety with transfers, AROM    Equipment Recommendations: Other: Continue to monitor need for equipment. Pt owns RW.      Safety:  Safety Devices in place: Yes  Type of devices: Call light within reach, Left in bed, Gait belt, Bed alarm in place    Plan:  Times per week: 6x  Current Treatment Recommendations: Strengthening, Endurance Training, Patient/Caregiver Education & Training, Self-Care / ADL, Functional Mobility Training, Balance Training, Safety Education & Training    Goals:  Patient goals : feel less tired     Short term goals  Time Frame for Short term goals: 1 week   Short term goal 1: Pt will complete functional transfers to/from chair/toilet with CGA and 0-2 cues for safety to increase safety with toileting routine. Short term goal 2: Pt will complete dynamic standing > 3 min with SBA and 1-2 hand release to increase ability to reach into closet or cupbaords for ADL objects   Short term goal 3: Pt will complete functional mobilty > HH distances with CGA and RW and 0-2 cues for safety to increase endurance needed for ADL routine. Short term goal 4: Pt will complete light UE AROM HEP with 0-2 cues for technique to increase strength needed for bathing. Short term goal 5: Pt will complete LE ADLs with Min A and LHAE prn. Long term goals  Time Frame for Long term goals : 2 weeks  Long term goal 1: Pt will complete BADL routine with Mod A and AE prn and 0-2 cues for sequencing/attending to task.

## 2018-06-21 PROCEDURE — 97116 GAIT TRAINING THERAPY: CPT

## 2018-06-21 PROCEDURE — 97110 THERAPEUTIC EXERCISES: CPT

## 2018-06-21 PROCEDURE — 97530 THERAPEUTIC ACTIVITIES: CPT

## 2018-06-21 PROCEDURE — 97127 HC SP THER IVNTJ W/FOCUS COG FUNCJ: CPT | Performed by: SPEECH-LANGUAGE PATHOLOGIST

## 2018-06-21 PROCEDURE — 6370000000 HC RX 637 (ALT 250 FOR IP): Performed by: INTERNAL MEDICINE

## 2018-06-21 PROCEDURE — 1290000000 HC SEMI PRIVATE OTHER R&B

## 2018-06-21 RX ADMIN — CITALOPRAM 40 MG: 40 TABLET, FILM COATED ORAL at 08:32

## 2018-06-21 RX ADMIN — LATANOPROST 1 DROP: 50 SOLUTION OPHTHALMIC at 21:50

## 2018-06-21 RX ADMIN — DOCUSATE SODIUM 100 MG: 100 CAPSULE, LIQUID FILLED ORAL at 08:32

## 2018-06-21 RX ADMIN — SUCRALFATE 1 G: 1 TABLET ORAL at 21:49

## 2018-06-21 RX ADMIN — BUMETANIDE 0.5 MG: 1 TABLET ORAL at 08:32

## 2018-06-21 RX ADMIN — GUAIFENESIN 600 MG: 600 TABLET, EXTENDED RELEASE ORAL at 21:48

## 2018-06-21 RX ADMIN — ACETAMINOPHEN 650 MG: 325 TABLET ORAL at 15:55

## 2018-06-21 RX ADMIN — ACETAMINOPHEN 650 MG: 325 TABLET ORAL at 04:08

## 2018-06-21 RX ADMIN — ACETAMINOPHEN 650 MG: 325 TABLET ORAL at 08:33

## 2018-06-21 RX ADMIN — SUCRALFATE 1 G: 1 TABLET ORAL at 05:45

## 2018-06-21 RX ADMIN — ATORVASTATIN CALCIUM 20 MG: 20 TABLET, FILM COATED ORAL at 21:48

## 2018-06-21 RX ADMIN — METOPROLOL TARTRATE 12.5 MG: 25 TABLET ORAL at 21:48

## 2018-06-21 RX ADMIN — SUCRALFATE 1 G: 1 TABLET ORAL at 11:04

## 2018-06-21 RX ADMIN — SUCRALFATE 1 G: 1 TABLET ORAL at 15:56

## 2018-06-21 RX ADMIN — ACETAMINOPHEN 650 MG: 325 TABLET ORAL at 21:49

## 2018-06-21 RX ADMIN — DOCUSATE SODIUM 100 MG: 100 CAPSULE, LIQUID FILLED ORAL at 21:48

## 2018-06-21 RX ADMIN — MAGNESIUM GLUCONATE 500 MG ORAL TABLET 400 MG: 500 TABLET ORAL at 08:32

## 2018-06-21 RX ADMIN — POLYETHYLENE GLYCOL (3350) 17 G: 17 POWDER, FOR SOLUTION ORAL at 08:34

## 2018-06-21 RX ADMIN — CALCIUM CARBONATE-VITAMIN D TAB 500 MG-200 UNIT 1 TABLET: 500-200 TAB at 08:32

## 2018-06-21 RX ADMIN — PANTOPRAZOLE SODIUM 40 MG: 40 TABLET, DELAYED RELEASE ORAL at 15:55

## 2018-06-21 RX ADMIN — GUAIFENESIN 600 MG: 600 TABLET, EXTENDED RELEASE ORAL at 08:32

## 2018-06-21 RX ADMIN — SPIRONOLACTONE 12.5 MG: 25 TABLET ORAL at 08:33

## 2018-06-21 RX ADMIN — PANTOPRAZOLE SODIUM 40 MG: 40 TABLET, DELAYED RELEASE ORAL at 05:46

## 2018-06-21 RX ADMIN — ASPIRIN 81 MG: 81 TABLET ORAL at 08:33

## 2018-06-21 RX ADMIN — ISOSORBIDE MONONITRATE 30 MG: 30 TABLET ORAL at 08:32

## 2018-06-21 RX ADMIN — LEVOTHYROXINE SODIUM 88 MCG: 88 TABLET ORAL at 05:46

## 2018-06-21 ASSESSMENT — PAIN SCALES - GENERAL
PAINLEVEL_OUTOF10: 0
PAINLEVEL_OUTOF10: 1
PAINLEVEL_OUTOF10: 1
PAINLEVEL_OUTOF10: 0

## 2018-06-21 ASSESSMENT — PAIN DESCRIPTION - PAIN TYPE: TYPE: ACUTE PAIN

## 2018-06-21 NOTE — PROGRESS NOTES
semantic cues, 1/3 with multiple choice cues  -Recall following a 13 minute delay- 1/3 independently, 1/3 with semantic cues, 1/3 with multiple choice cues    *Decreased recall of living at [facility] prior to hospitalization. SHORT TERM GOAL #4:  Goal 4: Pt will complete basic safety, problem solving, reasoning, and sequencing tasks with 60% accuracy given mod cues to improve awareness for integration into current and future settings. INTERVENTIONS:Problem solving:  -Where to look to determine the date or time of day- Total assist to utilize newspaper, calendar and clock in room. - Use of call light- Pt initially stated \"I would get up and go on my own if I could\" when asked what she would do if she needed to use the restroom. Reviewed safety concerns and need for patient to notify nursing to obtain assistance. Question carry over of recommendations, placing patient at increased risk for falls. Pt was able to locate call light and demonstrate the button she would push for a nurse when prompted. - Emergency call- Pt stated she would call daughter first. No recall of the number for '911.'  -Spilled water on the floor- Pt stated \"I would get a mop and mop it up. \" Again discussed limited mobility and balance issues and concern for falls. Poor safety awareness and insight.  -Too cold- Pt unable to problem solve through a solution. Offered several suggestions such as asking for a blanket or asking to have the heat turned up. SHORT TERM GOAL #5:  Goal 5: Pt will complete spoken langauge expression (speech naming) and comprehension (moderately complex yes/no questions, 2-3 step commands) tasks with 70% accuracy given mod cues to improve communicative efficacy.    INTERVENTIONS:[place] Divergent naming:  -Animals- 2 indep, 1 with min cues, 2 with additional time and cues/60seconds  *Slow processing  *Required cues for subcategories to improve lexical retrieval.          ASSESSMENT:  Assessment: [x]Progressing

## 2018-06-21 NOTE — PROGRESS NOTES
[facility]  INPATIENT PHYSICAL THERAPY  DAILY NOTE  [facility] TCU 8E - 8E-61/061-A    Time In: 8140  Time Out: 1532  Timed Code Treatment Minutes: 40 Minutes  Minutes: 44          Date: 2018  Patient Name: Damian Baeza,  Gender:  female        MRN: [id number]  : 1930  (80 y.o.)  Referral Date : 18  Referring Practitioner: Ordering: Alecia Herrera MD  Attending: JOSE ROBERTO Mckeon MD  Diagnosis: s/p fall  Additional Pertinent Hx: Pt admitted 6-7 after a fall OOB at [facility]. Pt fell onto right side ,presetning with soreness throughout that side. CT of head and neck with no acute process, xrays of right tibia/fibula and hip/pelvis  negative. To TCU on .      Past Medical History:   Diagnosis Date    ARNOLD (acute kidney injury) ([address]     Atrial fibrillation ([address]     Blood transfusion reaction     CAD (coronary artery disease)     Severe triple vessel disease    Cerebrovascular disease 2018    CKD (chronic kidney disease)     Diastolic congestive heart failure (HCC)     DJD (degenerative joint disease), cervical 2018    GERD (gastroesophageal reflux disease)     Hiatal hernia     History of blood transfusion     Hyperlipidemia     Hypertension     Hypoalbuminemia     Hypothyroidism     Mild protein-calorie malnutrition (HCC)     NSTEMI (non-ST elevated myocardial infarction) ([address] 2014    Pancreatitis due to biliary obstruction     Pneumonia, organism unspecified(486)     Scoliosis     SDH (subdural hematoma) ([address]     [place] hole decompresson on 2014    Thyroid disease     Unspecified cerebral artery occlusion with cerebral infarction     Uterine cancer ([address] 1966     Past Surgical History:   Procedure Laterality Date    APPENDECTOMY      BACK SURGERY      TERESA HOLE  2014    drainage of left SDH    CARDIAC SURGERY      COLONOSCOPY      CORONARY ARTERY BYPASS GRAFT  11    LIMA to LAD, SVG to 1st Marginal, Distal Right Coronary Artery, 1st assistance  Stand to sit: Contact guard assistance       Ambulation 1  Surface: level tile  Device: Rolling Walker  Assistance: Contact guard assistance;Stand by assistance (cga-> close sba)  Quality of Gait: decreased lew and velocity, shortened B step length, fair- B heel strike, fair B foot clearance, mild path deviations (typically to the L), kyphotic posture, vc to amb. closer to walker  Distance: 130' x 2  Comments: no c/o's dizziness today. no standing rest with each bout of amb. Balance  Comments: standing without ue support pt. completed ring toss, feet isabel~3\" apart, reaching for rings within-> edge of isabel with sba x 2 trials  Exercises:  Exercises  Comments: seated on mat pt. completed 15 reps each b le heel-toe raises, laq, resistance t-band  marching, hip abd/add, ham curls with occasional vc's , supine on mat 15 reps each b le ap's, quad/glute sets, saq, heelslides, hip abd/add with occasional vc's needed     Activity Tolerance:  Activity Tolerance: Patient Tolerated treatment well  Activity Tolerance: good motivation this pm    Assessment: Body structures, Functions, Activity limitations: Decreased functional mobility , Decreased strength, Decreased cognition, Decreased endurance, Decreased balance  Assessment: Pt tolerated therapy session fair. Pt presents with recurrent falls and limited ability to ambulate secondary to SOB and c/o dizziness and lightheadedness. Pt pleasantly confused throughout session. Pt would benefit from continued skilled PT to increase LE strength and improve balance and endurance for return to PLOF and increase independence. Prognosis: Good  Discharge Recommendations: Continue to assess pending progress    Patient Education:  Patient Education: POC, safety awareness, breathing technique, therex, gait    Equipment Recommendations:  Equipment Needed: No    Safety:  Type of devices:  All fall risk precautions in place, Patient at risk for falls, Call light within reach, Left in chair, Chair alarm in place, Gait belt (dtr. and friend present)  Restraints  Initially in place: No    Plan:  Times per week: 6x  Times per day: Daily  Specific instructions for Next Treatment: gait, balance, endurance, therex  Current Treatment Recommendations: Strengthening, Balance Training, Functional Mobility Training, Transfer Training, Endurance Training, Gait Training, Home Exercise Program, Safety Education & Training, Patient/Caregiver Education & Training    Goals:  Patient goals : get stronger    Short term goals  Time Frame for Short term goals: 10 days  Short term goal 1: Pt to perform all bed mobility at [facility] for getting in and out of bed. Short term goal 2: Pt to transfer from various surfaces at [place] [address] for getting to/from chairs and bed. Short term goal 3: Pt to ambulate 48' with RW at [facility] for increased mobility at [address] [place] [address]. Short term goal 4: Pt to improve Elderly Mobility Index to 11/20 for improved functional mobility    Long term goals  Time Frame for Long term goals : 3 weeks  Long term goal 1: Pt to perform all bed mobility at S for getting in and out of bed. Long term goal 2: Pt to transfer from various surfaces at S for getting to/from chairs and bed. Long term goal 3: Pt to ambulate 100' with RW at [place] [address] for increased mobility at [address] [place] [address]. Long term goal 4: Pt to improve Elderly Mobility Index to 14 for improved functional mobility. Long term goal 5: Pt to perform car transfer at [place] [address] for safe transfer to [address] [place] [address].

## 2018-06-21 NOTE — PLAN OF CARE
Problem: Nutrition  Goal: Optimal nutrition therapy  Outcome: Ongoing  Nutrition Problem: Severe malnutrition, in context of acute illness or injury  Intervention: Food and/or Nutrient Delivery: Continue current diet, Modify current ONS, Mineral Supplement  Nutritional Goals: Pt. will  consume 75% or more at meals during LOS.

## 2018-06-21 NOTE — PROGRESS NOTES
Completed to increase dynamic standing balance and activity tolerance required for ADLs     Functional Mobility  Functional - Mobility Device: Rolling Walker  Activity: Other  Assist Level: Contact guard assistance  Functional Mobility Comments: To/from therapy gym at slow pace. No LOB noted, slightly unsteady at times. Required seated rest break after each trial of mobility  Apparatus Needs: Wheelchair follow       Type of ROM/Therapeutic Exercise: Free weights  Comment: Completed BUE exercises x15 reps x1 set in all joints/planes using 1# dumb bell seated in chair. Required rest breaks after each exercise. Completed exercises to increase strength and activity tolerance required for ADLs and toilet transfers       Activity Tolerance:  Activity Tolerance: Patient limited by fatigue    Assessment:     Performance deficits / Impairments: Decreased safe awareness, Decreased balance, Decreased ADL status, Decreased endurance, Decreased strength, Decreased functional mobility , Decreased cognition, Decreased coordination  Prognosis: Fair  Discharge Recommendations: Patient would benefit from continued therapy after discharge, [address]    Patient Education:  Patient Education: safety with transfers and mobility, BUE exercises, deep breathing    Equipment Recommendations: Other: Continue to monitor need for equipment. Pt owns RW.      Safety:  Safety Devices in place: Yes  Type of devices: Call light within reach, Chair alarm in place, Left in chair, Gait belt    Plan:  Times per week: 6x  Current Treatment Recommendations: Strengthening, Endurance Training, Patient/Caregiver Education & Training, Self-Care / ADL, Functional Mobility Training, Balance Training, Safety Education & Training    Goals:  Patient goals : feel less tired     Short term goals  Time Frame for Short term goals: 1 week   Short term goal 1: Pt will complete functional transfers to/from chair/toilet with CGA and 0-2 cues for

## 2018-06-22 LAB
ANION GAP SERPL CALCULATED.3IONS-SCNC: 9 MEQ/L (ref 8–16)
BUN BLDV-MCNC: 15 MG/DL (ref 7–22)
CALCIUM SERPL-MCNC: 9 MG/DL (ref 8.5–10.5)
CHLORIDE BLD-SCNC: 92 MEQ/L (ref 98–111)
CO2: 29 MEQ/L (ref 23–33)
CREAT SERPL-MCNC: 0.8 MG/DL (ref 0.4–1.2)
GFR SERPL CREATININE-BSD FRML MDRD: 68 ML/MIN/1.73M2
GLUCOSE BLD-MCNC: 119 MG/DL (ref 70–108)
POTASSIUM SERPL-SCNC: 4.1 MEQ/L (ref 3.5–5.2)
SODIUM BLD-SCNC: 130 MEQ/L (ref 135–145)

## 2018-06-22 PROCEDURE — 36415 COLL VENOUS BLD VENIPUNCTURE: CPT

## 2018-06-22 PROCEDURE — 97110 THERAPEUTIC EXERCISES: CPT

## 2018-06-22 PROCEDURE — 6370000000 HC RX 637 (ALT 250 FOR IP): Performed by: INTERNAL MEDICINE

## 2018-06-22 PROCEDURE — 97116 GAIT TRAINING THERAPY: CPT

## 2018-06-22 PROCEDURE — 97127 HC SP THER IVNTJ W/FOCUS COG FUNCJ: CPT | Performed by: SPEECH-LANGUAGE PATHOLOGIST

## 2018-06-22 PROCEDURE — 80048 BASIC METABOLIC PNL TOTAL CA: CPT

## 2018-06-22 PROCEDURE — 92526 ORAL FUNCTION THERAPY: CPT | Performed by: SPEECH-LANGUAGE PATHOLOGIST

## 2018-06-22 PROCEDURE — 97535 SELF CARE MNGMENT TRAINING: CPT

## 2018-06-22 PROCEDURE — 2580000003 HC RX 258: Performed by: FAMILY MEDICINE

## 2018-06-22 PROCEDURE — 1290000000 HC SEMI PRIVATE OTHER R&B

## 2018-06-22 RX ORDER — SODIUM CHLORIDE 0.9 % (FLUSH) 0.9 %
10 SYRINGE (ML) INJECTION 2 TIMES DAILY
Status: DISCONTINUED | OUTPATIENT
Start: 2018-06-22 | End: 2018-07-08 | Stop reason: ALTCHOICE

## 2018-06-22 RX ADMIN — ACETAMINOPHEN 650 MG: 325 TABLET ORAL at 03:51

## 2018-06-22 RX ADMIN — BUMETANIDE 0.5 MG: 1 TABLET ORAL at 09:07

## 2018-06-22 RX ADMIN — DOCUSATE SODIUM 100 MG: 100 CAPSULE, LIQUID FILLED ORAL at 09:07

## 2018-06-22 RX ADMIN — POLYETHYLENE GLYCOL (3350) 17 G: 17 POWDER, FOR SOLUTION ORAL at 09:07

## 2018-06-22 RX ADMIN — SUCRALFATE 1 G: 1 TABLET ORAL at 17:04

## 2018-06-22 RX ADMIN — ASPIRIN 81 MG: 81 TABLET ORAL at 09:08

## 2018-06-22 RX ADMIN — ACETAMINOPHEN 650 MG: 325 TABLET ORAL at 12:02

## 2018-06-22 RX ADMIN — SPIRONOLACTONE 12.5 MG: 25 TABLET ORAL at 09:07

## 2018-06-22 RX ADMIN — Medication 10 ML: at 21:05

## 2018-06-22 RX ADMIN — METOPROLOL TARTRATE 12.5 MG: 25 TABLET ORAL at 09:07

## 2018-06-22 RX ADMIN — PANTOPRAZOLE SODIUM 40 MG: 40 TABLET, DELAYED RELEASE ORAL at 17:05

## 2018-06-22 RX ADMIN — SUCRALFATE 1 G: 1 TABLET ORAL at 13:57

## 2018-06-22 RX ADMIN — MAGNESIUM GLUCONATE 500 MG ORAL TABLET 400 MG: 500 TABLET ORAL at 09:08

## 2018-06-22 RX ADMIN — Medication 10 ML: at 12:03

## 2018-06-22 RX ADMIN — ACETAMINOPHEN 650 MG: 325 TABLET ORAL at 21:04

## 2018-06-22 RX ADMIN — CITALOPRAM 40 MG: 40 TABLET, FILM COATED ORAL at 09:07

## 2018-06-22 RX ADMIN — LATANOPROST 1 DROP: 50 SOLUTION OPHTHALMIC at 21:05

## 2018-06-22 RX ADMIN — ISOSORBIDE MONONITRATE 30 MG: 30 TABLET ORAL at 09:08

## 2018-06-22 RX ADMIN — GUAIFENESIN 600 MG: 600 TABLET, EXTENDED RELEASE ORAL at 21:05

## 2018-06-22 RX ADMIN — PANTOPRAZOLE SODIUM 40 MG: 40 TABLET, DELAYED RELEASE ORAL at 05:38

## 2018-06-22 RX ADMIN — ATORVASTATIN CALCIUM 20 MG: 20 TABLET, FILM COATED ORAL at 21:05

## 2018-06-22 RX ADMIN — GUAIFENESIN 600 MG: 600 TABLET, EXTENDED RELEASE ORAL at 09:07

## 2018-06-22 RX ADMIN — DOCUSATE SODIUM 100 MG: 100 CAPSULE, LIQUID FILLED ORAL at 21:05

## 2018-06-22 RX ADMIN — LEVOTHYROXINE SODIUM 75 MCG: 75 TABLET ORAL at 05:38

## 2018-06-22 RX ADMIN — SUCRALFATE 1 G: 1 TABLET ORAL at 05:38

## 2018-06-22 RX ADMIN — CALCIUM CARBONATE-VITAMIN D TAB 500 MG-200 UNIT 1 TABLET: 500-200 TAB at 09:07

## 2018-06-22 RX ADMIN — SUCRALFATE 1 G: 1 TABLET ORAL at 21:05

## 2018-06-22 ASSESSMENT — PAIN SCALES - GENERAL
PAINLEVEL_OUTOF10: 0
PAINLEVEL_OUTOF10: 0
PAINLEVEL_OUTOF10: 1
PAINLEVEL_OUTOF10: 0

## 2018-06-22 NOTE — PROGRESS NOTES
[facility]  INPATIENT PHYSICAL THERAPY  DAILY NOTE  [facility] TCU 8E - 8E-61/061-A    Time In: 1410  Time Out: 1457  Timed Code Treatment Minutes: 52 Minutes  Minutes: 47          Date: 2018  Patient Name: Eva Pittman,  Gender:  female        MRN: [id number]  : 1930  (80 y.o.)  Referral Date : 18  Referring Practitioner: Ordering: Renato Gil MD  Attending: JOSE ROBERTO Santana MD  Diagnosis: s/p fall  Additional Pertinent Hx: Pt admitted 6-7 after a fall OOB at [facility]. Pt fell onto right side ,presetning with soreness throughout that side. CT of head and neck with no acute process, xrays of right tibia/fibula and hip/pelvis  negative. To TCU on .      Past Medical History:   Diagnosis Date    ARNOLD (acute kidney injury) ([address]     Atrial fibrillation ([address]     Blood transfusion reaction     CAD (coronary artery disease)     Severe triple vessel disease    Cerebrovascular disease 2018    CKD (chronic kidney disease)     Diastolic congestive heart failure (HCC)     DJD (degenerative joint disease), cervical 2018    GERD (gastroesophageal reflux disease)     Hiatal hernia     History of blood transfusion     Hyperlipidemia     Hypertension     Hypoalbuminemia     Hypothyroidism     Mild protein-calorie malnutrition (HCC)     NSTEMI (non-ST elevated myocardial infarction) ([address] 2014    Pancreatitis due to biliary obstruction     Pneumonia, organism unspecified(486)     Scoliosis     SDH (subdural hematoma) ([address]     [place] hole decompresson on 2014    Thyroid disease     Unspecified cerebral artery occlusion with cerebral infarction     Uterine cancer ([address] 1966     Past Surgical History:   Procedure Laterality Date    APPENDECTOMY      BACK SURGERY      TERESA HOLE  2014    drainage of left SDH    CARDIAC SURGERY      COLONOSCOPY      CORONARY ARTERY BYPASS GRAFT  11    LIMA to LAD, SVG to 1st Marginal, Distal Right Coronary Artery, 1st

## 2018-06-22 NOTE — PROGRESS NOTES
[address] [address]  INPATIENT SPEECH THERAPY  [facility] TCU 8E    TIME   SLP Individual Minutes  Time In: 1100  Time Out: 1130  Minutes: 30  Timed Code Treatment Minutes: 15 Minutes       [x]Daily Note  []Progress Note  []Discharge Note    Date: 2018  Patient Name: Mary Stewart        MRN: [id number]    : 1930  (80 y.o.)  Gender: female   Primary Provider: Miles Bae MD  Admitting Diagnosis:  s/p fall   Secondary Diagnosis: Dysphagia; Cognitive-linguistic deficits  Precautions: Fall risk, aspiration precautions; Close pulmonary monitoring   Swallowing Status/Diet: Dysphagia level 2 with thin liquids   Swallowing Strategies: Monitor for alertness, upright positioning, small bites/sips, slwo rate, 1:1 direct supervision   DATE of last BSE: 18  Pain:  0/10    Subjective: Pt sitting upright in recliner watching tv when SLP entered the room. Pt alert and pleasant throughout session. Hoarse/raspy vocal quality noted. SHORT TERM GOAL #1:  Goal 1: Pt will safely toleration dysphagia I diet with thin liquids with no overt s/s aspiration to maximize nutrition/hydration measures. GOAL MET   REVISED GOAL:Pt will safely toleration dysphagia 2 diet with thin liquids (with direct supervision) with no overt s/s aspiration to maximize nutrition/hydration measures. INTERVENTIONS:See goal #2 for details    SHORT TERM GOAL #2:  Goal 2: Pt will safely tolerate advanced texture trials 10/10 times to permit potential diet advancement. INTERVENTIONS: Completed advanced texture analysis with soft solids (peaches). Pt demonstrating slightly increased mastication time, but overall effective oral bolus management. Pt effectively cleared oral cavity with lingual sweep. Throat clear noted x1, but no other s/s of aspiration identified. Pt with slightly delayed swallow onset and mildly reduced anterior hyolaryngeal excursion, but appropriate elevation. Pt denies globus sensation following swallow.  Recommend diet advancement to dysphagia 2 with thin liquids with direct 1:1 supervision from staff. SHORT TERM GOAL #3:  Goal 3: Pt will complete orientation and functional recall/carryover tasks with 60% accuracy given mod cues to improve retention of novel information.l  INTERVENTIONS:Orientation:  Month- Indep  Date- Min cues to use calendar in room  [facility]. Day of the week- Indep  Time of day- Indep  Location- Indep    *Improved success with orientation this session. SHORT TERM GOAL #4:  Goal 4: Pt will complete basic safety, problem solving, reasoning, and sequencing tasks with 60% accuracy given mod cues to improve awareness for integration into current and future settings. INTERVENTIONS:Problem solving:  *Independently looked to clock to determine time of day. Yesterday min cues were needed. SHORT TERM GOAL #5:  Goal 5: Pt will complete spoken langauge expression (speech naming) and comprehension (moderately complex yes/no questions, 2-3 step commands) tasks with 70% accuracy given mod cues to improve communicative efficacy. INTERVENTIONS:[place] Divergent naming:  -States- 3/60 seconds, cueing needed to think of states surrounding [place]  -Fruit- 1 independently, 2 with min cues, 3 with mod cues/60 seconds  -Items in a bathroom- 3 independently, 3 with min cues/60 seconds  *Pt continues to demonstrate significant difficulty with lexical retrieval and slow processing speed.            ASSESSMENT:  Assessment: [x]Progressing towards goals          []Not Progressing towards goals       Patient Tolerance of Treatment:   [x]Tolerated well []Tolerated fair []Required rest breaks []Fatigued  Plan for Next Session: Diet monitor, sequencing, short term memory  Education:  Learner:  [x]Patient          []Significant Other          []Other  Education provided regarding:  [x]Goals and POC   []Diet and swallowing precautions    []Home Exercise Program  []Progress and/or discharge information  Method of

## 2018-06-23 ENCOUNTER — APPOINTMENT (OUTPATIENT)
Dept: GENERAL RADIOLOGY | Age: 83
DRG: 091 | End: 2018-06-23
Attending: FAMILY MEDICINE
Payer: MEDICARE

## 2018-06-23 LAB
ALBUMIN SERPL-MCNC: 3.7 G/DL (ref 3.5–5.1)
ALP BLD-CCNC: 82 U/L (ref 38–126)
ALT SERPL-CCNC: 21 U/L (ref 11–66)
ANION GAP SERPL CALCULATED.3IONS-SCNC: 9 MEQ/L (ref 8–16)
ANISOCYTOSIS: ABNORMAL
AST SERPL-CCNC: 31 U/L (ref 5–40)
BASOPHILS # BLD: 0.6 %
BASOPHILS ABSOLUTE: 0 THOU/MM3 (ref 0–0.1)
BILIRUB SERPL-MCNC: 0.6 MG/DL (ref 0.3–1.2)
BUN BLDV-MCNC: 22 MG/DL (ref 7–22)
CALCIUM SERPL-MCNC: 9.6 MG/DL (ref 8.5–10.5)
CHLORIDE BLD-SCNC: 91 MEQ/L (ref 98–111)
CO2: 33 MEQ/L (ref 23–33)
CREAT SERPL-MCNC: 1 MG/DL (ref 0.4–1.2)
EOSINOPHIL # BLD: 1.7 %
EOSINOPHILS ABSOLUTE: 0.1 THOU/MM3 (ref 0–0.4)
GFR SERPL CREATININE-BSD FRML MDRD: 52 ML/MIN/1.73M2
GLUCOSE BLD-MCNC: 108 MG/DL (ref 70–108)
HCT VFR BLD CALC: 32.2 % (ref 37–47)
HEMOGLOBIN: 10.7 GM/DL (ref 12–16)
LYMPHOCYTES # BLD: 22.7 %
LYMPHOCYTES ABSOLUTE: 1.2 THOU/MM3 (ref 1–4.8)
MCH RBC QN AUTO: 29.9 PG (ref 27–31)
MCHC RBC AUTO-ENTMCNC: 33.1 GM/DL (ref 33–37)
MCV RBC AUTO: 90.2 FL (ref 81–99)
MONOCYTES # BLD: 11.8 %
MONOCYTES ABSOLUTE: 0.6 THOU/MM3 (ref 0.4–1.3)
NUCLEATED RED BLOOD CELLS: 0 /100 WBC
PDW BLD-RTO: 16.5 % (ref 11.5–14.5)
PLATELET # BLD: 209 THOU/MM3 (ref 130–400)
PMV BLD AUTO: 7.9 FL (ref 7.4–10.4)
POTASSIUM SERPL-SCNC: 4.8 MEQ/L (ref 3.5–5.2)
PRO-BNP: 6151 PG/ML (ref 0–1800)
RBC # BLD: 3.57 MILL/MM3 (ref 4.2–5.4)
SEG NEUTROPHILS: 63.2 %
SEGMENTED NEUTROPHILS ABSOLUTE COUNT: 3.2 THOU/MM3 (ref 1.8–7.7)
SODIUM BLD-SCNC: 133 MEQ/L (ref 135–145)
TOTAL PROTEIN: 6.5 G/DL (ref 6.1–8)
WBC # BLD: 5.1 THOU/MM3 (ref 4.8–10.8)

## 2018-06-23 PROCEDURE — 2580000003 HC RX 258: Performed by: FAMILY MEDICINE

## 2018-06-23 PROCEDURE — 36415 COLL VENOUS BLD VENIPUNCTURE: CPT

## 2018-06-23 PROCEDURE — 6370000000 HC RX 637 (ALT 250 FOR IP): Performed by: INTERNAL MEDICINE

## 2018-06-23 PROCEDURE — 85025 COMPLETE CBC W/AUTO DIFF WBC: CPT

## 2018-06-23 PROCEDURE — 80053 COMPREHEN METABOLIC PANEL: CPT

## 2018-06-23 PROCEDURE — 83880 ASSAY OF NATRIURETIC PEPTIDE: CPT

## 2018-06-23 PROCEDURE — 1290000000 HC SEMI PRIVATE OTHER R&B

## 2018-06-23 PROCEDURE — 71046 X-RAY EXAM CHEST 2 VIEWS: CPT

## 2018-06-23 RX ADMIN — CITALOPRAM 40 MG: 40 TABLET, FILM COATED ORAL at 11:03

## 2018-06-23 RX ADMIN — LATANOPROST 1 DROP: 50 SOLUTION OPHTHALMIC at 20:32

## 2018-06-23 RX ADMIN — BUMETANIDE 0.5 MG: 1 TABLET ORAL at 11:03

## 2018-06-23 RX ADMIN — ASPIRIN 81 MG: 81 TABLET ORAL at 11:04

## 2018-06-23 RX ADMIN — PANTOPRAZOLE SODIUM 40 MG: 40 TABLET, DELAYED RELEASE ORAL at 17:20

## 2018-06-23 RX ADMIN — LEVOTHYROXINE SODIUM 88 MCG: 88 TABLET ORAL at 11:03

## 2018-06-23 RX ADMIN — PANTOPRAZOLE SODIUM 40 MG: 40 TABLET, DELAYED RELEASE ORAL at 05:33

## 2018-06-23 RX ADMIN — SUCRALFATE 1 G: 1 TABLET ORAL at 17:21

## 2018-06-23 RX ADMIN — MAGNESIUM GLUCONATE 500 MG ORAL TABLET 400 MG: 500 TABLET ORAL at 11:03

## 2018-06-23 RX ADMIN — METOPROLOL TARTRATE 12.5 MG: 25 TABLET ORAL at 11:03

## 2018-06-23 RX ADMIN — ACETAMINOPHEN 650 MG: 325 TABLET ORAL at 17:20

## 2018-06-23 RX ADMIN — GUAIFENESIN 600 MG: 600 TABLET, EXTENDED RELEASE ORAL at 20:33

## 2018-06-23 RX ADMIN — POLYETHYLENE GLYCOL (3350) 17 G: 17 POWDER, FOR SOLUTION ORAL at 11:04

## 2018-06-23 RX ADMIN — ATORVASTATIN CALCIUM 20 MG: 20 TABLET, FILM COATED ORAL at 20:33

## 2018-06-23 RX ADMIN — DOCUSATE SODIUM 100 MG: 100 CAPSULE, LIQUID FILLED ORAL at 11:04

## 2018-06-23 RX ADMIN — DOCUSATE SODIUM 100 MG: 100 CAPSULE, LIQUID FILLED ORAL at 20:32

## 2018-06-23 RX ADMIN — SUCRALFATE 1 G: 1 TABLET ORAL at 20:33

## 2018-06-23 RX ADMIN — SUCRALFATE 1 G: 1 TABLET ORAL at 11:02

## 2018-06-23 RX ADMIN — SPIRONOLACTONE 12.5 MG: 25 TABLET ORAL at 11:04

## 2018-06-23 RX ADMIN — Medication 10 ML: at 11:15

## 2018-06-23 RX ADMIN — CALCIUM CARBONATE-VITAMIN D TAB 500 MG-200 UNIT 1 TABLET: 500-200 TAB at 11:03

## 2018-06-23 RX ADMIN — ACETAMINOPHEN 650 MG: 325 TABLET ORAL at 20:32

## 2018-06-23 RX ADMIN — GUAIFENESIN 600 MG: 600 TABLET, EXTENDED RELEASE ORAL at 11:02

## 2018-06-23 RX ADMIN — ACETAMINOPHEN 650 MG: 325 TABLET ORAL at 05:33

## 2018-06-23 RX ADMIN — ISOSORBIDE MONONITRATE 30 MG: 30 TABLET ORAL at 11:03

## 2018-06-23 RX ADMIN — ACETAMINOPHEN 650 MG: 325 TABLET ORAL at 11:04

## 2018-06-23 RX ADMIN — Medication 10 ML: at 20:32

## 2018-06-23 ASSESSMENT — PAIN SCALES - GENERAL
PAINLEVEL_OUTOF10: 0
PAINLEVEL_OUTOF10: 2
PAINLEVEL_OUTOF10: 0
PAINLEVEL_OUTOF10: 0
PAINLEVEL_OUTOF10: 3
PAINLEVEL_OUTOF10: 2

## 2018-06-23 NOTE — PROGRESS NOTES
BID    pantoprazole  40 mg Oral BID AC    polyethylene glycol  17 g Oral Daily    spironolactone  12.5 mg Oral Daily    sucralfate  1 g Oral 4x Daily AC & HS     Continuous Infusions:  PRN Meds:magnesium hydroxide, senna, albuterol, albuterol sulfate HFA    Review of Systems  Pertinent items are noted in HPI. Objective:     Patient Vitals for the past 8 hrs:   BP Temp Temp src Pulse Resp SpO2   06/23/18 1345 (!) 118/47 - - 51 - -   06/23/18 0830 (!) 185/76 97.4 °F (36.3 °C) Oral 58 18 97 %     I/O last 3 completed shifts: In: 600 [P.O.:600]  Out: -   No intake/output data recorded. BP (!) 118/47   Pulse 51   Temp 97.4 °F (36.3 °C) (Oral)   Resp 18   Ht 4' 8\" (1.422 m)   Wt 122 lb 12.8 oz (55.7 kg)   LMP  (Exact Date)   SpO2 97%   BMI 27.53 kg/m²     BP (!) 118/47   Pulse 51   Temp 97.4 °F (36.3 °C) (Oral)   Resp 18   Ht 4' 8\" (1.422 m)   Wt 122 lb 12.8 oz (55.7 kg)   LMP  (Exact Date)   SpO2 97%   BMI 27.53 kg/m²   General appearance: alert, appears stated age and cooperative  Head: Normocephalic, without obvious abnormality, atraumatic  Lungs: clear to auscultation bilaterally  Heart S1S2 without murmur  Abd soft, non tender normoactive BS and no mass  Ext with resolving bruising 1-2+ edema bilaterally  Neuro weak  Psy cooperative    Data Review:   Results for Manuel Garcia (MRN [id number]) as of 6/23/2018 16:16   Ref.  Range 6/19/2018 19:06 6/20/2018 00:36 6/20/2018 00:58 6/22/2018 08:49   Sodium Latest Ref Range: 135 - 145 meq/L   134 (L) 130 (L)   Potassium Latest Ref Range: 3.5 - 5.2 meq/L   4.0 4.1   Chloride Latest Ref Range: 98 - 111 meq/L   93 (L) 92 (L)   CO2 Latest Ref Range: 23 - 33 meq/L   30 29   BUN Latest Ref Range: 7 - 22 mg/dL   23 (H) 15   Creatinine Latest Ref Range: 0.4 - 1.2 mg/dL   0.9 0.8   Anion Gap Latest Ref Range: 8.0 - 16.0 meq/L   11.0 9.0   Est, Glom Filt Rate Latest Units: ml/min/1.73m2   59 (A) 68 (A)   Glucose Latest Ref Range: 70 - 108 mg/dL   115 (H)

## 2018-06-24 PROCEDURE — 97530 THERAPEUTIC ACTIVITIES: CPT

## 2018-06-24 PROCEDURE — 2580000003 HC RX 258: Performed by: FAMILY MEDICINE

## 2018-06-24 PROCEDURE — 1290000000 HC SEMI PRIVATE OTHER R&B

## 2018-06-24 PROCEDURE — 97116 GAIT TRAINING THERAPY: CPT

## 2018-06-24 PROCEDURE — 97110 THERAPEUTIC EXERCISES: CPT

## 2018-06-24 PROCEDURE — 6370000000 HC RX 637 (ALT 250 FOR IP): Performed by: INTERNAL MEDICINE

## 2018-06-24 RX ADMIN — ATORVASTATIN CALCIUM 20 MG: 20 TABLET, FILM COATED ORAL at 20:10

## 2018-06-24 RX ADMIN — GUAIFENESIN 600 MG: 600 TABLET, EXTENDED RELEASE ORAL at 20:10

## 2018-06-24 RX ADMIN — MAGNESIUM GLUCONATE 500 MG ORAL TABLET 400 MG: 500 TABLET ORAL at 10:23

## 2018-06-24 RX ADMIN — METOPROLOL TARTRATE 12.5 MG: 25 TABLET ORAL at 10:24

## 2018-06-24 RX ADMIN — ACETAMINOPHEN 650 MG: 325 TABLET ORAL at 20:10

## 2018-06-24 RX ADMIN — Medication 10 ML: at 10:23

## 2018-06-24 RX ADMIN — LATANOPROST 1 DROP: 50 SOLUTION OPHTHALMIC at 20:10

## 2018-06-24 RX ADMIN — DOCUSATE SODIUM 100 MG: 100 CAPSULE, LIQUID FILLED ORAL at 10:22

## 2018-06-24 RX ADMIN — SUCRALFATE 1 G: 1 TABLET ORAL at 17:05

## 2018-06-24 RX ADMIN — ASPIRIN 81 MG: 81 TABLET ORAL at 10:23

## 2018-06-24 RX ADMIN — CALCIUM CARBONATE-VITAMIN D TAB 500 MG-200 UNIT 1 TABLET: 500-200 TAB at 10:23

## 2018-06-24 RX ADMIN — PANTOPRAZOLE SODIUM 40 MG: 40 TABLET, DELAYED RELEASE ORAL at 17:04

## 2018-06-24 RX ADMIN — CITALOPRAM 40 MG: 40 TABLET, FILM COATED ORAL at 10:23

## 2018-06-24 RX ADMIN — ACETAMINOPHEN 650 MG: 325 TABLET ORAL at 10:22

## 2018-06-24 RX ADMIN — BUMETANIDE 0.5 MG: 1 TABLET ORAL at 10:23

## 2018-06-24 RX ADMIN — DOCUSATE SODIUM 100 MG: 100 CAPSULE, LIQUID FILLED ORAL at 20:10

## 2018-06-24 RX ADMIN — ISOSORBIDE MONONITRATE 30 MG: 30 TABLET ORAL at 10:23

## 2018-06-24 RX ADMIN — SUCRALFATE 1 G: 1 TABLET ORAL at 10:23

## 2018-06-24 RX ADMIN — ACETAMINOPHEN 650 MG: 325 TABLET ORAL at 17:04

## 2018-06-24 RX ADMIN — LEVOTHYROXINE SODIUM 75 MCG: 75 TABLET ORAL at 10:23

## 2018-06-24 RX ADMIN — PANTOPRAZOLE SODIUM 40 MG: 40 TABLET, DELAYED RELEASE ORAL at 05:56

## 2018-06-24 RX ADMIN — SPIRONOLACTONE 12.5 MG: 25 TABLET ORAL at 10:23

## 2018-06-24 RX ADMIN — METOPROLOL TARTRATE 12.5 MG: 25 TABLET ORAL at 20:10

## 2018-06-24 RX ADMIN — ACETAMINOPHEN 650 MG: 325 TABLET ORAL at 05:56

## 2018-06-24 RX ADMIN — SUCRALFATE 1 G: 1 TABLET ORAL at 20:10

## 2018-06-24 RX ADMIN — GUAIFENESIN 600 MG: 600 TABLET, EXTENDED RELEASE ORAL at 10:22

## 2018-06-24 RX ADMIN — POLYETHYLENE GLYCOL (3350) 17 G: 17 POWDER, FOR SOLUTION ORAL at 10:36

## 2018-06-24 ASSESSMENT — PAIN SCALES - GENERAL
PAINLEVEL_OUTOF10: 0
PAINLEVEL_OUTOF10: 0
PAINLEVEL_OUTOF10: 3
PAINLEVEL_OUTOF10: 0

## 2018-06-24 NOTE — PLAN OF CARE
Problem: Falls - Risk of:  Goal: Will remain free from falls  Will remain free from falls   Outcome: Ongoing  Hourly rounding. Call light and bedside table within reach. Problem: Risk for Impaired Skin Integrity  Goal: Tissue integrity - skin and mucous membranes  Structural intactness and normal physiological function of skin and  mucous membranes. Outcome: Ongoing  Pillow support in bed. Waffle cushion in chair.  Turn every 2hr    Problem: Pain:  Goal: Pain level will decrease  Pain level will decrease   Outcome: Ongoing  Pt denies pain    Problem: Nutritional:  Goal: Nutritional status will improve  Nutritional status will improve   Outcome: Ongoing  Encourage supplement intake    Problem: DISCHARGE BARRIERS  Goal: Patient's continuum of care needs are met  Outcome: Ongoing  Team conference held every tuesday

## 2018-06-24 NOTE — PROGRESS NOTES
[facility]  INPATIENT PHYSICAL THERAPY  DAILY NOTE  [facility] TCU 8E - 8E-61/061-A    Time In: 0745  Time Out: 4003  Timed Code Treatment Minutes: 37 Minutes  Minutes: 43          Date: 2018  Patient Name: Eva Pittman,  Gender:  female        MRN: [id number]  : 1930  (80 y.o.)  Referral Date : 18  Referring Practitioner: Ordering: Renato Gil MD  Attending: JOSE ROBERTO Santana MD  Diagnosis: s/p fall  Additional Pertinent Hx: Pt admitted 6-7 after a fall OOB at [facility]. Pt fell onto right side ,presetning with soreness throughout that side. CT of head and neck with no acute process, xrays of right tibia/fibula and hip/pelvis  negative. To TCU on .      Past Medical History:   Diagnosis Date    ARNOLD (acute kidney injury) ([address]     Atrial fibrillation ([address]     Blood transfusion reaction     CAD (coronary artery disease)     Severe triple vessel disease    Cerebrovascular disease 2018    CKD (chronic kidney disease)     Diastolic congestive heart failure (HCC)     DJD (degenerative joint disease), cervical 2018    GERD (gastroesophageal reflux disease)     Hiatal hernia     History of blood transfusion     Hyperlipidemia     Hypertension     Hypoalbuminemia     Hypothyroidism     Mild protein-calorie malnutrition (HCC)     NSTEMI (non-ST elevated myocardial infarction) ([address] 2014    Pancreatitis due to biliary obstruction     Pneumonia, organism unspecified(486)     Scoliosis     SDH (subdural hematoma) ([address]     Teresa hole decompresson on 2014    Thyroid disease     Unspecified cerebral artery occlusion with cerebral infarction     Uterine cancer ([address] 1966     Past Surgical History:   Procedure Laterality Date    APPENDECTOMY      BACK SURGERY      TERESA HOLE  2014    drainage of left SDH    CARDIAC SURGERY      COLONOSCOPY      CORONARY ARTERY BYPASS GRAFT  11    LIMA to LAD, SVG to 1st Marginal, Distal Right Coronary Artery, 1st Diagonal    DILATATION, ESOPHAGUS      ENDOSCOPY, COLON, DIAGNOSTIC      EYE SURGERY      bilateral cataracts    HYSTERECTOMY      IA EGD BALLOON DILATION ESOPHAGUS <30 MM DIAM N/A 11/22/2017    EGD ESOPHAGOGASTRODUODENOSCOPY DILATATION performed by Madison Powers MD at [facility]    IA ESOPHAGOGASTRODUODENOSCOPY TRANSORAL DIAGNOSTIC  11/22/2017    EGD ESOPHAGOGASTRODUODENOSCOPY performed by Madison Powers MD at [address] [place] [address] Left     SKIN BIOPSY      VASCULAR SURGERY      cabg harvests from left leg       Restrictions/Precautions:  General Precautions, Fall Risk                    Other position/activity restrictions: confused       Prior Level of Function:  ADL Assistance: Independent  Homemaking Assistance: Needs assistance  Ambulation Assistance: Independent  Transfer Assistance: Independent  Additional Comments: Pt reports living at [place] [address] PTA for \"a couple months. \" Pt using RW PTA for mobility. Getting assistance for meals. Indep with ADLs. Subjective:  Response To Previous Treatment: Patient with no complaints from previous session. Family / Caregiver Present: No  Comments: pt. requesting to go to the bathroom   Subjective: Pt resting in bed upon arrival, pleasant,  very co-operative. Pain: no   .          Social/Functional:  Lives With: Alone  Type of Home: Assisted living  Home Layout: One level  Home Access: Level entry  Home Equipment: Rolling walker     Objective:  Rolling to Right: Supervision  Supine to Sit: Supervision  Sit to Supine: Unable to assess  Scooting: Modified independent    Transfers  Sit to Stand: Stand by assistance  Stand to sit: Contact guard assistance (freq. vc for hand placement)  Toilet transfer withy rolling walker, with sba-> lt.  Cga, assist needed with hygiene following a BM and with clothing management       Ambulation 1  Surface: level tile  Device: Rolling Walker  Assistance: Stand by assistance;Contact guard assistance (sba-> occasional lt. cga)  Quality of Gait: decreased lew and velocity, shortened B step length, fair- B heel strike, fair B foot clearance, mild path deviations (typically to the L), kyphotic posture, vc to amb. closer to walker  Distance: 220' x 1,130' x 1  Comments: no c/o's dizziness today. no standing rest with each bout of amb. Exercises:  Exercises  Comments: seated,  pt. completed 15 reps each b le heel-toe raises, laq, resistance t-band ham curls, marching, hip abd/add with occasional vc's. 15 reps each b le standing heelraises, marching, hip abd/add, str. leg hip flexion, partial squats,with b ue support with sba with occasional vc's needed throughout ex's   Activity Tolerance:  Activity Tolerance: Patient Tolerated treatment well  Activity Tolerance: good motivation this am    Assessment: Body structures, Functions, Activity limitations: Decreased functional mobility , Decreased strength, Decreased cognition, Decreased endurance, Decreased balance  Assessment: Pt tolerated therapy session fair. Pt presents with recurrent falls and limited ability to ambulate secondary to SOB and c/o dizziness and lightheadedness. Pt pleasantly confused throughout session. Pt would benefit from continued skilled PT to increase LE strength and improve balance and endurance for return to PLOF and increase independence. Prognosis: Good  Discharge Recommendations: Continue to assess pending progress    Patient Education:  Patient Education: POC, safety awareness, breathing technique, therex, gait    Equipment Recommendations:  Equipment Needed: No    Safety:  Type of devices:  All fall risk precautions in place, Patient at risk for falls, Call light within reach, Left in chair, Chair alarm in place, Gait belt (breakfast present)  Restraints  Initially in place: No    Plan:  Times per week: 6x  Times per day: Daily  Specific instructions for Next Treatment: gait, balance, endurance, therex  Current Treatment Recommendations: Strengthening, Balance Training, Functional Mobility Training, Transfer Training, Endurance Training, Gait Training, Home Exercise Program, Safety Education & Training, Patient/Caregiver Education & Training    Goals:  Patient goals : get stronger    Short term goals  Time Frame for Short term goals: 10 days  Short term goal 1: Pt to perform all bed mobility at A for getting in and out of bed. Short term goal 2: Pt to transfer from various surfaces at [place] [address] for getting to/from chairs and bed. Short term goal 3: Pt to ambulate 48' with RW at [facility] for increased mobility at [address] [place] [address]. Short term goal 4: Pt to improve Elderly Mobility Index to 11/20 for improved functional mobility    Long term goals  Time Frame for Long term goals : 3 weeks  Long term goal 1: Pt to perform all bed mobility at S for getting in and out of bed. Long term goal 2: Pt to transfer from various surfaces at S for getting to/from chairs and bed. Long term goal 3: Pt to ambulate 100' with RW at [place] [address] for increased mobility at [address] [place] [address]. Long term goal 4: Pt to improve Elderly Mobility Index to 14 for improved functional mobility. Long term goal 5: Pt to perform car transfer at [place] [address] for safe transfer to [address] [place] [address].

## 2018-06-24 NOTE — PROGRESS NOTES
Performance deficits / Impairments: Decreased safe awareness, Decreased balance, Decreased ADL status, Decreased endurance, Decreased strength, Decreased functional mobility , Decreased cognition, Decreased coordination  Prognosis: Fair  Discharge Recommendations: Patient would benefit from continued therapy after discharge, [address]    Patient Education:  Patient Education: safety with transfers, standing posture    Equipment Recommendations: Other: Continue to monitor need for equipment. Pt owns RW. Safety:  Safety Devices in place: Yes  Type of devices: Chair alarm in place, Left in chair, Gait belt, Call light within reach    Plan:  Times per week: 6x  Current Treatment Recommendations: Strengthening, Endurance Training, Patient/Caregiver Education & Training, Self-Care / ADL, Functional Mobility Training, Balance Training, Safety Education & Training    Goals:  Patient goals : feel less tired     Short term goals  Time Frame for Short term goals: 1 week   Short term goal 1: Pt will complete functional transfers to/from chair/toilet with CGA and 0-2 cues for safety to increase safety with toileting routine. Short term goal 2: Pt will complete dynamic standing > 3 min with SBA and 1-2 hand release to increase ability to reach into closet or cupbaords for ADL objects   Short term goal 3: Pt will complete functional mobilty > HH distances with CGA and RW and 0-2 cues for safety to increase endurance needed for ADL routine. Short term goal 4: Pt will complete light UE AROM HEP with 0-2 cues for technique to increase strength needed for bathing. Short term goal 5: Pt will complete LE ADLs with Min A and LHAE prn. Long term goals  Time Frame for Long term goals : 2 weeks  Long term goal 1: Pt will complete BADL routine with Mod A and AE prn and 0-2 cues for sequencing/attending to task.

## 2018-06-25 PROCEDURE — 92507 TX SP LANG VOICE COMM INDIV: CPT

## 2018-06-25 PROCEDURE — 2580000003 HC RX 258: Performed by: FAMILY MEDICINE

## 2018-06-25 PROCEDURE — 97535 SELF CARE MNGMENT TRAINING: CPT

## 2018-06-25 PROCEDURE — 97530 THERAPEUTIC ACTIVITIES: CPT

## 2018-06-25 PROCEDURE — 0220000000 HC SKILLED NURSING FACILITY

## 2018-06-25 PROCEDURE — 97110 THERAPEUTIC EXERCISES: CPT

## 2018-06-25 PROCEDURE — 97116 GAIT TRAINING THERAPY: CPT

## 2018-06-25 PROCEDURE — 6370000000 HC RX 637 (ALT 250 FOR IP): Performed by: INTERNAL MEDICINE

## 2018-06-25 PROCEDURE — 1290000000 HC SEMI PRIVATE OTHER R&B

## 2018-06-25 RX ADMIN — PANTOPRAZOLE SODIUM 40 MG: 40 TABLET, DELAYED RELEASE ORAL at 17:54

## 2018-06-25 RX ADMIN — ACETAMINOPHEN 650 MG: 325 TABLET ORAL at 11:51

## 2018-06-25 RX ADMIN — METOPROLOL TARTRATE 12.5 MG: 25 TABLET ORAL at 08:34

## 2018-06-25 RX ADMIN — DOCUSATE SODIUM 100 MG: 100 CAPSULE, LIQUID FILLED ORAL at 22:01

## 2018-06-25 RX ADMIN — GUAIFENESIN 600 MG: 600 TABLET, EXTENDED RELEASE ORAL at 22:01

## 2018-06-25 RX ADMIN — LEVOTHYROXINE SODIUM 88 MCG: 88 TABLET ORAL at 06:29

## 2018-06-25 RX ADMIN — LATANOPROST 1 DROP: 50 SOLUTION OPHTHALMIC at 22:02

## 2018-06-25 RX ADMIN — SUCRALFATE 1 G: 1 TABLET ORAL at 17:54

## 2018-06-25 RX ADMIN — CITALOPRAM 40 MG: 40 TABLET, FILM COATED ORAL at 08:34

## 2018-06-25 RX ADMIN — ACETAMINOPHEN 650 MG: 325 TABLET ORAL at 17:54

## 2018-06-25 RX ADMIN — ATORVASTATIN CALCIUM 20 MG: 20 TABLET, FILM COATED ORAL at 22:01

## 2018-06-25 RX ADMIN — Medication 10 ML: at 08:34

## 2018-06-25 RX ADMIN — GUAIFENESIN 600 MG: 600 TABLET, EXTENDED RELEASE ORAL at 08:34

## 2018-06-25 RX ADMIN — ASPIRIN 81 MG: 81 TABLET ORAL at 08:34

## 2018-06-25 RX ADMIN — POLYETHYLENE GLYCOL (3350) 17 G: 17 POWDER, FOR SOLUTION ORAL at 08:43

## 2018-06-25 RX ADMIN — SUCRALFATE 1 G: 1 TABLET ORAL at 06:29

## 2018-06-25 RX ADMIN — BUMETANIDE 0.5 MG: 1 TABLET ORAL at 08:34

## 2018-06-25 RX ADMIN — SPIRONOLACTONE 12.5 MG: 25 TABLET ORAL at 08:34

## 2018-06-25 RX ADMIN — SUCRALFATE 1 G: 1 TABLET ORAL at 11:51

## 2018-06-25 RX ADMIN — MAGNESIUM GLUCONATE 500 MG ORAL TABLET 400 MG: 500 TABLET ORAL at 08:34

## 2018-06-25 RX ADMIN — ACETAMINOPHEN 650 MG: 325 TABLET ORAL at 22:01

## 2018-06-25 RX ADMIN — DOCUSATE SODIUM 100 MG: 100 CAPSULE, LIQUID FILLED ORAL at 08:43

## 2018-06-25 RX ADMIN — ISOSORBIDE MONONITRATE 30 MG: 30 TABLET ORAL at 08:34

## 2018-06-25 RX ADMIN — SUCRALFATE 1 G: 1 TABLET ORAL at 22:02

## 2018-06-25 RX ADMIN — PANTOPRAZOLE SODIUM 40 MG: 40 TABLET, DELAYED RELEASE ORAL at 08:34

## 2018-06-25 RX ADMIN — CALCIUM CARBONATE-VITAMIN D TAB 500 MG-200 UNIT 1 TABLET: 500-200 TAB at 08:34

## 2018-06-25 ASSESSMENT — PAIN SCALES - GENERAL
PAINLEVEL_OUTOF10: 0
PAINLEVEL_OUTOF10: 5
PAINLEVEL_OUTOF10: 0
PAINLEVEL_OUTOF10: 0
PAINLEVEL_OUTOF10: 3
PAINLEVEL_OUTOF10: 0

## 2018-06-25 NOTE — PLAN OF CARE
Problem: Nutrition  Goal: Optimal nutrition therapy  Outcome: Ongoing  Nutrition Problem: Inadequate oral intake  Intervention: Food and/or Nutrient Delivery: Start oral diet, Continue current ONS  Nutritional Goals: Pt. will  consume 75% or more at meals during LOS.

## 2018-06-25 NOTE — PATIENT CARE CONFERENCE
assistance  Stand Pivot Transfers: Minimal Assistance (without ad from w/c-> mat and w/c-> eob)  Ambulation 1  Surface: level tile  Device: Rolling Walker  Assistance: Contact guard assistance  Quality of Gait: mostly SBA x 1 on straight paths ; cues for pathfinding and to keep straight path, pt tends to veer to left   Distance: approx 140 feet x 2 and 15 feet x 4   Comments: no c/o's dizziness today. no standing rest with each bout of amb. Stairs  # Steps : 4  Stairs Height: 6\"  Rails: Bilateral  Assistance: Contact guard assistance  Comment: cues to go slowly and one step at a time   PT Equipment Recommendations  Equipment Needed: No    Occupational  Therapy:   ADL  Grooming: Contact guard assistance (at sink with oral care and washing face and hands)  UE Bathing: Supervision (seated at sink)  LE Bathing: Contact guard assistance (washing jaylen area/bottom while standing with BUE release from walker; Able to wash BLE below knees including B feet with SBA)  UE Dressing: Supervision (to doff gown and don tshirt seated in chair)  LE Dressing: Minimal assistance (to don B shoes and socksl; Able to thread BLE into pants with extended time/SBA and pull up with BUE release from walker requiring CGA-close SBA)  Toileting: Contact guard assistance (CGA-close SBA for hygiene and clothing management)       Speech Therapy:       Nutrition:    Weight: 120 lb 11.2 oz (54.7 kg) / Body mass index is 27.06 kg/m². Please see nutrition note for details.     Family Education: No family available for education  Fall Risk:  Falling star program initiated    Goal Achievement:   Patient Continues to progress toward goal achievement    Discharge Plan   Estimated Length of Stay: re eval  Destination: discharge home with supervision  Services at Discharge: [facility] and Occupational Therapy 3x week  Equipment at Discharge: No equipment needs  Factors facilitating achievement of predicted outcomes: Family support, Motivated, Cooperative and Pleasant  Barriers to the achievement of predicted outcomes: Cognitive deficit and Decreased endurance    Readmission Risk              Risk of Unplanned Readmission:        26           High (20-31)        Team Members Present at Conference:  Physician: Dr. Virgil Holter, MD  Nurse: Ruslan Marroquin RN, Pamela RN  :  MARTIN Melendez  Occupational Therapist:  JONES Baird  Physical Therapist:   PATTI  Speech Therapist: Speech Therapist: N/A. All team members have reviewed and updated as necessary and appropriate the established interdisciplinary plan of care [facility]. Pt's team conference attended (see above). Pt seen on rounds with LSW, to review the results with patient and family. Discussed length of stay as above. Pt's team conference attended (see above.)  Pt seen on rounds with LSW, to review the results with patient and family. Discussed length of stay as above.     Physical Exam:  General:  Alert and oriented  Vitals:   Vitals:    06/25/18 2201   BP: (!) 121/50   Pulse: 55   Resp: 16   Temp: 97.9 °F (36.6 °C)   SpO2: 94%     Heart:  Regular rate and rhythm  Lungs:  Clear to auscultation  Abdomen:  soft with positive bowel sounds     Assessment:  Progressing in full rehabilitation program (see above)    Plan:  Continue Rehab            Continue current medications            Spent 35 minutes today in patient management and care    Electronically signed by Fuad Maynard MD on 6/26/2018 at 8:49 AM

## 2018-06-25 NOTE — PROGRESS NOTES
[facility]  INPATIENT OCCUPATIONAL THERAPY  [facility] TCU 8E  DAILY NOTE    Time:  Time In: 1128  Time Out: 1215  Timed Code Treatment Minutes: 52 Minutes  Minutes: 47     Date: 2018  Patient Name: Dione Ferrer,   Gender: female      Room: [facility]61/061-A  MRN: [id number]  : 1930  (80 y.o.)  Referring Practitioner: Denice Walsh MD  Diagnosis: s/p fall   Additional Pertinent Hx: Pt admitted 6-7 after a fall OOB at [facility]. Pt fell onto right side ,presetning with soreness throughout that side.  CT of head and neck with no acute process, xrays of right tibia/fibula and hip/pelvis  negative    Past Medical History:   Diagnosis Date    ARNOLD (acute kidney injury) ([address]     Atrial fibrillation ([address]     Blood transfusion reaction     CAD (coronary artery disease)     Severe triple vessel disease    Cerebrovascular disease 2018    CKD (chronic kidney disease)     Diastolic congestive heart failure (HCC)     DJD (degenerative joint disease), cervical 2018    GERD (gastroesophageal reflux disease)     Hiatal hernia     History of blood transfusion     Hyperlipidemia     Hypertension     Hypoalbuminemia     Hypothyroidism     Mild protein-calorie malnutrition (HCC)     NSTEMI (non-ST elevated myocardial infarction) ([address] 2014    Pancreatitis due to biliary obstruction     Pneumonia, organism unspecified(486)     Scoliosis     SDH (subdural hematoma) ([address]     Charlotte hole decompresson on 2014    Thyroid disease     Unspecified cerebral artery occlusion with cerebral infarction     Uterine cancer ([address] 1966     Past Surgical History:   Procedure Laterality Date    APPENDECTOMY      BACK SURGERY      TERESA HOLE  2014    drainage of left SDH    CARDIAC SURGERY      COLONOSCOPY      CORONARY ARTERY BYPASS GRAFT  11    LIMA to LAD, SVG to 1st Marginal, Distal Right Coronary Artery, 1st Diagonal    DILATATION, ESOPHAGUS      ENDOSCOPY, COLON, DIAGNOSTIC

## 2018-06-25 NOTE — PROGRESS NOTES
Nutrition Assessment    Type and Reason for Visit: Reassess    Nutrition Recommendations:   Continue to encourage oral intake of meals and supplements  Honor food prefs as able    Malnutrition Assessment:  · Malnutrition Status: At risk for malnutrition  · Context: Acute illness or injury    Nutrition Diagnosis:   · Problem: Inadequate oral intake  · Etiology: related to Insufficient energy/nutrient consumption     Signs and symptoms:  as evidenced by Diet history of poor intake    Nutrition Assessment:  · Subjective Assessment: Follow up for oral intake and PO diet tolerance. Pt s/p fall at [facility], ongoing therapies for strenghening. Continues to work with SLP, 6/22 was upgraded to Dysphagia 2 + thins. Reports she has been eating better since then. Also reports she has been drinking some Ensure and eating the 'ice cream cups'. · Wound Type: Stage I (Back wound)  · Current Nutrition Therapies:  · Oral Diet Orders: Dysphagia 2   · Oral Diet intake: %, 51-75%  · Oral Nutrition Supplement (ONS) Orders: Standard High Calorie Oral Supplement, Frozen Oral Supplement (Ensure Enlive BID + Magic Cup BID)  · ONS intake: 51-75%, %  · Anthropometric Measures:  · Ht: 4' 8\" (142.2 cm)   · Current Body Wt: 121 lb (54.9 kg) (6/25/18 +1 pitting)  · Admission Body Wt: 126 lb 15.8 oz (57.6 kg) ((6/19))  · Usual Body Wt:  (132# per pt. report, 129lb 1.6oz on 2/15/18; 129lb 6.4 oz on 9/25/17)  · % Weight Change: Noted weight changes since admit,  almost 10 months  · Ideal Body Wt: 90 lb (40.8 kg), % Ideal Body 140%  · Adjusted Body Wt: 99 lb (44.9 kg),  · BMI Classification: BMI 25.0 - 29.9 Overweight  · Comparative Standards (Estimated Nutrition Needs):  · Estimated Daily Total Kcal: 8341-2381  · Estimated Daily Protein (g): 45-54 grams as renal function tolerates    Estimated Intake vs Estimated Needs: Intake Improving    Nutrition Risk Level:  Moderate    Nutrition Interventions:   Start oral diet, Continue current ONS  Continued Inpatient Monitoring, Education not appropriate at this time    Nutrition Evaluation:   · Evaluation: Progressing toward goals   · Goals: Pt. will  consume 75% or more at meals during LOS. · Monitoring: Meal Intake, Supplement Intake, Weight    See Adult Nutrition Doc Flowsheet for more detail.      Electronically signed by Richa Rosa RD LD [address]  on 6/25/18 at 1:07 PM    Contact Number: [phone]

## 2018-06-25 NOTE — PROGRESS NOTES
Patient Name: Eva Pittman        MRN: [id number]    : 1930  (80 y.o.)  Gender: female   Principal Problem: <principal problem not specified>    Section F  Preferences for Customary Routine Activities   . Should Interview for Daily and Activity Preferences be Conducted?  Attempt to interview all residents able to communicate. If resident is unable to complete, attempt to complete interview with family member or significant other. Enter Code    1 0. No (resident is rarely/never understood and family/significant other not available) à Skip to and complete , Staff Assessment of Daily and Activity Preferences  1. Yes à Continue to Merl Scheuermann for Daily Preferences   . Interview for Daily Preferences  Show resident the response options and say: Juliana Love you are in this facility           [place]  Very Important  2  Somewhat Important  3  Not very important  4  Not important at all  5  Important, but cant do or no choice  6  No response or non-responsive Enter Codes in Boxes    1 A. How important is it to you to choose what clothes to wear?    1 B. How important is it to you to take care of your personal belongings or things?    1 C. How important is it to you to choose between a tub bath, shower, bed bath or sponge bath?    4 D. How important is it to you to have snacks available between meals?    1 E. How important is it to you to choose your own bedtime?    1 F. How important is it to you to have your family or a close friend involved in discussions about your care?    1 G. How important is it to you to be able to use the phone in private?    4 H. How important is it to you to have a place to lock your things to keep them safe? .  Interview for Activity Preferences   Show resident the response options and say: Juliana Love you are in this facility           Codin  Very Important  2  Somewhat Important  3  Not very important  4  Not important at all  5  Important,

## 2018-06-25 NOTE — PROGRESS NOTES
[address] - 8E-61/061-A    Time In: 0700  Time Out: 0745  Timed Code Treatment Minutes: 39 Minutes  Minutes: 45          Date: 2018  Patient Name: Adrian Delgado,  Gender:  female        MRN: [id number]  : 1930  (80 y.o.)  Referral Date : 18  Referring Practitioner: Ordering: Vandana Angel MD  Attending: JOSE ROBERTO Dee MD  Diagnosis: s/p fall  Additional Pertinent Hx: Pt admitted 6-7 after a fall OOB at [facility]. Pt fell onto right side ,presetning with soreness throughout that side. CT of head and neck with no acute process, xrays of right tibia/fibula and hip/pelvis  negative. To TCU on .      Past Medical History:   Diagnosis Date    ARNOLD (acute kidney injury) ([address]     Atrial fibrillation ([address]     Blood transfusion reaction     CAD (coronary artery disease)     Severe triple vessel disease    Cerebrovascular disease 2018    CKD (chronic kidney disease)     Diastolic congestive heart failure (HCC)     DJD (degenerative joint disease), cervical 2018    GERD (gastroesophageal reflux disease)     Hiatal hernia     History of blood transfusion     Hyperlipidemia     Hypertension     Hypoalbuminemia     Hypothyroidism     Mild protein-calorie malnutrition (HCC)     NSTEMI (non-ST elevated myocardial infarction) ([address] 2014    Pancreatitis due to biliary obstruction     Pneumonia, organism unspecified(486)     Scoliosis     SDH (subdural hematoma) ([address]     Teresa hole decompresson on 2014    Thyroid disease     Unspecified cerebral artery occlusion with cerebral infarction     Uterine cancer ([address] 1966     Past Surgical History:   Procedure Laterality Date    APPENDECTOMY      BACK SURGERY      TERESA HOLE  2014    drainage of left SDH    CARDIAC SURGERY      COLONOSCOPY      CORONARY ARTERY BYPASS GRAFT  11    LIMA to LAD, SVG to 1st Marginal, Distal Right Coronary Artery, 1st to/from chairs and bed. Short term goal 3: Pt to ambulate 48' with RW at [facility] for increased mobility at [address] [place] [address]. Short term goal 4: Pt to improve Elderly Mobility Index to 11/20 for improved functional mobility    Long term goals  Time Frame for Long term goals : 3 weeks  Long term goal 1: Pt to perform all bed mobility at S for getting in and out of bed. Long term goal 2: Pt to transfer from various surfaces at S for getting to/from chairs and bed. Long term goal 3: Pt to ambulate 100' with RW at [facility] for increased mobility at [address] [place] [address]. Long term goal 4: Pt to improve Elderly Mobility Index to 14 for improved functional mobility. Long term goal 5: Pt to perform car transfer at [facility] for safe transfer to [address] [place] [address].

## 2018-06-26 ENCOUNTER — APPOINTMENT (OUTPATIENT)
Dept: GENERAL RADIOLOGY | Age: 83
DRG: 091 | End: 2018-06-26
Attending: FAMILY MEDICINE
Payer: MEDICARE

## 2018-06-26 PROBLEM — F32.A ANXIETY AND DEPRESSION: Status: ACTIVE | Noted: 2018-06-26

## 2018-06-26 PROBLEM — F41.9 ANXIETY AND DEPRESSION: Status: ACTIVE | Noted: 2018-06-26

## 2018-06-26 LAB
ALBUMIN SERPL-MCNC: 3.8 G/DL (ref 3.5–5.1)
ALLEN TEST: POSITIVE
ALP BLD-CCNC: 88 U/L (ref 38–126)
ALT SERPL-CCNC: 23 U/L (ref 11–66)
ANION GAP SERPL CALCULATED.3IONS-SCNC: 11 MEQ/L (ref 8–16)
AST SERPL-CCNC: 36 U/L (ref 5–40)
BASE EXCESS (CALCULATED): 6.5 MMOL/L (ref -2.5–2.5)
BASOPHILS # BLD: 0.6 %
BASOPHILS ABSOLUTE: 0 THOU/MM3 (ref 0–0.1)
BILIRUB SERPL-MCNC: 0.7 MG/DL (ref 0.3–1.2)
BUN BLDV-MCNC: 27 MG/DL (ref 7–22)
CALCIUM SERPL-MCNC: 9.5 MG/DL (ref 8.5–10.5)
CHLORIDE BLD-SCNC: 93 MEQ/L (ref 98–111)
CO2: 29 MEQ/L (ref 23–33)
COLLECTED BY:: ABNORMAL
CREAT SERPL-MCNC: 1 MG/DL (ref 0.4–1.2)
DEVICE: ABNORMAL
EOSINOPHIL # BLD: 2.1 %
EOSINOPHILS ABSOLUTE: 0.1 THOU/MM3 (ref 0–0.4)
ERYTHROCYTE [DISTWIDTH] IN BLOOD BY AUTOMATED COUNT: 15.3 % (ref 11.5–14.5)
ERYTHROCYTE [DISTWIDTH] IN BLOOD BY AUTOMATED COUNT: 50.9 FL (ref 35–45)
GFR SERPL CREATININE-BSD FRML MDRD: 52 ML/MIN/1.73M2
GLUCOSE BLD-MCNC: 121 MG/DL (ref 70–108)
HCO3: 30 MMOL/L (ref 23–28)
HCT VFR BLD CALC: 33.2 % (ref 37–47)
HEMOGLOBIN: 10.6 GM/DL (ref 12–16)
IMMATURE GRANS (ABS): 0.06 THOU/MM3 (ref 0–0.07)
IMMATURE GRANULOCYTES: 0.9 %
LYMPHOCYTES # BLD: 16.6 %
LYMPHOCYTES ABSOLUTE: 1.1 THOU/MM3 (ref 1–4.8)
MAGNESIUM: 2 MG/DL (ref 1.6–2.4)
MCH RBC QN AUTO: 29.2 PG (ref 26–33)
MCHC RBC AUTO-ENTMCNC: 31.9 GM/DL (ref 32–35)
MCV RBC AUTO: 91.5 FL (ref 81–99)
MONOCYTES # BLD: 11.5 %
MONOCYTES ABSOLUTE: 0.8 THOU/MM3 (ref 0.4–1.3)
NUCLEATED RED BLOOD CELLS: 0 /100 WBC
O2 SATURATION: 95 %
PCO2: 40 MMHG (ref 35–45)
PH BLOOD GAS: 7.49 (ref 7.35–7.45)
PLATELET # BLD: 228 THOU/MM3 (ref 130–400)
PMV BLD AUTO: 9.7 FL (ref 9–12)
PO2: 70 MMHG (ref 71–104)
POTASSIUM SERPL-SCNC: 4.8 MEQ/L (ref 3.5–5.2)
RBC # BLD: 3.63 MILL/MM3 (ref 4.2–5.4)
SEG NEUTROPHILS: 68.3 %
SEGMENTED NEUTROPHILS ABSOLUTE COUNT: 4.6 THOU/MM3 (ref 1.8–7.7)
SODIUM BLD-SCNC: 133 MEQ/L (ref 135–145)
SOURCE, BLOOD GAS: ABNORMAL
TOTAL PROTEIN: 6.8 G/DL (ref 6.1–8)
TSH SERPL DL<=0.05 MIU/L-ACNC: 7.74 UIU/ML (ref 0.4–4.2)
WBC # BLD: 6.8 THOU/MM3 (ref 4.8–10.8)

## 2018-06-26 PROCEDURE — 97110 THERAPEUTIC EXERCISES: CPT

## 2018-06-26 PROCEDURE — 1290000000 HC SEMI PRIVATE OTHER R&B

## 2018-06-26 PROCEDURE — 97127 HC SP THER IVNTJ W/FOCUS COG FUNCJ: CPT

## 2018-06-26 PROCEDURE — 83735 ASSAY OF MAGNESIUM: CPT

## 2018-06-26 PROCEDURE — 85025 COMPLETE CBC W/AUTO DIFF WBC: CPT

## 2018-06-26 PROCEDURE — 2500000003 HC RX 250 WO HCPCS: Performed by: INTERNAL MEDICINE

## 2018-06-26 PROCEDURE — 97535 SELF CARE MNGMENT TRAINING: CPT

## 2018-06-26 PROCEDURE — 36415 COLL VENOUS BLD VENIPUNCTURE: CPT

## 2018-06-26 PROCEDURE — 74018 RADEX ABDOMEN 1 VIEW: CPT

## 2018-06-26 PROCEDURE — 6370000000 HC RX 637 (ALT 250 FOR IP): Performed by: INTERNAL MEDICINE

## 2018-06-26 PROCEDURE — 92507 TX SP LANG VOICE COMM INDIV: CPT

## 2018-06-26 PROCEDURE — 36600 WITHDRAWAL OF ARTERIAL BLOOD: CPT

## 2018-06-26 PROCEDURE — 84443 ASSAY THYROID STIM HORMONE: CPT

## 2018-06-26 PROCEDURE — 97116 GAIT TRAINING THERAPY: CPT

## 2018-06-26 PROCEDURE — 80053 COMPREHEN METABOLIC PANEL: CPT

## 2018-06-26 PROCEDURE — 82803 BLOOD GASES ANY COMBINATION: CPT

## 2018-06-26 PROCEDURE — 97530 THERAPEUTIC ACTIVITIES: CPT

## 2018-06-26 RX ADMIN — CITALOPRAM 40 MG: 40 TABLET, FILM COATED ORAL at 10:24

## 2018-06-26 RX ADMIN — ACETAMINOPHEN 650 MG: 325 TABLET ORAL at 10:25

## 2018-06-26 RX ADMIN — GUAIFENESIN 600 MG: 600 TABLET, EXTENDED RELEASE ORAL at 10:24

## 2018-06-26 RX ADMIN — LATANOPROST 1 DROP: 50 SOLUTION OPHTHALMIC at 21:16

## 2018-06-26 RX ADMIN — POLYETHYLENE GLYCOL (3350) 17 G: 17 POWDER, FOR SOLUTION ORAL at 10:26

## 2018-06-26 RX ADMIN — DOCUSATE SODIUM 100 MG: 100 CAPSULE, LIQUID FILLED ORAL at 10:25

## 2018-06-26 RX ADMIN — SUCRALFATE 1 G: 1 TABLET ORAL at 18:34

## 2018-06-26 RX ADMIN — PANTOPRAZOLE SODIUM 40 MG: 40 TABLET, DELAYED RELEASE ORAL at 10:24

## 2018-06-26 RX ADMIN — DOCUSATE SODIUM 100 MG: 100 CAPSULE, LIQUID FILLED ORAL at 21:16

## 2018-06-26 RX ADMIN — SUCRALFATE 1 G: 1 TABLET ORAL at 10:24

## 2018-06-26 RX ADMIN — SUCRALFATE 1 G: 1 TABLET ORAL at 21:16

## 2018-06-26 RX ADMIN — ACETAMINOPHEN 650 MG: 325 TABLET ORAL at 18:34

## 2018-06-26 RX ADMIN — ACETAMINOPHEN 650 MG: 325 TABLET ORAL at 21:16

## 2018-06-26 RX ADMIN — LEVOTHYROXINE SODIUM 75 MCG: 75 TABLET ORAL at 05:21

## 2018-06-26 RX ADMIN — ACETAMINOPHEN 650 MG: 325 TABLET ORAL at 05:21

## 2018-06-26 RX ADMIN — GUAIFENESIN 600 MG: 600 TABLET, EXTENDED RELEASE ORAL at 21:15

## 2018-06-26 RX ADMIN — METOPROLOL TARTRATE 12.5 MG: 25 TABLET ORAL at 10:25

## 2018-06-26 RX ADMIN — ISOSORBIDE MONONITRATE 30 MG: 30 TABLET ORAL at 10:24

## 2018-06-26 RX ADMIN — METOPROLOL TARTRATE 12.5 MG: 25 TABLET ORAL at 21:15

## 2018-06-26 RX ADMIN — CALCIUM CARBONATE-VITAMIN D TAB 500 MG-200 UNIT 1 TABLET: 500-200 TAB at 10:24

## 2018-06-26 RX ADMIN — ATORVASTATIN CALCIUM 20 MG: 20 TABLET, FILM COATED ORAL at 21:15

## 2018-06-26 RX ADMIN — ASPIRIN 81 MG: 81 TABLET ORAL at 10:25

## 2018-06-26 RX ADMIN — SPIRONOLACTONE 12.5 MG: 25 TABLET ORAL at 10:25

## 2018-06-26 RX ADMIN — MAGNESIUM GLUCONATE 500 MG ORAL TABLET 400 MG: 500 TABLET ORAL at 10:24

## 2018-06-26 RX ADMIN — Medication 5 UNITS: at 10:42

## 2018-06-26 RX ADMIN — BUMETANIDE 0.5 MG: 1 TABLET ORAL at 10:24

## 2018-06-26 RX ADMIN — SUCRALFATE 1 G: 1 TABLET ORAL at 05:21

## 2018-06-26 ASSESSMENT — PAIN SCALES - GENERAL
PAINLEVEL_OUTOF10: 0

## 2018-06-26 NOTE — PROGRESS NOTES
standing rest with each bout of amb. Balance  Comments: standing balance without ue support reaching within-> edge of isabel with feet ~ 3'' apart tossing bean bags to target with sba-> lt. cga. ,occasional mild posterior leaning 3 min. 10 sec. Exercises:  Exercises  Comments: 15 reps each b le seated heel-toe raises, laq, resistance t-band ham curls, marching,hip abd/add . 15 reps each b le standing heelraises, marching, hip abd/add, partial squats with b ue support with lt. cga-> sba        Activity Tolerance:  Activity Tolerance: Patient Tolerated treatment well  Activity Tolerance: good motivation this am    Assessment: Body structures, Functions, Activity limitations: Decreased functional mobility , Decreased strength, Decreased cognition, Decreased endurance, Decreased balance  Assessment: recommend continued skilled therapy to improve overall functional endurance and strength   Prognosis: Good  Discharge Recommendations: [address] , Patient would benefit from continued therapy after discharge    Patient Education:  Patient Education: posture , ther. ex, balance activity    Equipment Recommendations:  Equipment Needed: No    Safety:  Type of devices: All fall risk precautions in place, Patient at risk for falls, Call light within reach, Left in chair, Chair alarm in place, Gait belt  Restraints  Initially in place: No    Plan:  Times per week: 6x  Times per day: Daily  Specific instructions for Next Treatment: gait, balance, endurance, therex  Current Treatment Recommendations: Strengthening, Balance Training, Functional Mobility Training, Transfer Training, Endurance Training, Gait Training, Home Exercise Program, Safety Education & Training, Patient/Caregiver Education & Training    Goals:  Patient goals : get stronger    Short term goals  Time Frame for Short term goals: 10 days  Short term goal 1: Pt to perform all bed mobility at [facility] for getting in and out of bed.   Short term goal 2: Pt to transfer from various surfaces at [facility] for getting to/from chairs and bed. Short term goal 3: Pt to ambulate 48' with RW at [facility] for increased mobility at [address] [place] [address]. Short term goal 4: Pt to improve Elderly Mobility Index to 11/20 for improved functional mobility    Long term goals  Time Frame for Long term goals : 3 weeks  Long term goal 1: Pt to perform all bed mobility at S for getting in and out of bed. Long term goal 2: Pt to transfer from various surfaces at S for getting to/from chairs and bed. Long term goal 3: Pt to ambulate 100' with RW at [facility] for increased mobility at [address] [place] [address]. Long term goal 4: Pt to improve Elderly Mobility Index to 14 for improved functional mobility. Long term goal 5: Pt to perform car transfer at [facility] for safe transfer to [address] [place] [address].

## 2018-06-26 NOTE — PROGRESS NOTES
Team Conference held this morning. Recommendations of the team were explained to the patient by Dr Jacob Figueredo and . Team is recommending that patient continue on TCU for another week and plan to Re-TC 7/3. Following discharge team is recommending a return to AL, but patient possibly needing a higher level of assistance. SW has called patient's daughter and Lisa Mcadams, at patient's request, and shared the above information with her.

## 2018-06-26 NOTE — PROGRESS NOTES
[facility] [address]  INPATIENT OCCUPATIONAL THERAPY  [facility]  DAILY NOTE    Time:  Time In: 9413  Time Out: 1235  Timed Code Treatment Minutes: 40 Minutes  Minutes: 40          Date: 2018  Patient Name: Ro Lund,   Gender: female      Room: [facility]/061-A  MRN: [id number]  : 1930  (80 y.o.)  Referring Practitioner: Michael Lozano MD  Diagnosis: s/p fall   Additional Pertinent Hx: Pt admitted 6-7 after a fall OOB at [facility]. Pt fell onto right side ,presetning with soreness throughout that side.  CT of head and neck with no acute process, xrays of right tibia/fibula and hip/pelvis  negative    Past Medical History:   Diagnosis Date    ARNOLD (acute kidney injury) ([address]     Anxiety and depression 2018    Atrial fibrillation ([address]     Blood transfusion reaction     CAD (coronary artery disease)     Severe triple vessel disease    Cerebrovascular disease 2018    CKD (chronic kidney disease)     Diastolic congestive heart failure (HCC)     DJD (degenerative joint disease), cervical 2018    GERD (gastroesophageal reflux disease)     Hiatal hernia     History of blood transfusion     Hyperlipidemia     Hypertension     Hypoalbuminemia     Hypothyroidism     Mild protein-calorie malnutrition (HCC)     NSTEMI (non-ST elevated myocardial infarction) ([address] 2014    Pancreatitis due to biliary obstruction     Pneumonia, organism unspecified(486)     Scoliosis     SDH (subdural hematoma) ([address]     Teresa hole decompresson on 2014    Thyroid disease     Unspecified cerebral artery occlusion with cerebral infarction     Uterine cancer ([address] 1966     Past Surgical History:   Procedure Laterality Date    APPENDECTOMY      BACK SURGERY      TERESA HOLE  2014    drainage of left SDH    CARDIAC SURGERY      COLONOSCOPY      CORONARY ARTERY BYPASS GRAFT  11    LIMA to LAD, SVG to 1st Marginal, Distal Right Coronary Artery, 1st Diagonal    DILATATION, Stand by assistance  Standing Balance: Contact guard assistance     Time: ~5 minutes 30 seconds  Activity: Grooming tasks     Functional Mobility  Functional - Mobility Device: Rolling Walker  Activity: Other; To/from bathroom  Assist Level: Contact guard assistance  Functional Mobility Comments: To/from therapy gym at slow pace. No LOB noted. Lengthy seated rest break after each trial of mobility. Libby Buitrago for walker safety provided       Type of ROM/Therapeutic Exercise: Free weights  Comment: Completed BUE exercises x15 reps x1 set in all joints/planes seated in chair. Required rest breaks after each exercise. Completed exercises to increase strength and activity tolerance required for ADLs and toilet transfers       Activity Tolerance:  Activity Tolerance: Patient limited by fatigue  Activity Tolerance: Patient having difficulty keeping eyes opened during session- checked O2 sats with reading of 95-96%. Charge nurse Concetta notified of fatigue level.       Assessment:     Performance deficits / Impairments: Decreased safe awareness, Decreased balance, Decreased ADL status, Decreased endurance, Decreased strength, Decreased functional mobility , Decreased cognition, Decreased coordination  Prognosis: Fair  Discharge Recommendations: Patient would benefit from continued therapy after discharge, Home with Home health OT (Return to assisted living)    Patient Education:  Patient Education: safety with transfers and mobility, BUE exercises, deep breathing, ADL strategies    Equipment Recommendations:  Equipment Needed: No  Other: None needed at this time    Safety:  Safety Devices in place: Yes  Type of devices: Call light within reach, Chair alarm in place, Left in chair, Gait belt    Plan:  Times per week: 6x  Current Treatment Recommendations: Strengthening, Endurance Training, Patient/Caregiver Education & Training, Self-Care / ADL, Functional Mobility Training, Balance Training, Safety Education &

## 2018-06-26 NOTE — PROGRESS NOTES
[facility]  INPATIENT SPEECH THERAPY  [facility]    TIME   SLP Individual Minutes  Time In: 1500  Time Out: 1520  Minutes: 20  Timed Code Treatment Minutes: 8 Minutes       [x]Daily Note  []Progress Note  []Discharge Note    Date: 2018  Patient Name: Aurelia Bueno        MRN: [id number]    : 1930  (80 y.o.)  Gender: female   Primary Provider: Geronimo Shen MD  Admitting Diagnosis:  s/p fall   Secondary Diagnosis: Dysphagia; Cognitive-linguistic deficits  Precautions: Fall risk, aspiration precautions; Close pulmonary monitoring   Swallowing Status/Diet: Dysphagia level 2 with thin liquids   Swallowing Strategies: Monitor for alertness, upright positioning, small bites/sips, slwo rate, 1:1 direct supervision   DATE of last BSE: 18  Pain:  0/10    Subjective: Pt seen bedside for therapy. Pt agreeable to session; however, due to increased fatigue session stopped after 20 min. SHORT TERM GOAL #1:  Goal 1: Pt will safely toleration dysphagia 2 diet with thin liquids (with direct supervision) with no overt s/s aspiration to maximize nutrition/hydration measures. INTERVENTIONS: DNT due to focus on other goals. SHORT TERM GOAL #2:  Goal 2: Pt will safely tolerate advanced texture trials 10/10 times to permit potential diet advancement. INTERVENTIONS: DNT due to focus on other goals.     SHORT TERM GOAL #3:  Goal 3: Pt will complete orientation and functional recall/carryover tasks with 60% accuracy given mod cues to improve retention of novel information.l   INTERVENTIONS: Pt completed orientation task: 3/6 indep, 1/6 min verbal cues (year and place), 2/6 mod verbal cues (ELVI)    *Difficulty completing orientation task compared to previous date  *ST reminded pt to use external aids for orientation    Pt completed functional recall task: (staff relations- nurse and tech's names): 0/2 indep, 2/2 min    *increased processing time to complete tasks    SHORT TERM GOAL #4:  Goal 4: Pt will complete basic safety, problem solving, reasoning, and sequencing tasks with 60% accuracy given mod cues to improve awareness for integration into current and future settings. INTERVENTIONS:   Basic problem solving task (using call light): 0/1 indep, 1/1 mod cues    Basic reasoning (reasons to use a call light): 2/3 indep, 1/3 min verbal cues    Simple sequencing task (making a grilled cheese): 0/1 indep, 1/1 mod cues  *Difficulty with adding details in steps    SHORT TERM GOAL #5:  Goal 5: Pt will complete spoken langauge expression (speech naming) and comprehension (moderately complex yes/no questions, 2-3 step commands) tasks with 70% accuracy given mod cues to improve communicative efficacy. INTERVENTIONS:    Level 1 Convergent namin/8 indep, 2/8 min cues, 1/8 mod cues  *Pt continues to demonstrate significant difficulty with lexical retrieval and slow processing speed. Pt followed 2 step directions: 4/5 indep, 1/5 min cues    Long-term Goals  Timeframe for Long-term Goals: three weeks  Goal 1: Pt will improve cognitive linguistic functioning to moderate impairment level or higher to promote improved safety and communication for optimal level of functionallity. GOAL NOT MET. CONTINUE GOAL. Goal 2: Pt will safely tolerate PO diet level 98% of the time given compensatory swallowing strategies to maximize nutrition/hydration measures. GOAL MET. CONTINUE GOAL.          ASSESSMENT:  Assessment: [x]Progressing towards goals          []Not Progressing towards goals  SUMMARY:     Patient Tolerance of Treatment:   [x]Tolerated well []Tolerated fair []Required rest breaks []Fatigued  Plan for Next Session: Diet monitor, sequencing, short term memory  Education:  Learner:  [x]Patient          []Significant Other          []Other:   Education provided regarding:  [x]Goals and POC   []Diet and swallowing precautions    []Home Exercise Program  []Progress and/or discharge information  Method of Education:  [x]Discussion          []Demonstration          []Handout          []Other  Evaluation of Education:   [x]Verbalized understanding   []Demonstrates without assistance  []Demonstrates with assistance  [x]Needs further instruction     []No evidence of learning                  []No family present      Plan: [x]Continue with current plan of care    []Modify current plan of care as follows:    []Discharge patient    Discharge Location:    Services/Supervision Recommended:     [x]Patient continues to require treatment by a licensed therapist to address functional deficits as outlined in the established plan of care. ALICIA Bills, speech intern. Derik Rivera.  [address] [facility], [address]

## 2018-06-27 PROCEDURE — 1290000000 HC SEMI PRIVATE OTHER R&B

## 2018-06-27 PROCEDURE — 97535 SELF CARE MNGMENT TRAINING: CPT

## 2018-06-27 PROCEDURE — 97530 THERAPEUTIC ACTIVITIES: CPT

## 2018-06-27 PROCEDURE — 6370000000 HC RX 637 (ALT 250 FOR IP): Performed by: INTERNAL MEDICINE

## 2018-06-27 PROCEDURE — 97110 THERAPEUTIC EXERCISES: CPT

## 2018-06-27 PROCEDURE — 97127 HC SP THER IVNTJ W/FOCUS COG FUNCJ: CPT

## 2018-06-27 RX ADMIN — CALCIUM CARBONATE-VITAMIN D TAB 500 MG-200 UNIT 1 TABLET: 500-200 TAB at 09:04

## 2018-06-27 RX ADMIN — MAGNESIUM GLUCONATE 500 MG ORAL TABLET 400 MG: 500 TABLET ORAL at 09:04

## 2018-06-27 RX ADMIN — BUMETANIDE 0.5 MG: 1 TABLET ORAL at 09:04

## 2018-06-27 RX ADMIN — ACETAMINOPHEN 650 MG: 325 TABLET ORAL at 05:13

## 2018-06-27 RX ADMIN — POLYETHYLENE GLYCOL (3350) 17 G: 17 POWDER, FOR SOLUTION ORAL at 09:04

## 2018-06-27 RX ADMIN — PANTOPRAZOLE SODIUM 40 MG: 40 TABLET, DELAYED RELEASE ORAL at 15:05

## 2018-06-27 RX ADMIN — METOPROLOL TARTRATE 12.5 MG: 25 TABLET ORAL at 20:31

## 2018-06-27 RX ADMIN — DOCUSATE SODIUM 100 MG: 100 CAPSULE, LIQUID FILLED ORAL at 09:04

## 2018-06-27 RX ADMIN — METOPROLOL TARTRATE 12.5 MG: 25 TABLET ORAL at 09:03

## 2018-06-27 RX ADMIN — SUCRALFATE 1 G: 1 TABLET ORAL at 10:23

## 2018-06-27 RX ADMIN — SUCRALFATE 1 G: 1 TABLET ORAL at 17:17

## 2018-06-27 RX ADMIN — ACETAMINOPHEN 650 MG: 325 TABLET ORAL at 09:03

## 2018-06-27 RX ADMIN — DOCUSATE SODIUM 100 MG: 100 CAPSULE, LIQUID FILLED ORAL at 20:31

## 2018-06-27 RX ADMIN — ASPIRIN 81 MG: 81 TABLET ORAL at 09:04

## 2018-06-27 RX ADMIN — SUCRALFATE 1 G: 1 TABLET ORAL at 05:14

## 2018-06-27 RX ADMIN — ACETAMINOPHEN 650 MG: 325 TABLET ORAL at 22:27

## 2018-06-27 RX ADMIN — PANTOPRAZOLE SODIUM 40 MG: 40 TABLET, DELAYED RELEASE ORAL at 05:13

## 2018-06-27 RX ADMIN — GUAIFENESIN 600 MG: 600 TABLET, EXTENDED RELEASE ORAL at 09:04

## 2018-06-27 RX ADMIN — LATANOPROST 1 DROP: 50 SOLUTION OPHTHALMIC at 20:31

## 2018-06-27 RX ADMIN — ISOSORBIDE MONONITRATE 30 MG: 30 TABLET ORAL at 09:04

## 2018-06-27 RX ADMIN — CITALOPRAM 40 MG: 40 TABLET, FILM COATED ORAL at 09:04

## 2018-06-27 RX ADMIN — ATORVASTATIN CALCIUM 20 MG: 20 TABLET, FILM COATED ORAL at 20:31

## 2018-06-27 RX ADMIN — SPIRONOLACTONE 12.5 MG: 25 TABLET ORAL at 09:04

## 2018-06-27 RX ADMIN — LEVOTHYROXINE SODIUM 88 MCG: 88 TABLET ORAL at 05:14

## 2018-06-27 RX ADMIN — GUAIFENESIN 600 MG: 600 TABLET, EXTENDED RELEASE ORAL at 20:31

## 2018-06-27 RX ADMIN — ACETAMINOPHEN 650 MG: 325 TABLET ORAL at 15:05

## 2018-06-27 RX ADMIN — SUCRALFATE 1 G: 1 TABLET ORAL at 20:31

## 2018-06-27 ASSESSMENT — PAIN SCALES - GENERAL
PAINLEVEL_OUTOF10: 0
PAINLEVEL_OUTOF10: 2
PAINLEVEL_OUTOF10: 0
PAINLEVEL_OUTOF10: 1

## 2018-06-27 NOTE — PROGRESS NOTES
Patient Name: Eva Pittman        MRN: [id number]    : 1930  (80 y.o.)  Gender: female   Principal Problem: <principal problem not specified>    Section F  Preferences for Customary Routine Activities   . Should Interview for Daily and Activity Preferences be Conducted?  Attempt to interview all residents able to communicate. If resident is unable to complete, attempt to complete interview with family member or significant other. Enter Code    1 0. No (resident is rarely/never understood and family/significant other not available) à Skip to and complete , Staff Assessment of Daily and Activity Preferences  1. Yes à Continue to Merl Scheuermann for Daily Preferences   . Interview for Daily Preferences  Show resident the response options and say: Juliana Love you are in this facility           [place]  Very Important  2  Somewhat Important  3  Not very important  4  Not important at all  5  Important, but cant do or no choice  6  No response or non-responsive Enter Codes in Boxes    1 A. How important is it to you to choose what clothes to wear?    1 B. How important is it to you to take care of your personal belongings or things?    4 C. How important is it to you to choose between a tub bath, shower, bed bath or sponge bath?    4 D. How important is it to you to have snacks available between meals?    1 E. How important is it to you to choose your own bedtime?    1 F. How important is it to you to have your family or a close friend involved in discussions about your care?    4 G. How important is it to you to be able to use the phone in private?    4 H. How important is it to you to have a place to lock your things to keep them safe? .  Interview for Activity Preferences   Show resident the response options and say: Juliana Love you are in this facility           [place]  Very Important  2  Somewhat Important  3  Not very important  4  Not important at all  5  Important, but cant do or no choice  6  No response or non-responsive Enter Codes in Boxes    1 A. How important is it to you to have books, newspapers, and magazines to read?    1 B. How important is it to you to listen to music you like?    4 C. How important is it to you to be around animals such as pets?     4 D. How important is it to you to keep up with the news?    2 E. How important is it to you to do things with groups of people?     1 F. How important is it to you to do your favorite activities?     1 G. How important is it to you to go outside to get fresh air when the weather is good?     1 H. How important is it to you to participate in [employer] services or practices? .  Daily and Activity Preferences Primary Respondent   Enter Code    1 Indicate Primary respondent for Daily and Activity Preferences ( and )  1  Resident  2  Family or Significant other (close friend or other representative)  5  Interview could not be completed by resident or family/significant other (no response to 3 or more items)

## 2018-06-27 NOTE — PROGRESS NOTES
Liliya Stern        MRN: [id number]    : 1930  (80 y.o.)  Gender: female   Principal Problem: <principal problem not specified>    Section J    Health Conditions    Should Pain Assessment Interview be Conducted? Attempt to conduct interview with all residents. If resident is comatose, skip to , Shortness of Breath (dyspnea)   Enter Code  1 0 - No à (resident is rarely/never understood) à Skip to [address] of Breath  1 - Yes à Continue to , Pain Presence   Pain Assessment Interview   Pain Presence   Enter Code  1 Ask resident: Israel Hector you had pain or hurting at any time in the last 5 days?   0 - No à Skip to , Shortness of Breath  1 - Yes  à Continue to , Pain Frequency  9  Unable to answer à Skip to , Shortness of Breath    Pain Frequency   Enter Code          4 Ask resident: Rito Zamudio much of the time have you experienced pain or hurting over the last 5 days?   1  Almost constantly  2  Frequently  3  Occasionally  4  Rarely  9  Unable to answer    Pain Effect on Function   Enter Code      0 Ask resident: Joon Strickland the last 5 days, has pain made it hard for you to sleep at night?   0 - No   1 - Yes   9  Unable to answer    Enter Code      0 Ask resident: Joon Sidles the last 5 days, have you limited your day-to-day activities because of pain?   0 - No   1 - Yes   9  Unable to answer     Pain Intensity       Enter Code      1 A. Verbal Descriptor Scale  Ask resident: Please rate the intensity of your worst pain over the last 5 days.  (Show resident verbal scale)  1  Mild  2  Moderate  3  Severe  4  Very severe, horrible  9  Unable to answer

## 2018-06-27 NOTE — PROGRESS NOTES
tray removal (~15 minute delay): 1/5 indep, 1/5 indep use of external cues, 1/5 min cues, 1/5 min-mod cues, 1/5 max cues     Recall of who pt was talking to on the phone prior to [address] [place] [address]  arrival (20 minute delay): indep recall     Recall of children's names-- pt stating Bolivar Porras" unable to confirm as no family present     SHORT TERM GOAL #4:  Goal 4: Pt will complete basic safety, problem solving, reasoning, and sequencing tasks with 60% accuracy given mod cues to improve awareness for integration into current and future settings. INTERVENTIONS: Problem solving for use of call light-- min cues to recall use of call light, indep locating and demonstration of use   Reasons for call light-- 2/3 min cues, 1/3 mod cues     Determining times on analog clocks: 4/10 indep, 1/10 min cues, 1/10 mod cues, 3/10 max cues, 0/55 unable to elicit despite max cues     Determining bill/coin amounts-- pt with hx of glaucoma. Poor success with task resulting in termination. SHORT TERM GOAL #5:  Goal 5: Pt will complete spoken langauge expression (speech naming) and comprehension (moderately complex yes/no questions, 2-3 step commands) tasks with 70% accuracy given mod cues to improve communicative efficacy. INTERVENTIONS: DNT d/t focus on other goals     Long-term Goals  Timeframe for Long-term Goals: three weeks  Goal 1: Pt will improve cognitive linguistic functioning to moderate impairment level or higher to promote improved safety and communication for optimal level of functionallity. ONGOING   Goal 2: Pt will safely tolerate PO diet level 98% of the time given compensatory swallowing strategies to maximize nutrition/hydration measures.    ONGOING        ASSESSMENT:  Assessment: [x]Progressing towards goals          []Not Progressing towards goals  SUMMARY:     Patient Tolerance of Treatment:   [x]Tolerated well []Tolerated fair []Required rest breaks []Fatigued  Plan for Next Session: Diet monitor, sequencing,

## 2018-06-27 NOTE — PROGRESS NOTES
[address]  INPATIENT OCCUPATIONAL THERAPY  [facility]  PROGRESS NOTE    Time:  Time In: 07  Time Out: 0825  Timed Code Treatment Minutes: 39 Minutes  Minutes: 45          Date: 2018  Patient Name: Dianne Ordonez,   Gender: female      MRN: [id number]  : 1930  (80 y.o.)  Referring Practitioner: Jacqueline Hogue MD  Diagnosis: s/p fall   Additional Pertinent Hx: Pt admitted 6-7 after a fall OOB at [facility]. Pt fell onto right side ,presetning with soreness throughout that side.  CT of head and neck with no acute process, xrays of right tibia/fibula and hip/pelvis  negative    Restrictions/Precautions:  Restrictions/Precautions: General Precautions, Fall Risk            Position Activity Restriction  Other position/activity restrictions: confused         Past Medical History:   Diagnosis Date    ARNOLD (acute kidney injury) ([address]     Anxiety and depression 2018    Atrial fibrillation ([address]     Blood transfusion reaction     CAD (coronary artery disease)     Severe triple vessel disease    Cerebrovascular disease 2018    CKD (chronic kidney disease)     Diastolic congestive heart failure (HCC)     DJD (degenerative joint disease), cervical 2018    GERD (gastroesophageal reflux disease)     Hiatal hernia     History of blood transfusion     Hyperlipidemia     Hypertension     Hypoalbuminemia     Hypothyroidism     Mild protein-calorie malnutrition (HCC)     NSTEMI (non-ST elevated myocardial infarction) ([address] 2014    Pancreatitis due to biliary obstruction     Pneumonia, organism unspecified(486)     Scoliosis     SDH (subdural hematoma) ([address]     Teresa hole decompresson on 2014    Thyroid disease     Unspecified cerebral artery occlusion with cerebral infarction     Uterine cancer ([address] 1966     Past Surgical History:   Procedure Laterality Date    APPENDECTOMY      BACK SURGERY      TERESA HOLE  2014    drainage of left SDH    CARDIAC SURGERY  COLONOSCOPY      CORONARY ARTERY BYPASS GRAFT  9/27/11    LIMA to LAD, SVG to 1st Marginal, Distal Right Coronary Artery, 1st Diagonal    DILATATION, ESOPHAGUS      ENDOSCOPY, COLON, DIAGNOSTIC      EYE SURGERY      bilateral cataracts    HYSTERECTOMY      MS EGD BALLOON DILATION ESOPHAGUS <30 MM DIAM N/A 11/22/2017    EGD ESOPHAGOGASTRODUODENOSCOPY DILATATION performed by Omer Wick MD at [facility]    MS ESOPHAGOGASTRODUODENOSCOPY TRANSORAL DIAGNOSTIC  11/22/2017    EGD ESOPHAGOGASTRODUODENOSCOPY performed by Omer Wick MD at [address] [place] [address] Left     SKIN BIOPSY      VASCULAR SURGERY      cabg harvests from left leg           Subjective       Subjective: Patient seated in bedside chair upon arrival. Agreeable to OT session    Overall Orientation Status: Impaired         Pain:  Pain Assessment  Patient Currently in Pain: Denies       Objective  Overall Cognitive Status: Exceptions  Cognition Comment: slow processing, decreased ST recall during ADLs         ADL  Grooming: Stand by assistance;Verbal cueing (sinkside for oral care, washing face and combing hair)  LE Dressing: Moderate assistance (donning shoes)  Toileting: Stand by assistance               Transfers  Sit to stand: Stand by assistance  Stand to sit: Stand by assistance  Transfer Comments: verbal cues for safety with hand placement  Toilet Transfers  Toilet - Technique: Ambulating  Equipment Used: Standard toilet  Toilet Transfer: Stand by assistance    Balance  Sitting Balance: Supervision  Standing Balance: Stand by assistance     Time: x 5 min during ADLs, and x 4 min (see below)  Activity: Pt stood with B hand release while folding linens. Fair balance noted, no LOB, fatigues quickly  Functional - Mobility Device: Rolling Walker  Activity: Other; To/from bathroom  Assist Level: Contact guard assistance  Functional Mobility Comments: To/from therapy gym at slow pace. No LOB noted.  Lengthy seated rest break after each trial of mobility. Arturo Kidney for walker safety provided        Type of ROM/Therapeutic Exercise: Free weights  Comment: Completed BUE exercises x10 reps x1 set in all joints/planes seated in chair with 1# weight. Required rest breaks after each exercise. Completed exercises to increase strength and activity tolerance required for ADLs and toilet transfers          Activity Tolerance:  Activity Tolerance: Patient limited by fatigue;Patient Tolerated treatment well  Activity Tolerance: noted confusion during ADLs, decreased ST recall of how to perform ADLs and where items belong    Assessment:  Performance deficits / Impairments: Decreased safe awareness, Decreased balance, Decreased ADL status, Decreased endurance, Decreased strength, Decreased functional mobility , Decreased cognition, Decreased coordination  Assessment: Pt is making good progress towards goals. Pt has met 4/5 STGs and 1/1 LTGs. Pt has exhibited improvement in t/f, standing, mobility, exs and ADLs as evidenced by pt requiring SBA t/fs, standing x 5 min, and functional mobility, pt requiring modA LB ADL, and able to follow HEP with min cues. Upon admission pt required CGA t/fs, CGA mobility, Gay standing x 30 sec., maxA LB ADL. Pt continues to exhibit decreased cognition, attention, endurance, and activity tolerance causing barriers to meeting pt's goals. Pt continues to require skilled OT intervention to improve ADL performance required for pt to return home at [facility].   Prognosis: Fair  Discharge Recommendations: Patient would benefit from continued therapy after discharge, Home with Home health OT (Return to assisted living)    Patient Education:  Patient Education: safety with ADLs    Equipment Recommendations:  Equipment Needed: No  Other: None needed at this time    Safety:  Safety Devices in place: Yes  Type of devices: Call light within reach, Chair alarm in place, Left in chair, Gait belt, Patient at risk for falls, All fall risk precautions in place    Plan:  Times per week: 6x  Current Treatment Recommendations: Strengthening, Endurance Training, Patient/Caregiver Education & Training, Self-Care / ADL, Functional Mobility Training, Balance Training, Safety Education & Training    Goals:  Patient goals : feel less tired     Short term goals  Time Frame for Short term goals: 1 week   Short term goal 1: Pt will complete functional transfers to/from chair/toilet with CGA and 0-2 cues for safety to increase safety with toileting routine. MET, REVISE  Short term goal 2: Pt will complete dynamic standing > 3 min with SBA and 1-2 hand release to increase ability to reach into closet or cupbaords for ADL objects MET, REVISE  Short term goal 3: Pt will complete functional mobilty > HH distances with CGA and RW and 0-2 cues for safety to increase endurance needed for ADL routine. MET, REVISE  Short term goal 4: Pt will complete light UE AROM HEP with 0-2 cues for technique to increase strength needed for bathing. MET, REVISE  Short term goal 5: Pt will complete LE ADLs with Min A and LHAE prn. NOT MET, CONTINUE  Long term goals  Time Frame for Long term goals : 2 weeks  Long term goal 1: Pt will complete BADL routine with Mod A and AE prn and 0-2 cues for sequencing/attending to task. MET, REVISE      Revised Short-Term Goals  Short term goals  Time Frame for Short term goals: 1 week   Short term goal 1: Pt will complete functional transfers to/from chair/toilet with S and 0 cues for safety to increase safety with toileting routine. Short term goal 2: Pt will complete dynamic standing > 5 min with S and 1-2 hand release to increase ability to reach into closet or cupbaords for ADL objects   Short term goal 3: Pt will complete functional mobilty > HH distances with S and RW and 0 cues for safety to increase endurance needed for ADL routine.    Short term goal 4: Pt will complete light UE AROM HEP with 0-1 cues for technique to increase strength

## 2018-06-27 NOTE — PROGRESS NOTES
Stand by assistance (from bedside chair, toilet seat, standard chair; multiple attempts to rise from toilet seat; verbal cues for hand placement with poor carrover)  Stand to sit: Stand by assistance (to toilet seat, bedside chair, and standard chair; verbal cues for hand placement with poor carryover)       Ambulation 1  Surface: level tile  Device: Rolling Walker  Other Apparatus: O2 (2L O2)  Assistance: Stand by assistance  Quality of Gait: decreased velocity and lew, fair- B heel strike, fair+ B foot clearance, shortened but equal B step length, mild path deviations, kyphotic posture  Distance: 130'x2, 10'x2  Comments: pt easily distracted requiring frequent verbal cues for re-direction; verbal cues to correct path deviations       Balance  Comments: pt completed pericare and hand washing/drying at SBA/CGA with no unsteadiness or LOB noted; pt performed ring toss and reached outside JEFFERY to retrieve rings with LUE support on RW at CGA x3 trials with rest breaks between trials     ELDERLY MOBILITY SCALE   LYING TO SITTING 1 - Needs help of 1 person   SITTING TO LYING 1 - Needs help of 1 person   SIT TO STAND 1 - Needs help of 1 person (verbal or physical)   STANDING 2 - Stands without support but needs help to reach   GAIT 0 - Needs physical help to walk or constant supervision   TIMED WALK (6 meters) 1 - Over 30 seconds   FUNCTIONAL REACH 2 - 10 - 20 cm       TOTAL SCORE 8/20     Scores under 10 - generally these patients are dependent for mobility and require help with basic ADL's. Scores between 10 and 13 - generally these patients are borderline in terms of safe mobility and ADL independence  Scores over 14 - generally these patients are able to perform mobility independently and safely and are independent in basic ADL's.    *Results are general interpretations that do not take into account cognition, safety awareness and other factors that may impact mobility.     SCORE CONVERTER:  0 % Impaired 1-4 CM 80-99% Impaired   5-8 CL 60-79% Impaired   9-12 CK 40-59% Impaired   13-16 CJ 20-39% Impaired   17-19 CI 1-19% Impaired   20 CH 0% Impaired     Activity Tolerance:  Activity Tolerance: Patient Tolerated treatment well    Assessment: Body structures, Functions, Activity limitations: Decreased functional mobility , Decreased strength, Decreased cognition, Decreased endurance, Decreased balance  Assessment: Pt tolerated PT session fairly well. Pt demonstrates improvements in ambulation distance, LE strength, endurance, and balance. Pt met 3/4 STGs. Pt has not met all goals secondary to poor safety awareness and requiring assistance for functional mobility tasks. Further therapy sessions will focus on improving balance, increasing LE strength, and walking prolonged distances with no c/o SOB/dizziness/lightheadedness. Continue per POC with revised STGs and LTGs. Prognosis: Good  Discharge Recommendations: [address], Patient would benefit from continued therapy after discharge    Patient Education:  Patient Education: transfers, balance, posture, gait    Equipment Recommendations:  Equipment Needed: No    Safety:  Type of devices:  All fall risk precautions in place, Call light within reach, Chair alarm in place, Gait belt, Patient at risk for falls, Left in chair, Nurse notified  Restraints  Initially in place: No    Plan:  Times per week: 6x  Times per day: Daily  Specific instructions for Next Treatment: gait, balance, endurance, therex  Current Treatment Recommendations: Strengthening, Balance Training, Functional Mobility Training, Transfer Training, Endurance Training, Gait Training, Home Exercise Program, Safety Education & Training, Patient/Caregiver Education & Training    Goals:  Patient goals : get stronger    Short term goals  Time Frame for Short term goals: 10 days  Short term goal 1: Pt to perform all bed mobility at A for getting in and out of bed. - MET  Short term goal

## 2018-06-28 PROCEDURE — 97535 SELF CARE MNGMENT TRAINING: CPT

## 2018-06-28 PROCEDURE — 97530 THERAPEUTIC ACTIVITIES: CPT

## 2018-06-28 PROCEDURE — 92526 ORAL FUNCTION THERAPY: CPT

## 2018-06-28 PROCEDURE — 97116 GAIT TRAINING THERAPY: CPT

## 2018-06-28 PROCEDURE — 6370000000 HC RX 637 (ALT 250 FOR IP): Performed by: INTERNAL MEDICINE

## 2018-06-28 PROCEDURE — 1290000000 HC SEMI PRIVATE OTHER R&B

## 2018-06-28 RX ADMIN — LATANOPROST 1 DROP: 50 SOLUTION OPHTHALMIC at 23:45

## 2018-06-28 RX ADMIN — ISOSORBIDE MONONITRATE 30 MG: 30 TABLET ORAL at 09:23

## 2018-06-28 RX ADMIN — ACETAMINOPHEN 650 MG: 325 TABLET ORAL at 09:24

## 2018-06-28 RX ADMIN — CITALOPRAM 40 MG: 40 TABLET, FILM COATED ORAL at 09:23

## 2018-06-28 RX ADMIN — ATORVASTATIN CALCIUM 20 MG: 20 TABLET, FILM COATED ORAL at 23:45

## 2018-06-28 RX ADMIN — METOPROLOL TARTRATE 12.5 MG: 25 TABLET ORAL at 09:23

## 2018-06-28 RX ADMIN — CALCIUM CARBONATE-VITAMIN D TAB 500 MG-200 UNIT 1 TABLET: 500-200 TAB at 09:23

## 2018-06-28 RX ADMIN — ACETAMINOPHEN 650 MG: 325 TABLET ORAL at 05:35

## 2018-06-28 RX ADMIN — ASPIRIN 81 MG: 81 TABLET ORAL at 09:24

## 2018-06-28 RX ADMIN — SPIRONOLACTONE 12.5 MG: 25 TABLET ORAL at 09:24

## 2018-06-28 RX ADMIN — BUMETANIDE 0.5 MG: 1 TABLET ORAL at 09:23

## 2018-06-28 RX ADMIN — GUAIFENESIN 600 MG: 600 TABLET, EXTENDED RELEASE ORAL at 23:45

## 2018-06-28 RX ADMIN — GUAIFENESIN 600 MG: 600 TABLET, EXTENDED RELEASE ORAL at 09:23

## 2018-06-28 RX ADMIN — SUCRALFATE 1 G: 1 TABLET ORAL at 23:45

## 2018-06-28 RX ADMIN — LEVOTHYROXINE SODIUM 75 MCG: 75 TABLET ORAL at 05:35

## 2018-06-28 RX ADMIN — DOCUSATE SODIUM 100 MG: 100 CAPSULE, LIQUID FILLED ORAL at 09:24

## 2018-06-28 RX ADMIN — MAGNESIUM GLUCONATE 500 MG ORAL TABLET 400 MG: 500 TABLET ORAL at 09:23

## 2018-06-28 RX ADMIN — DOCUSATE SODIUM 100 MG: 100 CAPSULE, LIQUID FILLED ORAL at 23:51

## 2018-06-28 RX ADMIN — PANTOPRAZOLE SODIUM 40 MG: 40 TABLET, DELAYED RELEASE ORAL at 05:35

## 2018-06-28 ASSESSMENT — PAIN SCALES - GENERAL
PAINLEVEL_OUTOF10: 0
PAINLEVEL_OUTOF10: 1
PAINLEVEL_OUTOF10: 0
PAINLEVEL_OUTOF10: 0

## 2018-06-28 NOTE — PROGRESS NOTES
Transitional Care Unit                   RECERTIFICATION    Patient Name: Mary Stewart        MRN: [id number]    Audie: [de-identified]    : 1930  (80 y.o.)  Gender: female     RECERTIFICATION of continued SNF inpatient care. On or before the 14 day:  Date:      I certify that continued SNF inpatient care is necessary for the following reason(s):  PT/OT     I estimate that the additional period of SNF inpatient care will be 7-10 days.       Plans for post-SNF care are:  [x]Home Health Agency  [x]Office Care    []Other :    Continued SNF care is same condition for which patient received inpatient hospital services:  [x]Yes   []No    Electronically signed by Miles Bae MD on 2018 at 6:55 PM

## 2018-06-28 NOTE — PROGRESS NOTES
[address]  INPATIENT OCCUPATIONAL THERAPY  [facility]  DAILY NOTE    Time:  Time In: 0700  Time Out: 8175  Timed Code Treatment Minutes: 52 Minutes  Minutes: 47          Date: 2018  Patient Name: Damian Baeza,   Gender: female      Room: [facility]/061-A  MRN: [id number]  : 1930  (80 y.o.)  Referring Practitioner: Jeannie Cuello MD  Diagnosis: s/p fall   Additional Pertinent Hx: Pt admitted 6-7 after a fall OOB at [facility]. Pt fell onto right side ,presetning with soreness throughout that side.  CT of head and neck with no acute process, xrays of right tibia/fibula and hip/pelvis  negative    Past Medical History:   Diagnosis Date    ARNOLD (acute kidney injury) ([address]     Anxiety and depression 2018    Atrial fibrillation ([address]     Blood transfusion reaction     CAD (coronary artery disease)     Severe triple vessel disease    Cerebrovascular disease 2018    CKD (chronic kidney disease)     Diastolic congestive heart failure (HCC)     DJD (degenerative joint disease), cervical 2018    GERD (gastroesophageal reflux disease)     Hiatal hernia     History of blood transfusion     Hyperlipidemia     Hypertension     Hypoalbuminemia     Hypothyroidism     Mild protein-calorie malnutrition (HCC)     NSTEMI (non-ST elevated myocardial infarction) ([address] 2014    Pancreatitis due to biliary obstruction     Pneumonia, organism unspecified(486)     Scoliosis     SDH (subdural hematoma) ([address]     Lorman hole decompresson on 2014    Thyroid disease     Unspecified cerebral artery occlusion with cerebral infarction     Uterine cancer ([address] 1966     Past Surgical History:   Procedure Laterality Date    APPENDECTOMY      BACK SURGERY      TERESA HOLE  2014    drainage of left SDH    CARDIAC SURGERY      COLONOSCOPY      CORONARY ARTERY BYPASS GRAFT  11    LIMA to LAD, SVG to 1st Marginal, Distal Right Coronary Artery, 1st Diagonal    DILATATION, ESOPHAGUS      ENDOSCOPY, COLON, DIAGNOSTIC      EYE SURGERY      bilateral cataracts    HYSTERECTOMY      [place] EGD BALLOON DILATION ESOPHAGUS <30 MM DIAM N/A 11/22/2017    EGD ESOPHAGOGASTRODUODENOSCOPY DILATATION performed by Joceline Livingston MD at [facility]    [place] ESOPHAGOGASTRODUODENOSCOPY TRANSORAL DIAGNOSTIC  11/22/2017    EGD ESOPHAGOGASTRODUODENOSCOPY performed [facility]oc[facility] MD at [facility] Left     SKIN BIOPSY      VASCULAR SURGERY      cabg harvests from left leg       Restrictions/Precautions:  General Precautions, Fall Risk     Other position/activity restrictions: confused       Prior Level of Function:  ADL Assistance: Independent  Homemaking Assistance: Needs assistance  Ambulation Assistance: Independent  Transfer Assistance: Independent  Additional Comments: Pt reports living at [place] [address] [facility] for \"a couple months. \" Pt using  PTA for mobility. Getting assistance for meals. Indep with ADLs. Subjective       Subjective: Patient lying in bed upon arrival. Pt stated \"I think something is wrong with my family. I haven't seen them all day and nobody has talked to me. \" Therapist reoriented patient to time and situation and pt stated \"I just know something is wrong with my family. I need to talk to them. \" Therapist assisted patient with calling daughter. After talking with daughter pt appeared pleased and agreeable to complete ADL routine and then call sister. Overall Orientation Status: Impaired         Pain:  Pain Assessment  Patient Currently in Pain: Denies       Objective  Overall Cognitive Status: Exceptions  Cognition Comment: Patient appeared to have an increase of confusion this AM. Decreased safety and insight. Decreased problem solving.  Slow processing      ADL  Grooming: Setup;Verbal cueing (seated in chair for hair care)  UE Bathing: Supervision;Verbal cueing (seated EOB with cueing provided for problem solving)  LE Bathing: Contact guard

## 2018-06-28 NOTE — PROGRESS NOTES
[address] [address]  INPATIENT SPEECH THERAPY  [facility]    TIME   SLP Individual Minutes  Time In: 0800  Time Out: 0830  Minutes: 30  Timed Code Treatment Minutes: 0 Minutes       [x]Daily Note  []Progress Note  []Discharge Note    Date: 2018  Patient Name: Ro Lund        MRN: [id number]    : 1930  (80 y.o.)  Gender: female   Primary Provider: Ivette Hartmann MD  Admitting Diagnosis:  s/p fall   Secondary Diagnosis: Dysphagia; Cognitive-linguistic deficits  Precautions: Fall risk, aspiration precautions; Close pulmonary monitoring   Swallowing Status/Diet: Dysphagia level 2 with thin liquids   Swallowing Strategies: Monitor for alertness, upright positioning, small bites/sips, slow rate, 1:1 direct supervision   DATE of last BSE: 18  Pain:  0/10    Subjective: Pt upright in recliner for dysphagia therapy. Pt alert and agreeable to session. SHORT TERM GOAL #1:  Goal 1: Pt will safely toleration dysphagia 2 diet with thin liquids (with direct supervision) with no overt s/s aspiration to maximize nutrition/hydration measures. INTERVENTIONS: Direct diet monitor via breakfast tray of dysphagia II and thin liquids. Pt's oral phase characterized by slightly prolonged, but adequate mastication; good AP oral transit; good size and rate of bolus; and no oral stasis. Pt's pharyngeal phase unremarkable; no s/s of aspiration observed during meal. Pt's vocal quality clear throughout. Pt with excellent tolerance of dysphagia II and thins this date. Recommend continue on diet of dysphagia II and thins and trial advanced textures to assess appropriateness for diet upgrade. SHORT TERM GOAL #2:  Goal 2: Pt will safely tolerate advanced texture trials 10/10 times to permit potential diet advancement. INTERVENTIONS: DNT due to focus on other goals.     SHORT TERM GOAL #3:  Goal 3: Pt will complete orientation and functional recall/carryover tasks with 60% accuracy given mod cues to improve retention of novel information.l   INTERVENTIONS: DNT due to focus on other goals. SHORT TERM GOAL #4:  Goal 4: Pt will complete basic safety, problem solving, reasoning, and sequencing tasks with 60% accuracy given mod cues to improve awareness for integration into current and future settings. INTERVENTIONS: DNT due to focus on other goals. SHORT TERM GOAL #5:  Goal 5: Pt will complete spoken langauge expression (speech naming) and comprehension (moderately complex yes/no questions, 2-3 step commands) tasks with 70% accuracy given mod cues to improve communicative efficacy. INTERVENTIONS: DNT d/t focus on other goals     Long-term Goals  Timeframe for Long-term Goals: three weeks  Goal 1: Pt will improve cognitive linguistic functioning to moderate impairment level or higher to promote improved safety and communication for optimal level of functionallity. ONGOING   Goal 2: Pt will safely tolerate PO diet level 98% of the time given compensatory swallowing strategies to maximize nutrition/hydration measures.    ONGOING        ASSESSMENT:  Assessment: [x]Progressing towards goals          []Not Progressing towards goals  SUMMARY:     Patient Tolerance of Treatment:   [x]Tolerated well []Tolerated fair []Required rest breaks []Fatigued  Plan for Next Session: sequencing, short term memory  Education:  Learner:  [x]Patient          []Significant Other          []Other:   Education provided regarding:  [x]Goals and POC   [x]Diet and swallowing precautions    []Home Exercise Program  []Progress and/or discharge information  Method of Education:  [x]Discussion          [x]Demonstration          []Handout          []Other  Evaluation of Education:   [x]Verbalized understanding   []Demonstrates without assistance  []Demonstrates with assistance  [x]Needs further instruction     []No evidence of learning                  [x]No family present      Plan: [x]Continue with current plan of care    []Modify current plan

## 2018-06-28 NOTE — PROGRESS NOTES
[address] [address]  INPATIENT PHYSICAL THERAPY  DAILY NOTE  [facility] - 8E-61/061-A    Time In: 5005  Time Out: 1219  Timed Code Treatment Minutes: 29 Minutes  Minutes: 34          Date: 2018  Patient Name: Will Howard,  Gender:  female        MRN: [id number]  : 1930  (80 y.o.)  Referral Date : 18  Referring Practitioner: Ordering: Leydi Dickens MD  Attending: JOSE ROBERTO Mendoza MD  Diagnosis: s/p fall  Additional Pertinent Hx: Pt admitted 6-7 after a fall OOB at [facility]. Pt fell onto right side ,presetning with soreness throughout that side. CT of head and neck with no acute process, xrays of right tibia/fibula and hip/pelvis  negative. To TCU on .      Past Medical History:   Diagnosis Date    ARNOLD (acute kidney injury) ([address]     Anxiety and depression 2018    Atrial fibrillation ([address]     Blood transfusion reaction     CAD (coronary artery disease)     Severe triple vessel disease    Cerebrovascular disease 2018    CKD (chronic kidney disease)     Diastolic congestive heart failure (HCC)     DJD (degenerative joint disease), cervical 2018    GERD (gastroesophageal reflux disease)     Hiatal hernia     History of blood transfusion     Hyperlipidemia     Hypertension     Hypoalbuminemia     Hypothyroidism     Mild protein-calorie malnutrition (HCC)     NSTEMI (non-ST elevated myocardial infarction) ([address] 2014    Pancreatitis due to biliary obstruction     Pneumonia, organism unspecified(486)     Scoliosis     SDH (subdural hematoma) ([address]     Teresa hole decompresson on 2014    Thyroid disease     Unspecified cerebral artery occlusion with cerebral infarction     Uterine cancer ([address] 1966     Past Surgical History:   Procedure Laterality Date    APPENDECTOMY      BACK SURGERY      TERESA HOLE  2014    drainage of left SDH    CARDIAC SURGERY      COLONOSCOPY      CORONARY ARTERY BYPASS GRAFT  11    LIMA to LAD, SVG to 1st Marginal,

## 2018-06-28 NOTE — PROGRESS NOTES
Alert and oriented to person and place only. Re-oriented to time. LAW 3mm to 2mm brisk. sclera white. Mucous membranes pink and moist. Hair shiny. skin warm and dry. Bruising right side of face and neck. speech clear. Heart sounds strong irregular. Lung sounds clear anterior. Crackles  posteriorly through out. Respiration easy and unlabored. Nonproductive cough noted. 02 2 liters per minute. bowel sounds active in all four quadrant. Abdomen round and soft. denies passing flatus. denies any burning with urination. Small Brown stool soft . radial pulse strong bilaterally. Hand grasp strong bilaterally. arm drift negative bilaterally. Capillary refill less the three seconds. skin turgor sluggish. No edema noted. Pedal push and pull strong bilaterally. pedal pulses strong bilaterally. Jayshree's sign negative leg lift strong bilaterally bruise on right lower leg. no numbness or tingling noted. Chair alarm on feet elevated call light in reach. Denies assistance at this time.  Denies pain at this time but is holding right arm

## 2018-06-29 PROCEDURE — 97110 THERAPEUTIC EXERCISES: CPT

## 2018-06-29 PROCEDURE — 1290000000 HC SEMI PRIVATE OTHER R&B

## 2018-06-29 PROCEDURE — 97116 GAIT TRAINING THERAPY: CPT

## 2018-06-29 PROCEDURE — 6370000000 HC RX 637 (ALT 250 FOR IP): Performed by: INTERNAL MEDICINE

## 2018-06-29 RX ADMIN — LATANOPROST 1 DROP: 50 SOLUTION OPHTHALMIC at 19:54

## 2018-06-29 RX ADMIN — SPIRONOLACTONE 12.5 MG: 25 TABLET ORAL at 08:31

## 2018-06-29 RX ADMIN — ATORVASTATIN CALCIUM 20 MG: 20 TABLET, FILM COATED ORAL at 19:53

## 2018-06-29 RX ADMIN — ACETAMINOPHEN 650 MG: 325 TABLET ORAL at 00:02

## 2018-06-29 RX ADMIN — PANTOPRAZOLE SODIUM 40 MG: 40 TABLET, DELAYED RELEASE ORAL at 05:54

## 2018-06-29 RX ADMIN — ACETAMINOPHEN 650 MG: 325 TABLET ORAL at 22:06

## 2018-06-29 RX ADMIN — SUCRALFATE 1 G: 1 TABLET ORAL at 19:53

## 2018-06-29 RX ADMIN — SUCRALFATE 1 G: 1 TABLET ORAL at 17:20

## 2018-06-29 RX ADMIN — GUAIFENESIN 600 MG: 600 TABLET, EXTENDED RELEASE ORAL at 19:53

## 2018-06-29 RX ADMIN — SUCRALFATE 1 G: 1 TABLET ORAL at 05:53

## 2018-06-29 RX ADMIN — CALCIUM CARBONATE-VITAMIN D TAB 500 MG-200 UNIT 1 TABLET: 500-200 TAB at 08:30

## 2018-06-29 RX ADMIN — DOCUSATE SODIUM 100 MG: 100 CAPSULE, LIQUID FILLED ORAL at 19:54

## 2018-06-29 RX ADMIN — CITALOPRAM 40 MG: 40 TABLET, FILM COATED ORAL at 08:30

## 2018-06-29 RX ADMIN — LEVOTHYROXINE SODIUM 88 MCG: 88 TABLET ORAL at 05:54

## 2018-06-29 RX ADMIN — GUAIFENESIN 600 MG: 600 TABLET, EXTENDED RELEASE ORAL at 08:30

## 2018-06-29 RX ADMIN — ACETAMINOPHEN 650 MG: 325 TABLET ORAL at 10:01

## 2018-06-29 RX ADMIN — ACETAMINOPHEN 650 MG: 325 TABLET ORAL at 17:20

## 2018-06-29 RX ADMIN — ISOSORBIDE MONONITRATE 30 MG: 30 TABLET ORAL at 08:31

## 2018-06-29 RX ADMIN — MAGNESIUM GLUCONATE 500 MG ORAL TABLET 400 MG: 500 TABLET ORAL at 08:31

## 2018-06-29 RX ADMIN — METOPROLOL TARTRATE 12.5 MG: 25 TABLET ORAL at 08:31

## 2018-06-29 RX ADMIN — ACETAMINOPHEN 650 MG: 325 TABLET ORAL at 05:53

## 2018-06-29 RX ADMIN — PANTOPRAZOLE SODIUM 40 MG: 40 TABLET, DELAYED RELEASE ORAL at 17:20

## 2018-06-29 RX ADMIN — METOPROLOL TARTRATE 12.5 MG: 25 TABLET ORAL at 19:54

## 2018-06-29 RX ADMIN — ASPIRIN 81 MG: 81 TABLET ORAL at 08:30

## 2018-06-29 RX ADMIN — BUMETANIDE 0.5 MG: 1 TABLET ORAL at 08:30

## 2018-06-29 ASSESSMENT — PAIN SCALES - GENERAL
PAINLEVEL_OUTOF10: 0

## 2018-06-29 NOTE — PROGRESS NOTES
Alert to person and place reoriented to time. LAW 3mm to 2mm brisk. Sclera white. Mucous membranes pink and moist. Hair shiny. Skin warm and dry. Bruising in right lower leg, right inner ankle, and right lower quadrant of abdomen. Speech clear. Apical pulse irregular. Lung sounds crackles throughout. No cough noted. Bowel sounds active in all four quadrants. Abdomen round and soft and non tender to palpation. States no flatulence. Denies pain with urination. Last bowel movement yesterday. Radial pulses strong and equal bilaterally. Hand grasp strong and equal. Arm drift negative bilaterally. Capillary refill less than three seconds. Skin turgor sluggish. No edema note. Pedal pulses strong and equal bilaterally. Pedal push and pull strong bilaterally. Homans sign negative bilaterally. Leg lift strong bilaterally. Denies numbness and tingling. Up in chair wheels lock, alarm on, call light in reach. Denies any assistance at this time.

## 2018-06-29 NOTE — PROGRESS NOTES
Nutrition Assessment    Type and Reason for Visit: Reassess    Nutrition Recommendations: Encourage oral intake of meals and oral supplements  Honor food prefs as able  Monitor weight trends closely    Malnutrition Assessment:  · Malnutrition Status: At risk for malnutrition  · Context: Acute illness or injury    Nutrition Diagnosis:   · Problem: Inadequate oral intake  · Etiology: related to Insufficient energy/nutrient consumption     Signs and symptoms:  as evidenced by Diet history of poor intake    Nutrition Assessment:  · Subjective Assessment: Follow up for oral itnake and PO diet tolerance. Pt visited earlier today, and was very tired appearing. Briefly would open eyes but not answer questions. Nursing aware. Noted previous intakes % but breakfast was 1-25%. · Wound Type: Stage I (Back wound)  · Current Nutrition Therapies:  · Oral Diet Orders: Dysphagia 2   · Oral Diet intake: %, 1-25%  · Oral Nutrition Supplement (ONS) Orders: Standard High Calorie Oral Supplement, Frozen Oral Supplement (Ensure Enlive BID + Magic Cup BID)  · ONS intake: 26-50%  · Anthropometric Measures:  · Ht: 4' 8\" (142.2 cm)   · Current Body Wt: 119 lb (54 kg) (6/29)  · Admission Body Wt: 126 lb 15.8 oz (57.6 kg) ((6/19))  · Usual Body Wt:  (132# per pt. report, 129lb 1.6oz on 2/15/18; 129lb 6.4 oz on 9/25/17)  · Weight Change: Gradual weight loss since admit noted, 7 lbs in 9 days. Pt with trace edema and continues on diuretics,  -  · Ideal Body Wt: 90 lb (40.8 kg), % Ideal Body 140%  · Adjusted Body Wt: 99 lb (44.9 kg), body weight adjusted for    · BMI Classification: BMI 25.0 - 29.9 Overweight  · Comparative Standards (Estimated Nutrition Needs):  · Estimated Daily Total Kcal: 9277-7380  · Estimated Daily Protein (g): 45-54 grams as renal function tolerates    Estimated Intake vs Estimated Needs: Intake Improving    Nutrition Risk Level:  Moderate    Nutrition Interventions:   Start oral diet, Continue current ONS  Continued Inpatient Monitoring, Education not appropriate at this time    Nutrition Evaluation:   · Evaluation: Progressing toward goals   · Goals: Pt. will  consume 75% or more at meals during LOS. · Monitoring: Meal Intake, Supplement Intake, Weight    See Adult Nutrition Doc Flowsheet for more detail.      Electronically signed by Carol Tirado RD LD [id number]  on 6/29/18 at 1:44 PM    Contact Number: [phone]

## 2018-06-29 NOTE — PROGRESS NOTES
[facility]  SPEECH THERAPY MISSED TREATMENT NOTE  [facility]      Date: 2018  Patient Name: Ana Peralta        MRN: [id number]    : 1930  (80 y.o.)    REASON FOR MISSED TREATMENT:  Attempted to see pt at 36 for completion of cognitive therapy. Upon arrival, pt resting in recliner with feet elevated. Notable fatigue evidenced with limited gains in alertness level despite MAX cues provided; pt unable to respond appropriately to basic questions for follow simple directions. RN Phoebe Kennedy aware of concerns and currently addressing. Consultation with [facility]IL, in which pt also unable to participate in OT session given overt lethargy and changes in status. Plan to resume per POC at subsequent tx date.      Ramin Back M.A., [address] [place] [address]

## 2018-06-29 NOTE — PROGRESS NOTES
[address] [address]  INPATIENT PHYSICAL THERAPY  DAILY NOTE  [facility] - [id number]-55/055-A    Time In: 1191  Time Out: 0740  Timed Code Treatment Minutes: 28 Minutes  Minutes: 35          Date: 2018  Patient Name: Adry Alcaraz,  Gender:  female        MRN: [id number]  : 1930  (80 y.o.)  Referral Date : 18  Referring Practitioner: Ordering: Alf Osorio MD  Attending: JOSE ROBERTO Torres MD  Diagnosis: s/p fall  Additional Pertinent Hx: Pt admitted 6-7 after a fall OOB at [facility]. Pt fell onto right side ,presetning with soreness throughout that side. CT of head and neck with no acute process, xrays of right tibia/fibula and hip/pelvis  negative. To TCU on .      Past Medical History:   Diagnosis Date    ARNOLD (acute kidney injury) ([address]     Anxiety and depression 2018    Atrial fibrillation ([address]     Blood transfusion reaction     CAD (coronary artery disease)     Severe triple vessel disease    Cerebrovascular disease 2018    CKD (chronic kidney disease)     Diastolic congestive heart failure (HCC)     DJD (degenerative joint disease), cervical 2018    GERD (gastroesophageal reflux disease)     Hiatal hernia     History of blood transfusion     Hyperlipidemia     Hypertension     Hypoalbuminemia     Hypothyroidism     Mild protein-calorie malnutrition (HCC)     NSTEMI (non-ST elevated myocardial infarction) ([address] 2014    Pancreatitis due to biliary obstruction     Pneumonia, organism unspecified(486)     Scoliosis     SDH (subdural hematoma) ([address]     [place] hole decompresson on 2014    Thyroid disease     Unspecified cerebral artery occlusion with cerebral infarction     Uterine cancer ([address] 1966     Past Surgical History:   Procedure Laterality Date    APPENDECTOMY      BACK SURGERY      TERESA HOLE  2014    drainage of left SDH    CARDIAC SURGERY      COLONOSCOPY      CORONARY ARTERY BYPASS GRAFT  11    LIMA to LAD, SVG to 1st Marginal,

## 2018-06-30 LAB
BACTERIA: ABNORMAL /HPF
BILIRUBIN URINE: NEGATIVE
BLOOD, URINE: NEGATIVE
CASTS 2: ABNORMAL /LPF
CASTS UA: ABNORMAL /LPF
CHARACTER, URINE: CLEAR
COLOR: YELLOW
CRYSTALS, UA: ABNORMAL
EPITHELIAL CELLS, UA: ABNORMAL /HPF
GLUCOSE URINE: NEGATIVE MG/DL
KETONES, URINE: NEGATIVE
LEUKOCYTE ESTERASE, URINE: ABNORMAL
MISCELLANEOUS 2: ABNORMAL
NITRITE, URINE: NEGATIVE
PH UA: 7.5
PROTEIN UA: NEGATIVE
RBC URINE: ABNORMAL /HPF
RENAL EPITHELIAL, UA: ABNORMAL
SPECIFIC GRAVITY, URINE: 1.01 (ref 1–1.03)
UROBILINOGEN, URINE: 0.2 EU/DL
WBC UA: ABNORMAL /HPF
YEAST: ABNORMAL

## 2018-06-30 PROCEDURE — 87077 CULTURE AEROBIC IDENTIFY: CPT

## 2018-06-30 PROCEDURE — 81001 URINALYSIS AUTO W/SCOPE: CPT

## 2018-06-30 PROCEDURE — 6370000000 HC RX 637 (ALT 250 FOR IP): Performed by: FAMILY MEDICINE

## 2018-06-30 PROCEDURE — 6370000000 HC RX 637 (ALT 250 FOR IP): Performed by: INTERNAL MEDICINE

## 2018-06-30 PROCEDURE — 87086 URINE CULTURE/COLONY COUNT: CPT

## 2018-06-30 PROCEDURE — 1290000000 HC SEMI PRIVATE OTHER R&B

## 2018-06-30 PROCEDURE — 87184 SC STD DISK METHOD PER PLATE: CPT

## 2018-06-30 PROCEDURE — 0220000000 HC SKILLED NURSING FACILITY

## 2018-06-30 PROCEDURE — 87186 SC STD MICRODIL/AGAR DIL: CPT

## 2018-06-30 RX ORDER — SULFAMETHOXAZOLE AND TRIMETHOPRIM 800; 160 MG/1; MG/1
1 TABLET ORAL EVERY 12 HOURS SCHEDULED
Status: COMPLETED | OUTPATIENT
Start: 2018-06-30 | End: 2018-07-03

## 2018-06-30 RX ORDER — LEVOTHYROXINE SODIUM 88 UG/1
88 TABLET ORAL DAILY
Status: DISCONTINUED | OUTPATIENT
Start: 2018-07-01 | End: 2018-06-30 | Stop reason: SDUPTHER

## 2018-06-30 RX ORDER — LEVOTHYROXINE SODIUM 88 UG/1
88 TABLET ORAL DAILY
Status: DISCONTINUED | OUTPATIENT
Start: 2018-07-01 | End: 2018-07-14 | Stop reason: HOSPADM

## 2018-06-30 RX ADMIN — POLYETHYLENE GLYCOL (3350) 17 G: 17 POWDER, FOR SOLUTION ORAL at 08:54

## 2018-06-30 RX ADMIN — PANTOPRAZOLE SODIUM 40 MG: 40 TABLET, DELAYED RELEASE ORAL at 17:23

## 2018-06-30 RX ADMIN — METOPROLOL TARTRATE 12.5 MG: 25 TABLET ORAL at 08:54

## 2018-06-30 RX ADMIN — ASPIRIN 81 MG: 81 TABLET ORAL at 08:54

## 2018-06-30 RX ADMIN — METOPROLOL TARTRATE 12.5 MG: 25 TABLET ORAL at 20:19

## 2018-06-30 RX ADMIN — CALCIUM CARBONATE-VITAMIN D TAB 500 MG-200 UNIT 1 TABLET: 500-200 TAB at 08:54

## 2018-06-30 RX ADMIN — LEVOTHYROXINE SODIUM 75 MCG: 75 TABLET ORAL at 05:56

## 2018-06-30 RX ADMIN — SULFAMETHOXAZOLE AND TRIMETHOPRIM 1 TABLET: 800; 160 TABLET ORAL at 20:19

## 2018-06-30 RX ADMIN — SUCRALFATE 1 G: 1 TABLET ORAL at 17:23

## 2018-06-30 RX ADMIN — GUAIFENESIN 600 MG: 600 TABLET, EXTENDED RELEASE ORAL at 20:19

## 2018-06-30 RX ADMIN — ISOSORBIDE MONONITRATE 30 MG: 30 TABLET ORAL at 08:54

## 2018-06-30 RX ADMIN — SUCRALFATE 1 G: 1 TABLET ORAL at 09:00

## 2018-06-30 RX ADMIN — DOCUSATE SODIUM 100 MG: 100 CAPSULE, LIQUID FILLED ORAL at 20:20

## 2018-06-30 RX ADMIN — PANTOPRAZOLE SODIUM 40 MG: 40 TABLET, DELAYED RELEASE ORAL at 05:55

## 2018-06-30 RX ADMIN — DOCUSATE SODIUM 100 MG: 100 CAPSULE, LIQUID FILLED ORAL at 08:54

## 2018-06-30 RX ADMIN — MAGNESIUM GLUCONATE 500 MG ORAL TABLET 400 MG: 500 TABLET ORAL at 08:54

## 2018-06-30 RX ADMIN — ATORVASTATIN CALCIUM 20 MG: 20 TABLET, FILM COATED ORAL at 20:20

## 2018-06-30 RX ADMIN — SUCRALFATE 1 G: 1 TABLET ORAL at 05:55

## 2018-06-30 RX ADMIN — CITALOPRAM 40 MG: 40 TABLET, FILM COATED ORAL at 08:54

## 2018-06-30 RX ADMIN — MAGNESIUM HYDROXIDE 30 ML: 400 SUSPENSION ORAL at 18:27

## 2018-06-30 RX ADMIN — BUMETANIDE 0.5 MG: 1 TABLET ORAL at 08:55

## 2018-06-30 RX ADMIN — LATANOPROST 1 DROP: 50 SOLUTION OPHTHALMIC at 20:20

## 2018-06-30 RX ADMIN — SPIRONOLACTONE 12.5 MG: 25 TABLET ORAL at 08:54

## 2018-06-30 RX ADMIN — GUAIFENESIN 600 MG: 600 TABLET, EXTENDED RELEASE ORAL at 08:54

## 2018-06-30 RX ADMIN — SUCRALFATE 1 G: 1 TABLET ORAL at 20:19

## 2018-06-30 RX ADMIN — ACETAMINOPHEN 650 MG: 325 TABLET ORAL at 17:23

## 2018-06-30 RX ADMIN — ACETAMINOPHEN 650 MG: 325 TABLET ORAL at 08:54

## 2018-06-30 ASSESSMENT — PAIN SCALES - GENERAL
PAINLEVEL_OUTOF10: 0

## 2018-06-30 NOTE — FLOWSHEET NOTE
06/29/18 2130   Encounter Summary   Services provided to: Patient   Referral/Consult From: [employer] [facility]   Continue Visiting Yes  (6/29)   Complexity of Encounter Moderate   Length of Encounter 15 minutes   Spiritual Assessment Completed Yes   Grief and Life Adjustment   Type Adjustment to illness   Assessment Approachable   Intervention Discussed illness/injury and it's impact; Discussed relationship with God;End of life care; Discussed death; Active listening;Prayer   Outcome Receptive;Venting emotion;Engaged in conversation;Expressed gratitude   6/29/18  Subjective: Patient had just gotten back into her bed when this staff arrived. She stated that she is \"very scared. \" This staff asked why this might be and she went on to explain \"she fell\" and that is what put her into the hospital.  Objective: Patient was laying on in the bed. Her covers were pulled up and the lights were still displayed. There was a telli-sitter in the room with her. She is still working with rehab according to the information on her white board. Assessment:  Patient was receptive to the visit as she engaged in the conversation with this  about where she is now living and why she had to move into the assisted living. She explained \"she wasn't happy about it at first, but understands her daughters reasons for it now. \"   - Patient is sustained by her children. Concerning her bridgette, she stated Derrickshaylee Morrison feels like God has forgotten her at times. \" We talked about this for a while and she appeared to be gaining comfort from our conversation.   - Patient is 80+ and has entered into a status with Limited code. She explained what she does and doesn't want regarding her end of life care. She mentioned needing to have an alert button so she feels safer. She also mentioned the need to stay living where she is even though she doesn't like it.    - Patient is coping with the situation and her pain level has finally gotten under control. She is hopeful about getting out of here soon. Plan:   provided an empathetic presence for the patient to share her story during our visit. This patient asked that we continue to provide her with support and she feels all the prayers are helping her. Patient is not sure when her release will take place.

## 2018-06-30 NOTE — PROGRESS NOTES
mg Oral BID    pantoprazole  40 mg Oral BID AC    polyethylene glycol  17 g Oral Daily    spironolactone  12.5 mg Oral Daily    sucralfate  1 g Oral 4x Daily AC & HS     Continuous Infusions:  PRN Meds:magnesium hydroxide, senna, albuterol, albuterol sulfate HFA    Review of Systems  Pertinent items are noted in HPI. Objective:     No data found. I/O last 3 completed shifts: In: 480 [P.O.:480]  Out: -   No intake/output data recorded. BP (!) 145/67   Pulse 57   Temp 97.8 °F (36.6 °C) (Oral)   Resp 16   Ht 4' 8\" (1.422 m)   Wt 118 lb 11.2 oz (53.8 kg)   LMP  (Exact Date)   SpO2 97%   BMI 26.61 kg/m²     BP (!) 145/67   Pulse 57   Temp 97.8 °F (36.6 °C) (Oral)   Resp 16   Ht 4' 8\" (1.422 m)   Wt 118 lb 11.2 oz (53.8 kg)   LMP  (Exact Date)   SpO2 97%   BMI 26.61 kg/m²   General appearance: alert, appears stated age and cooperative  Head: Normocephalic, without obvious abnormality, atraumatic  Lungs: clear to auscultation bilaterally  Heart: regular rate and rhythm, S1, S2 normal, no murmur, click, rub or gallop  Abdomen: soft, non-tender; bowel sounds normal; no masses,  no organomegaly  Extremities: extremities normal, atraumatic, no cyanosis or edema  Skin: Skin color, texture, turgor normal. No rashes or lesions  Neurologic: Grossly normal    Data Review:   Results for Homero Chamorro (MRN [id number]) as of 6/30/2018 16:29   Ref.  Range 6/23/2018 16:46 6/26/2018 14:37 6/26/2018 20:00 6/30/2018 13:40   Sodium Latest Ref Range: 135 - 145 meq/L 133 (L) 133 (L)     Potassium Latest Ref Range: 3.5 - 5.2 meq/L 4.8 4.8     Chloride Latest Ref Range: 98 - 111 meq/L 91 (L) 93 (L)     CO2 Latest Ref Range: 23 - 33 meq/L 33 29     BUN Latest Ref Range: 7 - 22 mg/dL 22 27 (H)     Creatinine Latest Ref Range: 0.4 - 1.2 mg/dL 1.0 1.0     Anion Gap Latest Ref Range: 8.0 - 16.0 meq/L 9.0 11.0     Est, Glom Filt Rate Latest Units: ml/min/1.73m2 52 (A) 52 (A)     Magnesium Latest Ref Range: 1.6 - 2.4 mg/dL  2.0     Glucose Latest Ref Range: 70 - 108 mg/dL 108 121 (H)     Calcium Latest Ref Range: 8.5 - 10.5 mg/dL 9.6 9.5     Total Protein Latest Ref Range: 6.1 - 8.0 g/dL 6.5 6.8     Pro-BNP Latest Ref Range: 0.0 - 1800.0 pg/mL 6151.0 (H)      Albumin Latest Ref Range: 3.5 - 5.1 g/dL 3.7 3.8     Alk Phos Latest Ref Range: 38 - 126 U/L 82 88     ALT Latest Ref Range: 11 - 66 U/L 21 23     AST Latest Ref Range: 5 - 40 U/L 31 36     Bilirubin Latest Ref Range: 0.3 - 1.2 mg/dL 0.6 0.7     TSH Latest Ref Range: 0.400 - 4.20 uIU/mL  7.740 (H)     WBC Latest Ref Range: 4.8 - 10.8 thou/mm3 5.1 6.8     RBC Latest Ref Range: 4.20 - 5.40 mill/mm3 3.57 (L) 3.63 (L)     Hemoglobin Quant Latest Ref Range: 12.0 - 16.0 gm/dl 10.7 (L) 10.6 (L)     Hematocrit Latest Ref Range: 37.0 - 47.0 % 32.2 (L) 33.2 (L)     MCV Latest Ref Range: 81.0 - 99.0 fL 90.2 91.5     MCH Latest Ref Range: 26.0 - 33.0 pg 29.9 29.2     MCHC Latest Ref Range: 32.0 - 35.0 gm/dl 33.1 31.9 (L)     MPV Latest Ref Range: 9.0 - 12.0 fL 7.9 9.7     RDW Latest Ref Range: 11.5 - 14.5 % 16.5 (H)      RDW-CV Latest Ref Range: 11.5 - 14.5 %  15.3 (H)     RDW-SD Latest Ref Range: 35.0 - 45.0 fL  50.9 (H)     Platelet Count Latest Ref Range: 130 - 400 thou/mm3 209 228     Lymphocytes # Latest Ref Range: 1.0 - 4.8 thou/mm3 1.2 1.1     Monocytes # Latest Ref Range: 0.4 - 1.3 thou/mm3 0.6 0.8     Eosinophils # Latest Ref Range: 0.0 - 0.4 thou/mm3 0.1 0.1     Basophils # Latest Ref Range: 0.0 - 0.1 thou/mm3 0.0 0.0     Seg Neutrophils Latest Units: % 63.2 68.3     Segs Absolute Latest Ref Range: 1.8 - 7.7 thou/mm3 3.2 4.6     Lymphocytes Latest Units: % 22.7 16.6     Monocytes Latest Units: % 11.8 11.5     Eosinophils Latest Units: % 1.7 2.1     Basophils Latest Units: % 0.6 0.6     Immature Grans (Abs) Latest Ref Range: 0.00 - 0.07 thou/mm3  0.06     Immature Granulocytes Latest Units: %  0.9     Nucleated Red Blood Cells Latest Units: /100 wbc 0 0     Anisocytosis Latest Ref Range: Absent  1+      URINE CULTURE, REFLEXED Unknown    Rpt   URINE WITH REFLEXED MICRO Unknown    Rpt (A)   Color, UA Latest Ref Range: STRAW-YELL     YELLOW   Glucose, UA Latest Ref Range: NEGATIVE mg/dl    NEGATIVE   Bilirubin, Urine Latest Ref Range: NEGATIVE     NEGATIVE   Ketones, Urine Latest Ref Range: NEGATIVE     NEGATIVE   Blood, Urine Latest Ref Range: NEGATIVE     NEGATIVE   pH, UA Latest Ref Range: 5.0 - 9.0     7.5   Protein, UA Latest Ref Range: NEGATIVE     NEGATIVE   Urobilinogen, Urine Latest Ref Range: 0.0 - 1.0 eu/dl    0.2   Nitrite, Urine Latest Ref Range: NEGATIVE     NEGATIVE   Leukocyte Esterase, Urine Latest Ref Range: NEGATIVE     MODERATE (A)   Casts UA Latest Ref Range: NONE SEEN /lpf    NONE SEEN   CASTS 2 Latest Ref Range: NONE SEEN /lpf    NONE SEEN   WBC, UA Latest Ref Range: 0-4/hpf /hpf    25-50   RBC, UA Latest Ref Range: 0-2/hpf /hpf    0-2   Epi Cells Latest Ref Range: 3-5/hpf /hpf    0-2   Renal Epithelial, Urine Latest Ref Range: NONE SEEN     NONE SEEN   Bacteria, UA Latest Ref Range: FEW/NONE S /hpf    FEW   Yeast, Urine Latest Ref Range: NONE SEEN     NONE SEEN   Crystals Latest Ref Range: NONE SEEN     NONE SEEN   Character, Urine Latest Ref Range: CLEAR-SL C     CLEAR   Specific Gravity, Urine Latest Ref Range: 1.002 - 1.03     1.011   Source: Unknown   L Radial    pH, Blood Gas Latest Ref Range: 7.35 - 7.45    7.49 (H)    PCO2 Latest Ref Range: 35 - 45 mmhg   40    pO2 Latest Ref Range: 71 - 104 mmhg   70 (L)    HCO3 Latest Ref Range: 23 - 28 mmol/l   30 (H)    Base Excess (Calculated) Latest Ref Range: -2.5 - 2.5 mmol/l   6.5 (H)    O2 Sat Latest Units: %   95    Willie Test Unknown   Positive    DEVICE Unknown   Room Air    COLLECTED BY: Unknown   [id number]    MISCELLANEOUS 2 Unknown    NONE SEEN       Electronically signed by Blas Stanford MD on 6/30/2018 at 4:33 PM

## 2018-07-01 LAB
ALBUMIN SERPL-MCNC: 3.5 G/DL (ref 3.5–5.1)
ALP BLD-CCNC: 71 U/L (ref 38–126)
ALT SERPL-CCNC: 16 U/L (ref 11–66)
ANION GAP SERPL CALCULATED.3IONS-SCNC: 10 MEQ/L (ref 8–16)
AST SERPL-CCNC: 23 U/L (ref 5–40)
BASOPHILS # BLD: 0.4 %
BASOPHILS ABSOLUTE: 0 THOU/MM3 (ref 0–0.1)
BILIRUB SERPL-MCNC: 0.5 MG/DL (ref 0.3–1.2)
BUN BLDV-MCNC: 36 MG/DL (ref 7–22)
CALCIUM SERPL-MCNC: 9.2 MG/DL (ref 8.5–10.5)
CHLORIDE BLD-SCNC: 94 MEQ/L (ref 98–111)
CO2: 29 MEQ/L (ref 23–33)
CREAT SERPL-MCNC: 1.3 MG/DL (ref 0.4–1.2)
EOSINOPHIL # BLD: 3.8 %
EOSINOPHILS ABSOLUTE: 0.2 THOU/MM3 (ref 0–0.4)
ERYTHROCYTE [DISTWIDTH] IN BLOOD BY AUTOMATED COUNT: 15.2 % (ref 11.5–14.5)
ERYTHROCYTE [DISTWIDTH] IN BLOOD BY AUTOMATED COUNT: 49.8 FL (ref 35–45)
GFR SERPL CREATININE-BSD FRML MDRD: 39 ML/MIN/1.73M2
GLUCOSE BLD-MCNC: 94 MG/DL (ref 70–108)
HCT VFR BLD CALC: 31.8 % (ref 37–47)
HEMOGLOBIN: 10.2 GM/DL (ref 12–16)
IMMATURE GRANS (ABS): 0.03 THOU/MM3 (ref 0–0.07)
IMMATURE GRANULOCYTES: 0.6 %
LYMPHOCYTES # BLD: 25.7 %
LYMPHOCYTES ABSOLUTE: 1.3 THOU/MM3 (ref 1–4.8)
MCH RBC QN AUTO: 29.1 PG (ref 26–33)
MCHC RBC AUTO-ENTMCNC: 32.1 GM/DL (ref 32.2–35.5)
MCV RBC AUTO: 90.6 FL (ref 81–99)
MONOCYTES # BLD: 14.7 %
MONOCYTES ABSOLUTE: 0.7 THOU/MM3 (ref 0.4–1.3)
NUCLEATED RED BLOOD CELLS: 0 /100 WBC
PLATELET # BLD: 171 THOU/MM3 (ref 130–400)
PMV BLD AUTO: 9.8 FL (ref 9.4–12.4)
POTASSIUM SERPL-SCNC: 4.8 MEQ/L (ref 3.5–5.2)
RBC # BLD: 3.51 MILL/MM3 (ref 4.2–5.4)
SEG NEUTROPHILS: 54.8 %
SEGMENTED NEUTROPHILS ABSOLUTE COUNT: 2.7 THOU/MM3 (ref 1.8–7.7)
SODIUM BLD-SCNC: 133 MEQ/L (ref 135–145)
TOTAL PROTEIN: 6.3 G/DL (ref 6.1–8)
WBC # BLD: 5 THOU/MM3 (ref 4.8–10.8)

## 2018-07-01 PROCEDURE — 1290000000 HC SEMI PRIVATE OTHER R&B

## 2018-07-01 PROCEDURE — 36415 COLL VENOUS BLD VENIPUNCTURE: CPT

## 2018-07-01 PROCEDURE — 97530 THERAPEUTIC ACTIVITIES: CPT

## 2018-07-01 PROCEDURE — 6370000000 HC RX 637 (ALT 250 FOR IP): Performed by: INTERNAL MEDICINE

## 2018-07-01 PROCEDURE — 85025 COMPLETE CBC W/AUTO DIFF WBC: CPT

## 2018-07-01 PROCEDURE — 97116 GAIT TRAINING THERAPY: CPT

## 2018-07-01 PROCEDURE — 80053 COMPREHEN METABOLIC PANEL: CPT

## 2018-07-01 PROCEDURE — 97535 SELF CARE MNGMENT TRAINING: CPT

## 2018-07-01 PROCEDURE — 6370000000 HC RX 637 (ALT 250 FOR IP): Performed by: FAMILY MEDICINE

## 2018-07-01 RX ADMIN — SUCRALFATE 1 G: 1 TABLET ORAL at 17:35

## 2018-07-01 RX ADMIN — ACETAMINOPHEN 650 MG: 325 TABLET ORAL at 21:19

## 2018-07-01 RX ADMIN — LEVOTHYROXINE SODIUM 88 MCG: 88 TABLET ORAL at 05:37

## 2018-07-01 RX ADMIN — SUCRALFATE 1 G: 1 TABLET ORAL at 05:36

## 2018-07-01 RX ADMIN — SUCRALFATE 1 G: 1 TABLET ORAL at 21:18

## 2018-07-01 RX ADMIN — DOCUSATE SODIUM 100 MG: 100 CAPSULE, LIQUID FILLED ORAL at 09:03

## 2018-07-01 RX ADMIN — ISOSORBIDE MONONITRATE 30 MG: 30 TABLET ORAL at 09:03

## 2018-07-01 RX ADMIN — ACETAMINOPHEN 650 MG: 325 TABLET ORAL at 09:01

## 2018-07-01 RX ADMIN — SULFAMETHOXAZOLE AND TRIMETHOPRIM 1 TABLET: 800; 160 TABLET ORAL at 09:01

## 2018-07-01 RX ADMIN — DOCUSATE SODIUM 100 MG: 100 CAPSULE, LIQUID FILLED ORAL at 21:18

## 2018-07-01 RX ADMIN — METOPROLOL TARTRATE 12.5 MG: 25 TABLET ORAL at 21:19

## 2018-07-01 RX ADMIN — ASPIRIN 81 MG: 81 TABLET ORAL at 09:01

## 2018-07-01 RX ADMIN — GUAIFENESIN 600 MG: 600 TABLET, EXTENDED RELEASE ORAL at 09:01

## 2018-07-01 RX ADMIN — ACETAMINOPHEN 650 MG: 325 TABLET ORAL at 17:33

## 2018-07-01 RX ADMIN — CALCIUM CARBONATE-VITAMIN D TAB 500 MG-200 UNIT 1 TABLET: 500-200 TAB at 09:02

## 2018-07-01 RX ADMIN — SULFAMETHOXAZOLE AND TRIMETHOPRIM 1 TABLET: 800; 160 TABLET ORAL at 21:18

## 2018-07-01 RX ADMIN — PANTOPRAZOLE SODIUM 40 MG: 40 TABLET, DELAYED RELEASE ORAL at 17:35

## 2018-07-01 RX ADMIN — SUCRALFATE 1 G: 1 TABLET ORAL at 09:01

## 2018-07-01 RX ADMIN — ATORVASTATIN CALCIUM 20 MG: 20 TABLET, FILM COATED ORAL at 21:18

## 2018-07-01 RX ADMIN — BUMETANIDE 0.5 MG: 1 TABLET ORAL at 09:02

## 2018-07-01 RX ADMIN — PANTOPRAZOLE SODIUM 40 MG: 40 TABLET, DELAYED RELEASE ORAL at 05:37

## 2018-07-01 RX ADMIN — GUAIFENESIN 600 MG: 600 TABLET, EXTENDED RELEASE ORAL at 21:18

## 2018-07-01 RX ADMIN — CITALOPRAM 40 MG: 40 TABLET, FILM COATED ORAL at 09:02

## 2018-07-01 RX ADMIN — LATANOPROST 1 DROP: 50 SOLUTION OPHTHALMIC at 21:19

## 2018-07-01 RX ADMIN — MAGNESIUM GLUCONATE 500 MG ORAL TABLET 400 MG: 500 TABLET ORAL at 09:01

## 2018-07-01 RX ADMIN — SPIRONOLACTONE 12.5 MG: 25 TABLET ORAL at 09:01

## 2018-07-01 RX ADMIN — ACETAMINOPHEN 650 MG: 325 TABLET ORAL at 03:56

## 2018-07-01 ASSESSMENT — PAIN SCALES - GENERAL
PAINLEVEL_OUTOF10: 0

## 2018-07-01 NOTE — PROGRESS NOTES
[address]  INPATIENT OCCUPATIONAL THERAPY  [facility]  DAILY NOTE    Time:  Time In: 1340  Time Out: 1410  Timed Code Treatment Minutes: 30 Minutes  Minutes: 30          Date: 2018  Patient Name: Charan Gold,   Gender: female      Room: [facility]55/055-A  MRN: [id number]  : 1930  (80 y.o.)  Referring Practitioner: Fartun Sutton MD  Diagnosis: s/p fall   Additional Pertinent Hx: Pt admitted 6-7 after a fall OOB at [facility]. Pt fell onto right side ,presetning with soreness throughout that side.  CT of head and neck with no acute process, xrays of right tibia/fibula and hip/pelvis  negative    Past Medical History:   Diagnosis Date    ARNOLD (acute kidney injury) ([address]     Anxiety and depression 2018    Atrial fibrillation ([address]     Blood transfusion reaction     CAD (coronary artery disease)     Severe triple vessel disease    Cerebrovascular disease 2018    CKD (chronic kidney disease)     Diastolic congestive heart failure (HCC)     DJD (degenerative joint disease), cervical 2018    GERD (gastroesophageal reflux disease)     Hiatal hernia     History of blood transfusion     Hyperlipidemia     Hypertension     Hypoalbuminemia     Hypothyroidism     Mild protein-calorie malnutrition (HCC)     NSTEMI (non-ST elevated myocardial infarction) ([address] 2014    Pancreatitis due to biliary obstruction     Pneumonia, organism unspecified(486)     Scoliosis     SDH (subdural hematoma) ([address]     [place] hole decompresson on 2014    Thyroid disease     Unspecified cerebral artery occlusion with cerebral infarction     Uterine cancer ([address] 1966     Past Surgical History:   Procedure Laterality Date    APPENDECTOMY      BACK SURGERY      TERESA HOLE  2014    drainage of left SDH    CARDIAC SURGERY      COLONOSCOPY      CORONARY ARTERY BYPASS GRAFT  11    LIMA to LAD, SVG to 1st Marginal, Distal Right Coronary Artery, 1st Diagonal    DILATATION, ESOPHAGUS      ENDOSCOPY, COLON, DIAGNOSTIC      EYE SURGERY      bilateral cataracts    HYSTERECTOMY      MN EGD BALLOON DILATION ESOPHAGUS <30 MM DIAM N/A 11/22/2017    EGD ESOPHAGOGASTRODUODENOSCOPY DILATATION performed by Bradd Favre, MD at [facility]    MN ESOPHAGOGASTRODUODENOSCOPY TRANSORAL DIAGNOSTIC  11/22/2017    EGD ESOPHAGOGASTRODUODENOSCOPY performed by Bradd Favre, MD at [address] [place] [address] Left     SKIN BIOPSY      VASCULAR SURGERY      cabg harvests from left leg       Restrictions/Precautions:  General Precautions, Fall Risk                    Other position/activity restrictions: confused       Prior Level of Function:  ADL Assistance: Independent  Homemaking Assistance: Needs assistance  Ambulation Assistance: Independent  Transfer Assistance: Independent  Additional Comments: Pt reports living at [address] for \"a couple months. \" Pt using  PTA for mobility. Getting assistance for meals. Indep with ADLs. Subjective       Subjective: Pt. seated in recline rupon arrival, agreeable to OT treatment. Daughter present but stepped out.               Pain:  Pain Assessment  Patient Currently in Pain: Denies       Objective  Overall Cognitive Status: Exceptions         ADL  UE Bathing: Supervision (very thorough)  LE Bathing: Stand by assistance  UE Dressing: Minimal assistance (min A to do robe)  LE Dressing: Stand by assistance (able to don depends from seated in chair, close SBA while standing to pull up)  Toileting: Stand by assistance               Transfers  Sit to stand: Contact guard assistance  Stand to sit: Stand by assistance  Toilet Transfers  Toilet - Technique: Ambulating  Equipment Used: Standard toilet  Toilet Transfer: Stand by assistance  Toilet Transfers Comments: use of grab bar     Balance  Sitting Balance: Stand by assistance  Standing Balance: Stand by assistance     Time: x15 minutes at sink for ADLs     Functional Mobility  Functional - Mobility Device: Rolling Walker  Assist Level: Stand by assistance (close SBA)  Functional Mobility Comments: Pt. ambulated to and from BR no LOB           Activity Tolerance:  Activity Tolerance: Patient limited by fatigue;Treatment limited secondary to decreased cognition    Assessment:     Performance deficits / Impairments: Decreased safe awareness, Decreased balance, Decreased ADL status, Decreased endurance, Decreased strength, Decreased functional mobility , Decreased cognition, Decreased coordination  Prognosis: Fair  Discharge Recommendations: Patient would benefit from continued therapy after discharge, Home with Home health OT (Return to assisted living)    Patient Education:  Patient Education: safety with transfers and mobility, ADL strategies    Equipment Recommendations:  Equipment Needed: No  Other: None needed at this time    Safety:  Safety Devices in place: Yes  Type of devices: Chair alarm in place, Call light within reach, Left in chair, Gait belt    Plan:  Times per week: 6x  Current Treatment Recommendations: Strengthening, Endurance Training, Patient/Caregiver Education & Training, Self-Care / ADL, Functional Mobility Training, Balance Training, Safety Education & Training    Goals:  Patient goals : feel less tired     Short term goals  Time Frame for Short term goals: 1 week   Short term goal 1: Pt will complete functional transfers to/from chair/toilet with S and 0 cues for safety to increase safety with toileting routine. Short term goal 2: Pt will complete dynamic standing > 5 min with S and 1-2 hand release to increase ability to reach into closet or cupbaords for ADL objects   Short term goal 3: Pt will complete functional mobilty > HH distances with S and RW and 0 cues for safety to increase endurance needed for ADL routine. Short term goal 4: Pt will complete light UE AROM HEP with 0-1 cues for technique to increase strength needed for bathing.    Short term goal 5: Pt will

## 2018-07-02 LAB
ORGANISM: ABNORMAL
URINE CULTURE REFLEX: ABNORMAL

## 2018-07-02 PROCEDURE — 97116 GAIT TRAINING THERAPY: CPT

## 2018-07-02 PROCEDURE — 97530 THERAPEUTIC ACTIVITIES: CPT

## 2018-07-02 PROCEDURE — 97110 THERAPEUTIC EXERCISES: CPT

## 2018-07-02 PROCEDURE — 6370000000 HC RX 637 (ALT 250 FOR IP): Performed by: INTERNAL MEDICINE

## 2018-07-02 PROCEDURE — 92507 TX SP LANG VOICE COMM INDIV: CPT

## 2018-07-02 PROCEDURE — 6370000000 HC RX 637 (ALT 250 FOR IP): Performed by: FAMILY MEDICINE

## 2018-07-02 PROCEDURE — 1290000000 HC SEMI PRIVATE OTHER R&B

## 2018-07-02 PROCEDURE — 97535 SELF CARE MNGMENT TRAINING: CPT

## 2018-07-02 RX ADMIN — CALCIUM CARBONATE-VITAMIN D TAB 500 MG-200 UNIT 1 TABLET: 500-200 TAB at 09:20

## 2018-07-02 RX ADMIN — PANTOPRAZOLE SODIUM 40 MG: 40 TABLET, DELAYED RELEASE ORAL at 06:10

## 2018-07-02 RX ADMIN — SPIRONOLACTONE 12.5 MG: 25 TABLET ORAL at 09:21

## 2018-07-02 RX ADMIN — ATORVASTATIN CALCIUM 20 MG: 20 TABLET, FILM COATED ORAL at 21:22

## 2018-07-02 RX ADMIN — ASPIRIN 81 MG: 81 TABLET ORAL at 09:19

## 2018-07-02 RX ADMIN — SUCRALFATE 1 G: 1 TABLET ORAL at 21:22

## 2018-07-02 RX ADMIN — GUAIFENESIN 600 MG: 600 TABLET, EXTENDED RELEASE ORAL at 09:20

## 2018-07-02 RX ADMIN — ACETAMINOPHEN 650 MG: 325 TABLET ORAL at 06:10

## 2018-07-02 RX ADMIN — BUMETANIDE 0.5 MG: 1 TABLET ORAL at 09:20

## 2018-07-02 RX ADMIN — ISOSORBIDE MONONITRATE 30 MG: 30 TABLET ORAL at 09:20

## 2018-07-02 RX ADMIN — SUCRALFATE 1 G: 1 TABLET ORAL at 16:15

## 2018-07-02 RX ADMIN — ACETAMINOPHEN 650 MG: 325 TABLET ORAL at 11:12

## 2018-07-02 RX ADMIN — SUCRALFATE 1 G: 1 TABLET ORAL at 06:10

## 2018-07-02 RX ADMIN — SULFAMETHOXAZOLE AND TRIMETHOPRIM 1 TABLET: 800; 160 TABLET ORAL at 09:21

## 2018-07-02 RX ADMIN — CITALOPRAM 40 MG: 40 TABLET, FILM COATED ORAL at 09:20

## 2018-07-02 RX ADMIN — SUCRALFATE 1 G: 1 TABLET ORAL at 11:12

## 2018-07-02 RX ADMIN — MAGNESIUM GLUCONATE 500 MG ORAL TABLET 400 MG: 500 TABLET ORAL at 09:20

## 2018-07-02 RX ADMIN — DOCUSATE SODIUM 100 MG: 100 CAPSULE, LIQUID FILLED ORAL at 21:22

## 2018-07-02 RX ADMIN — LEVOTHYROXINE SODIUM 88 MCG: 88 TABLET ORAL at 09:20

## 2018-07-02 RX ADMIN — LATANOPROST 1 DROP: 50 SOLUTION OPHTHALMIC at 21:22

## 2018-07-02 RX ADMIN — DOCUSATE SODIUM 100 MG: 100 CAPSULE, LIQUID FILLED ORAL at 09:24

## 2018-07-02 RX ADMIN — ACETAMINOPHEN 650 MG: 325 TABLET ORAL at 18:10

## 2018-07-02 RX ADMIN — GUAIFENESIN 600 MG: 600 TABLET, EXTENDED RELEASE ORAL at 21:22

## 2018-07-02 RX ADMIN — SULFAMETHOXAZOLE AND TRIMETHOPRIM 1 TABLET: 800; 160 TABLET ORAL at 21:22

## 2018-07-02 RX ADMIN — PANTOPRAZOLE SODIUM 40 MG: 40 TABLET, DELAYED RELEASE ORAL at 16:15

## 2018-07-02 RX ADMIN — METOPROLOL TARTRATE 12.5 MG: 25 TABLET ORAL at 09:20

## 2018-07-02 ASSESSMENT — PAIN SCALES - GENERAL
PAINLEVEL_OUTOF10: 0

## 2018-07-02 NOTE — PROGRESS NOTES
close SBA)       Transfers  Sit to stand: Contact guard assistance  Stand to sit: Stand by assistance  Transfer Comments: verbal cues for safety with hand placement       Balance  Sitting Balance: Supervision  Standing Balance: Stand by assistance     Time: ~4 minutes  Activity: Grooming tasks     Functional Mobility  Functional - Mobility Device: Rolling Walker  Activity: To/from bathroom  Assist Level: Stand by assistance       Type of ROM/Therapeutic Exercise: Free weights  Comment: Completed BUE exercises x15 reps x1 set in all joints/planes seated in chair with 1# weight. Required rest breaks after each exercise.  Completed exercises to increase strength and activity tolerance required for ADLs and toilet transfers       Activity Tolerance:  Activity Tolerance: Patient limited by fatigue;Treatment limited secondary to decreased cognition    Assessment:     Performance deficits / Impairments: Decreased safe awareness, Decreased balance, Decreased ADL status, Decreased endurance, Decreased strength, Decreased functional mobility , Decreased cognition, Decreased coordination  Prognosis: Fair  Discharge Recommendations: Patient would benefit from continued therapy after discharge, Home with Home health OT (Return to assisted living)    Patient Education:  Patient Education: safety with transfers and mobility, ADL strategies, BUE exercises    Equipment Recommendations:  Equipment Needed: No  Other: None needed at this time    Safety:  Safety Devices in place: Yes  Type of devices: Call light within reach, Chair alarm in place, Left in chair, Gait belt (telesitter)    Plan:  Times per week: 6x  Current Treatment Recommendations: Strengthening, Endurance Training, Patient/Caregiver Education & Training, Self-Care / ADL, Functional Mobility Training, Balance Training, Safety Education & Training    Goals:  Patient goals : feel less tired     Short term goals  Time Frame for Short term goals: 1 week   Short term goal 1: Pt will complete functional transfers to/from chair/toilet with S and 0 cues for safety to increase safety with toileting routine. Short term goal 2: Pt will complete dynamic standing > 5 min with S and 1-2 hand release to increase ability to reach into closet or cupbaords for ADL objects   Short term goal 3: Pt will complete functional mobilty > HH distances with S and RW and 0 cues for safety to increase endurance needed for ADL routine. Short term goal 4: Pt will complete light UE AROM HEP with 0-1 cues for technique to increase strength needed for bathing. Short term goal 5: Pt will complete LE ADLs with Min A and LHAE prn. Long term goals  Time Frame for Long term goals : 2 weeks  Long term goal 1: Pt will complete BADL routine with CGA and AE prn and 0-2 cues for sequencing/attending to task.

## 2018-07-02 NOTE — PLAN OF CARE
Ambulate patient    Intake and output    Up as tolerated    Vital signs per unit routine    Incentive spirometry nursing    Limited Code    Consult to Recreation Therapy    Consult to Social Work    Inpatient consult to Dietitian    Inpatient consult to Spiritual Services    Dietary Nutrition Supplements: Standard High Calorie Oral Supplement    Dietary Nutrition Supplements: Frozen Oral Supplement    OT eval and treat    PT eval and treat    Initiate Oxygen Therapy Protocol    HHN Treatment    SLP eval and treat    POCT Glucose    EKG 12 Lead       Current Medications:  Current Facility-Administered Medications   Medication Dose Route Frequency Provider Last Rate Last Dose    levothyroxine (SYNTHROID) tablet 88 mcg  88 mcg Oral Daily Chi Calix MD   88 mcg at 07/02/18 0920    sulfamethoxazole-trimethoprim (BACTRIM DS;SEPTRA DS) 800-160 MG per tablet 1 tablet  1 tablet Oral 2 times per day Chi Calix MD   1 tablet at 07/02/18 0921    sodium chloride flush 0.9 % injection 10 mL  10 mL Intravenous BID Chi Calix MD   10 mL at 06/25/18 0834    magnesium hydroxide (MILK OF MAGNESIA) 400 MG/5ML suspension 30 mL  30 mL Oral Daily PRN Chi Calix MD   30 mL at 06/30/18 1827    senna (SENOKOT) tablet 8.6 mg  1 tablet Oral Nightly PRN Chi Calix MD   8.6 mg at 06/20/18 2234    docusate sodium (COLACE) capsule 100 mg  100 mg Oral BID Bebo Saunders MD   100 mg at 07/02/18 0924    acetaminophen (TYLENOL) tablet 650 mg  650 mg Oral Q6H Bebo Saunders MD   650 mg at 07/02/18 0610    albuterol (PROVENTIL) nebulizer solution 2.5 mg  2.5 mg Nebulization Q6H PRN Bebo Saunders MD   2.5 mg at 06/19/18 1838    albuterol sulfate  (90 Base) MCG/ACT inhaler 2 puff  2 puff Inhalation Q6H PRN Bebo Saunders MD        aspirin EC tablet 81 mg  81 mg Oral Daily Bebo Saunders MD   81 mg at 07/02/18 0919    atorvastatin (LIPITOR) tablet 20 mg  20 mg Oral Nightly motion, home exercise program with 0-1 cues for technique to increase strength needed for bathing    Goal: will complete lower extremity ADLs with Minimal assist and long handled adaptive equipment as needed   [x] Problem: Communication/Dysarthria (ST)    Goal: will improve expressive language skills to allow for communication of wants and needs in daily activities    Goal: will complete orientation and functional recall/carryover tasks with 60% accuracy given mod cues to improve retention of novel information    Goal: will complete basic safety, problem solving, reasoning, and sequencing tasks with 60% accuracy given mod cues to improve awareness for integration into current and future settings    Goal: will complete spoken langauge expression (speech naming) and comprehension (moderately complex yes/no questions, 2-3 step commands) tasks with 70% accuracy given mod cues to improve communicative efficacy.      [x] Problem: Swallowing (ST)    Goal: will tolerate the least restrictive diet consistency to allow for safe consumption of daily meals    Goal: will safely toleration dysphagia 2 diet with thin liquids (with direct supervision) with no overt s/s aspiration to maximize nutrition/hydration measures    Goal: will safely tolerate advanced texture trials 10/10 times to permit potential diet advancement

## 2018-07-02 NOTE — PATIENT CARE CONFERENCE
oral phase characterized by slightly prolonged, but adequate mastication; good AP oral transit; good size and rate of bolus; and no oral stasis. Pharyngeal phase unremarkable; no s/s of aspiration observed. Pt continues to require environmental modifications to reduce external distractions during meal time. Strengths include response to stimulus, social pleasantries/greetings, and increased orientation. Skilled ST services are medically necessary to decrease risk of medical decline, aspiration, social isolation, and for improving cognitive linguistic domains for optimal level of functionality. Nutrition:    Weight: 121 lb (54.9 kg) / Body mass index is 27.13 kg/m². Please see nutrition note for details. Family Education: No family available for education  Fall Risk:  Falling star program initiated    Goal Achievement:   Patient Continues to progress toward goal achievement    Discharge Plan   Estimated Length of Stay: re eval  Destination: discharge home with supervision  Services at Discharge: [address] and Occupational Therapy 3x week  Equipment at Discharge: No equipment needs  Factors facilitating achievement of predicted outcomes: Family support, Motivated, Cooperative and Pleasant  Barriers to the achievement of predicted outcomes: Cognitive deficit, Decreased endurance, Upper extremity weakness and Lower extremity weakness    Readmission Risk              Risk of Unplanned Readmission:        32           High (20-31)         Team Members Present at Conference:  Physician: Dr. Deirdre Pizarro MD  Nurse: Brenden Alexis RN, Tammy RN  :  Xochitl [facility]  Occupational Therapist:  JONES Langley  Physical Therapist:   PATTI  Speech Therapist: Speech Therapist: N/A. All team members have reviewed and updated as necessary and appropriate the established interdisciplinary plan of care within the medical record of Sundar Raygoza.     Pt's team conference

## 2018-07-02 NOTE — PROGRESS NOTES
[facility]  INPATIENT SPEECH THERAPY  [facility]    TIME   SLP Individual Minutes  Time In: 1200  Time Out: 1230  Minutes: 30  Timed Code Treatment Minutes: 0 Minutes       []Daily Note  [x]Progress Note  []Discharge Note    Date: 2018  Patient Name: Neo Moctezuma        MRN: [id number]    : 1930  (80 y.o.)  Gender: female   Primary Provider: Alejandra Gamboa MD  Admitting Diagnosis:  s/p fall   Secondary Diagnosis: Dysphagia; Cognitive-linguistic deficits  Precautions: Fall risk, aspiration precautions; Close pulmonary monitoring   Swallowing Status/Diet: Dysphagia level 2 with thin liquids   Swallowing Strategies: Monitor for alertness, upright positioning, small bites/sips, slow rate, 1:1 direct supervision   DATE of last BSE: 18  Pain:  0/10    Subjective: Pt seen bedside for speech/language therapy. Pt alert and agreeable to session. SHORT TERM GOAL #1:  Goal 1: Pt will safely toleration dysphagia 2 diet with thin liquids (with direct supervision) with no overt s/s aspiration to maximize nutrition/hydration measures. GOAL MET. CONTINUE GOAL. INTERVENTIONS: DNT due to focus on other goals. Per previous note: Direct diet monitor via breakfast tray of dysphagia II and thin liquids. Pt's oral phase characterized by slightly prolonged, but adequate mastication; good AP oral transit; good size and rate of bolus; and no oral stasis. Pt's pharyngeal phase unremarkable; no s/s of aspiration observed during meal. Pt's vocal quality clear throughout. Pt with excellent tolerance of dysphagia II and thins this date. Recommend continue on diet of dysphagia II and thins and trial advanced textures to assess appropriateness for diet upgrade. SHORT TERM GOAL #2:  Goal 2: Pt will safely tolerate advanced texture trials 10/10 times to permit potential diet advancement. GOAL NOT MET.- CONTINUE   INTERVENTIONS: DNT due to focus on other goals.  No recent advanced texture trials since indep, 1 item min cues/60 seconds  *additional 4 items provided with additional time and mod cues    *Pt perseverating on family members and what state they live in  *Mod cues given for pt to provide cities in [place]  *Slow processing speed    Pt completed level 2 yes/no questions: 2/4 indep, 2/4 max verbal cues     Per previous note:  Level 1 Convergent namin/8 indep, 2/8 min cues, 1/8 mod cues  *Pt continues to demonstrate significant difficulty with lexical retrieval and slow processing speed.     Pt followed 2 step directions: 4/5 indep, 1/5 min cues    Long-term Goals  Timeframe for Long-term Goals: three weeks  Goal 1: Pt will improve cognitive linguistic functioning to moderate impairment level or higher to promote improved safety and communication for optimal level of functionallity. ONGOING   Goal 2: Pt will safely tolerate PO diet level 98% of the time given compensatory swallowing strategies to maximize nutrition/hydration measures. ONGOING        ASSESSMENT:  Assessment: [x]Progressing towards goals          []Not Progressing towards goals  Pt has achieved 3/5 STGs for this therapy period and is slowly progressing towards other goals. Pt continues to present with moderate-severe cognitive deficit given impaired functional recall/carryover, safety awareness, problem solving, reasoning, sequential thought/analysis, thought organization, processing speed, and mental flexibility. Expressive and comprehension skills grossly intact for basic wants/needs with communicative breakdowns occurring during increased complexity of posed questions/commands. Pt also presents with moderate oral dysphagia, mild pharyngeal dysphagia and currently tolerating dysphagia II and thins diet. Per previous note, oral phase characterized by slightly prolonged, but adequate mastication; good AP oral transit; good size and rate of bolus; and no oral stasis. Pharyngeal phase unremarkable; no s/s of aspiration observed.  Pt continues to require environmental modifications to reduce external distractions during meal time. Strengths include response to stimulus, social pleasantries/greetings, and increased orientation. Skilled ST services are medically necessary to decrease risk of medical decline, aspiration, social isolation, and for improving cognitive linguistic domains for optimal level of functionality. Patient Tolerance of Treatment:   [x]Tolerated well []Tolerated fair []Required rest breaks []Fatigued  Plan for Next Session: sequencing, short term memory  Education:  Learner:  [x]Patient          []Significant Other          []Other:   Education provided regarding:  [x]Goals and POC   []Diet and swallowing precautions    []Home Exercise Program  [x]Progress and/or discharge information  Method of Education:  [x]Discussion          [x]Demonstration          []Handout          []Other  Evaluation of Education:   [x]Verbalized understanding   []Demonstrates without assistance  []Demonstrates with assistance  [x]Needs further instruction     []No evidence of learning                  [x]No family present      Plan: [x]Continue with current plan of care    []Modify current plan of care as follows:    []Discharge patient    Discharge Location:    Services/Supervision Recommended:     [x]Patient continues to require treatment by a licensed therapist to address functional deficits as outlined in the established plan of care. ALICIA Howard, speech intern.   [place] M.S. Jovany Lea 7/2/2018

## 2018-07-03 ENCOUNTER — APPOINTMENT (OUTPATIENT)
Dept: GENERAL RADIOLOGY | Age: 83
DRG: 091 | End: 2018-07-03
Attending: FAMILY MEDICINE
Payer: MEDICARE

## 2018-07-03 LAB
ANION GAP SERPL CALCULATED.3IONS-SCNC: 15 MEQ/L (ref 8–16)
BASOPHILS # BLD: 0.3 %
BASOPHILS ABSOLUTE: 0 THOU/MM3 (ref 0–0.1)
BUN BLDV-MCNC: 35 MG/DL (ref 7–22)
CALCIUM SERPL-MCNC: 9.6 MG/DL (ref 8.5–10.5)
CHLORIDE BLD-SCNC: 91 MEQ/L (ref 98–111)
CO2: 26 MEQ/L (ref 23–33)
CREAT SERPL-MCNC: 1.4 MG/DL (ref 0.4–1.2)
EOSINOPHIL # BLD: 3.1 %
EOSINOPHILS ABSOLUTE: 0.2 THOU/MM3 (ref 0–0.4)
ERYTHROCYTE [DISTWIDTH] IN BLOOD BY AUTOMATED COUNT: 15.4 % (ref 11.5–14.5)
ERYTHROCYTE [DISTWIDTH] IN BLOOD BY AUTOMATED COUNT: 51 FL (ref 35–45)
GFR SERPL CREATININE-BSD FRML MDRD: 35 ML/MIN/1.73M2
GLUCOSE BLD-MCNC: 131 MG/DL (ref 70–108)
HCT VFR BLD CALC: 34.7 % (ref 37–47)
HEMOGLOBIN: 11.2 GM/DL (ref 12–16)
IMMATURE GRANS (ABS): 0.05 THOU/MM3 (ref 0–0.07)
IMMATURE GRANULOCYTES: 0.9 %
LYMPHOCYTES # BLD: 21 %
LYMPHOCYTES ABSOLUTE: 1.2 THOU/MM3 (ref 1–4.8)
MCH RBC QN AUTO: 29.5 PG (ref 26–33)
MCHC RBC AUTO-ENTMCNC: 32.3 GM/DL (ref 32.2–35.5)
MCV RBC AUTO: 91.3 FL (ref 81–99)
MONOCYTES # BLD: 11.3 %
MONOCYTES ABSOLUTE: 0.7 THOU/MM3 (ref 0.4–1.3)
NUCLEATED RED BLOOD CELLS: 0 /100 WBC
PLATELET # BLD: 221 THOU/MM3 (ref 130–400)
PMV BLD AUTO: 10.1 FL (ref 9.4–12.4)
POTASSIUM SERPL-SCNC: 5.2 MEQ/L (ref 3.5–5.2)
PRO-BNP: 2851 PG/ML (ref 0–1800)
RBC # BLD: 3.8 MILL/MM3 (ref 4.2–5.4)
SEG NEUTROPHILS: 63.4 %
SEGMENTED NEUTROPHILS ABSOLUTE COUNT: 3.7 THOU/MM3 (ref 1.8–7.7)
SODIUM BLD-SCNC: 132 MEQ/L (ref 135–145)
WBC # BLD: 5.8 THOU/MM3 (ref 4.8–10.8)

## 2018-07-03 PROCEDURE — 94640 AIRWAY INHALATION TREATMENT: CPT

## 2018-07-03 PROCEDURE — 80048 BASIC METABOLIC PNL TOTAL CA: CPT

## 2018-07-03 PROCEDURE — 1290000000 HC SEMI PRIVATE OTHER R&B

## 2018-07-03 PROCEDURE — 6370000000 HC RX 637 (ALT 250 FOR IP): Performed by: INTERNAL MEDICINE

## 2018-07-03 PROCEDURE — 97116 GAIT TRAINING THERAPY: CPT

## 2018-07-03 PROCEDURE — 97110 THERAPEUTIC EXERCISES: CPT

## 2018-07-03 PROCEDURE — 6370000000 HC RX 637 (ALT 250 FOR IP): Performed by: FAMILY MEDICINE

## 2018-07-03 PROCEDURE — 97530 THERAPEUTIC ACTIVITIES: CPT

## 2018-07-03 PROCEDURE — 36415 COLL VENOUS BLD VENIPUNCTURE: CPT

## 2018-07-03 PROCEDURE — 97127 HC SP THER IVNTJ W/FOCUS COG FUNCJ: CPT

## 2018-07-03 PROCEDURE — 85025 COMPLETE CBC W/AUTO DIFF WBC: CPT

## 2018-07-03 PROCEDURE — 2700000000 HC OXYGEN THERAPY PER DAY

## 2018-07-03 PROCEDURE — 71046 X-RAY EXAM CHEST 2 VIEWS: CPT

## 2018-07-03 PROCEDURE — 83880 ASSAY OF NATRIURETIC PEPTIDE: CPT

## 2018-07-03 PROCEDURE — 97535 SELF CARE MNGMENT TRAINING: CPT

## 2018-07-03 RX ORDER — IPRATROPIUM BROMIDE AND ALBUTEROL SULFATE 2.5; .5 MG/3ML; MG/3ML
1 SOLUTION RESPIRATORY (INHALATION)
Status: DISCONTINUED | OUTPATIENT
Start: 2018-07-03 | End: 2018-07-14 | Stop reason: HOSPADM

## 2018-07-03 RX ADMIN — MAGNESIUM GLUCONATE 500 MG ORAL TABLET 400 MG: 500 TABLET ORAL at 10:01

## 2018-07-03 RX ADMIN — ACETAMINOPHEN 650 MG: 325 TABLET ORAL at 14:15

## 2018-07-03 RX ADMIN — SULFAMETHOXAZOLE AND TRIMETHOPRIM 1 TABLET: 800; 160 TABLET ORAL at 22:35

## 2018-07-03 RX ADMIN — SUCRALFATE 1 G: 1 TABLET ORAL at 10:02

## 2018-07-03 RX ADMIN — PANTOPRAZOLE SODIUM 40 MG: 40 TABLET, DELAYED RELEASE ORAL at 18:28

## 2018-07-03 RX ADMIN — ASPIRIN 81 MG: 81 TABLET ORAL at 10:01

## 2018-07-03 RX ADMIN — DOCUSATE SODIUM 100 MG: 100 CAPSULE, LIQUID FILLED ORAL at 10:02

## 2018-07-03 RX ADMIN — ATORVASTATIN CALCIUM 20 MG: 20 TABLET, FILM COATED ORAL at 22:35

## 2018-07-03 RX ADMIN — LEVOTHYROXINE SODIUM 88 MCG: 88 TABLET ORAL at 05:27

## 2018-07-03 RX ADMIN — SPIRONOLACTONE 12.5 MG: 25 TABLET ORAL at 10:02

## 2018-07-03 RX ADMIN — IPRATROPIUM BROMIDE AND ALBUTEROL SULFATE 1 AMPULE: .5; 3 SOLUTION RESPIRATORY (INHALATION) at 15:15

## 2018-07-03 RX ADMIN — IPRATROPIUM BROMIDE AND ALBUTEROL SULFATE 1 AMPULE: .5; 3 SOLUTION RESPIRATORY (INHALATION) at 20:03

## 2018-07-03 RX ADMIN — GUAIFENESIN 600 MG: 600 TABLET, EXTENDED RELEASE ORAL at 10:01

## 2018-07-03 RX ADMIN — POLYETHYLENE GLYCOL (3350) 17 G: 17 POWDER, FOR SOLUTION ORAL at 10:01

## 2018-07-03 RX ADMIN — CALCIUM CARBONATE-VITAMIN D TAB 500 MG-200 UNIT 1 TABLET: 500-200 TAB at 10:01

## 2018-07-03 RX ADMIN — METOPROLOL TARTRATE 12.5 MG: 25 TABLET ORAL at 10:01

## 2018-07-03 RX ADMIN — BUMETANIDE 0.5 MG: 1 TABLET ORAL at 10:01

## 2018-07-03 RX ADMIN — GUAIFENESIN 600 MG: 600 TABLET, EXTENDED RELEASE ORAL at 22:35

## 2018-07-03 RX ADMIN — SUCRALFATE 1 G: 1 TABLET ORAL at 22:35

## 2018-07-03 RX ADMIN — METOPROLOL TARTRATE 12.5 MG: 25 TABLET ORAL at 22:34

## 2018-07-03 RX ADMIN — CITALOPRAM 40 MG: 40 TABLET, FILM COATED ORAL at 10:01

## 2018-07-03 RX ADMIN — SUCRALFATE 1 G: 1 TABLET ORAL at 05:27

## 2018-07-03 RX ADMIN — ACETAMINOPHEN 650 MG: 325 TABLET ORAL at 05:27

## 2018-07-03 RX ADMIN — LATANOPROST 1 DROP: 50 SOLUTION OPHTHALMIC at 22:36

## 2018-07-03 RX ADMIN — ISOSORBIDE MONONITRATE 30 MG: 30 TABLET ORAL at 10:01

## 2018-07-03 RX ADMIN — DOCUSATE SODIUM 100 MG: 100 CAPSULE, LIQUID FILLED ORAL at 22:42

## 2018-07-03 RX ADMIN — SUCRALFATE 1 G: 1 TABLET ORAL at 18:29

## 2018-07-03 RX ADMIN — ACETAMINOPHEN 650 MG: 325 TABLET ORAL at 18:28

## 2018-07-03 RX ADMIN — SULFAMETHOXAZOLE AND TRIMETHOPRIM 1 TABLET: 800; 160 TABLET ORAL at 10:01

## 2018-07-03 RX ADMIN — PANTOPRAZOLE SODIUM 40 MG: 40 TABLET, DELAYED RELEASE ORAL at 05:27

## 2018-07-03 ASSESSMENT — PAIN DESCRIPTION - LOCATION: LOCATION: FACE

## 2018-07-03 ASSESSMENT — PAIN SCALES - GENERAL
PAINLEVEL_OUTOF10: 0
PAINLEVEL_OUTOF10: 0
PAINLEVEL_OUTOF10: 2
PAINLEVEL_OUTOF10: 3
PAINLEVEL_OUTOF10: 3

## 2018-07-03 ASSESSMENT — PAIN DESCRIPTION - PAIN TYPE: TYPE: ACUTE PAIN

## 2018-07-03 ASSESSMENT — PAIN DESCRIPTION - ORIENTATION: ORIENTATION: RIGHT

## 2018-07-03 NOTE — PROGRESS NOTES
RECREATIONAL THERAPY  MISSED TREATMENT    [x]Transitional Care Unit  []Inpatient Rehabilitation    Date:  7/3/2018            Patient Name: Dianne Ordonez           MRN: [id number]  Acct: [de-identified]          YOB: 1930 (80 y.o.)       Gender: female   Diagnosis: s/p fall   Physician: Referring Practitioner: Jacqueline Hogue MD    REASON FOR MISSED TREATMENT:    []Hold treatment per nursing request  []Patient refusal  []Family declined treatment  []Patient at testing and/or off the floor  []Patient unavailable, with PT/OT/nursing, etc.  [x]Other pt declined coming to the dining [facility]-she ate her ordered lunch in her [address] [facility], [address]    7/3/2018

## 2018-07-03 NOTE — PROGRESS NOTES
[facility]  INPATIENT OCCUPATIONAL THERAPY  [facility]  DAILY NOTE    Time:  Time In: 1302  Time Out: 1342  Timed Code Treatment Minutes: 40 Minutes  Minutes: 40          Date: 7/3/2018  Patient Name: Paulo Chacko,   Gender: female      Room: [facility]/055-A  MRN: [id number]  : 1930  (80 y.o.)  Referring Practitioner: Gera Wang MD  Diagnosis: s/p fall   Additional Pertinent Hx: Pt admitted 6-7 after a fall OOB at [facility]. Pt fell onto right side ,presetning with soreness throughout that side.  CT of head and neck with no acute process, xrays of right tibia/fibula and hip/pelvis  negative    Past Medical History:   Diagnosis Date    ARNOLD (acute kidney injury) ([address]     Anxiety and depression 2018    Atrial fibrillation ([address]     Blood transfusion reaction     CAD (coronary artery disease)     Severe triple vessel disease    Cerebrovascular disease 2018    CKD (chronic kidney disease)     Diastolic congestive heart failure (HCC)     DJD (degenerative joint disease), cervical 2018    GERD (gastroesophageal reflux disease)     Hiatal hernia     History of blood transfusion     Hyperlipidemia     Hypertension     Hypoalbuminemia     Hypothyroidism     Mild protein-calorie malnutrition (HCC)     NSTEMI (non-ST elevated myocardial infarction) ([address] 2014    Pancreatitis due to biliary obstruction     Pneumonia, organism unspecified(486)     Scoliosis     SDH (subdural hematoma) ([address]     [place] hole decompresson on 2014    Thyroid disease     Unspecified cerebral artery occlusion with cerebral infarction     Uterine cancer ([address] 1966     Past Surgical History:   Procedure Laterality Date    APPENDECTOMY      BACK SURGERY      TERESA HOLE  2014    drainage of left SDH    CARDIAC SURGERY      COLONOSCOPY      CORONARY ARTERY BYPASS GRAFT  11    LIMA to LAD, SVG to 1st Marginal, Distal Right Coronary Artery, 1st Diagonal    DILATATION, complete dynamic standing > 5 min with S and 1-2 hand release to increase ability to reach into closet or cupbaords for ADL objects   Short term goal 3: Pt will complete functional mobilty > HH distances with S and RW and 0 cues for safety to increase endurance needed for ADL routine. Short term goal 4: Pt will complete light UE AROM HEP with 0-1 cues for technique to increase strength needed for bathing. Short term goal 5: Pt will complete LE ADLs with Min A and LHAE prn. Long term goals  Time Frame for Long term goals : 2 weeks  Long term goal 1: Pt will complete BADL routine with CGA and AE prn and 0-2 cues for sequencing/attending to task.

## 2018-07-03 NOTE — PROGRESS NOTES
[facility]  INPATIENT OCCUPATIONAL THERAPY  [facility]  DAILY NOTE    Time:  Time In: 1402  Time Out: 1415  Timed Code Treatment Minutes: 13 Minutes  Minutes: 13          Date: 7/3/2018  Patient Name: Leatha Manzanares,   Gender: female      Room: [facility]55/055-A  MRN: [id number]  : 1930  (80 y.o.)  Referring Practitioner: Stiven Lopez MD  Diagnosis: s/p fall   Additional Pertinent Hx: Pt admitted 6-7 after a fall OOB at [facility]. Pt fell onto right side ,presetning with soreness throughout that side.  CT of head and neck with no acute process, xrays of right tibia/fibula and hip/pelvis  negative    Past Medical History:   Diagnosis Date    ARNOLD (acute kidney injury) ([address]     Anxiety and depression 2018    Atrial fibrillation ([address]     Blood transfusion reaction     CAD (coronary artery disease)     Severe triple vessel disease    Cerebrovascular disease 2018    CKD (chronic kidney disease)     Diastolic congestive heart failure (HCC)     DJD (degenerative joint disease), cervical 2018    GERD (gastroesophageal reflux disease)     Hiatal hernia     History of blood transfusion     Hyperlipidemia     Hypertension     Hypoalbuminemia     Hypothyroidism     Mild protein-calorie malnutrition (HCC)     NSTEMI (non-ST elevated myocardial infarction) ([address] 2014    Pancreatitis due to biliary obstruction     Pneumonia, organism unspecified(486)     Scoliosis     SDH (subdural hematoma) ([address]     [place] hole decompresson on 2014    Thyroid disease     Unspecified cerebral artery occlusion with cerebral infarction     Uterine cancer ([address] 1966     Past Surgical History:   Procedure Laterality Date    APPENDECTOMY      BACK SURGERY      TERESA HOLE  2014    drainage of left SDH    CARDIAC SURGERY      COLONOSCOPY      CORONARY ARTERY BYPASS GRAFT  11    LIMA to LAD, SVG to 1st Marginal, Distal Right Coronary Artery, 1st Diagonal    DILATATION, ESOPHAGUS      ENDOSCOPY, COLON, DIAGNOSTIC      EYE SURGERY      bilateral cataracts    HYSTERECTOMY      NE EGD BALLOON DILATION ESOPHAGUS <30 MM DIAM N/A 11/22/2017    EGD ESOPHAGOGASTRODUODENOSCOPY DILATATION performed by Barbie Butterfield MD at [facility]    NE ESOPHAGOGASTRODUODENOSCOPY TRANSORAL DIAGNOSTIC  11/22/2017    EGD ESOPHAGOGASTRODUODENOSCOPY performed by Barbie Butterfield MD at [address] [place] [address] Left     SKIN BIOPSY      VASCULAR SURGERY      cabg harvests from left leg       Restrictions/Precautions:  General Precautions, Fall Risk                    Other position/activity restrictions: confused       Prior Level of Function:  ADL Assistance: Independent  Homemaking Assistance: Needs assistance  Ambulation Assistance: Independent  Transfer Assistance: Independent  Additional Comments: Pt reports living at [address] for \"a couple months. \" Pt using  PTA for mobility. Getting assistance for meals. Indep with ADLs. Subjective     Subjective: 2nd session provided to patient and daughter for family education. Pt agreeable to complete. Overall Orientation Status: Within Functional Limits         Pain:  Pain Assessment  Patient Currently in Pain: Denies       Objective  Overall Cognitive Status: Exceptions  Cognition Comment: Sligthly confused throughout session. Decreased safety and insight. Decreased problem solving. Slow processing         Transfers  Sit to stand: Contact guard assistance (from bedside chair)  Stand to sit: Contact guard assistance (to bedside chair)  Transfer Comments: Education provided to daughter Viv Perdomo on how to put on gait belt and proper way for handling during transfers.  Daughter demonstrated transfer with patient this PM requiring CGA and cueing for technique    Balance  Sitting Balance: Supervision  Standing Balance: Contact guard assistance       Functional Mobility  Functional - Mobility Device: Rolling Walker  Activity: distances with S and RW and 0 cues for safety to increase endurance needed for ADL routine. Short term goal 4: Pt will complete light UE AROM HEP with 0-1 cues for technique to increase strength needed for bathing. Short term goal 5: Pt will complete LE ADLs with Min A and LHAE prn. Long term goals  Time Frame for Long term goals : 2 weeks  Long term goal 1: Pt will complete BADL routine with CGA and AE prn and 0-2 cues for sequencing/attending to task.

## 2018-07-03 NOTE — PROGRESS NOTES
Distal Right Coronary Artery, 1st Diagonal    DILATATION, ESOPHAGUS      ENDOSCOPY, COLON, DIAGNOSTIC      EYE SURGERY      bilateral cataracts    HYSTERECTOMY      [facility] EGD BALLOON DILATION ESOPHAGUS <30 MM DIAM N/A 11/22/2017    EGD ESOPHAGOGASTRODUODENOSCOPY DILATATION performed by Hiral Fuller MD at [facility]    VA ESOPHAGOGASTRODUODENOSCOPY TRANSORAL DIAGNOSTIC  11/22/2017    EGD ESOPHAGOGASTRODUODENOSCOPY performed by Hiral Fuller MD at [address] [place] [address] Left     SKIN BIOPSY      VASCULAR SURGERY      cabg harvests from left leg       Restrictions/Precautions:  General Precautions, Fall Risk   Other position/activity restrictions: confused       Prior Level of Function:  ADL Assistance: Independent  Homemaking Assistance: Needs assistance  Ambulation Assistance: Independent  Transfer Assistance: Independent  Additional Comments: Pt reports living at [address] for \"a couple months. \" Pt using RW PTA for mobility. Getting assistance for meals. Indep with ADLs. Subjective:  Response To Previous Treatment: Patient with no complaints from previous session.   Family / Caregiver Present: No  Comments: pt c/o back itching throughout session, often scratching  Subjective: pt. in bed , agreed to therapy.   pt.stated    \"I feel awlful today\"    Pain: no   .          Social/Functional:  Lives With: Alone  Type of Home: Assisted living  Home Layout: One level  Home Access: Level entry  Home Equipment: Rolling walker     Objective:  Rolling to Left: Supervision (bed)  Rolling to Right: Supervision (mat)  Supine to Sit: Supervision (bed and mat)  Sit to Supine: Supervision (mat)  Scooting: Supervision (scoot to edge of sitting surfaces)    Transfers  Sit to Stand: Stand by assistance  Stand to sit: Stand by assistance  TOILET TRANSFER with rolling walker with cga-> close sba       Ambulation 1  Surface: level tile  Device: Rolling Walker  Other Apparatus: O2 (3L 02 during therapy)  Assistance: Contact guard assistance;Stand by assistance  Quality of Gait: Slow , increasing to fair pace. Steady with AD.   occasional CGA to monitor balance. Patient avoided objects . Required VC for directions once in hallway. Distance:  15' x 1,150 ft x 1,,70' x 1          Balance  Comments:  for ~ 4min. Standing balance at SBA for patient to wash and dry hands and comb hair at sink. Donning and doffing undergarment with patient managing crys and john paul. Patient completed pericare with therapist handing patient wash cloth. Exercises:  Exercises  Comments: pt. completed 15 reps each b le seated heel-toe raises, laq, resistance t-band ham curls, marching, hip abd/add and 15 reps each b le supine ap's, quad/glute sets, saq, heelslides, hip abd/add all therex to increase strength/endurance and balance for improved functional mobility   Activity Tolerance:  Activity Tolerance: Patient Tolerated treatment well  Activity Tolerance: Good    Assessment: Body structures, Functions, Activity limitations: Decreased functional mobility , Decreased strength, Decreased cognition, Decreased endurance, Decreased balance  Assessment: Patient knew she was in hospital due to fall at assisted living. Patient incontinent of bladder in undergarment , but did urinate in toilet. Handled AD without assistance. Extra time taken for toileting and washing of hands. Prognosis: Good  Discharge Recommendations: 24 hour supervision or assist, Patient would benefit from continued therapy after discharge    Patient Education:  Patient Education: safety, use of AD, gait, transfers, bed mobility    Equipment Recommendations:  Equipment Needed: No    Safety:  Type of devices:  All fall risk precautions in place, Patient at risk for falls, Call light within reach, Left in chair, Chair alarm in place, Gait belt, Telesitter in use  Restraints  Initially in place: No    Plan:  Times per week: 6x  Times per day: Daily  Specific instructions for Next Treatment: gait, balance, endurance, therex  Current Treatment Recommendations: Strengthening, Balance Training, Functional Mobility Training, Transfer Training, Endurance Training, Gait Training, Home Exercise Program, Safety Education & Training, Patient/Caregiver Education & Training    Goals:  Patient goals : get stronger    Short term goals  Time Frame for Short term goals: 10 days  Short term goal 1: Pt to perform all bed mobility at A for getting in and out of bed. - Discontinue (refer to LTG)  Short term goal 2: Pt to transfer from various surfaces at [facility] for getting to/from chairs and bed. - Discontinue (refer to LTG)  Short term goal 3: Pt to ambulate 48' with RW at [facility] for increased mobility at [address] [place] [address] (refer to LTG)  Short term goal 4: Pt to improve Elderly Mobility Index to 11/20 for improved functional mobility. - Continue    Long term goals  Time Frame for Long term goals : 3 weeks  Long term goal 1: Pt to perform all bed mobility at S for getting in and out of bed. Long term goal 2: Pt to transfer from various surfaces at S for getting to/from chairs and bed. Long term goal 3: Pt to ambulate 200' with RW at S for increased mobility at [address] [place] [address]. Long term goal 4: Pt to improve Elderly Mobility Index to 14 for improved functional mobility. Long term goal 5: Pt to perform car transfer at [facility] for safe transfer to [address] [place] [address].

## 2018-07-04 PROCEDURE — 1290000000 HC SEMI PRIVATE OTHER R&B

## 2018-07-04 PROCEDURE — 6370000000 HC RX 637 (ALT 250 FOR IP): Performed by: FAMILY MEDICINE

## 2018-07-04 PROCEDURE — 6370000000 HC RX 637 (ALT 250 FOR IP): Performed by: INTERNAL MEDICINE

## 2018-07-04 PROCEDURE — 94640 AIRWAY INHALATION TREATMENT: CPT

## 2018-07-04 PROCEDURE — 2700000000 HC OXYGEN THERAPY PER DAY

## 2018-07-04 RX ADMIN — BUMETANIDE 0.5 MG: 1 TABLET ORAL at 10:45

## 2018-07-04 RX ADMIN — LATANOPROST 1 DROP: 50 SOLUTION OPHTHALMIC at 22:24

## 2018-07-04 RX ADMIN — PANTOPRAZOLE SODIUM 40 MG: 40 TABLET, DELAYED RELEASE ORAL at 06:20

## 2018-07-04 RX ADMIN — ISOSORBIDE MONONITRATE 30 MG: 30 TABLET ORAL at 10:45

## 2018-07-04 RX ADMIN — SUCRALFATE 1 G: 1 TABLET ORAL at 22:24

## 2018-07-04 RX ADMIN — ATORVASTATIN CALCIUM 20 MG: 20 TABLET, FILM COATED ORAL at 22:24

## 2018-07-04 RX ADMIN — DOCUSATE SODIUM 100 MG: 100 CAPSULE, LIQUID FILLED ORAL at 10:46

## 2018-07-04 RX ADMIN — ACETAMINOPHEN 650 MG: 325 TABLET ORAL at 22:22

## 2018-07-04 RX ADMIN — LEVOTHYROXINE SODIUM 88 MCG: 88 TABLET ORAL at 06:20

## 2018-07-04 RX ADMIN — ACETAMINOPHEN 650 MG: 325 TABLET ORAL at 01:15

## 2018-07-04 RX ADMIN — METOPROLOL TARTRATE 12.5 MG: 25 TABLET ORAL at 22:24

## 2018-07-04 RX ADMIN — IPRATROPIUM BROMIDE AND ALBUTEROL SULFATE 1 AMPULE: .5; 3 SOLUTION RESPIRATORY (INHALATION) at 16:21

## 2018-07-04 RX ADMIN — ASPIRIN 81 MG: 81 TABLET ORAL at 10:45

## 2018-07-04 RX ADMIN — CITALOPRAM 40 MG: 40 TABLET, FILM COATED ORAL at 10:45

## 2018-07-04 RX ADMIN — IPRATROPIUM BROMIDE AND ALBUTEROL SULFATE 1 AMPULE: .5; 3 SOLUTION RESPIRATORY (INHALATION) at 08:49

## 2018-07-04 RX ADMIN — SUCRALFATE 1 G: 1 TABLET ORAL at 17:24

## 2018-07-04 RX ADMIN — SPIRONOLACTONE 12.5 MG: 25 TABLET ORAL at 10:44

## 2018-07-04 RX ADMIN — ACETAMINOPHEN 650 MG: 325 TABLET ORAL at 17:26

## 2018-07-04 RX ADMIN — GUAIFENESIN 600 MG: 600 TABLET, EXTENDED RELEASE ORAL at 22:24

## 2018-07-04 RX ADMIN — METOPROLOL TARTRATE 12.5 MG: 25 TABLET ORAL at 10:44

## 2018-07-04 RX ADMIN — CALCIUM CARBONATE-VITAMIN D TAB 500 MG-200 UNIT 1 TABLET: 500-200 TAB at 10:45

## 2018-07-04 RX ADMIN — ACETAMINOPHEN 650 MG: 325 TABLET ORAL at 06:20

## 2018-07-04 RX ADMIN — SUCRALFATE 1 G: 1 TABLET ORAL at 10:44

## 2018-07-04 RX ADMIN — PANTOPRAZOLE SODIUM 40 MG: 40 TABLET, DELAYED RELEASE ORAL at 17:28

## 2018-07-04 RX ADMIN — IPRATROPIUM BROMIDE AND ALBUTEROL SULFATE 1 AMPULE: .5; 3 SOLUTION RESPIRATORY (INHALATION) at 13:07

## 2018-07-04 RX ADMIN — MAGNESIUM GLUCONATE 500 MG ORAL TABLET 400 MG: 500 TABLET ORAL at 10:45

## 2018-07-04 RX ADMIN — ACETAMINOPHEN 650 MG: 325 TABLET ORAL at 12:57

## 2018-07-04 RX ADMIN — POLYETHYLENE GLYCOL (3350) 17 G: 17 POWDER, FOR SOLUTION ORAL at 10:44

## 2018-07-04 RX ADMIN — DOCUSATE SODIUM 100 MG: 100 CAPSULE, LIQUID FILLED ORAL at 22:21

## 2018-07-04 RX ADMIN — GUAIFENESIN 600 MG: 600 TABLET, EXTENDED RELEASE ORAL at 10:45

## 2018-07-04 ASSESSMENT — PAIN SCALES - GENERAL
PAINLEVEL_OUTOF10: 0
PAINLEVEL_OUTOF10: 1
PAINLEVEL_OUTOF10: 0
PAINLEVEL_OUTOF10: 0

## 2018-07-05 PROCEDURE — 97535 SELF CARE MNGMENT TRAINING: CPT

## 2018-07-05 PROCEDURE — 97116 GAIT TRAINING THERAPY: CPT

## 2018-07-05 PROCEDURE — 6370000000 HC RX 637 (ALT 250 FOR IP): Performed by: FAMILY MEDICINE

## 2018-07-05 PROCEDURE — 97110 THERAPEUTIC EXERCISES: CPT

## 2018-07-05 PROCEDURE — 97530 THERAPEUTIC ACTIVITIES: CPT

## 2018-07-05 PROCEDURE — 6370000000 HC RX 637 (ALT 250 FOR IP): Performed by: INTERNAL MEDICINE

## 2018-07-05 PROCEDURE — 94640 AIRWAY INHALATION TREATMENT: CPT

## 2018-07-05 PROCEDURE — 92526 ORAL FUNCTION THERAPY: CPT

## 2018-07-05 PROCEDURE — 1290000000 HC SEMI PRIVATE OTHER R&B

## 2018-07-05 PROCEDURE — 2700000000 HC OXYGEN THERAPY PER DAY

## 2018-07-05 RX ADMIN — IPRATROPIUM BROMIDE AND ALBUTEROL SULFATE 1 AMPULE: .5; 3 SOLUTION RESPIRATORY (INHALATION) at 15:15

## 2018-07-05 RX ADMIN — PANTOPRAZOLE SODIUM 40 MG: 40 TABLET, DELAYED RELEASE ORAL at 17:39

## 2018-07-05 RX ADMIN — ATORVASTATIN CALCIUM 20 MG: 20 TABLET, FILM COATED ORAL at 21:37

## 2018-07-05 RX ADMIN — LATANOPROST 1 DROP: 50 SOLUTION OPHTHALMIC at 21:37

## 2018-07-05 RX ADMIN — ACETAMINOPHEN 650 MG: 325 TABLET ORAL at 17:35

## 2018-07-05 RX ADMIN — SUCRALFATE 1 G: 1 TABLET ORAL at 05:26

## 2018-07-05 RX ADMIN — MAGNESIUM GLUCONATE 500 MG ORAL TABLET 400 MG: 500 TABLET ORAL at 10:17

## 2018-07-05 RX ADMIN — METOPROLOL TARTRATE 12.5 MG: 25 TABLET ORAL at 21:36

## 2018-07-05 RX ADMIN — CITALOPRAM 40 MG: 40 TABLET, FILM COATED ORAL at 10:17

## 2018-07-05 RX ADMIN — IPRATROPIUM BROMIDE AND ALBUTEROL SULFATE 1 AMPULE: .5; 3 SOLUTION RESPIRATORY (INHALATION) at 11:42

## 2018-07-05 RX ADMIN — POLYETHYLENE GLYCOL (3350) 17 G: 17 POWDER, FOR SOLUTION ORAL at 10:17

## 2018-07-05 RX ADMIN — ACETAMINOPHEN 650 MG: 325 TABLET ORAL at 05:25

## 2018-07-05 RX ADMIN — SUCRALFATE 1 G: 1 TABLET ORAL at 21:37

## 2018-07-05 RX ADMIN — SPIRONOLACTONE 12.5 MG: 25 TABLET ORAL at 10:18

## 2018-07-05 RX ADMIN — DOCUSATE SODIUM 100 MG: 100 CAPSULE, LIQUID FILLED ORAL at 10:17

## 2018-07-05 RX ADMIN — GUAIFENESIN 600 MG: 600 TABLET, EXTENDED RELEASE ORAL at 10:17

## 2018-07-05 RX ADMIN — DOCUSATE SODIUM 100 MG: 100 CAPSULE, LIQUID FILLED ORAL at 21:37

## 2018-07-05 RX ADMIN — IPRATROPIUM BROMIDE AND ALBUTEROL SULFATE 1 AMPULE: .5; 3 SOLUTION RESPIRATORY (INHALATION) at 09:11

## 2018-07-05 RX ADMIN — BUMETANIDE 0.5 MG: 1 TABLET ORAL at 10:18

## 2018-07-05 RX ADMIN — GUAIFENESIN 600 MG: 600 TABLET, EXTENDED RELEASE ORAL at 21:37

## 2018-07-05 RX ADMIN — SUCRALFATE 1 G: 1 TABLET ORAL at 10:17

## 2018-07-05 RX ADMIN — ASPIRIN 81 MG: 81 TABLET ORAL at 10:17

## 2018-07-05 RX ADMIN — METOPROLOL TARTRATE 12.5 MG: 25 TABLET ORAL at 10:18

## 2018-07-05 RX ADMIN — LEVOTHYROXINE SODIUM 88 MCG: 88 TABLET ORAL at 05:26

## 2018-07-05 RX ADMIN — PANTOPRAZOLE SODIUM 40 MG: 40 TABLET, DELAYED RELEASE ORAL at 05:25

## 2018-07-05 RX ADMIN — IPRATROPIUM BROMIDE AND ALBUTEROL SULFATE 1 AMPULE: .5; 3 SOLUTION RESPIRATORY (INHALATION) at 19:18

## 2018-07-05 RX ADMIN — ISOSORBIDE MONONITRATE 30 MG: 30 TABLET ORAL at 10:17

## 2018-07-05 RX ADMIN — CALCIUM CARBONATE-VITAMIN D TAB 500 MG-200 UNIT 1 TABLET: 500-200 TAB at 10:17

## 2018-07-05 ASSESSMENT — PAIN SCALES - GENERAL
PAINLEVEL_OUTOF10: 0
PAINLEVEL_OUTOF10: 0
PAINLEVEL_OUTOF10: 1
PAINLEVEL_OUTOF10: 0

## 2018-07-05 NOTE — PLAN OF CARE
Problem: Nutrition  Goal: Optimal nutrition therapy  Outcome: Ongoing  Nutrition Problem: Moderate malnutrition  Intervention: Food and/or Nutrient Delivery: Continue current diet, Modify current ONS  Nutritional Goals: Pt. will  consume 75% or more at meals during LOS.

## 2018-07-05 NOTE — PROGRESS NOTES
continue with advanced trial tray of dysphagia III and thins to assess appropriateness for diet upgrade. SHORT TERM GOAL #3:   Goal 3: Pt will complete orientation and functional recall/carryover tasks with 60% accuracy given min cues to improve retention of novel information. INTERVENTIONS: DNT due to focus on other goals. SHORT TERM GOAL #4:  Goal 4: Pt will complete basic safety, problem solving, reasoning, and sequencing tasks with 60% accuracy given min cues to improve awareness for integration into current and future settings. INTERVENTIONS: DNT due to focus on other goals. SHORT TERM GOAL #5:  Goal 5: Pt will complete spoken langauge expression (speech naming) and comprehension (moderately complex yes/no questions, 2-3 step commands) tasks with 70% accuracy given mod cues to improve communicative efficacy. INTERVENTIONS: DNT due to focus on other goals. Long-term Goals  Timeframe for Long-term Goals: three weeks  Goal 1: Pt will improve cognitive linguistic functioning to moderate impairment level or higher to promote improved safety and communication for optimal level of functionallity. ONGOING   Goal 2: Pt will safely tolerate PO diet level 98% of the time given compensatory swallowing strategies to maximize nutrition/hydration measures.  ONGOING        ASSESSMENT:  Assessment: [x]Progressing towards goals          []Not Progressing towards goals    Patient Tolerance of Treatment:   [x]Tolerated well []Tolerated fair []Required rest breaks []Fatigued  Plan for Next Session: sequencing, short term memory  Education:  Learner:  [x]Patient          []Significant Other          []Other:   Education provided regarding:  [x]Goals and POC   [x]Diet and swallowing precautions    []Home Exercise Program  [x]Progress and/or discharge information  Method of Education:  [x]Discussion          [x]Demonstration          []Handout          []Other  Evaluation of Education:   [x]Verbalized understanding   []Demonstrates without assistance  []Demonstrates with assistance  [x]Needs further instruction     []No evidence of learning                  [x]No family present      Plan: [x]Continue with current plan of care    []Modify current plan of care as follows:    []Discharge patient    Discharge Location:    Services/Supervision Recommended:     [x]Patient continues to require treatment by a licensed therapist to address functional deficits as outlined in the established plan of care. ALICIA Jean Baptiste, speech intern.   Yulia Han M.S. Cee Nicolas 7/5/2018

## 2018-07-05 NOTE — PROGRESS NOTES
Nutrition Assessment    Type and Reason for Visit: Reassess (po/wt. check)    Nutrition Recommendations: Consider MVI. Continue current diet. Send magic cup daily, ensure enlive twice/day    Malnutrition Assessment:  · Malnutrition Status: Meets the criteria for moderate malnutrition  · Context: Acute illness or injury  · Findings of the 6 clinical characteristics of malnutrition (Minimum of 2 out of 6 clinical characteristics is required to make the diagnosis of moderate or severe Protein Calorie Malnutrition based on AND/ASPEN Guidelines):  1. Energy Intake- (1-100% varied),      2. Weight Loss-7.5% loss or greater,  (4 months)  3. Fat Loss-Mild subcutaneous fat loss, Orbital  4. Muscle Loss-Mild muscle mass loss, Temples (temporalis muscle)  5. Fluid Accumulation-Unable to assess,    6.  Strength-Not measured    Nutrition Diagnosis:   · Problem: Moderate malnutrition  · Etiology: related to Insufficient energy/nutrient consumption     Signs and symptoms:  as evidenced by  (mild fat & muscle loss)    Nutrition Assessment:  · Subjective Assessment: Pt. has telesitter. She mentions she consumed all of scrabmed eggs & ensure enlive at breakfast. She mentions she took sips of her juice. Appettie is \"pretty good\". She denies difficulty chewing/swallowing foods on current diet. SLP monitoring. Labs include: (7/3) Na 132, BUN 35, Creatinine 1.4, Glucose 131, Hemoglobin 11.2. Pt. Mentions she wants magic cup reduced to once daily since already receiving ensure enlive twice/day.    · -  · Wound Type: Stage I (Back wound)  · Current Nutrition Therapies:  · Oral Diet Orders:  (Dysphagia 2, Direct 1:1 supervision, ground up meat, diced fruit & veggies, extra sauce & gravy)   · Oral Diet intake: 1-25%, 26-50%, %  · Oral Nutrition Supplement (ONS) Orders: Standard High Calorie Oral Supplement, Frozen Oral Supplement  · ONS intake: 26-50%, %  · Anthropometric Measures:  · Ht: 4' 8\" (142.2 cm)   · Current

## 2018-07-05 NOTE — PROGRESS NOTES
[address] [address]  INPATIENT PHYSICAL THERAPY  DAILY NOTE  [facility] 8E - 8E-55/055-A    Time In: 5529  Time Out: 1140  Timed Code Treatment Minutes: 37 Minutes  Minutes: 43          Date: 2018  Patient Name: Liam Razo,  Gender:  female        MRN: [id number]  : 1930  (80 y.o.)  Referral Date : 18  Referring Practitioner: Ordering: Mariah Corrigan MD  Attending: JOSE ROBERTO Olsen MD  Diagnosis: s/p fall  Additional Pertinent Hx: Pt admitted 6-7 after a fall OOB at [facility]. Pt fell onto right side ,presetning with soreness throughout that side. CT of head and neck with no acute process, xrays of right tibia/fibula and hip/pelvis  negative. To TCU on .      Past Medical History:   Diagnosis Date    ARNOLD (acute kidney injury) ([address]     Anxiety and depression 2018    Atrial fibrillation ([address]     Blood transfusion reaction     CAD (coronary artery disease)     Severe triple vessel disease    Cerebrovascular disease 2018    CKD (chronic kidney disease)     Diastolic congestive heart failure (HCC)     DJD (degenerative joint disease), cervical 2018    GERD (gastroesophageal reflux disease)     Hiatal hernia     History of blood transfusion     Hyperlipidemia     Hypertension     Hypoalbuminemia     Hypothyroidism     Mild protein-calorie malnutrition (HCC)     NSTEMI (non-ST elevated myocardial infarction) ([address] 2014    Pancreatitis due to biliary obstruction     Pneumonia, organism unspecified(486)     Scoliosis     SDH (subdural hematoma) ([address]     Tiffin hole decompresson on 2014    Thyroid disease     Unspecified cerebral artery occlusion with cerebral infarction     Uterine cancer ([address] 1966     Past Surgical History:   Procedure Laterality Date    APPENDECTOMY      BACK SURGERY      TERESA HOLE  2014    drainage of left SDH    CARDIAC SURGERY      COLONOSCOPY      CORONARY ARTERY BYPASS GRAFT  11    LIMA to LAD, SVG to 1st Marginal, Distal Right Coronary Artery, 1st Diagonal    DILATATION, ESOPHAGUS      ENDOSCOPY, COLON, DIAGNOSTIC      EYE SURGERY      bilateral cataracts    HYSTERECTOMY      [place] EGD BALLOON DILATION ESOPHAGUS <30 MM DIAM N/A 11/22/2017    EGD ESOPHAGOGASTRODUODENOSCOPY DILATATION performed by Stefania Sam MD at [address] Endoscopy    [place] ESOPHAGOGASTRODUODENOSCOPY TRANSORAL DIAGNOSTIC  11/22/2017    EGD ESOPHAGOGASTRODUODENOSCOPY performed by Stefania Sam MD at [facility] Left     SKIN BIOPSY      VASCULAR SURGERY      cabg harvests from left leg       Restrictions/Precautions:  General Precautions, Fall Risk                    Other position/activity restrictions: confused       Prior Level of Function:  ADL Assistance: Independent  Homemaking Assistance: Needs assistance  Ambulation Assistance: Independent  Transfer Assistance: Independent  Additional Comments: Pt reports living at [address] [address] PTA for \"a couple months. \" Pt using RW PTA for mobility. Getting assistance for meals. Indep with ADLs. Subjective:     Subjective: pt in recliner upon arrival and agrees to therapy. pt pleasant and agreeable, very talkative throughout session. Pain:  Denies. Social/Functional:  Lives With: Alone  Type of Home: Assisted living  Home Layout: One level  Home Access: Level entry  Home Equipment: Rolling walker     Objective:       Transfers  Sit to Stand: Stand by assistance  Stand to sit: Stand by assistance       Ambulation 1  Surface: level tile  Device: Rolling Walker  Other Apparatus: O2  Assistance: Stand by assistance  Quality of Gait: slowed lew, short step length, forward flexed posture, occasioanlly bumping into objects/walls  Distance: 250'x1 75'x1       Balance  Comments: ring toss activity x2, pt reaching outside of JEFFERY in all directions with CGA.  pt mildly unsteady with no LOB       Exercises:  Exercises  Comments: seated BLE therex to increase strength and independence with fuctional mobility 1x10 reps heel/toe raises, marches, LAQ, hs curls, hip ab/add, talkative throughout, easily off task, cues to redirect     Activity Tolerance:  Activity Tolerance: Patient Tolerated treatment well    Assessment: Body structures, Functions, Activity limitations: Decreased functional mobility , Decreased strength, Decreased endurance, Decreased balance, Decreased cognition  Assessment: pt olerated session fairly well. pt somewhat confused during session however oriented. Pt demos decreased balance and mobility. pt would benefit from cont skilled PT to improve this and decrease the risk for falls. Prognosis: Good  Discharge Recommendations: 24 hour supervision or assist, Patient would benefit from continued therapy after discharge    Patient Education:  Patient Education: safety, gait, transfers, balance, therex    Equipment Recommendations:  Equipment Needed: No    Safety:  Type of devices: All fall risk precautions in place, Patient at risk for falls, Call light within reach, Left in chair, Chair alarm in place, Gait belt, Telesitter in use  Restraints  Initially in place: No    Plan:  Times per week: 6x  Times per day: Daily  Specific instructions for Next Treatment: gait, balance, endurance, therex  Current Treatment Recommendations: Strengthening, Balance Training, Functional Mobility Training, Transfer Training, Endurance Training, Gait Training, Home Exercise Program, Safety Education & Training, Patient/Caregiver Education & Training    Goals:  Patient goals : get stronger    Short term goals  Time Frame for Short term goals: 10 days  Short term goal 1: Pt to perform all bed mobility at [facility] for getting in and out of bed. - Discontinue (refer to LTG)  Short term goal 2: Pt to transfer from various surfaces at [facility] for getting to/from chairs and bed. - Discontinue (refer to LTG)  Short term goal 3: Pt to ambulate 48' with RW at [facility] for increased mobility at [facility].  -

## 2018-07-05 NOTE — PROGRESS NOTES
Supervision  Scooting: Supervision    Transfers  Sit to stand: Contact guard assistance (CGA-close SBA )  Stand to sit: Stand by assistance       Balance  Sitting Balance: Supervision  Standing Balance: Contact guard assistance     Time: ~2 minutes   Activity: Cleaning off tray table with BUE release from walker requiring CGA-close SBA required lengthy seated rest break d/t increase of feeling dizzy. Functional Mobility  Functional - Mobility Device: Rolling Walker  Activity: Other  Assist Level: Contact guard assistance  Functional Mobility Comments: To/from doorway within room- pt declining further mobility at this time d/t increase of fatigue. Seated rest break after mobility       Type of ROM/Therapeutic Exercise: AROM  Comment: Completed BUE AROM exercises x15 reps x1 set in all joints/planes seated in chair. Required rest breaks after each exercise.  Completed exercises to increase strength and activity tolerance required for ADLs and toilet transfers       Activity Tolerance:  Activity Tolerance: Patient limited by fatigue    Assessment:     Performance deficits / Impairments: Decreased safe awareness, Decreased balance, Decreased ADL status, Decreased endurance, Decreased strength, Decreased functional mobility , Decreased cognition, Decreased coordination  Prognosis: Fair  Discharge Recommendations: Patient would benefit from continued therapy after discharge, Home with Home health OT (Return to assisted living)    Patient Education:  Patient Education: safety with transfers and mobility, AROM, ADL strategies    Equipment Recommendations:  Equipment Needed: No  Other: None needed at this time    Safety:  Safety Devices in place: Yes  Type of devices: Call light within reach, Chair alarm in place, Left in chair, Gait belt (telesitter)    Plan:  Times per week: 6x  Current Treatment Recommendations: Strengthening, Endurance Training, Patient/Caregiver Education & Training, Self-Care / ADL, Functional

## 2018-07-06 PROCEDURE — 6370000000 HC RX 637 (ALT 250 FOR IP): Performed by: FAMILY MEDICINE

## 2018-07-06 PROCEDURE — 6370000000 HC RX 637 (ALT 250 FOR IP): Performed by: INTERNAL MEDICINE

## 2018-07-06 PROCEDURE — 1290000000 HC SEMI PRIVATE OTHER R&B

## 2018-07-06 PROCEDURE — 94640 AIRWAY INHALATION TREATMENT: CPT

## 2018-07-06 PROCEDURE — 97530 THERAPEUTIC ACTIVITIES: CPT

## 2018-07-06 PROCEDURE — 97535 SELF CARE MNGMENT TRAINING: CPT

## 2018-07-06 PROCEDURE — 92526 ORAL FUNCTION THERAPY: CPT

## 2018-07-06 PROCEDURE — 2700000000 HC OXYGEN THERAPY PER DAY

## 2018-07-06 RX ADMIN — DOCUSATE SODIUM 100 MG: 100 CAPSULE, LIQUID FILLED ORAL at 09:11

## 2018-07-06 RX ADMIN — LEVOTHYROXINE SODIUM 88 MCG: 88 TABLET ORAL at 05:41

## 2018-07-06 RX ADMIN — ACETAMINOPHEN 650 MG: 325 TABLET ORAL at 00:13

## 2018-07-06 RX ADMIN — ISOSORBIDE MONONITRATE 30 MG: 30 TABLET ORAL at 09:12

## 2018-07-06 RX ADMIN — SPIRONOLACTONE 12.5 MG: 25 TABLET ORAL at 09:12

## 2018-07-06 RX ADMIN — GUAIFENESIN 600 MG: 600 TABLET, EXTENDED RELEASE ORAL at 09:12

## 2018-07-06 RX ADMIN — PANTOPRAZOLE SODIUM 40 MG: 40 TABLET, DELAYED RELEASE ORAL at 17:43

## 2018-07-06 RX ADMIN — IPRATROPIUM BROMIDE AND ALBUTEROL SULFATE 1 AMPULE: .5; 3 SOLUTION RESPIRATORY (INHALATION) at 07:16

## 2018-07-06 RX ADMIN — ACETAMINOPHEN 650 MG: 325 TABLET ORAL at 12:09

## 2018-07-06 RX ADMIN — BUMETANIDE 0.5 MG: 1 TABLET ORAL at 09:12

## 2018-07-06 RX ADMIN — IPRATROPIUM BROMIDE AND ALBUTEROL SULFATE 1 AMPULE: .5; 3 SOLUTION RESPIRATORY (INHALATION) at 11:44

## 2018-07-06 RX ADMIN — CALCIUM CARBONATE-VITAMIN D TAB 500 MG-200 UNIT 1 TABLET: 500-200 TAB at 09:12

## 2018-07-06 RX ADMIN — ASPIRIN 81 MG: 81 TABLET ORAL at 09:11

## 2018-07-06 RX ADMIN — CITALOPRAM 40 MG: 40 TABLET, FILM COATED ORAL at 09:12

## 2018-07-06 RX ADMIN — ACETAMINOPHEN 650 MG: 325 TABLET ORAL at 05:41

## 2018-07-06 RX ADMIN — SUCRALFATE 1 G: 1 TABLET ORAL at 12:09

## 2018-07-06 RX ADMIN — METOPROLOL TARTRATE 12.5 MG: 25 TABLET ORAL at 09:12

## 2018-07-06 RX ADMIN — IPRATROPIUM BROMIDE AND ALBUTEROL SULFATE 1 AMPULE: .5; 3 SOLUTION RESPIRATORY (INHALATION) at 22:32

## 2018-07-06 RX ADMIN — LATANOPROST 1 DROP: 50 SOLUTION OPHTHALMIC at 22:53

## 2018-07-06 RX ADMIN — PANTOPRAZOLE SODIUM 40 MG: 40 TABLET, DELAYED RELEASE ORAL at 05:41

## 2018-07-06 RX ADMIN — SUCRALFATE 1 G: 1 TABLET ORAL at 17:43

## 2018-07-06 RX ADMIN — SUCRALFATE 1 G: 1 TABLET ORAL at 05:40

## 2018-07-06 RX ADMIN — IPRATROPIUM BROMIDE AND ALBUTEROL SULFATE 1 AMPULE: .5; 3 SOLUTION RESPIRATORY (INHALATION) at 16:14

## 2018-07-06 RX ADMIN — MAGNESIUM GLUCONATE 500 MG ORAL TABLET 400 MG: 500 TABLET ORAL at 09:12

## 2018-07-06 RX ADMIN — ACETAMINOPHEN 650 MG: 325 TABLET ORAL at 17:43

## 2018-07-06 ASSESSMENT — PAIN SCALES - GENERAL
PAINLEVEL_OUTOF10: 5
PAINLEVEL_OUTOF10: 0

## 2018-07-06 NOTE — PROGRESS NOTES
not recall  1. Yes, after cueing (a color)  2. Yes, no cue required       0 Able to recall bed  0. No  could not recall  1. Yes, after cueing (a piece of furniture)  2. Yes, no cue required                  Patient Name: Ayan Sharp        MRN: [id number]    : 1930  (80 y.o.)  Gender: female   Principal Problem: <principal problem not specified>     Section D - Mood     Should Resident Mood Interview be Conducted?  Attempt to conduct interview with all residents   0. No (resident is rarely/never understood) à Skip to and complete -, Staff Assessment of Mood (PHQ 9-OV)  1. Yes à Continue to , Resident Mood Interview (PHQ-9) Enter Code    1   . Resident Mood Interview (PHQ-9)   Say to resident: Kirti Vera the last 2 weeks, have you been bothered by any of the following problems?    If symptom is present, enter 1(yes) in column 1, Symptom Presence. Then move to column 2, Symptom Frequency, and indicate symptom frequency. 1. Symptom Presence                   2. Symptom                                                                  Frequency  0. No (enter 0 in column 2)          0. Never or 1 day          1. Yes (enter 0-3 in column 2)      1. 2-6 days           9. No Response                               2.  7-11 days                                                 3.  12-14 days   1. Symptom Presence 2. Symptom  Frequency        Enter Scores in boxes below   A. Little interest or pleasure in doing things 0 0   B. Feeling down, depressed, or hopeless 1 2   C. Trouble falling or staying asleep, or sleeping too much 1 2   D. Feeling tired or having little energy 1 2   E. Poor appetite or overeating 1 2   F. Feeling bad about yourself  or that you are a failure, or have let your family down 0 0   G. Trouble concentrating on things, such as reading the newspaper or watching television 1 2   H. Moving or speaking so slowly that other people have noticed.   Or the opposite-being so fidgety or restless that s/he has been moving around a lot more than usual   0   0   I. Thoughts that you would be better off dead, or of hurting yourself in some way. 0 0              Patient Name: Ayan Sharp        MRN: [id number]    : 1930  (80 y.o.)  Gender: female   Principal Problem: <principal problem not specified>    Section J    Health Conditions    Should Pain Assessment Interview be Conducted? Attempt to conduct interview with all residents. If resident is comatose, skip to , Shortness of Breath (dyspnea)   Enter Code  1 0 - No à (resident is rarely/never understood) à Skip to [address] of Breath  1 - Yes à Continue to , Pain Presence   Pain Assessment Interview   Pain Presence   Enter Code  0 Ask resident: [place] you had pain or hurting at any time in the last 5 days?   0 - No à Skip to , Shortness of Breath  1 - Yes  à Continue to , Pain Frequency  9  Unable to answer à Skip to , Shortness of Breath

## 2018-07-06 NOTE — PROGRESS NOTES
Susan Branch 60  INPATIENT OCCUPATIONAL THERAPY  [facility]  PROGRESS NOTE    Time:  Time In: 1030  Time Out: 1138  Timed Code Treatment Minutes: 68 Minutes  Minutes: 68          Date: 2018  Patient Name: Liam Razo,   Gender: female      MRN: [id number]  : 1930  (80 y.o.)  Referring Practitioner: Alyssia Burkett MD  Diagnosis: s/p fall   Additional Pertinent Hx: Pt admitted 6-7 after a fall OOB at [facility]. Pt fell onto right side ,presetning with soreness throughout that side.  CT of head and neck with no acute process, xrays of right tibia/fibula and hip/pelvis  negative    Restrictions/Precautions:  Restrictions/Precautions: General Precautions, Fall Risk            Position Activity Restriction  Other position/activity restrictions: confused         Past Medical History:   Diagnosis Date    ARNOLD (acute kidney injury) ([address]     Anxiety and depression 2018    Atrial fibrillation ([address]     Blood transfusion reaction     CAD (coronary artery disease)     Severe triple vessel disease    Cerebrovascular disease 2018    CKD (chronic kidney disease)     Diastolic congestive heart failure (HCC)     DJD (degenerative joint disease), cervical 2018    GERD (gastroesophageal reflux disease)     Hiatal hernia     History of blood transfusion     Hyperlipidemia     Hypertension     Hypoalbuminemia     Hypothyroidism     Mild protein-calorie malnutrition (HCC)     NSTEMI (non-ST elevated myocardial infarction) ([address] 2014    Pancreatitis due to biliary obstruction     Pneumonia, organism unspecified(486)     Scoliosis     SDH (subdural hematoma) ([address]     [place] hole decompresson on 2014    Thyroid disease     Unspecified cerebral artery occlusion with cerebral infarction     Uterine cancer ([address] 1966     Past Surgical History:   Procedure Laterality Date    APPENDECTOMY      BACK SURGERY      TERESA HOLE  2014    drainage of left SDH    CARDIAC SURGERY Supervision  Standing Balance: Stand by assistance     Time: x 1-2 min x 4 trials, x 4 min sinkside for oral care  Functional - Mobility Device: Rolling Walker  Activity: To/from bathroom  Assist Level: Contact guard assistance  Functional Mobility Comments: slow pace, min unsteadiness, fatigued quickly        Activity Tolerance:  Activity Tolerance: Patient Tolerated treatment well;Patient limited by fatigue;Treatment limited secondary to decreased cognition  Activity Tolerance: increased time this date d/t confusion and fatigue, pt asking multiple times, what day of the week it was. Assessment:  Performance deficits / Impairments: Decreased safe awareness, Decreased balance, Decreased ADL status, Decreased endurance, Decreased strength, Decreased functional mobility , Decreased cognition, Decreased coordination  Assessment: Pt is making steady progress towards goals. Pt has met 2/5 STGs and 1/1 LTGs. Pt has exhibited improvement in following exs and ADL routine. Pt requiring SBA-S for ADL routine this date. Upon admission pt required maxA ADLs. Pt continues to exhibit decreased balance, endurance, and cognition causing barriers to meeting pt's goals. Pt continues to require skilled OT intervention to improve ADl performance required for pt to return [facility] at [facility].   Prognosis: Fair  Discharge Recommendations: Patient would benefit from continued therapy after discharge, Home with Home health OT (Return to assisted living)    Patient Education:  Patient Education: safety with ADLs    Equipment Recommendations:  Equipment Needed: No  Other: None needed at this time    Safety:  Safety Devices in place: Yes  Type of devices: Call light within reach, Chair alarm in place, Left in chair, Gait belt, All fall risk precautions in place (telesitter)    Plan:  Times per week: 6x  Current Treatment Recommendations: Strengthening, Endurance Training, Patient/Caregiver Education & Training, Self-Care / ADL, Functional Mobility Training, Balance Training, Safety Education & Training    Goals:  Patient goals : feel less tired     Short term goals  Time Frame for Short term goals: 1 week   Short term goal 1: Pt will complete functional transfers to/from chair/toilet with S and 0 cues for safety to increase safety with toileting routine. NOT MET, CONTINUE  Short term goal 2: Pt will complete dynamic standing > 5 min with S and 1-2 hand release to increase ability to reach into closet or cupbaords for ADL objects NOT MET, CONTINUE  Short term goal 3: Pt will complete functional mobilty > HH distances with S and RW and 0 cues for safety to increase endurance needed for ADL routine. NOT MET, CONTINUE  Short term goal 4: Pt will complete light UE AROM HEP with 0-1 cues for technique to increase strength needed for bathing. MET, CONTINUE  Short term goal 5: Pt will complete LE ADLs with Min A and LHAE prn. MET, REVISE  Long term goals  Time Frame for Long term goals : 2 weeks  Long term goal 1: Pt will complete BADL routine with CGA and AE prn and 0-2 cues for sequencing/attending to task. MET, REVISE    Revised Short-Term Goals  Short term goals  Time Frame for Short term goals: 1 week   Short term goal 1: Pt will complete functional transfers to/from chair/toilet with S and 0 cues for safety to increase safety with toileting routine. Short term goal 2: Pt will complete dynamic standing > 5 min with S and 1-2 hand release to increase ability to reach into closet or cupbaords for ADL objects   Short term goal 3: Pt will complete functional mobilty > HH distances with S and RW and 0 cues for safety to increase endurance needed for ADL routine. Short term goal 4: Pt will complete light UE AROM HEP with 0-1 cues for technique to increase strength needed for bathing. Short term goal 5: Pt will complete LE ADLs with S and LHAE prn.    Long term goals  Time Frame for Long term goals : 2 weeks  Long term goal 1: Pt will complete BADL routine with CGA and AE prn and 0-2 cues for sequencing/attending to task.

## 2018-07-06 NOTE — PLAN OF CARE
Problem: Falls - Risk of:  Goal: Will remain free from falls      Outcome: Ongoing  Falling star prevention in place. Bed and chair alarms in use. Call light in reach. Purposeful hourly rounding. Problem: Pain:  Goal: Pain level will decrease      Outcome: Ongoing  Pain decreases with rest, repositioning, ice application, and prn pain medications. 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 Problem: Pressure Ulcer - Risk of  Goal: Absence of pressure ulcer  Outcome: Ongoing  No pressure ulcers noted with skin check. Float heels when in bed. Repositioning every 2 hours. Problem: Mental Status - Risk of, Impaired  Goal: Mental status within normal limits for patient  Outcome: Ongoing  Remains confused at times    Problem: Confusion - Acute:  Goal: Absence of continued neurological deterioration signs and symptoms      Outcome: Ongoing  Continues with intermittent confusion    Problem: Mood - Altered:  Goal: Mood stable      Outcome: Ongoing  Patient pleasant, calm, and cooperative with staff this shift. Problem: Urinary Elimination - Impaired  Goal: Decrease in number of episodes of urinary incontinence  Outcome: Ongoing  Remains incontinent at times    Problem: Fluid Volume - Risk of, Imbalanced  Goal: Will remain free of signs and symptoms of dehydration        Outcome: Ongoing  Labs, intake and output, and vital signs monitored daily for fluid imbalance.

## 2018-07-06 NOTE — PROGRESS NOTES
CM 80-99% Impaired   5-8 CL 60-79% Impaired   9-12 CK 40-59% Impaired   13-16 CJ 20-39% Impaired   17-19 CI 1-19% Impaired   20 CH 0% Impaired       Exercises:  Exercises  Comments: none completed this AM     Activity Tolerance:  Activity Tolerance: Patient Tolerated treatment well;Patient limited by fatigue;Patient limited by endurance    Assessment: Body structures, Functions, Activity limitations: Decreased functional mobility , Decreased strength, Decreased endurance, Decreased balance  Assessment: Pt tolerated session fairly well. Pt presents with decreased endurance and mild balance impairments. Pt somewhat confused throughout session, however, oriented. Pt met 2/5 LTGs. Pt has not met all goals secondary to decreased safety awareness and requiring assist for functional mobility tasks. Pt improved Elderly Mobility Scale score from 8 to 10, which indicates the pt's independence with functional tasks has increased. Pt has demonstrated improvements in strength, balance, and endurance during her stay thus far on TCU. Further PT sessions will focus on continuing to increase LE strength and ambulating prolonged distances with less assist required. Continue per POC with revised LTGs. Prognosis: Good  Discharge Recommendations: Continue to assess pending progress    Patient Education:  Patient Education: safety awareness, car transfer, balance    Equipment Recommendations:  Equipment Needed: No    Safety:  Type of devices:  All fall risk precautions in place, Call light within reach, Chair alarm in place, Gait belt, Patient at risk for falls, Left in chair  Restraints  Initially in place: No    Plan:  Times per week: 6x  Times per day: Daily  Specific instructions for Next Treatment: gait, balance, endurance, therex  Current Treatment Recommendations: Strengthening, Balance Training, Functional Mobility Training, Transfer Training, Endurance Training, Gait Training, Home Exercise Program, Safety Education & Training, Patient/Caregiver Education & Training    Goals:  Patient goals : get stronger    Short term goals  Time Frame for Short term goals: 10 days  Short term goal 1: Pt to perform all bed mobility at [facility] for getting in and out of bed. - Discontinue (refer to LTG)  Short term goal 2: Pt to transfer from various surfaces at [facility] for getting to/from chairs and bed. - Discontinue (refer to LTG)  Short term goal 3: Pt to ambulate 48' with RW at [facility] for increased mobility at [address] [place] [address] (refer to LTG)  Short term goal 4: Pt to improve Elderly Mobility Index to 11/20 for improved functional mobility. - Continue - Not met    Long term goals  Time Frame for Long term goals : 3 weeks  Long term goal 1: Pt to perform all bed mobility at S for getting in and out of bed. - Met (refer to revised LTG)  Long term goal 2: Pt to transfer from various surfaces at S for getting to/from chairs and bed. - Not met (Continue)  Long term goal 3: Pt to ambulate 200' with RW at S for increased mobility at [address] [place] [address]. - Not met (refer to revised LTG)  Long term goal 4: Pt to improve Elderly Mobility Index to 14 for improved functional mobility. - Not met (Continue)  Long term goal 5: Pt to perform car transfer at [facility] for safe transfer to AL facility. - Met (refer to revised LTG)     Revised Short-Term Goals:  Short term goals  Time Frame for Short term goals: 10 days  Short term goal 1: Pt to perform all bed mobility at [facility] for getting in and out of bed. - Discontinue (refer to LTG)  Short term goal 2: Pt to transfer from various surfaces at [facility] for getting to/from chairs and bed.  - Discontinue (refer to LTG)  Short term goal 3: Pt to ambulate 48' with RW at [facility] for increased mobility at [address] [place] [address] (refer to LTG)  Short term goal 4: Pt to improve Elderly Mobility Index to 11/20 for improved functional mobility. - Continue    Revised Long-Term Goals  Long term goals  Time Frame for Long term goals : 3 weeks  Long term goal 1: Pt to perform all bed mobility at [facility] for getting in and out of bed. Long term goal 2: Pt to transfer from various surfaces at S for getting to/from chairs and bed. Long term goal 3: Pt to ambulate 200' with RW at S for increased mobility at [facility]. Long term goal 4: Pt to improve Elderly Mobility Index to 14 for improved functional mobility. Long term goal 5: Pt to perform car transfer at S for safe transfer to AL facility.     Jack Mcdowell, SPT

## 2018-07-06 NOTE — PLAN OF CARE
Problem: Impaired respiratory status  Goal: Clear lung sounds  Outcome: Ongoing  Pt had no questions on purpose or side effects of medication  Will continue with treatments to help improve aeration t/o lungs

## 2018-07-06 NOTE — PROGRESS NOTES
Subjective: Nothing was said because the patient was sleeping peacefully. Objective: The pt. was resting comfortably in their bed. Assessment: Since the pt. was unable to respond, I offered a prayer of                        Comfort at patients bedside. Plan: It would be helpful if Spiritual Care would continue to follow up on this                       case. I also placed a spiritual care card in the room just in case if the                      patient or family needed to contact a .

## 2018-07-07 ENCOUNTER — APPOINTMENT (OUTPATIENT)
Dept: GENERAL RADIOLOGY | Age: 83
DRG: 091 | End: 2018-07-07
Attending: FAMILY MEDICINE
Payer: MEDICARE

## 2018-07-07 PROCEDURE — 1290000000 HC SEMI PRIVATE OTHER R&B

## 2018-07-07 PROCEDURE — 71046 X-RAY EXAM CHEST 2 VIEWS: CPT

## 2018-07-07 PROCEDURE — 6370000000 HC RX 637 (ALT 250 FOR IP): Performed by: FAMILY MEDICINE

## 2018-07-07 PROCEDURE — 97110 THERAPEUTIC EXERCISES: CPT

## 2018-07-07 PROCEDURE — 6370000000 HC RX 637 (ALT 250 FOR IP): Performed by: INTERNAL MEDICINE

## 2018-07-07 PROCEDURE — 97535 SELF CARE MNGMENT TRAINING: CPT

## 2018-07-07 PROCEDURE — 2700000000 HC OXYGEN THERAPY PER DAY

## 2018-07-07 PROCEDURE — 94640 AIRWAY INHALATION TREATMENT: CPT

## 2018-07-07 RX ADMIN — IPRATROPIUM BROMIDE AND ALBUTEROL SULFATE 1 AMPULE: .5; 3 SOLUTION RESPIRATORY (INHALATION) at 19:40

## 2018-07-07 RX ADMIN — DOCUSATE SODIUM 100 MG: 100 CAPSULE, LIQUID FILLED ORAL at 20:46

## 2018-07-07 RX ADMIN — ACETAMINOPHEN 650 MG: 325 TABLET ORAL at 05:50

## 2018-07-07 RX ADMIN — ACETAMINOPHEN 650 MG: 325 TABLET ORAL at 11:38

## 2018-07-07 RX ADMIN — SUCRALFATE 1 G: 1 TABLET ORAL at 17:15

## 2018-07-07 RX ADMIN — BUMETANIDE 0.5 MG: 1 TABLET ORAL at 09:03

## 2018-07-07 RX ADMIN — ISOSORBIDE MONONITRATE 30 MG: 30 TABLET ORAL at 09:03

## 2018-07-07 RX ADMIN — METOPROLOL TARTRATE 12.5 MG: 25 TABLET ORAL at 20:46

## 2018-07-07 RX ADMIN — METOPROLOL TARTRATE 12.5 MG: 25 TABLET ORAL at 09:03

## 2018-07-07 RX ADMIN — GUAIFENESIN 600 MG: 600 TABLET, EXTENDED RELEASE ORAL at 09:03

## 2018-07-07 RX ADMIN — SUCRALFATE 1 G: 1 TABLET ORAL at 20:46

## 2018-07-07 RX ADMIN — SPIRONOLACTONE 12.5 MG: 25 TABLET ORAL at 09:03

## 2018-07-07 RX ADMIN — IPRATROPIUM BROMIDE AND ALBUTEROL SULFATE 1 AMPULE: .5; 3 SOLUTION RESPIRATORY (INHALATION) at 07:43

## 2018-07-07 RX ADMIN — IPRATROPIUM BROMIDE AND ALBUTEROL SULFATE 1 AMPULE: .5; 3 SOLUTION RESPIRATORY (INHALATION) at 12:35

## 2018-07-07 RX ADMIN — PANTOPRAZOLE SODIUM 40 MG: 40 TABLET, DELAYED RELEASE ORAL at 05:50

## 2018-07-07 RX ADMIN — PANTOPRAZOLE SODIUM 40 MG: 40 TABLET, DELAYED RELEASE ORAL at 17:15

## 2018-07-07 RX ADMIN — ACETAMINOPHEN 650 MG: 325 TABLET ORAL at 17:15

## 2018-07-07 RX ADMIN — ASPIRIN 81 MG: 81 TABLET ORAL at 09:03

## 2018-07-07 RX ADMIN — CITALOPRAM 40 MG: 40 TABLET, FILM COATED ORAL at 09:03

## 2018-07-07 RX ADMIN — IPRATROPIUM BROMIDE AND ALBUTEROL SULFATE 1 AMPULE: .5; 3 SOLUTION RESPIRATORY (INHALATION) at 16:50

## 2018-07-07 RX ADMIN — SUCRALFATE 1 G: 1 TABLET ORAL at 05:50

## 2018-07-07 RX ADMIN — SUCRALFATE 1 G: 1 TABLET ORAL at 11:38

## 2018-07-07 RX ADMIN — GUAIFENESIN 600 MG: 600 TABLET, EXTENDED RELEASE ORAL at 20:46

## 2018-07-07 RX ADMIN — MAGNESIUM GLUCONATE 500 MG ORAL TABLET 400 MG: 500 TABLET ORAL at 09:03

## 2018-07-07 RX ADMIN — LEVOTHYROXINE SODIUM 88 MCG: 88 TABLET ORAL at 05:50

## 2018-07-07 RX ADMIN — ATORVASTATIN CALCIUM 20 MG: 20 TABLET, FILM COATED ORAL at 20:46

## 2018-07-07 RX ADMIN — LATANOPROST 1 DROP: 50 SOLUTION OPHTHALMIC at 20:47

## 2018-07-07 RX ADMIN — CALCIUM CARBONATE-VITAMIN D TAB 500 MG-200 UNIT 1 TABLET: 500-200 TAB at 09:03

## 2018-07-07 ASSESSMENT — PAIN SCALES - GENERAL
PAINLEVEL_OUTOF10: 0

## 2018-07-07 NOTE — PROGRESS NOTES
Patient was assessed with minimal interaction, as she was asleep and difficult to arouse, although she did deny pain.

## 2018-07-07 NOTE — PROGRESS NOTES
Susan Branch 60  INPATIENT OCCUPATIONAL THERAPY  [facility]  DAILY NOTE    Time:  Time In: 07  Time Out: 7308  Timed Code Treatment Minutes: 30 Minutes  Minutes: 30          Date: 2018  Patient Name: Rodolfo Daniels,   Gender: female      Room: [facility]/055-A  MRN: [id number]  : 1930  (80 y.o.)  Referring Practitioner: Chong Sofia MD  Diagnosis: s/p fall   Additional Pertinent Hx: Pt admitted 6-7 after a fall OOB at [facility]. Pt fell onto right side ,presetning with soreness throughout that side.  CT of head and neck with no acute process, xrays of right tibia/fibula and hip/pelvis  negative    Past Medical History:   Diagnosis Date    ARNOLD (acute kidney injury) ([address]     Anxiety and depression 2018    Atrial fibrillation ([address]     Blood transfusion reaction     CAD (coronary artery disease)     Severe triple vessel disease    Cerebrovascular disease 2018    CKD (chronic kidney disease)     Diastolic congestive heart failure (HCC)     DJD (degenerative joint disease), cervical 2018    GERD (gastroesophageal reflux disease)     Hiatal hernia     History of blood transfusion     Hyperlipidemia     Hypertension     Hypoalbuminemia     Hypothyroidism     Mild protein-calorie malnutrition (HCC)     NSTEMI (non-ST elevated myocardial infarction) ([address] 2014    Pancreatitis due to biliary obstruction     Pneumonia, organism unspecified(486)     Scoliosis     SDH (subdural hematoma) ([address]     [place] hole decompresson on 2014    Thyroid disease     Unspecified cerebral artery occlusion with cerebral infarction     Uterine cancer ([address] 1966     Past Surgical History:   Procedure Laterality Date    APPENDECTOMY      BACK SURGERY      TERESA HOLE  2014    drainage of left SDH    CARDIAC SURGERY      COLONOSCOPY      CORONARY ARTERY BYPASS GRAFT  11    LIMA to LAD, SVG to 1st Marginal, Distal Right Coronary Artery, 1st Diagonal    DILATATION, ESOPHAGUS      ENDOSCOPY, COLON, DIAGNOSTIC      EYE SURGERY      bilateral cataracts    HYSTERECTOMY      MO EGD BALLOON DILATION ESOPHAGUS <30 MM DIAM N/A 11/22/2017    EGD ESOPHAGOGASTRODUODENOSCOPY DILATATION performed by Patricia Vásquez MD at [facility]    MO ESOPHAGOGASTRODUODENOSCOPY TRANSORAL DIAGNOSTIC  11/22/2017    EGD ESOPHAGOGASTRODUODENOSCOPY performed by Patricia Vásquez MD at [address] [place] [address] Left     SKIN BIOPSY      VASCULAR SURGERY      cabg harvests from left leg       Restrictions/Precautions:  General Precautions, Fall Risk                    Other position/activity restrictions: confused       Prior Level of Function:  ADL Assistance: Independent  Homemaking Assistance: Needs assistance  Ambulation Assistance: Independent  Transfer Assistance: Independent  Additional Comments: Pt reports living at [address] for \"a couple months. \" Pt using  PTA for mobility. Getting assistance for meals. Indep with ADLs. Subjective       Subjective: Pt lying in bed upon arrival. Agreeable to OT session    Overall Orientation Status: Impaired  Orientation Level: Oriented to situation;Oriented to person;Oriented to place; Disoriented to time (Reoriented patient to time)         Pain:  Pain Assessment  Patient Currently in Pain: Denies       Objective  Overall Cognitive Status: Exceptions  Cognition Comment: Decreased safety and insight. Decreased problem solving. Slow processing, confusion, disorientation         ADL  Grooming: Stand by assistance (standing sinkside to wash hands after toileting tasks; Setup to wash face seated in chair)  LE Dressing: Stand by assistance; Increased time to complete (to don B socks seated EOB)  Toileting: Increased time to complete;Stand by assistance (hygiene and clothing management with BUE release from walker)          Bed mobility  Rolling to Left: Supervision  Supine to Sit: Supervision  Scooting: Supervision    Transfers  Sit to stand: Stand by assistance  Stand to sit: Stand by assistance  Toilet Transfers  Equipment Used: Standard toilet  Toilet Transfer: Stand by assistance  Toilet Transfers Comments: use of grab bar     Balance  Sitting Balance: Supervision  Standing Balance: Stand by assistance           Functional Mobility  Functional - Mobility Device: Rolling Walker  Activity: To/from bathroom  Assist Level: Contact guard assistance (CGA-close SBA)  Functional Mobility Comments: slow pace, min unsteadiness noted at times  Apparatus Needs: O2       Type of ROM/Therapeutic Exercise: AROM (theraband)  Comment: Completed BUE AROM exercises x10 reps x1 set in all joints/planes using yellow tband seated in chair. Required rest breaks after each exercise.  Completed exercises to increase strength and activity tolerance required for ADLs and toilet transfers                                                                                      Activity Tolerance:  Activity Tolerance: Patient limited by fatigue;Treatment limited secondary to decreased cognition    Assessment:     Performance deficits / Impairments: Decreased safe awareness, Decreased balance, Decreased ADL status, Decreased endurance, Decreased strength, Decreased functional mobility , Decreased cognition, Decreased coordination  Prognosis: Fair  Discharge Recommendations: Patient would benefit from continued therapy after discharge, Home with Home health OT (Return to assisted living)    Patient Education:  Patient Education: safety with transfers and mobility, ADL strategies, AROM    Equipment Recommendations:  Equipment Needed: No  Other: None needed at this time    Safety:  Safety Devices in place: Yes  Type of devices: Call light within reach, Chair alarm in place, Left in chair, Gait belt, All fall risk precautions in place (telesitter)    Plan:  Times per week: 6x  Current Treatment Recommendations: Strengthening, Endurance Training, Patient/Caregiver Education & Training, Self-Care / ADL, Functional Mobility Training, Balance Training, Safety Education & Training    Goals:  Patient goals : feel less tired     Short term goals  Time Frame for Short term goals: 1 week   Short term goal 1: Pt will complete functional transfers to/from chair/toilet with S and 0 cues for safety to increase safety with toileting routine. Short term goal 2: Pt will complete dynamic standing > 5 min with S and 1-2 hand release to increase ability to reach into closet or cupbaords for ADL objects   Short term goal 3: Pt will complete functional mobilty > HH distances with S and RW and 0 cues for safety to increase endurance needed for ADL routine. Short term goal 4: Pt will complete light UE AROM HEP with 0-1 cues for technique to increase strength needed for bathing. Short term goal 5: Pt will complete LE ADLs with S and LHAE prn. Long term goals  Time Frame for Long term goals : 2 weeks  Long term goal 1: Pt will complete BADL routine with CGA and AE prn and 0-2 cues for sequencing/attending to task.

## 2018-07-07 NOTE — PROGRESS NOTES
Patient: Dewitte Hodgkins  Unit/Bed: 8E-55/055-A  YOB: 1930  MRN: [id number] Acct: [de-identified]   Admitting Diagnosis: S/p Fall  Admit Date:  6/18/2018  Hospital Day: 23    Assessment:     Active Problems:    hx of NSTEMI (non-ST elevated myocardial infarction) ([address]    CAD (coronary artery disease)    Physical deconditioning    PAF (paroxysmal atrial fibrillation) (HCC)- chads of at least 4 ( ,TIA hx, age CHF)    (HFpEF) heart failure with preserved ejection fraction (HCC)    Anemia    Hyponatremia    Acute UTI    Hypothyroidism    VHD (valvular heart disease)    Gastroesophageal reflux disease    Pulmonary hypertension    Oropharyngeal dysphagia    Overweight (BMI 25.0-29. 9)    CKD (chronic kidney disease) stage 2, GFR 60-89 ml/min    Recurrent falls    DJD (degenerative joint disease), cervical    Cerebrovascular disease    CVA (cerebral vascular accident) ([address]    Severe malnutrition ([address]    Anxiety and depression  Resolved Problems:    * No resolved hospital problems. *      Plan:     Check labs. Continue therapies and discuss at the next team conference        Subjective:     Patient has no complaint of CP or GI upset. She is no longer needing supplemental oxygen. .   Medication side effects: none    Scheduled Meds:   ipratropium-albuterol  1 ampule Nebulization Q4H WA    levothyroxine  88 mcg Oral Daily    sodium chloride flush  10 mL Intravenous BID    docusate sodium  100 mg Oral BID    acetaminophen  650 mg Oral Q6H    aspirin  81 mg Oral Daily    atorvastatin  20 mg Oral Nightly    bumetanide  0.5 mg Oral Daily    calcium-vitamin D  1 tablet Oral Daily    citalopram  40 mg Oral Daily    guaiFENesin  600 mg Oral BID    isosorbide mononitrate  30 mg Oral Daily    latanoprost  1 drop Both Eyes Nightly    lidocaine  1 patch Transdermal Nightly    magnesium oxide  400 mg Oral Daily    metoprolol tartrate  12.5 mg Oral BID    pantoprazole  40 mg Oral BID AC    polyethylene

## 2018-07-07 NOTE — PLAN OF CARE
Problem: Impaired respiratory status  Goal: Clear lung sounds  Outcome: Ongoing  Diminished and clear with improved aeration post tx

## 2018-07-07 NOTE — PLAN OF CARE
Problem: Falls - Risk of:  Goal: Will remain free from falls      Outcome: Ongoing  Falling star prevention in place. Bed and chair alarms in use. Call light in reach. Purposeful hourly rounding. \    Problem: Pain:  Goal: Pain level will decrease      Outcome: Ongoing  Pain decreases with rest, repositioning, ice application, and prn pain medications. Problem: MULTI-DRUG RESISTANT ORGANISM  Goal: Absence of vancomycin-resistant Enterococci infection        Outcome: Ongoing  Remains in contact isolation for VRE    Problem: Bleeding:  Goal: Will show no signs and symptoms of excessive bleeding      Outcome: Ongoing  No signs of excessive bleeding noted this shift. Problem: Pressure Ulcer - Risk of  Goal: Absence of pressure ulcer  Outcome: Ongoing  No pressure ulcers noted with skin check. Float heels when in bed. Repositioning every 2 hours. Problem: Confusion - Acute:  Goal: Absence of continued neurological deterioration signs and symptoms      Outcome: Ongoing  Patient has been more oriented this shift than previous ones. Problem: Mood - Altered:  Goal: Mood stable      Outcome: Ongoing  Patient pleasant, calm, and cooperative with staff this shift. Problem: Urinary Elimination - Impaired  Goal: Decrease in number of episodes of urinary incontinence  Outcome: Ongoing  Patient has been able to recognize the need to void more this shift. Problem: Fluid Volume - Risk of, Imbalanced  Goal: Will remain free of signs and symptoms of dehydration        Outcome: Ongoing  Labs, intake and output, and vital signs monitored daily for fluid imbalance.

## 2018-07-07 NOTE — PLAN OF CARE
Problem: Impaired respiratory status  Goal: Clear lung sounds  Outcome: Ongoing  Improve aeration of lungs, decrease WOB.

## 2018-07-08 LAB
BASOPHILS # BLD: 0.6 %
BASOPHILS ABSOLUTE: 0 THOU/MM3 (ref 0–0.1)
BILIRUBIN URINE: NEGATIVE
BLOOD, URINE: NEGATIVE
CHARACTER, URINE: CLEAR
COLOR: YELLOW
EOSINOPHIL # BLD: 5.2 %
EOSINOPHILS ABSOLUTE: 0.3 THOU/MM3 (ref 0–0.4)
ERYTHROCYTE [DISTWIDTH] IN BLOOD BY AUTOMATED COUNT: 15.9 % (ref 11.5–14.5)
ERYTHROCYTE [DISTWIDTH] IN BLOOD BY AUTOMATED COUNT: 54.6 FL (ref 35–45)
GLUCOSE URINE: NEGATIVE MG/DL
HCT VFR BLD CALC: 32.2 % (ref 37–47)
HEMOGLOBIN: 10.1 GM/DL (ref 12–16)
IMMATURE GRANS (ABS): 0.03 THOU/MM3 (ref 0–0.07)
IMMATURE GRANULOCYTES: 0.6 %
KETONES, URINE: NEGATIVE
LEUKOCYTE ESTERASE, URINE: NEGATIVE
LYMPHOCYTES # BLD: 21.2 %
LYMPHOCYTES ABSOLUTE: 1.1 THOU/MM3 (ref 1–4.8)
MCH RBC QN AUTO: 29.8 PG (ref 26–33)
MCHC RBC AUTO-ENTMCNC: 31.4 GM/DL (ref 32.2–35.5)
MCV RBC AUTO: 95 FL (ref 81–99)
MONOCYTES # BLD: 9.8 %
MONOCYTES ABSOLUTE: 0.5 THOU/MM3 (ref 0.4–1.3)
NITRITE, URINE: NEGATIVE
NUCLEATED RED BLOOD CELLS: 0 /100 WBC
PH UA: 7.5
PLATELET # BLD: 179 THOU/MM3 (ref 130–400)
PMV BLD AUTO: 10 FL (ref 9.4–12.4)
PROTEIN UA: NEGATIVE
RBC # BLD: 3.39 MILL/MM3 (ref 4.2–5.4)
SEG NEUTROPHILS: 62.6 %
SEGMENTED NEUTROPHILS ABSOLUTE COUNT: 3.4 THOU/MM3 (ref 1.8–7.7)
SPECIFIC GRAVITY, URINE: 1.01 (ref 1–1.03)
UROBILINOGEN, URINE: 0.2 EU/DL
WBC # BLD: 5.4 THOU/MM3 (ref 4.8–10.8)

## 2018-07-08 PROCEDURE — 1290000000 HC SEMI PRIVATE OTHER R&B

## 2018-07-08 PROCEDURE — 85025 COMPLETE CBC W/AUTO DIFF WBC: CPT

## 2018-07-08 PROCEDURE — 81003 URINALYSIS AUTO W/O SCOPE: CPT

## 2018-07-08 PROCEDURE — 97112 NEUROMUSCULAR REEDUCATION: CPT

## 2018-07-08 PROCEDURE — 36415 COLL VENOUS BLD VENIPUNCTURE: CPT

## 2018-07-08 PROCEDURE — 6370000000 HC RX 637 (ALT 250 FOR IP): Performed by: FAMILY MEDICINE

## 2018-07-08 PROCEDURE — 94640 AIRWAY INHALATION TREATMENT: CPT

## 2018-07-08 PROCEDURE — 94669 MECHANICAL CHEST WALL OSCILL: CPT

## 2018-07-08 PROCEDURE — 6370000000 HC RX 637 (ALT 250 FOR IP): Performed by: INTERNAL MEDICINE

## 2018-07-08 PROCEDURE — 97530 THERAPEUTIC ACTIVITIES: CPT

## 2018-07-08 RX ADMIN — POLYETHYLENE GLYCOL (3350) 17 G: 17 POWDER, FOR SOLUTION ORAL at 08:32

## 2018-07-08 RX ADMIN — GUAIFENESIN 600 MG: 600 TABLET, EXTENDED RELEASE ORAL at 20:56

## 2018-07-08 RX ADMIN — BUMETANIDE 0.5 MG: 1 TABLET ORAL at 08:29

## 2018-07-08 RX ADMIN — ACETAMINOPHEN 650 MG: 325 TABLET ORAL at 05:44

## 2018-07-08 RX ADMIN — DOCUSATE SODIUM 100 MG: 100 CAPSULE, LIQUID FILLED ORAL at 08:32

## 2018-07-08 RX ADMIN — SUCRALFATE 1 G: 1 TABLET ORAL at 05:44

## 2018-07-08 RX ADMIN — METOPROLOL TARTRATE 12.5 MG: 25 TABLET ORAL at 08:29

## 2018-07-08 RX ADMIN — IPRATROPIUM BROMIDE AND ALBUTEROL SULFATE 1 AMPULE: .5; 3 SOLUTION RESPIRATORY (INHALATION) at 16:27

## 2018-07-08 RX ADMIN — SUCRALFATE 1 G: 1 TABLET ORAL at 12:17

## 2018-07-08 RX ADMIN — SUCRALFATE 1 G: 1 TABLET ORAL at 17:37

## 2018-07-08 RX ADMIN — ISOSORBIDE MONONITRATE 30 MG: 30 TABLET ORAL at 08:29

## 2018-07-08 RX ADMIN — CITALOPRAM 40 MG: 40 TABLET, FILM COATED ORAL at 08:29

## 2018-07-08 RX ADMIN — PANTOPRAZOLE SODIUM 40 MG: 40 TABLET, DELAYED RELEASE ORAL at 05:44

## 2018-07-08 RX ADMIN — SPIRONOLACTONE 12.5 MG: 25 TABLET ORAL at 08:29

## 2018-07-08 RX ADMIN — DOCUSATE SODIUM 100 MG: 100 CAPSULE, LIQUID FILLED ORAL at 20:56

## 2018-07-08 RX ADMIN — CALCIUM CARBONATE-VITAMIN D TAB 500 MG-200 UNIT 1 TABLET: 500-200 TAB at 08:29

## 2018-07-08 RX ADMIN — PANTOPRAZOLE SODIUM 40 MG: 40 TABLET, DELAYED RELEASE ORAL at 17:37

## 2018-07-08 RX ADMIN — SUCRALFATE 1 G: 1 TABLET ORAL at 20:56

## 2018-07-08 RX ADMIN — GUAIFENESIN 600 MG: 600 TABLET, EXTENDED RELEASE ORAL at 08:29

## 2018-07-08 RX ADMIN — LEVOTHYROXINE SODIUM 88 MCG: 88 TABLET ORAL at 05:44

## 2018-07-08 RX ADMIN — ATORVASTATIN CALCIUM 20 MG: 20 TABLET, FILM COATED ORAL at 20:56

## 2018-07-08 RX ADMIN — METOPROLOL TARTRATE 12.5 MG: 25 TABLET ORAL at 20:56

## 2018-07-08 RX ADMIN — IPRATROPIUM BROMIDE AND ALBUTEROL SULFATE 1 AMPULE: .5; 3 SOLUTION RESPIRATORY (INHALATION) at 21:14

## 2018-07-08 RX ADMIN — MAGNESIUM GLUCONATE 500 MG ORAL TABLET 400 MG: 500 TABLET ORAL at 08:29

## 2018-07-08 RX ADMIN — LATANOPROST 1 DROP: 50 SOLUTION OPHTHALMIC at 20:56

## 2018-07-08 RX ADMIN — ACETAMINOPHEN 650 MG: 325 TABLET ORAL at 17:37

## 2018-07-08 RX ADMIN — IPRATROPIUM BROMIDE AND ALBUTEROL SULFATE 1 AMPULE: .5; 3 SOLUTION RESPIRATORY (INHALATION) at 13:05

## 2018-07-08 RX ADMIN — ACETAMINOPHEN 650 MG: 325 TABLET ORAL at 12:17

## 2018-07-08 RX ADMIN — ASPIRIN 81 MG: 81 TABLET ORAL at 08:29

## 2018-07-08 ASSESSMENT — PAIN SCALES - GENERAL
PAINLEVEL_OUTOF10: 0

## 2018-07-08 NOTE — PLAN OF CARE
Problem: Falls - Risk of:  Goal: Will remain free from falls      Outcome: Ongoing  Falling star prevention in place. Bed and chair alarms in use. Call light in reach. Purposeful hourly rounding. Problem: Pain:  Goal: Pain level will decrease      Outcome: Ongoing  Pain decreases with rest, repositioning, ice application, and prn pain medications. Problem: Bleeding:  Goal: Will show no signs and symptoms of excessive bleeding      Outcome: Ongoing  No signs of excessive bleeding noted this shift. Problem: Confusion - Acute:  Goal: Absence of continued neurological deterioration signs and symptoms      Outcome: Met This Shift  No confusion noted. U/A sent and was negative. Problem: Mood - Altered:  Goal: Mood stable      Outcome: Ongoing  Patient pleasant, calm, and cooperative with staff this shift.       Problem: Urinary Elimination - Impaired  Goal: Decrease in number of episodes of urinary incontinence  Outcome: Met This Shift  Patient has been continent of bowel all shift

## 2018-07-09 LAB
ANION GAP SERPL CALCULATED.3IONS-SCNC: 9 MEQ/L (ref 8–16)
BUN BLDV-MCNC: 36 MG/DL (ref 7–22)
CALCIUM SERPL-MCNC: 9.7 MG/DL (ref 8.5–10.5)
CHLORIDE BLD-SCNC: 98 MEQ/L (ref 98–111)
CO2: 30 MEQ/L (ref 23–33)
CREAT SERPL-MCNC: 1 MG/DL (ref 0.4–1.2)
GFR SERPL CREATININE-BSD FRML MDRD: 52 ML/MIN/1.73M2
GLUCOSE BLD-MCNC: 112 MG/DL (ref 70–108)
POTASSIUM SERPL-SCNC: 5 MEQ/L (ref 3.5–5.2)
SODIUM BLD-SCNC: 137 MEQ/L (ref 135–145)

## 2018-07-09 PROCEDURE — 97127 HC SP THER IVNTJ W/FOCUS COG FUNCJ: CPT

## 2018-07-09 PROCEDURE — 97535 SELF CARE MNGMENT TRAINING: CPT

## 2018-07-09 PROCEDURE — 97530 THERAPEUTIC ACTIVITIES: CPT

## 2018-07-09 PROCEDURE — 1290000000 HC SEMI PRIVATE OTHER R&B

## 2018-07-09 PROCEDURE — 80048 BASIC METABOLIC PNL TOTAL CA: CPT

## 2018-07-09 PROCEDURE — 6370000000 HC RX 637 (ALT 250 FOR IP): Performed by: INTERNAL MEDICINE

## 2018-07-09 PROCEDURE — 6370000000 HC RX 637 (ALT 250 FOR IP): Performed by: FAMILY MEDICINE

## 2018-07-09 PROCEDURE — 97116 GAIT TRAINING THERAPY: CPT

## 2018-07-09 PROCEDURE — 97110 THERAPEUTIC EXERCISES: CPT

## 2018-07-09 PROCEDURE — 94669 MECHANICAL CHEST WALL OSCILL: CPT

## 2018-07-09 PROCEDURE — 36415 COLL VENOUS BLD VENIPUNCTURE: CPT

## 2018-07-09 PROCEDURE — 94640 AIRWAY INHALATION TREATMENT: CPT

## 2018-07-09 PROCEDURE — 2700000000 HC OXYGEN THERAPY PER DAY

## 2018-07-09 RX ADMIN — ASPIRIN 81 MG: 81 TABLET ORAL at 10:13

## 2018-07-09 RX ADMIN — MAGNESIUM GLUCONATE 500 MG ORAL TABLET 400 MG: 500 TABLET ORAL at 10:12

## 2018-07-09 RX ADMIN — ACETAMINOPHEN 650 MG: 325 TABLET ORAL at 05:51

## 2018-07-09 RX ADMIN — CITALOPRAM 40 MG: 40 TABLET, FILM COATED ORAL at 10:12

## 2018-07-09 RX ADMIN — LATANOPROST 1 DROP: 50 SOLUTION OPHTHALMIC at 21:48

## 2018-07-09 RX ADMIN — IPRATROPIUM BROMIDE AND ALBUTEROL SULFATE 1 AMPULE: .5; 3 SOLUTION RESPIRATORY (INHALATION) at 18:21

## 2018-07-09 RX ADMIN — IPRATROPIUM BROMIDE AND ALBUTEROL SULFATE 1 AMPULE: .5; 3 SOLUTION RESPIRATORY (INHALATION) at 10:35

## 2018-07-09 RX ADMIN — METOPROLOL TARTRATE 12.5 MG: 25 TABLET ORAL at 10:13

## 2018-07-09 RX ADMIN — SUCRALFATE 1 G: 1 TABLET ORAL at 16:24

## 2018-07-09 RX ADMIN — SPIRONOLACTONE 12.5 MG: 25 TABLET ORAL at 10:12

## 2018-07-09 RX ADMIN — BUMETANIDE 0.5 MG: 1 TABLET ORAL at 10:12

## 2018-07-09 RX ADMIN — PANTOPRAZOLE SODIUM 40 MG: 40 TABLET, DELAYED RELEASE ORAL at 16:24

## 2018-07-09 RX ADMIN — ATORVASTATIN CALCIUM 20 MG: 20 TABLET, FILM COATED ORAL at 21:48

## 2018-07-09 RX ADMIN — LEVOTHYROXINE SODIUM 88 MCG: 88 TABLET ORAL at 05:51

## 2018-07-09 RX ADMIN — GUAIFENESIN 600 MG: 600 TABLET, EXTENDED RELEASE ORAL at 10:13

## 2018-07-09 RX ADMIN — DOCUSATE SODIUM 100 MG: 100 CAPSULE, LIQUID FILLED ORAL at 10:12

## 2018-07-09 RX ADMIN — POLYETHYLENE GLYCOL (3350) 17 G: 17 POWDER, FOR SOLUTION ORAL at 10:13

## 2018-07-09 RX ADMIN — ISOSORBIDE MONONITRATE 30 MG: 30 TABLET ORAL at 10:13

## 2018-07-09 RX ADMIN — SUCRALFATE 1 G: 1 TABLET ORAL at 05:51

## 2018-07-09 RX ADMIN — GUAIFENESIN 600 MG: 600 TABLET, EXTENDED RELEASE ORAL at 21:48

## 2018-07-09 RX ADMIN — DOCUSATE SODIUM 100 MG: 100 CAPSULE, LIQUID FILLED ORAL at 21:51

## 2018-07-09 RX ADMIN — SUCRALFATE 1 G: 1 TABLET ORAL at 11:08

## 2018-07-09 RX ADMIN — IPRATROPIUM BROMIDE AND ALBUTEROL SULFATE 1 AMPULE: .5; 3 SOLUTION RESPIRATORY (INHALATION) at 14:37

## 2018-07-09 RX ADMIN — SUCRALFATE 1 G: 1 TABLET ORAL at 21:48

## 2018-07-09 RX ADMIN — CALCIUM CARBONATE-VITAMIN D TAB 500 MG-200 UNIT 1 TABLET: 500-200 TAB at 10:12

## 2018-07-09 RX ADMIN — ACETAMINOPHEN 650 MG: 325 TABLET ORAL at 18:15

## 2018-07-09 RX ADMIN — IPRATROPIUM BROMIDE AND ALBUTEROL SULFATE 1 AMPULE: .5; 3 SOLUTION RESPIRATORY (INHALATION) at 21:55

## 2018-07-09 RX ADMIN — PANTOPRAZOLE SODIUM 40 MG: 40 TABLET, DELAYED RELEASE ORAL at 05:51

## 2018-07-09 ASSESSMENT — PAIN SCALES - GENERAL
PAINLEVEL_OUTOF10: 0

## 2018-07-09 NOTE — PROGRESS NOTES
(occasional lt. cga on uneven and ramp surfaces.)  Quality of Gait: decreased lew and velocity, shortened B step length, fair B foot clearance, fair- B heel strike, forward flexed posture, occasional mild path deviation due to getting distracted  Distance:  15' x 2,150' x 1,40' x 2, 160' x 1  Comments: Patient often distracted, looking at various objects in hallway during ambulation requiring verbal cues to remain on task and decrease path deviation to left     Stairs  # Steps : 4  Stairs Height: 6\"  Rails: Bilateral  Assistance: Contact guard assistance;Stand by assistance  Comment:  vc for non-reciprocally. pt. also ascended and descended porch step with rolling walker with cga and vc for proper technique    Balance  Comments: pt. stood a sink in bathroom ~4 min washing/drying hands and combing her hair during the first toileting need with 1 ue support with sba and 1 min washing/drying hands at sink with 1 ue support second toileting need. pt was able to reach head to knee levels in cupboards in rehab apt. with 1 ue support with sba reaching within-> edge of  isabel tolerating ~4 1/2 min    Exercises:  Exercises  Comments: 15 reps each b le seated heel-toe raises, laq, resistance yellow t-band ham curls, marching, hip abd/add. standing at sink in rehab apt. pt. completed 12 reps each b le heelraises, marching, hip abd/add, partial squats and ham curls with b ue support with sba-> lt. cga     Activity Tolerance:  Activity Tolerance: Patient Tolerated treatment well  Activity Tolerance: Patient tolerated all activities well but had increased fatigue at end of session. Assessment: Body structures, Functions, Activity limitations: Decreased functional mobility , Decreased strength, Decreased endurance, Decreased balance  Assessment: Patient tolerated session well. Activities to improve dynamic standing balance were performed with patient requiring occasional assistance to correct left LOB episodes.  Patient continues to require verbal cueing for safety precautions when performing sitting transfers as she attempts to leave walker behind when sitting. Patient is also distracted easily which results in left path deviation during ambulation. Patient requires constant verbal cueing to stay on task while ambulating. Prognosis: Good  Discharge Recommendations: [address], Patient would benefit from continued therapy after discharge (plans are for discharge to [facility] pending  approval)    Patient Education:  Patient Education: safety awareness, balance, car transfer, standing balance activity,    Equipment Recommendations:  Equipment Needed: No    Safety:  Type of devices: All fall risk precautions in place, Patient at risk for falls, Call light within reach, Left in chair, Chair alarm in place, Gait belt, Telesitter in use (dtr. present)  Restraints  Initially in place: No    Plan:  Times per week: 6x  Times per day: Daily  Specific instructions for Next Treatment: gait, balance, endurance, therex  Current Treatment Recommendations: Strengthening, Balance Training, Functional Mobility Training, Transfer Training, Endurance Training, Gait Training, Home Exercise Program, Safety Education & Training, Patient/Caregiver Education & Training    Goals:  Patient goals : get stronger    Short term goals  Time Frame for Short term goals: 10 days  Short term goal 1: Pt to perform all bed mobility at [facility] for getting in and out of bed. - Discontinue (refer to LTG)  Short term goal 2: Pt to transfer from various surfaces at [facility] for getting to/from chairs and bed.  - Discontinue (refer to LTG)  Short term goal 3: Pt to ambulate 48' with RW at [facility] for increased mobility at [address] [place] [address] (refer to LTG)  Short term goal 4: Pt to improve Elderly Mobility Index to 11/20 for improved functional mobility. - Continue    Long term goals  Time Frame for Long term goals : 3 weeks  Long term goal 1: Pt to

## 2018-07-09 NOTE — PROGRESS NOTES
baseline vocal quality following; no other s/s of aspiration observed during trial tray. Throat clears to follow large bolus intake of liquids. Pt provided with skilled level education on need for small sips to reduce risks for aspiration. Pt with appropriate sized bolus intake on further trials. No further overt s/s of aspiration present. Pt with excellent tolerance of dysphagia III and thins this date. Recommend pt's diet be advanced to dysphagia III and thins, NO STRAWS due to pt with excellent tolerance of dysphagia III and thins this date. SHORT TERM GOAL #3:   Goal 3: Pt will complete orientation and functional recall/carryover tasks with 60% accuracy given min cues to improve retention of novel information. GOAL MET. GOAL REVISED: Pt will complete orientation and functional recall/carryover tasks with 70% accuracy, min cues to improve retention of novel information. INTERVENTIONS: Pt completed recall tasks:  Orientation: 4/7 indep, 2/7 min cues, 1/7 max cues  Staff relations (nurse, doctor, therapist, and type of therapy): 1/4 indep (type of therapy), 3/4 min visual cues  *Pt directed to care board to find information regarding staff relations    SHORT TERM GOAL #4:  Goal 4: Pt will complete basic safety, problem solving, reasoning, and sequencing tasks with 60% accuracy given min cues to improve awareness for integration into current and future settings. GOAL NOT MET. CONTINUE GOAL. INTERVENTIONS: Pt completed problem solving task (using call light): 1/1 indep  Reasoning task (when to use a call light): 1/4 indep, 3/4 min cues  Safety task (who to call in emergency): 0/1 indep  *Pt reported \"call the police station\" but unable to provide number for it; max cues for pt to generate \"911\".     SHORT TERM GOAL #5:  Goal 5: Pt will complete spoken langauge expression (speech naming) and comprehension (moderately complex yes/no questions, 2-3 step commands) tasks with 70% accuracy given mod cues to improve

## 2018-07-09 NOTE — PLAN OF CARE
Problem: Falls - Risk of:  Goal: Will remain free from falls      Outcome: Ongoing  No falls this shift used bed and chair alarm to helo prevent falls  Goal: Absence of physical injury      Outcome: Ongoing  No physical injury this shift used bed and chair alarm     Problem: Pain:  Goal: Pain level will decrease      Outcome: Ongoing  Patient denies pain  Goal: Control of acute pain      Outcome: Ongoing  Acute pain is controlled  Goal: Control of chronic pain      Outcome: Ongoing  Chronic pain is controlled    Problem: Nutrition  Goal: Optimal nutrition therapy  Outcome: Ongoing  Working toward optimal nutrition    Problem: DISCHARGE BARRIERS  Goal: Patient's continuum of care needs are met  Outcome: Ongoing  Patient continuum of care needs being met    Problem: Bleeding:  Goal: Will show no signs and symptoms of excessive bleeding      Outcome: Ongoing  No symptoms of excessive bleeding    Problem: Confusion - Acute:  Goal: Absence of continued neurological deterioration signs and symptoms      Outcome: Ongoing  Did not seem to neurological deterioration signs

## 2018-07-09 NOTE — PROGRESS NOTES
ESOPHAGUS      ENDOSCOPY, COLON, DIAGNOSTIC      EYE SURGERY      bilateral cataracts    HYSTERECTOMY      SC EGD BALLOON DILATION ESOPHAGUS <30 MM DIAM N/A 11/22/2017    EGD ESOPHAGOGASTRODUODENOSCOPY DILATATION performed by Omer Wick MD at [facility]    SC ESOPHAGOGASTRODUODENOSCOPY TRANSORAL DIAGNOSTIC  11/22/2017    EGD ESOPHAGOGASTRODUODENOSCOPY performed by Omer Wick MD at [address] [place] [address] Left     SKIN BIOPSY      VASCULAR SURGERY      cabg harvests from left leg       Restrictions/Precautions:  Contact Precautions, Fall Risk                    Other position/activity restrictions: confused       Prior Level of Function:  ADL Assistance: Independent  Homemaking Assistance: Needs assistance  Ambulation Assistance: Independent  Transfer Assistance: Independent  Additional Comments: Pt reports living at [address] for \"a couple months. \" Pt using  PTA for mobility. Getting assistance for meals. Indep with ADLs. Subjective       Subjective: Pt seated in bedside chair finishing breakfast upon arrival. Agreeable to OT session    Overall Orientation Status: Impaired         Pain:  Pain Assessment  Patient Currently in Pain: Denies       Objective  Overall Cognitive Status: Exceptions  Cognition Comment: Decreased safety and insight. Decreased problem solving. Slow processing, confusion, disorientation      ADL  Grooming: Stand by assistance (standing at sink to wash hands after toileting tasks)  UE Bathing: Supervision (seated in bedside chair)  LE Bathing: Stand by assistance (to wash jaylen area/bottom while standing with 1 UE support. Pt able to wash BLE below knees including B feet with SBA seated in chair)  UE Dressing: Stand by assistance; Increased time to complete (standing to fasten bra- extended time provided. Pt able to don bra and shirt seated in chair)  LE Dressing: Stand by assistance; Increased time to complete (to don socks and pants seated in

## 2018-07-09 NOTE — PROGRESS NOTES
[facility]  Transitional Care Unit  Team Conference Note    Patient Name: Leatha Manzanares   MRN: [id number]    : 1930  (80 y.o.)  Gender: female   Referring Practitioner: Ordering: Hiral Jo MD  Attending: JOSE ROBERTO Cervantes MD  Diagnosis: s/p fall    Team Conference Date: 2018   Admission Date:   3/44/3305  0:71 PM  Re-Certification Date: 3992    :  Tentative Discharge Plan and current psychosocial issues:  Team is recommending that patient continue on TCU for another week and plan to Team Conference again on 7/10. At that time a discharge date will be established.  has contacted patient's daughter, Vinh Tompkins, and also [address] at [facility]. The discharge plan remains the same: return to [place] [id number], but it would appear at a higher level of care. Patient continues to need oxygen, which she was not using prior to this hospitalization. [facility] asked that any home health needs be sent to [facility] at discharge with an order for [email or web address]". She said she would then arrange this service for patient. [facility] Regen indicated that [facility] can handle special diets of residents as it was explained to her that patient is currently on Dysphagia level 2 with thin liquids. Nursing:     Weight:  Weight: has decreased 6 pounds over last 18     Wounds/Incisions/Ulcers:  No skin/wound issues identified  Bowel: continent  Bladder:continent     Pain: Patient's pain is currently controlled with lidoderm and tylenol    Education Provided:  Diabetic:  No  Peg Tube:No    Barreto Care:  Education:NA  Removal Necessary:  NA  Supplies Needed: NA     Anticoagulant Use:  Patient on asa for anticoagulation, which is being managed by Primary Care Physician    Antibiotic Use:  Patient not on antibiotics    Psychotropic Medication Use:  Patient not on psychotropic medications.     Oxygen Use: Yes 1L    Home Medications Reconciled: N/A    Physical Therapy:   Bed Mobility  Scooting: Modified independent (scoot to edge of sitting surfaces)  Transfers  Sit to Stand: Stand by assistance, Supervision (sba-> supervision)  Stand to sit: Stand by assistance, Supervision (sba-> supervision. Toilet transfer with rolling walker,sts with supervision.)  Stand Pivot Transfers: Contact guard assistance (using rolling walker in confined area of restroom or in hallway.)  Car Transfer: Stand by assistance (no difficulties observed with car transfer this pm.)  Ambulation 1  Surface: level tile  Device: Rolling Walker  Other Apparatus: O2 (1L)  Assistance: Stand by assistance  Quality of Gait: decreased lew and velocity, shortened B step length, fair B foot clearance, fair- B heel strike, forward flexed posture, occasional mild path deviation due to getting distracted  Distance:  15' x ,150' x 1,60' x 1  Comments: Patient often distracted, looking at various objects in hallway during ambulation requiring verbal cues to remain on task and decrease path deviation to left   Stairs  # Steps : 4  Stairs Height: 6\"  Rails: Bilateral  Assistance: Contact guard assistance, Stand by assistance  Comment:  vc for non-reciprocally.  pt. also ascended and descended porch step with rolling walker with cga and vc for proper technique  PT Equipment Recommendations  Equipment Needed: No    Occupational  Therapy:   ADL  Feeding: Setup (completing breakfast)  Grooming: Stand by assistance (Brushed teeth and combed hair standing sinkside)  UE Bathing: Setup (Completed sitting on toilet with set up)  LE Bathing: Stand by assistance (Completed sitting on toilet, SBA when standing to wash buttocks and jaylen area)  UE Dressing: Stand by assistance, Increased time to complete (Donned sitting on toilet)  LE Dressing: Stand by assistance, Increased time to complete (donned sitting on toilet, donned socks and shoes sitting in bedside chair)  Toileting: Stand by assistance (for clothing management)       Speech Therapy:   SUMMARY: Pt has achieved 3/5 STGs for this therapy period and is slowly progressing towards other goals. Pt continues to present with moderate cognitive deficit given impaired functional recall/carryover, safety awareness, problem solving, reasoning, sequential thought/analysis, thought organization, processing speed, and mental flexibility. Expressive and comprehension skills grossly intact for basic wants/needs with communicative breakdowns occurring during increased complexity of posed questions. Pt also presents with mild oropharyngeal dysphagia and pt's diet upgraded to dysphagia III and thins on 7/6. Pt continues to require environmental modifications to reduce external distractions during meal time. Strengths include response to stimulus, social pleasantries/greetings, and increased orientation. Skilled ST services are medically necessary to decrease risk of medical decline, aspiration, social isolation, and for improving cognitive linguistic domains for optimal level of functionality. Nutrition:    Weight: 121 lb 3.2 oz (55 kg) / Body mass index is 27.17 kg/m². Please see nutrition note for details.     Family Education: Family available and participating in education   Fall Risk:  Falling star program initiated    Goal Achievement:   Patient Continues to progress toward goal achievement    Discharge Plan   Estimated Length of Stay: 7/14/18  Destination: discharge home with supervision  Services at Discharge: [address] [place] [address], Occupational Therapy and Speech Therapy 3x week  Equipment at Discharge: No equipment needs  Factors facilitating achievement of predicted outcomes: Family support, Motivated, Cooperative and Pleasant  Barriers to the achievement of predicted outcomes: Cognitive deficit, Limited safety awareness and Decreased endurance    Readmission Risk              Risk of Unplanned Readmission:        31           High (20-31)         Team Members Present at Conference:  Physician: Dr. Magdy Abraham MD  Nurse: Carlos Henley

## 2018-07-10 PROCEDURE — 97110 THERAPEUTIC EXERCISES: CPT

## 2018-07-10 PROCEDURE — 2700000000 HC OXYGEN THERAPY PER DAY

## 2018-07-10 PROCEDURE — 97127 HC SP THER IVNTJ W/FOCUS COG FUNCJ: CPT

## 2018-07-10 PROCEDURE — 97530 THERAPEUTIC ACTIVITIES: CPT

## 2018-07-10 PROCEDURE — 1290000000 HC SEMI PRIVATE OTHER R&B

## 2018-07-10 PROCEDURE — 6370000000 HC RX 637 (ALT 250 FOR IP): Performed by: FAMILY MEDICINE

## 2018-07-10 PROCEDURE — 97535 SELF CARE MNGMENT TRAINING: CPT

## 2018-07-10 PROCEDURE — 6370000000 HC RX 637 (ALT 250 FOR IP): Performed by: INTERNAL MEDICINE

## 2018-07-10 PROCEDURE — 94640 AIRWAY INHALATION TREATMENT: CPT

## 2018-07-10 PROCEDURE — 97116 GAIT TRAINING THERAPY: CPT

## 2018-07-10 PROCEDURE — 94669 MECHANICAL CHEST WALL OSCILL: CPT

## 2018-07-10 RX ADMIN — DOCUSATE SODIUM 100 MG: 100 CAPSULE, LIQUID FILLED ORAL at 20:36

## 2018-07-10 RX ADMIN — IPRATROPIUM BROMIDE AND ALBUTEROL SULFATE 1 AMPULE: .5; 3 SOLUTION RESPIRATORY (INHALATION) at 19:24

## 2018-07-10 RX ADMIN — SUCRALFATE 1 G: 1 TABLET ORAL at 05:33

## 2018-07-10 RX ADMIN — GUAIFENESIN 600 MG: 600 TABLET, EXTENDED RELEASE ORAL at 09:52

## 2018-07-10 RX ADMIN — ASPIRIN 81 MG: 81 TABLET ORAL at 09:52

## 2018-07-10 RX ADMIN — POLYETHYLENE GLYCOL (3350) 17 G: 17 POWDER, FOR SOLUTION ORAL at 09:52

## 2018-07-10 RX ADMIN — GUAIFENESIN 600 MG: 600 TABLET, EXTENDED RELEASE ORAL at 20:36

## 2018-07-10 RX ADMIN — LATANOPROST 1 DROP: 50 SOLUTION OPHTHALMIC at 20:36

## 2018-07-10 RX ADMIN — IPRATROPIUM BROMIDE AND ALBUTEROL SULFATE 1 AMPULE: .5; 3 SOLUTION RESPIRATORY (INHALATION) at 11:33

## 2018-07-10 RX ADMIN — SUCRALFATE 1 G: 1 TABLET ORAL at 09:52

## 2018-07-10 RX ADMIN — IPRATROPIUM BROMIDE AND ALBUTEROL SULFATE 1 AMPULE: .5; 3 SOLUTION RESPIRATORY (INHALATION) at 15:19

## 2018-07-10 RX ADMIN — CITALOPRAM 40 MG: 40 TABLET, FILM COATED ORAL at 09:53

## 2018-07-10 RX ADMIN — ISOSORBIDE MONONITRATE 30 MG: 30 TABLET ORAL at 09:52

## 2018-07-10 RX ADMIN — ACETAMINOPHEN 650 MG: 325 TABLET ORAL at 05:32

## 2018-07-10 RX ADMIN — SUCRALFATE 1 G: 1 TABLET ORAL at 17:39

## 2018-07-10 RX ADMIN — BUMETANIDE 0.5 MG: 1 TABLET ORAL at 09:52

## 2018-07-10 RX ADMIN — SPIRONOLACTONE 12.5 MG: 25 TABLET ORAL at 09:53

## 2018-07-10 RX ADMIN — SUCRALFATE 1 G: 1 TABLET ORAL at 20:36

## 2018-07-10 RX ADMIN — LEVOTHYROXINE SODIUM 88 MCG: 88 TABLET ORAL at 05:33

## 2018-07-10 RX ADMIN — PANTOPRAZOLE SODIUM 40 MG: 40 TABLET, DELAYED RELEASE ORAL at 05:33

## 2018-07-10 RX ADMIN — MAGNESIUM GLUCONATE 500 MG ORAL TABLET 400 MG: 500 TABLET ORAL at 09:52

## 2018-07-10 RX ADMIN — CALCIUM CARBONATE-VITAMIN D TAB 500 MG-200 UNIT 1 TABLET: 500-200 TAB at 09:52

## 2018-07-10 RX ADMIN — ATORVASTATIN CALCIUM 20 MG: 20 TABLET, FILM COATED ORAL at 20:36

## 2018-07-10 RX ADMIN — METOPROLOL TARTRATE 12.5 MG: 25 TABLET ORAL at 09:52

## 2018-07-10 RX ADMIN — PANTOPRAZOLE SODIUM 40 MG: 40 TABLET, DELAYED RELEASE ORAL at 17:39

## 2018-07-10 RX ADMIN — IPRATROPIUM BROMIDE AND ALBUTEROL SULFATE 1 AMPULE: .5; 3 SOLUTION RESPIRATORY (INHALATION) at 07:13

## 2018-07-10 RX ADMIN — ACETAMINOPHEN 650 MG: 325 TABLET ORAL at 17:39

## 2018-07-10 RX ADMIN — DOCUSATE SODIUM 100 MG: 100 CAPSULE, LIQUID FILLED ORAL at 09:52

## 2018-07-10 ASSESSMENT — PAIN SCALES - GENERAL
PAINLEVEL_OUTOF10: 0

## 2018-07-10 NOTE — PROGRESS NOTES
[address]  INPATIENT OCCUPATIONAL THERAPY  [facility]  DAILY NOTE    Time:  Time In: 831  Time Out: 3932  Timed Code Treatment Minutes: 62 Minutes  Minutes: 57          Date: 7/10/2018  Patient Name: Claudetta Pole,   Gender: female      Room: [facility]55/055-A  MRN: [id number]  : 1930  (80 y.o.)  Referring Practitioner: Leah Chan MD  Diagnosis: s/p fall   Additional Pertinent Hx: Pt admitted 6-7 after a fall OOB at [facility]. Pt fell onto right side ,presetning with soreness throughout that side.  CT of head and neck with no acute process, xrays of right tibia/fibula and hip/pelvis  negative    Past Medical History:   Diagnosis Date    ARNOLD (acute kidney injury) ([address]     Anxiety and depression 2018    Atrial fibrillation ([address]     Blood transfusion reaction     CAD (coronary artery disease)     Severe triple vessel disease    Cerebrovascular disease 2018    CKD (chronic kidney disease)     Diastolic congestive heart failure (HCC)     DJD (degenerative joint disease), cervical 2018    GERD (gastroesophageal reflux disease)     Hiatal hernia     History of blood transfusion     Hyperlipidemia     Hypertension     Hypoalbuminemia     Hypothyroidism     Mild protein-calorie malnutrition (HCC)     NSTEMI (non-ST elevated myocardial infarction) ([address] 2014    Pancreatitis due to biliary obstruction     Pneumonia, organism unspecified(486)     Scoliosis     SDH (subdural hematoma) ([address]     [place] hole decompresson on 2014    Thyroid disease     Unspecified cerebral artery occlusion with cerebral infarction     Uterine cancer ([address] 1966     Past Surgical History:   Procedure Laterality Date    APPENDECTOMY      BACK SURGERY      TERESA HOLE  2014    drainage of left SDH    CARDIAC SURGERY      COLONOSCOPY      CORONARY ARTERY BYPASS GRAFT  11    LIMA to LAD, SVG to 1st Marginal, Distal Right Coronary Artery, 1st Diagonal    DILATATION, to complete (standing to fasten bra- able to don bra and shirt with extended time to adjust)  LE Dressing: Stand by assistance; Increased time to complete (to thread BLE into pant and don socks and shoes. Able to pull up pants over hips while standing with BUE release from walker requiring SBA)  Toileting: Stand by assistance       Transfers  Sit to stand: Stand by assistance  Stand to sit: Stand by assistance  Toilet Transfers  Equipment Used: Standard toilet  Toilet Transfer: Stand by assistance  Toilet Transfers Comments: use of grab bar     Balance  Sitting Balance: Supervision  Standing Balance: Stand by assistance     Time: ~4 minutes  Activity: Grooming tasks at sink     Functional Mobility  Functional - Mobility Device: Rolling Walker  Activity: To/from bathroom  Assist Level: Stand by assistance (Close SBA)        Type of ROM/Therapeutic Exercise: AROM (Theraband)  Comment: Completed BUE exercises x10 reps x1 set in all joints/planes using 1# dumb bell seated in chair. Required rest breaks after each exercise.  Completed exercises to increase strength and activity tolerance required for ADLs and toilet transfers       Activity Tolerance:  Activity Tolerance: Patient limited by fatigue    Assessment:     Performance deficits / Impairments: Decreased safe awareness, Decreased balance, Decreased ADL status, Decreased endurance, Decreased strength, Decreased functional mobility , Decreased cognition, Decreased coordination  Prognosis: Fair  Discharge Recommendations: Patient would benefit from continued therapy after discharge, Home with Home health OT (Return to assisted living)    Patient Education:  Patient Education: safety with transfers and mobility, ADL strategies, HEP    Equipment Recommendations:  Equipment Needed: No  Other: None needed at this time    Safety:  Safety Devices in place: Yes  Type of devices: Call light within reach, Left in chair, Chair alarm in place, Gait belt (telesitter)    Plan:  Times per week: 6x  Current Treatment Recommendations: Strengthening, Endurance Training, Patient/Caregiver Education & Training, Self-Care / ADL, Functional Mobility Training, Balance Training, Safety Education & Training    Goals:  Patient goals : feel less tired     Short term goals  Time Frame for Short term goals: 1 week   Short term goal 1: Pt will complete functional transfers to/from chair/toilet with S and 0 cues for safety to increase safety with toileting routine. Short term goal 2: Pt will complete dynamic standing > 5 min with S and 1-2 hand release to increase ability to reach into closet or cupbaords for ADL objects   Short term goal 3: Pt will complete functional mobilty > HH distances with S and RW and 0 cues for safety to increase endurance needed for ADL routine. Short term goal 4: Pt will complete light UE AROM HEP with 0-1 cues for technique to increase strength needed for bathing. Short term goal 5: Pt will complete LE ADLs with S and LHAE prn. Long term goals  Time Frame for Long term goals : 2 weeks  Long term goal 1: Pt will complete BADL routine with CGA and AE prn and 0-2 cues for sequencing/attending to task.

## 2018-07-10 NOTE — PROGRESS NOTES
spoke with Mr Blanca Bearden, patient's son-in-law, by phone to explain that the weekly Team Conference would be held this week on Thursday, 7/12/18. He said he would tell his wife, patient's daughter, who is currently receiving treatment at [facility] in [place], once she was available.

## 2018-07-10 NOTE — PROGRESS NOTES
[facility]  INPATIENT PHYSICAL THERAPY  DAILY NOTE  [facility] TCU 8E - 8E-55/055-A    Time In: 1400  Time Out: 1501  Timed Code Treatment Minutes: 61 Minutes  Minutes: 61          Date: 7/10/2018  Patient Name: Eva Pittman,  Gender:  female        MRN: [id number]  : 1930  (80 y.o.)  Referral Date : 18  Referring Practitioner: Ordering: Renato Gil MD  Attending: JOSE ROBERTO Santana MD  Diagnosis: s/p fall  Additional Pertinent Hx: Pt admitted 6-7 after a fall OOB at [facility]. Pt fell onto right side ,presetning with soreness throughout that side. CT of head and neck with no acute process, xrays of right tibia/fibula and hip/pelvis  negative. To TCU on .      Past Medical History:   Diagnosis Date    ARNOLD (acute kidney injury) ([address]     Anxiety and depression 2018    Atrial fibrillation ([address]     Blood transfusion reaction     CAD (coronary artery disease)     Severe triple vessel disease    Cerebrovascular disease 2018    CKD (chronic kidney disease)     Diastolic congestive heart failure (HCC)     DJD (degenerative joint disease), cervical 2018    GERD (gastroesophageal reflux disease)     Hiatal hernia     History of blood transfusion     Hyperlipidemia     Hypertension     Hypoalbuminemia     Hypothyroidism     Mild protein-calorie malnutrition (HCC)     NSTEMI (non-ST elevated myocardial infarction) ([address] 2014    Pancreatitis due to biliary obstruction     Pneumonia, organism unspecified(486)     Scoliosis     SDH (subdural hematoma) ([address]     [place] hole decompresson on 2014    Thyroid disease     Unspecified cerebral artery occlusion with cerebral infarction     Uterine cancer ([address] 1966     Past Surgical History:   Procedure Laterality Date    APPENDECTOMY      BACK SURGERY      TERESA HOLE  2014    drainage of left SDH    CARDIAC SURGERY      COLONOSCOPY      CORONARY ARTERY BYPASS GRAFT  11    LIMA to LAD, SVG to 1st Training    Goals:  Patient goals : get stronger    Short term goals  Time Frame for Short term goals: 10 days  Short term goal 1: Pt to perform all bed mobility at [facility] for getting in and out of bed. - Discontinue (refer to LTG)  Short term goal 2: Pt to transfer from various surfaces at [facility] for getting to/from chairs and bed. - Discontinue (refer to LTG)  Short term goal 3: Pt to ambulate 48' with RW at [facility] for increased mobility at [address] [place] [address] (refer to LTG)  Short term goal 4: Pt to improve Elderly Mobility Index to 11/20 for improved functional mobility. - Continue    Long term goals  Time Frame for Long term goals : 3 weeks  Long term goal 1: Pt to perform all bed mobility at mod I for getting in and out of bed. Long term goal 2: Pt to transfer from various surfaces at S for getting to/from chairs and bed. Long term goal 3: Pt to ambulate 200' with RW at S for increased mobility at [facility]. Long term goal 4: Pt to improve Elderly Mobility Index to 14 for improved functional mobility. Long term goal 5: Pt to perform car transfer at S for safe transfer to AL facility.

## 2018-07-11 PROCEDURE — 97110 THERAPEUTIC EXERCISES: CPT

## 2018-07-11 PROCEDURE — 92507 TX SP LANG VOICE COMM INDIV: CPT

## 2018-07-11 PROCEDURE — 92526 ORAL FUNCTION THERAPY: CPT

## 2018-07-11 PROCEDURE — 97535 SELF CARE MNGMENT TRAINING: CPT

## 2018-07-11 PROCEDURE — 97116 GAIT TRAINING THERAPY: CPT

## 2018-07-11 PROCEDURE — 94640 AIRWAY INHALATION TREATMENT: CPT

## 2018-07-11 PROCEDURE — 97530 THERAPEUTIC ACTIVITIES: CPT

## 2018-07-11 PROCEDURE — 6370000000 HC RX 637 (ALT 250 FOR IP): Performed by: FAMILY MEDICINE

## 2018-07-11 PROCEDURE — 6370000000 HC RX 637 (ALT 250 FOR IP): Performed by: INTERNAL MEDICINE

## 2018-07-11 PROCEDURE — 1290000000 HC SEMI PRIVATE OTHER R&B

## 2018-07-11 RX ADMIN — METOPROLOL TARTRATE 12.5 MG: 25 TABLET ORAL at 21:42

## 2018-07-11 RX ADMIN — SUCRALFATE 1 G: 1 TABLET ORAL at 12:51

## 2018-07-11 RX ADMIN — LATANOPROST 1 DROP: 50 SOLUTION OPHTHALMIC at 21:50

## 2018-07-11 RX ADMIN — SUCRALFATE 1 G: 1 TABLET ORAL at 17:05

## 2018-07-11 RX ADMIN — ASPIRIN 81 MG: 81 TABLET ORAL at 09:12

## 2018-07-11 RX ADMIN — METOPROLOL TARTRATE 12.5 MG: 25 TABLET ORAL at 09:12

## 2018-07-11 RX ADMIN — MAGNESIUM GLUCONATE 500 MG ORAL TABLET 400 MG: 500 TABLET ORAL at 09:12

## 2018-07-11 RX ADMIN — PANTOPRAZOLE SODIUM 40 MG: 40 TABLET, DELAYED RELEASE ORAL at 05:31

## 2018-07-11 RX ADMIN — GUAIFENESIN 600 MG: 600 TABLET, EXTENDED RELEASE ORAL at 09:12

## 2018-07-11 RX ADMIN — SUCRALFATE 1 G: 1 TABLET ORAL at 21:42

## 2018-07-11 RX ADMIN — ISOSORBIDE MONONITRATE 30 MG: 30 TABLET ORAL at 09:12

## 2018-07-11 RX ADMIN — PANTOPRAZOLE SODIUM 40 MG: 40 TABLET, DELAYED RELEASE ORAL at 17:05

## 2018-07-11 RX ADMIN — ACETAMINOPHEN 650 MG: 325 TABLET ORAL at 12:51

## 2018-07-11 RX ADMIN — SUCRALFATE 1 G: 1 TABLET ORAL at 05:32

## 2018-07-11 RX ADMIN — ACETAMINOPHEN 650 MG: 325 TABLET ORAL at 17:05

## 2018-07-11 RX ADMIN — SPIRONOLACTONE 12.5 MG: 25 TABLET ORAL at 09:12

## 2018-07-11 RX ADMIN — IPRATROPIUM BROMIDE AND ALBUTEROL SULFATE 1 AMPULE: .5; 3 SOLUTION RESPIRATORY (INHALATION) at 09:59

## 2018-07-11 RX ADMIN — GUAIFENESIN 600 MG: 600 TABLET, EXTENDED RELEASE ORAL at 21:42

## 2018-07-11 RX ADMIN — BUMETANIDE 0.5 MG: 1 TABLET ORAL at 09:12

## 2018-07-11 RX ADMIN — CALCIUM CARBONATE-VITAMIN D TAB 500 MG-200 UNIT 1 TABLET: 500-200 TAB at 09:12

## 2018-07-11 RX ADMIN — DOCUSATE SODIUM 100 MG: 100 CAPSULE, LIQUID FILLED ORAL at 21:42

## 2018-07-11 RX ADMIN — POLYETHYLENE GLYCOL (3350) 17 G: 17 POWDER, FOR SOLUTION ORAL at 09:12

## 2018-07-11 RX ADMIN — CITALOPRAM 40 MG: 40 TABLET, FILM COATED ORAL at 09:12

## 2018-07-11 RX ADMIN — IPRATROPIUM BROMIDE AND ALBUTEROL SULFATE 1 AMPULE: .5; 3 SOLUTION RESPIRATORY (INHALATION) at 18:16

## 2018-07-11 RX ADMIN — IPRATROPIUM BROMIDE AND ALBUTEROL SULFATE 1 AMPULE: .5; 3 SOLUTION RESPIRATORY (INHALATION) at 21:23

## 2018-07-11 RX ADMIN — IPRATROPIUM BROMIDE AND ALBUTEROL SULFATE 1 AMPULE: .5; 3 SOLUTION RESPIRATORY (INHALATION) at 14:43

## 2018-07-11 RX ADMIN — ATORVASTATIN CALCIUM 20 MG: 20 TABLET, FILM COATED ORAL at 21:42

## 2018-07-11 RX ADMIN — LEVOTHYROXINE SODIUM 88 MCG: 88 TABLET ORAL at 05:32

## 2018-07-11 RX ADMIN — DOCUSATE SODIUM 100 MG: 100 CAPSULE, LIQUID FILLED ORAL at 09:12

## 2018-07-11 RX ADMIN — ACETAMINOPHEN 650 MG: 325 TABLET ORAL at 05:31

## 2018-07-11 ASSESSMENT — PAIN SCALES - GENERAL
PAINLEVEL_OUTOF10: 0

## 2018-07-11 NOTE — PROGRESS NOTES
bedrail)    Transfers  Sit to stand: Stand by assistance (EOB)  Stand to sit: Stand by assistance (bedside chair)  Toilet Transfers  Toilet - Technique: Ambulating  Equipment Used: Standard toilet  Toilet Transfer: Stand by assistance    Balance  Standing Balance: Stand by assistance     Time: x 7 minutes for grooming     Functional Mobility  Functional - Mobility Device: Rolling Walker  Activity: To/from bathroom  Assist Level: Stand by assistance           Activity Tolerance:  Activity Tolerance: Patient limited by fatigue;Patient Tolerated treatment well    Assessment:     Performance deficits / Impairments: Decreased safe awareness, Decreased balance, Decreased ADL status, Decreased endurance, Decreased strength, Decreased functional mobility , Decreased cognition, Decreased coordination  Prognosis: Fair  Discharge Recommendations: Patient would benefit from continued therapy after discharge, Home with Home health OT (Return to assisted living)    Patient Education:  Patient Education: LB dressing and walker safety    Equipment Recommendations:  Equipment Needed: No  Other: None needed at this time    Safety:  Safety Devices in place: Yes  Type of devices: Call light within reach, Left in chair, Chair alarm in place, Gait belt (Telesitter)    Plan:  Times per week: 6x  Current Treatment Recommendations: Strengthening, Endurance Training, Patient/Caregiver Education & Training, Self-Care / ADL, Functional Mobility Training, Balance Training, Safety Education & Training    Goals:  Patient goals : feel less tired     Short term goals  Time Frame for Short term goals: 1 week   Short term goal 1: Pt will complete functional transfers to/from chair/toilet with S and 0 cues for safety to increase safety with toileting routine.    Short term goal 2: Pt will complete dynamic standing > 5 min with S and 1-2 hand release to increase ability to reach into closet or cupbaords for ADL objects   Short term goal 3: Pt will

## 2018-07-11 NOTE — PLAN OF CARE
Problem: Falls - Risk of:  Goal: Will remain free from falls      Outcome: Ongoing  Falling star prevention in place. Bed and chair alarms in use. Call light in reach. Purposeful hourly rounding. Problem: Pain:  Goal: Pain level will decrease      Outcome: Ongoing  Pain decreases with rest, repositioning, ice application, and prn pain medications. Problem: Bleeding:  Goal: Will show no signs and symptoms of excessive bleeding      Outcome: Ongoing  No signs of excessive bleeding noted this shift. Problem: Pressure Ulcer - Risk of  Goal: Absence of pressure ulcer  Outcome: Ongoing  No pressure ulcers noted with skin check. Float heels when in bed. Repositioning every 2 hours. Problem: Mental Status - Risk of, Impaired  Goal: Mental status within normal limits for patient  Outcome: Ongoing  Patient's mental status is within normal limits for him per family. Problem: Confusion - Acute:  Goal: Absence of continued neurological deterioration signs and symptoms      Outcome: Ongoing  Alert and oriented x 4 this shift    Problem: Mood - Altered:  Goal: Mood stable      Outcome: Ongoing  Patient pleasant, calm, and cooperative with staff this shift. Problem: Urinary Elimination - Impaired  Goal: Decrease in number of episodes of urinary incontinence  Outcome: Ongoing  Patient has been continent of bladder this shift    Problem: Fluid Volume - Risk of, Imbalanced  Goal: Will remain free of signs and symptoms of dehydration        Outcome: Ongoing  Labs, intake and output, and vital signs monitored daily for fluid imbalance.

## 2018-07-11 NOTE — PLAN OF CARE
Problem: Nutrition  Goal: Optimal nutrition therapy  Outcome: Ongoing  Nutrition Problem: Moderate malnutrition  Intervention: Food and/or Nutrient Delivery: Continue current diet, Continue current ONS  Nutritional Goals: Pt. will  consume 75% or more at meals during LOS.

## 2018-07-12 PROCEDURE — 97530 THERAPEUTIC ACTIVITIES: CPT

## 2018-07-12 PROCEDURE — 97127 HC SP THER IVNTJ W/FOCUS COG FUNCJ: CPT

## 2018-07-12 PROCEDURE — 94640 AIRWAY INHALATION TREATMENT: CPT

## 2018-07-12 PROCEDURE — 6370000000 HC RX 637 (ALT 250 FOR IP): Performed by: FAMILY MEDICINE

## 2018-07-12 PROCEDURE — 2700000000 HC OXYGEN THERAPY PER DAY

## 2018-07-12 PROCEDURE — 97535 SELF CARE MNGMENT TRAINING: CPT

## 2018-07-12 PROCEDURE — 1290000000 HC SEMI PRIVATE OTHER R&B

## 2018-07-12 PROCEDURE — 6370000000 HC RX 637 (ALT 250 FOR IP): Performed by: INTERNAL MEDICINE

## 2018-07-12 PROCEDURE — 97110 THERAPEUTIC EXERCISES: CPT

## 2018-07-12 PROCEDURE — 97116 GAIT TRAINING THERAPY: CPT

## 2018-07-12 RX ADMIN — DOCUSATE SODIUM 100 MG: 100 CAPSULE, LIQUID FILLED ORAL at 10:46

## 2018-07-12 RX ADMIN — LEVOTHYROXINE SODIUM 88 MCG: 88 TABLET ORAL at 05:10

## 2018-07-12 RX ADMIN — PANTOPRAZOLE SODIUM 40 MG: 40 TABLET, DELAYED RELEASE ORAL at 05:11

## 2018-07-12 RX ADMIN — POLYETHYLENE GLYCOL (3350) 17 G: 17 POWDER, FOR SOLUTION ORAL at 10:46

## 2018-07-12 RX ADMIN — SUCRALFATE 1 G: 1 TABLET ORAL at 17:06

## 2018-07-12 RX ADMIN — IPRATROPIUM BROMIDE AND ALBUTEROL SULFATE 1 AMPULE: .5; 3 SOLUTION RESPIRATORY (INHALATION) at 12:15

## 2018-07-12 RX ADMIN — LATANOPROST 1 DROP: 50 SOLUTION OPHTHALMIC at 22:03

## 2018-07-12 RX ADMIN — ACETAMINOPHEN 650 MG: 325 TABLET ORAL at 12:50

## 2018-07-12 RX ADMIN — MAGNESIUM GLUCONATE 500 MG ORAL TABLET 400 MG: 500 TABLET ORAL at 10:45

## 2018-07-12 RX ADMIN — METOPROLOL TARTRATE 12.5 MG: 25 TABLET ORAL at 10:45

## 2018-07-12 RX ADMIN — CITALOPRAM 40 MG: 40 TABLET, FILM COATED ORAL at 10:47

## 2018-07-12 RX ADMIN — SUCRALFATE 1 G: 1 TABLET ORAL at 05:10

## 2018-07-12 RX ADMIN — GUAIFENESIN 600 MG: 600 TABLET, EXTENDED RELEASE ORAL at 22:01

## 2018-07-12 RX ADMIN — CALCIUM CARBONATE-VITAMIN D TAB 500 MG-200 UNIT 1 TABLET: 500-200 TAB at 10:45

## 2018-07-12 RX ADMIN — METOPROLOL TARTRATE 12.5 MG: 25 TABLET ORAL at 22:01

## 2018-07-12 RX ADMIN — ASPIRIN 81 MG: 81 TABLET ORAL at 10:45

## 2018-07-12 RX ADMIN — BUMETANIDE 0.5 MG: 1 TABLET ORAL at 10:45

## 2018-07-12 RX ADMIN — IPRATROPIUM BROMIDE AND ALBUTEROL SULFATE 1 AMPULE: .5; 3 SOLUTION RESPIRATORY (INHALATION) at 07:29

## 2018-07-12 RX ADMIN — GUAIFENESIN 600 MG: 600 TABLET, EXTENDED RELEASE ORAL at 10:45

## 2018-07-12 RX ADMIN — PANTOPRAZOLE SODIUM 40 MG: 40 TABLET, DELAYED RELEASE ORAL at 17:06

## 2018-07-12 RX ADMIN — ISOSORBIDE MONONITRATE 30 MG: 30 TABLET ORAL at 10:45

## 2018-07-12 RX ADMIN — ACETAMINOPHEN 650 MG: 325 TABLET ORAL at 17:06

## 2018-07-12 RX ADMIN — IPRATROPIUM BROMIDE AND ALBUTEROL SULFATE 1 AMPULE: .5; 3 SOLUTION RESPIRATORY (INHALATION) at 15:24

## 2018-07-12 RX ADMIN — SUCRALFATE 1 G: 1 TABLET ORAL at 10:48

## 2018-07-12 RX ADMIN — SPIRONOLACTONE 12.5 MG: 25 TABLET ORAL at 10:46

## 2018-07-12 RX ADMIN — ATORVASTATIN CALCIUM 20 MG: 20 TABLET, FILM COATED ORAL at 22:02

## 2018-07-12 RX ADMIN — SUCRALFATE 1 G: 1 TABLET ORAL at 22:02

## 2018-07-12 RX ADMIN — ACETAMINOPHEN 650 MG: 325 TABLET ORAL at 05:11

## 2018-07-12 RX ADMIN — IPRATROPIUM BROMIDE AND ALBUTEROL SULFATE 1 AMPULE: .5; 3 SOLUTION RESPIRATORY (INHALATION) at 20:05

## 2018-07-12 RX ADMIN — DOCUSATE SODIUM 100 MG: 100 CAPSULE, LIQUID FILLED ORAL at 22:01

## 2018-07-12 ASSESSMENT — PAIN SCALES - GENERAL
PAINLEVEL_OUTOF10: 0

## 2018-07-12 NOTE — PLAN OF CARE
Problem: Impaired respiratory status  Goal: Clear lung sounds  Outcome: Ongoing  Need to keep lungs clear of any wheezes

## 2018-07-12 NOTE — PLAN OF CARE
Problem: DISCHARGE BARRIERS  Goal: Patient's continuum of care needs are met    Intervention: INVOLVE PATIENT/S.O. IN DISCHARGE PLANNING PROCESS  The TCU team meeting was held today, 7/12. The patient has shown progress in therapies and can be discharged back to her assisted living facility, the [address] [place] [address] . She continues to use 02 @ 1 liter, and a home 02 evaluation will be performed to determine if she qualifies to have 02 at the facility . [facility] to report progress, and to confirm discharge for 7/14. [facility] is planning to come to TCU tomorrow to do a level of care assessment, but the discharge is expected to take place as planned. Following the team meeting, the [address] [place] [address] Director and  met with patient and provided update. She was upbeat about being discharged soon. This worker contacted her daughter, Cassius Dawson, and updated her by phone. She has requested to end skilled services and have the patient discharged on 7/14. She was advised that patient will not qualify for an ambulance transport, and can safely be transported via wheelchair lift van. Arrangements will be made for ambulette transport via [facility] at cost to patient. Ongoing therapies (OT, PT, & ST) will be provided at the [address] [place] [address]  and a Rx will be obtained from the physician for these services. Will confirm all plans with [facility] and patient's daughter tomorrow.  MARTIN Jackson

## 2018-07-12 NOTE — PROGRESS NOTES
[facility]  INPATIENT OCCUPATIONAL THERAPY  [facility] TCU 8E  DAILY NOTE    Time:  Time In: 933  Time Out: 1027  Timed Code Treatment Minutes: 47 Minutes  Minutes: 54          Date: 2018  Patient Name: Liam Razo,   Gender: female      Room: [facility]55/055-A  MRN: [id number]  : 1930  (80 y.o.)  Referring Practitioner: Alyssia Burkett MD  Diagnosis: s/p fall   Additional Pertinent Hx: Pt admitted 6-7 after a fall OOB at [facility]. Pt fell onto right side ,presetning with soreness throughout that side.  CT of head and neck with no acute process, xrays of right tibia/fibula and hip/pelvis  negative    Past Medical History:   Diagnosis Date    ARNOLD (acute kidney injury) ([address]     Anxiety and depression 2018    Atrial fibrillation ([address]     Blood transfusion reaction     CAD (coronary artery disease)     Severe triple vessel disease    Cerebrovascular disease 2018    CKD (chronic kidney disease)     Diastolic congestive heart failure (HCC)     DJD (degenerative joint disease), cervical 2018    GERD (gastroesophageal reflux disease)     Hiatal hernia     History of blood transfusion     Hyperlipidemia     Hypertension     Hypoalbuminemia     Hypothyroidism     Mild protein-calorie malnutrition (HCC)     NSTEMI (non-ST elevated myocardial infarction) ([address] 2014    Pancreatitis due to biliary obstruction     Pneumonia, organism unspecified(486)     Scoliosis     SDH (subdural hematoma) ([address]     [place] hole decompresson on 2014    Thyroid disease     Unspecified cerebral artery occlusion with cerebral infarction     Uterine cancer ([address] 1966     Past Surgical History:   Procedure Laterality Date    APPENDECTOMY      BACK SURGERY      TERESA HOLE  2014    drainage of left SDH    CARDIAC SURGERY      COLONOSCOPY      CORONARY ARTERY BYPASS GRAFT  11    LIMA to LAD, SVG to 1st Marginal, Distal Right Coronary Artery, 1st Diagonal    DILATATION, ESOPHAGUS      ENDOSCOPY, COLON, DIAGNOSTIC      EYE SURGERY      bilateral cataracts    HYSTERECTOMY      [place] EGD BALLOON DILATION ESOPHAGUS <30 MM DIAM N/A 11/22/2017    EGD ESOPHAGOGASTRODUODENOSCOPY DILATATION performed by Joceline Livingston MD at [facility]    [place] ESOPHAGOGASTRODUODENOSCOPY TRANSORAL DIAGNOSTIC  11/22/2017    EGD ESOPHAGOGASTRODUODENOSCOPY performed by Joceline Livingston MD at [address] [place] [address] Left     SKIN BIOPSY      VASCULAR SURGERY      cabg harvests from left leg       Restrictions/Precautions:  Contact Precautions, Fall Risk                    Other position/activity restrictions: confused       Prior Level of Function:  ADL Assistance: Independent  Homemaking Assistance: Needs assistance  Ambulation Assistance: Independent  Transfer Assistance: Independent  Additional Comments: Pt reports living at [address] for \"a couple months. \" Pt using RW PTA for mobility. Getting assistance for meals. Indep with ADLs. Subjective       Subjective: Pt seated in bedside chair upon arrival. Agreeable to OT session    Overall Orientation Status: Within Functional Limits         Pain:  Pain Assessment  Patient Currently in Pain: Denies       Objective  Overall Cognitive Status: Exceptions  Cognition Comment: Decreased safety and insight. Decreased problem solving.  Slow processing, confusion      ADL  Grooming: Stand by assistance (standing at sink to complete oral care, hair care and washing hands after toileting tasks)  LE Dressing: Stand by assistance (to don B shoes and socks)  Toileting: Stand by assistance (hygiene and clothing management)        Transfers  Sit to stand: Stand by assistance  Stand to sit: Stand by assistance  Transfer Comments: Jack Lather for proper hand placement during transfers  Toilet Transfers  Equipment Used: Standard toilet  Toilet Transfer: Stand by assistance  Toilet Transfers Comments: use of grab bar     Balance  Sitting Balance: Training, Balance Training, Safety Education & Training    Goals:  Patient goals : feel less tired     Short term goals  Time Frame for Short term goals: 1 week   Short term goal 1: Pt will complete functional transfers to/from chair/toilet with S and 0 cues for safety to increase safety with toileting routine. Short term goal 2: Pt will complete dynamic standing > 5 min with S and 1-2 hand release to increase ability to reach into closet or cupbaords for ADL objects   Short term goal 3: Pt will complete functional mobilty > HH distances with S and RW and 0 cues for safety to increase endurance needed for ADL routine. Short term goal 4: Pt will complete light UE AROM HEP with 0-1 cues for technique to increase strength needed for bathing. Short term goal 5: Pt will complete LE ADLs with S and LHAE prn. Long term goals  Time Frame for Long term goals : 2 weeks  Long term goal 1: Pt will complete BADL routine with CGA and AE prn and 0-2 cues for sequencing/attending to task.

## 2018-07-12 NOTE — PROGRESS NOTES
[address] [address]  INPATIENT SPEECH THERAPY  [facility] TCU 8E    TIME   SLP Individual Minutes  Time In: 1330  Time Out: 1400  Minutes: 30  Timed Code Treatment Minutes: 30 Minutes       [x]Daily Note  []Progress Note  []Discharge Note    Date: 2018  Patient Name: Neo Moctezuma        MRN: [id number]    : 1930  (80 y.o.)  Gender: female   Primary Provider: Alejandra Gamboa MD  Admitting Diagnosis:  s/p fall   Secondary Diagnosis: Dysphagia; Cognitive-linguistic deficits  Precautions: Fall risk, aspiration precautions; Close pulmonary monitoring   Swallowing Status/Diet: Dysphagia level 3 with thin liquids   Swallowing Strategies: Monitor for alertness, upright positioning, small bites/sips, slow rate, Intermittent supervision   DATE of last BSE: 18  Pain:  0/10    Subjective: Pt seen in bathroom completing toileting ADL upon arrival; pt independent for pulling call light/string to seek assistance from nursing prior to completing sit<>stand transfer. Pt highly pleasant and cooperative with clinician and POC. SHORT TERM GOAL #1:  Goal 1:  Pt will safely tolerate dysphagia 3 diet with thin liquids (with intermittent supervision) with no overt s/s aspiration to maximize nutrition/hydration measures. INTERVENTIONS: DNT due to focus on cognitive therpay    Prior session:  Direct diet monitor via tray of dysphagia III and thins; minimal intake due to pt at the end of meal upon arrival. ST reminded pt to take small sips/bites and slow rate of intake during meals. Pt's oral phase characterized by slightly prolonged mastication; good bite size and rate; good bolus control; and min oral stasis, cleared with indep liquid wash. Pt's pharyngeal phase unremarkable; no s/s of aspiration observed during trial. Pt with excellent tolerance of upgraded diet this date; minimal intake due to pt at end of meal during session.  Recommend pt continue on prescribed diet of dysphagia III and thins and trial advanced textures as clinically indicated. SHORT TERM GOAL #2:  Goal 2: Pt will safely tolerate advanced texture trials 10/10 times to permit potential diet advancement. INTERVENTIONS: DNT due to focus on cognitive therapy    SHORT TERM GOAL #3:   Goal 3: Pt will complete orientation and functional recall/carryover tasks with 70% accuracy, min cues to improve retention of novel information. INTERVENTIONS: Orientation: 5/7 independently, 1/7 given min cues to locate external aid of care board for place, 1/7 given min logical cues for ELVI  *pt with independent reference to calendar for correct response to date    Functional recall task related to 4-digit phone extension to contact nutrition ambassador. Blue magnet on care board to serve as external device/strategy to assist with retention. Immediate memory: 4/4 digits independently  Delay x5 minutes: 4/4 digits with pt independently referring to magnet  Delay x10 minutes: 4/4 digits with pt requiring mod cues to refer to magnet    SHORT TERM GOAL #4:  Goal 4: Pt will complete basic safety, problem solving, reasoning, and sequencing tasks with 60% accuracy given min cues to improve awareness for integration into current and future settings. INTERVENTIONS: Reasoning: manipulating television remote to mute volume and turn off picture with pt requiring mod hand-over-hand assistance despite instruction given; decreased mental flexibility into navigating device    Safety awareness via problem solving task related to identifying hazards in picture scene: 9/14 independently, 5/14 given min visual cues     Sequencing skills targeted via completion of ADL routine related to toileting. Pt required mod cues for completion of handwashing rote activity with delayed processing speed noted.  Impaired sequential thought process for hand placement during sit<>stand transfers with improved success obtained given mod cues  *decreased safety noted within ambulation with walker, patient    Discharge Location:    Services/Supervision Recommended:     [x]Patient continues to require treatment by a licensed therapist to address functional deficits as outlined in the established plan of care.     Osbaldo Acosta M.A., [address] [place] [address]

## 2018-07-12 NOTE — PROGRESS NOTES
[facility]  INPATIENT PHYSICAL THERAPY  DAILY NOTE  [facility] TCU 8E - 8E-55/055-A    Time In: 5217  Time Out: 1455  Timed Code Treatment Minutes: 62 Minutes  Minutes: 58          Date: 2018  Patient Name: Leatha Manzanares,  Gender:  female        MRN: [id number]  : 1930  (80 y.o.)  Referral Date : 18  Referring Practitioner: Ordering: Hiral Jo MD  Attending: JOSE ROBERTO Cervantes MD  Diagnosis: s/p fall  Additional Pertinent Hx: Pt admitted 6-7 after a fall OOB at [facility]. Pt fell onto right side ,presetning with soreness throughout that side. CT of head and neck with no acute process, xrays of right tibia/fibula and hip/pelvis  negative. To TCU on .      Past Medical History:   Diagnosis Date    ARNOLD (acute kidney injury) ([address]     Anxiety and depression 2018    Atrial fibrillation ([address]     Blood transfusion reaction     CAD (coronary artery disease)     Severe triple vessel disease    Cerebrovascular disease 2018    CKD (chronic kidney disease)     Diastolic congestive heart failure (HCC)     DJD (degenerative joint disease), cervical 2018    GERD (gastroesophageal reflux disease)     Hiatal hernia     History of blood transfusion     Hyperlipidemia     Hypertension     Hypoalbuminemia     Hypothyroidism     Mild protein-calorie malnutrition (HCC)     NSTEMI (non-ST elevated myocardial infarction) ([address] 2014    Pancreatitis due to biliary obstruction     Pneumonia, organism unspecified(486)     Scoliosis     SDH (subdural hematoma) ([address]     [place] hole decompresson on 2014    Thyroid disease     Unspecified cerebral artery occlusion with cerebral infarction     Uterine cancer ([address] 1966     Past Surgical History:   Procedure Laterality Date    APPENDECTOMY      BACK SURGERY      TERESA HOLE  2014    drainage of left SDH    CARDIAC SURGERY      COLONOSCOPY      CORONARY ARTERY BYPASS GRAFT  11    LIMA to LAD, SVG to 1st Marginal, step length, fair B foot clearance, fair- B heel strike, forward flexed posture, occasional mild path deviation due to looking around /getting distracted  Distance: 220' x 1,30' x 1,60' x 1  Comments: Patient often distracted, looking at various objects in hallway during ambulation requiring verbal cues to remain on task and decrease path deviation to left     Stairs  # Steps : 4  Stairs Height: 6\"  Rails: Bilateral  Assistance: Stand by assistance  Comment:  sba-> lt. cga, vc for non-reciprocal    Balance  Comments: standing without ue support on green foam pad pt. completed 3 min x 1 and 2 1/2 min x 1 bean bag toss to target reaching within-> edge of isabel with sba->( lt. cga occasionally, moreso initially). feet isabel ~4\" apart      Exercises:  Exercises  Comments: 15 reps each b le seated heel-toe raises, laq, resistance yellow t-band ham curls, marching, hip abd/add. 15 reps each b le supine ap's, quad/glute sets, saq, heelslides, hip abd/add all for strengthening/endurance     Activity Tolerance:  Activity Tolerance: Patient Tolerated treatment well  Activity Tolerance: good motivation,    Assessment: Body structures, Functions, Activity limitations: Decreased functional mobility , Decreased strength, Decreased endurance, Decreased balance  Assessment: Patient tolerated session well. Activities to improve dynamic standing balance were performed with patient requiring occasional assistance to correct left LOB episodes. Patient continues to require verbal cueing for safety precautions when performing sitting transfers as she attempts to leave walker behind when sitting. Patient is also distracted easily which results in left path deviation during ambulation. Patient requires constant verbal cueing to stay on task while ambulating.    Prognosis: Good  Discharge Recommendations: [address], Patient would benefit from continued therapy after discharge    Patient Education:  Patient Education:

## 2018-07-13 PROCEDURE — 6370000000 HC RX 637 (ALT 250 FOR IP): Performed by: FAMILY MEDICINE

## 2018-07-13 PROCEDURE — 94640 AIRWAY INHALATION TREATMENT: CPT

## 2018-07-13 PROCEDURE — 94669 MECHANICAL CHEST WALL OSCILL: CPT

## 2018-07-13 PROCEDURE — 94761 N-INVAS EAR/PLS OXIMETRY MLT: CPT

## 2018-07-13 PROCEDURE — 2700000000 HC OXYGEN THERAPY PER DAY

## 2018-07-13 PROCEDURE — 97535 SELF CARE MNGMENT TRAINING: CPT

## 2018-07-13 PROCEDURE — 97110 THERAPEUTIC EXERCISES: CPT

## 2018-07-13 PROCEDURE — 97116 GAIT TRAINING THERAPY: CPT

## 2018-07-13 PROCEDURE — 97530 THERAPEUTIC ACTIVITIES: CPT

## 2018-07-13 PROCEDURE — 6370000000 HC RX 637 (ALT 250 FOR IP): Performed by: INTERNAL MEDICINE

## 2018-07-13 PROCEDURE — 97127 HC SP THER IVNTJ W/FOCUS COG FUNCJ: CPT

## 2018-07-13 PROCEDURE — 92526 ORAL FUNCTION THERAPY: CPT

## 2018-07-13 PROCEDURE — 1290000000 HC SEMI PRIVATE OTHER R&B

## 2018-07-13 RX ORDER — LEVOTHYROXINE SODIUM 88 UG/1
88 TABLET ORAL DAILY
COMMUNITY
Start: 2018-07-14

## 2018-07-13 RX ADMIN — CALCIUM CARBONATE-VITAMIN D TAB 500 MG-200 UNIT 1 TABLET: 500-200 TAB at 09:27

## 2018-07-13 RX ADMIN — IPRATROPIUM BROMIDE AND ALBUTEROL SULFATE 1 AMPULE: .5; 3 SOLUTION RESPIRATORY (INHALATION) at 17:55

## 2018-07-13 RX ADMIN — LATANOPROST 1 DROP: 50 SOLUTION OPHTHALMIC at 21:16

## 2018-07-13 RX ADMIN — LEVOTHYROXINE SODIUM 88 MCG: 88 TABLET ORAL at 05:08

## 2018-07-13 RX ADMIN — DOCUSATE SODIUM 100 MG: 100 CAPSULE, LIQUID FILLED ORAL at 09:23

## 2018-07-13 RX ADMIN — SUCRALFATE 1 G: 1 TABLET ORAL at 11:00

## 2018-07-13 RX ADMIN — ASPIRIN 81 MG: 81 TABLET ORAL at 09:23

## 2018-07-13 RX ADMIN — METOPROLOL TARTRATE 12.5 MG: 25 TABLET ORAL at 21:16

## 2018-07-13 RX ADMIN — SUCRALFATE 1 G: 1 TABLET ORAL at 21:16

## 2018-07-13 RX ADMIN — ACETAMINOPHEN 650 MG: 325 TABLET ORAL at 05:07

## 2018-07-13 RX ADMIN — IPRATROPIUM BROMIDE AND ALBUTEROL SULFATE 1 AMPULE: .5; 3 SOLUTION RESPIRATORY (INHALATION) at 09:57

## 2018-07-13 RX ADMIN — BUMETANIDE 0.5 MG: 1 TABLET ORAL at 09:26

## 2018-07-13 RX ADMIN — ACETAMINOPHEN 650 MG: 325 TABLET ORAL at 12:00

## 2018-07-13 RX ADMIN — PANTOPRAZOLE SODIUM 40 MG: 40 TABLET, DELAYED RELEASE ORAL at 16:40

## 2018-07-13 RX ADMIN — ATORVASTATIN CALCIUM 20 MG: 20 TABLET, FILM COATED ORAL at 21:16

## 2018-07-13 RX ADMIN — MAGNESIUM GLUCONATE 500 MG ORAL TABLET 400 MG: 500 TABLET ORAL at 09:26

## 2018-07-13 RX ADMIN — IPRATROPIUM BROMIDE AND ALBUTEROL SULFATE 1 AMPULE: .5; 3 SOLUTION RESPIRATORY (INHALATION) at 14:53

## 2018-07-13 RX ADMIN — CITALOPRAM 40 MG: 40 TABLET, FILM COATED ORAL at 09:27

## 2018-07-13 RX ADMIN — ISOSORBIDE MONONITRATE 30 MG: 30 TABLET ORAL at 09:26

## 2018-07-13 RX ADMIN — IPRATROPIUM BROMIDE AND ALBUTEROL SULFATE 1 AMPULE: .5; 3 SOLUTION RESPIRATORY (INHALATION) at 20:07

## 2018-07-13 RX ADMIN — PANTOPRAZOLE SODIUM 40 MG: 40 TABLET, DELAYED RELEASE ORAL at 05:08

## 2018-07-13 RX ADMIN — SPIRONOLACTONE 12.5 MG: 25 TABLET ORAL at 09:27

## 2018-07-13 RX ADMIN — ACETAMINOPHEN 650 MG: 325 TABLET ORAL at 16:39

## 2018-07-13 RX ADMIN — SUCRALFATE 1 G: 1 TABLET ORAL at 16:39

## 2018-07-13 RX ADMIN — SUCRALFATE 1 G: 1 TABLET ORAL at 05:08

## 2018-07-13 RX ADMIN — GUAIFENESIN 600 MG: 600 TABLET, EXTENDED RELEASE ORAL at 09:26

## 2018-07-13 ASSESSMENT — PAIN SCALES - GENERAL
PAINLEVEL_OUTOF10: 4
PAINLEVEL_OUTOF10: 2

## 2018-07-13 NOTE — PLAN OF CARE
Problem: Impaired respiratory status  Goal: Clear lung sounds  Outcome: Ongoing  Continue therapy as ordered to improve breath sounds/aeration to improve breath sounds/aeration and pulmonary toilet.

## 2018-07-13 NOTE — PROGRESS NOTES
2:  Doing laundry-overall patient demonstrated with independent thought organization, max cues provided to increase detail, max cues provided to increased mental flexibly     SHORT TERM GOAL #5:  Goal 5: Pt will complete spoken langauge expression (speech naming) and comprehension (moderately complex yes/no questions, 2-3 step commands) tasks with 70% accuracy given min cues to improve communicative efficacy. GOAL MET. INTERVENTIONS: Moderately complex yes/no questions: 10/10 indep   Previous tx:    2-step commands related to body part manipulation: 6/7 independently, 1/7 given repetition/rephrasing       Long-term Goals  Timeframe for Long-term Goals: three weeks  Goal 1: Pt will improve cognitive linguistic functioning to moderate impairment level or higher to promote improved safety and communication for optimal level of functionallity. GOAL MET. Goal 2: Pt will safely tolerate PO diet level 98% of the time given compensatory swallowing strategies to maximize nutrition/hydration measures. GOAL MET. ASSESSMENT:  Pt has achieved 2/5 STG's and 2/2 LTG's. Pt continues to present with moderate cognitive deficits given impaired functional recall/carryover, safety awareness, problem solving, reasoning, sequential thought/analysis, thought organization, processing speed, and mental flexibility. Expressive and comprehension skills grossly intact for basic wants/needs with communicative breakdowns occurring during increased complexity of posed questions/commands. Pt also presents with mild oral dysphagia tolerating dysphagia III diet and thin liquids with good success. Strengths include response to stimulus, social pleasantries/greetings, and increased orientation. Skilled ST services are medically necessary to decrease risk of medical decline, aspiration, social isolation, and for improving cognitive linguistic domains for optimal level of functionality.        Assessment: [x]Progressing towards goals []Not Progressing towards goals    Patient Tolerance of Treatment:   [x]Tolerated well []Tolerated fair []Required rest breaks []Fatigued  Plan for Next Session: diet analysis, speech naming  Education:  Learner:  [x]Patient          []Significant Other          []Other:   Education provided regarding:  [x]Goals and POC   []Diet and swallowing precautions    []Home Exercise Program  [x]Progress and/or discharge information  Method of Education:  [x]Discussion          [x]Demonstration          []Handout          []Other  Evaluation of Education:   [x]Verbalized understanding   []Demonstrates without assistance  []Demonstrates with assistance  [x]Needs further instruction     []No evidence of learning                  [x]No family present      Plan: []Continue with current plan of care    []Modify current plan of care as follows:    [x]Discharge patient    Discharge Location: [facility]     Services/Supervision Recommended: Continued  services     [x]Patient continues to require treatment by a licensed therapist to address functional deficits as outlined in the established plan of care.     ANUPAM Zhang 23

## 2018-07-13 NOTE — PROGRESS NOTES
standing tolerance ~4 minutes. ELDERLY MOBILITY SCALE   LYING TO SITTING 2 - Independent   SITTING TO LYING 2 - Independent   SIT TO STAND 1 - Needs help of 1 person (verbal or physical)   STANDING 1 - Stands but requires support   GAIT 0 - Needs physical help to walk or constant supervision   TIMED WALK (6 meters) 1 - Over 30 seconds   FUNCTIONAL REACH 2 - 10 - 20 cm       TOTAL SCORE 9/20     Scores under 10 - generally these patients are dependent for mobility and require help with basic ADL's. Scores between 10 and 13 - generally these patients are borderline in terms of safe mobility and ADL independence  Scores over 14 - generally these patients are able to perform mobility independently and safely and are independent in basic ADL's.    *Results are general interpretations that do not take into account cognition, safety awareness and other factors that may impact mobility. SCORE CONVERTER:  0 % Impaired   1-4 CM 80-99% Impaired   5-8 CL 60-79% Impaired   9-12 CK 40-59% Impaired   13-16 CJ 20-39% Impaired   17-19 CI 1-19% Impaired   20 CH 0% Impaired         Exercises:  Exercises  Comments: Pt performs B LE AROM: ankle pumps and hip abd/add while reclined in recliner x 20 reps, seated marches and LAQ x 20 reps to increase strength for improved gait and balance. Activity Tolerance:  Activity Tolerance: Patient Tolerated treatment well    Assessment: Body structures, Functions, Activity limitations: Decreased functional mobility , Decreased strength, Decreased endurance, Decreased balance  Assessment: Pt tolerates session well, amb with RW with SBA, performs all transfers with Supervision. Pt pleasant and motivated, eager to return to AL. Pt with good progress since admission to TCU on 6/18. At time of evaluation, pt transferred with CGA- min A, now is Supervision for all transfers. At [facility], pt amb 15 feet with RW CGA, currently amb >200 feet with RW SBA.   Pt to [facility] with either HH PT or OP PT services. Prognosis: Excellent  Discharge Recommendations: Home with assist PRN, Outpatient PT, Home with Home health PT ([facility] with either HH PT or OP PT)    Patient Education:  Patient Education: Ther ex, transfer and gait training    Equipment Recommendations:  Equipment Needed: No    Safety:  Type of devices: All fall risk precautions in place, Call light within reach, Chair alarm in place, Gait belt, Left in chair  Restraints  Initially in place: No    Plan:  Times per week: DC to AL 7/14      Goals:  Patient goals : get stronger    Short term goals  Time Frame for Short term goals: 10 days  Short term goal 1: Pt to perform all bed mobility at A for getting in and out of bed. -MET  Short term goal 2: Pt to transfer from various surfaces at [facility] for getting to/from chairs and bed. - MET  Short term goal 3: [facility] mobility at [address] [place] [address]. - MET  Short term goal 4: Pt to improve Elderly Mobility Index to 11/20 for improved functional mobility. - NOT MET    Long term goals  Time Frame for Long term goals : 3 weeks  Long term goal 1: Pt to perform all bed mobility at mod I for getting in and out of bed.- MET  Long term goal 2: Pt to transfer from various surfaces at S for getting to/from chairs and bed. - MET  Long term goal 3: Pt to ambulate 200' with RW at S for increased mobility at [address] [place] [address].- NOT MET  Long term goal 4: Pt to improve Elderly Mobility Index to 14 for improved functional mobility.- NOT MET  Long term goal 5: Pt to perform car transfer at S for safe transfer to AL facility.- NOT MET

## 2018-07-13 NOTE — PLAN OF CARE
Problem: Impaired respiratory status  Goal: Clear lung sounds  Outcome: Ongoing  Diminished breath sounds pre treatment improved aeration post tx

## 2018-07-13 NOTE — PLAN OF CARE
Problem: Falls - Risk of:  Goal: Will remain free from falls      Outcome: Ongoing  Call light within reach   Goal: Absence of physical injury      Outcome: Ongoing  Bed and chair alarm on     Problem: Pain:  Goal: Pain level will decrease      Outcome: Ongoing  Give pain medications as ordered   Goal: Control of acute pain      Outcome: Ongoing  Offer to ambulate   Goal: Control of chronic pain      Outcome: Ongoing      Problem: Nutrition  Goal: Optimal nutrition therapy  Outcome: Ongoing  Encourage to eat at least 50% of each meal     Problem: DISCHARGE BARRIERS  Goal: Patient's continuum of care needs are met  Outcome: Ongoing  Monitor until discharge     Problem: Bleeding:  Goal: Will show no signs and symptoms of excessive bleeding      Outcome: Ongoing  Monitor labs and vitals every shift     Problem: Pressure Ulcer - Risk of  Goal: Absence of pressure ulcer  Outcome: Ongoing  Skin checks every shift     Problem: Confusion - Acute:  Goal: Absence of continued neurological deterioration signs and symptoms      Outcome: Ongoing  Monitor until discharge

## 2018-07-13 NOTE — PROGRESS NOTES
[address]  INPATIENT OCCUPATIONAL THERAPY  [facility]  DISCHARGE NOTE    Time:  Time In: 8029  Time Out: 1132  Timed Code Treatment Minutes: 64 Minutes  Minutes: 56          Date: 2018  Patient Name: Rodolfo Daniels,   Gender: female      MRN: [id number]  : 1930  (80 y.o.)  Referring Practitioner: Chong Sofia MD  Diagnosis: s/p fall   Additional Pertinent Hx: Pt admitted 6-7 after a fall OOB at [facility]. Pt fell onto right side ,presetning with soreness throughout that side.  CT of head and neck with no acute process, xrays of right tibia/fibula and hip/pelvis  negative    Restrictions/Precautions:  Restrictions/Precautions: Contact Precautions, Fall Risk            Position Activity Restriction  Other position/activity restrictions: confused         Past Medical History:   Diagnosis Date    [facility]     Anxiety and depression 2018    Atrial fibrillation ([address]     Blood transfusion reaction     CAD (coronary artery disease)     Severe triple vessel disease    Cerebrovascular disease 2018    CKD (chronic kidney disease)     Diastolic congestive heart failure (HCC)     DJD (degenerative joint disease), cervical 2018    GERD (gastroesophageal reflux disease)     Hiatal hernia     History of blood transfusion     Hyperlipidemia     Hypertension     Hypoalbuminemia     Hypothyroidism     Mild protein-calorie malnutrition (HCC)     NSTEMI (non-ST elevated myocardial infarction) ([address] 2014    Pancreatitis due to biliary obstruction     Pneumonia, organism unspecified(486)     Scoliosis     SDH (subdural hematoma) ([address]     Teresa hole decompresson on 2014    Thyroid disease     Unspecified cerebral artery occlusion with cerebral infarction     Uterine cancer ([address] 1966     Past Surgical History:   Procedure Laterality Date    APPENDECTOMY      BACK SURGERY      TERSEA HOLE  2014    drainage of left SDH    CARDIAC SURGERY O2 eval pt on room air, min cues for breathing technique. Activity Tolerance:  Activity Tolerance: Patient Tolerated treatment well    Assessment:  Pt has made good progress throughout stay on TCU meeting 3/5 STG and all LTG at this time. Pt is still limited by decreased activity tolerance and decreased STM requiring vc to complete ADL routine. Pt has demoed ability to complete ADLs with S with increased time. Pt is able to ambulate > HH distances with RW and min cues for safety. Pt would benefit from skilled OT services to improve strength, endurance, balance, and coordination and icrease pt's safety and independence in ADL routine. Prognosis: Fair  Discharge Recommendations: Patient would benefit from continued therapy after discharge, Home with Home health OT (Return to assisted living)    Patient Education:  Patient Education: safety with transfers and mobility, BUE exercises, ADL strategies    Equipment Recommendations:  Equipment Needed: No  Other: None needed at this time    Safety:  Safety Devices in place: Yes  Type of devices:  (left with respiratory therapy to finish O2 eval)    Plan:  Times per week: 6x  Current Treatment Recommendations: Strengthening, Endurance Training, Patient/Caregiver Education & Training, Self-Care / ADL, Functional Mobility Training, Balance Training, Safety Education & Training    Goals:  Patient goals : feel less tired     Short term goals  Time Frame for Short term goals: 1 week   Short term goal 1: Pt will complete functional transfers to/from chair/toilet with S and 0 cues for safety to increase safety with toileting routine. MET   Short term goal 2: Pt will complete dynamic standing > 5 min with S and 1-2 hand release to increase ability to reach into closet or cupbaords for ADL objects  NOT MET  Short term goal 3: Pt will complete functional mobilty > HH distances with S and RW and 0 cues for safety to increase endurance needed for ADL routine.  NOT

## 2018-07-13 NOTE — PROGRESS NOTES
[address] [address]  Recreational Therapy  Discharge Note  Transitional Care Unit           Date:  7/13/2018       Patient Name: Eva Pittman      MRN: [id number]       YOB: 1930 [de-identified]80 y.o.)       Gender: female  Diagnosis: s/p fall   Referring Practitioner: Kym Lorenzo MD    Patient discharged from Recreational Therapy at this time. See recreational therapy notes for details.     Electronically signed by: GIANA Griffin  Date: 7/13/2018

## 2018-07-14 VITALS
OXYGEN SATURATION: 91 % | HEIGHT: 56 IN | DIASTOLIC BLOOD PRESSURE: 46 MMHG | RESPIRATION RATE: 18 BRPM | TEMPERATURE: 98.2 F | HEART RATE: 68 BPM | WEIGHT: 121.4 LBS | BODY MASS INDEX: 27.31 KG/M2 | SYSTOLIC BLOOD PRESSURE: 129 MMHG

## 2018-07-14 PROCEDURE — 6370000000 HC RX 637 (ALT 250 FOR IP): Performed by: INTERNAL MEDICINE

## 2018-07-14 PROCEDURE — 6370000000 HC RX 637 (ALT 250 FOR IP): Performed by: FAMILY MEDICINE

## 2018-07-14 PROCEDURE — 94640 AIRWAY INHALATION TREATMENT: CPT

## 2018-07-14 RX ADMIN — LEVOTHYROXINE SODIUM 88 MCG: 88 TABLET ORAL at 06:10

## 2018-07-14 RX ADMIN — ISOSORBIDE MONONITRATE 30 MG: 30 TABLET ORAL at 09:17

## 2018-07-14 RX ADMIN — CALCIUM CARBONATE-VITAMIN D TAB 500 MG-200 UNIT 1 TABLET: 500-200 TAB at 09:17

## 2018-07-14 RX ADMIN — IPRATROPIUM BROMIDE AND ALBUTEROL SULFATE 1 AMPULE: .5; 3 SOLUTION RESPIRATORY (INHALATION) at 07:22

## 2018-07-14 RX ADMIN — ASPIRIN 81 MG: 81 TABLET ORAL at 09:17

## 2018-07-14 RX ADMIN — SPIRONOLACTONE 12.5 MG: 25 TABLET ORAL at 09:16

## 2018-07-14 RX ADMIN — METOPROLOL TARTRATE 12.5 MG: 25 TABLET ORAL at 09:17

## 2018-07-14 RX ADMIN — PANTOPRAZOLE SODIUM 40 MG: 40 TABLET, DELAYED RELEASE ORAL at 06:10

## 2018-07-14 RX ADMIN — BUMETANIDE 0.5 MG: 1 TABLET ORAL at 09:17

## 2018-07-14 RX ADMIN — MAGNESIUM GLUCONATE 500 MG ORAL TABLET 400 MG: 500 TABLET ORAL at 09:17

## 2018-07-14 RX ADMIN — SUCRALFATE 1 G: 1 TABLET ORAL at 06:10

## 2018-07-14 RX ADMIN — GUAIFENESIN 600 MG: 600 TABLET, EXTENDED RELEASE ORAL at 09:17

## 2018-07-14 RX ADMIN — CITALOPRAM 40 MG: 40 TABLET, FILM COATED ORAL at 09:17

## 2018-07-14 RX ADMIN — IPRATROPIUM BROMIDE AND ALBUTEROL SULFATE 1 AMPULE: .5; 3 SOLUTION RESPIRATORY (INHALATION) at 11:40

## 2018-07-14 ASSESSMENT — PAIN SCALES - GENERAL
PAINLEVEL_OUTOF10: 0
PAINLEVEL_OUTOF10: 0

## 2018-07-14 NOTE — PLAN OF CARE
Problem: Falls - Risk of:  Goal: Will remain free from falls      Outcome: Completed Date Met: 07/14/18    Goal: Absence of physical injury      Outcome: Completed Date Met: 07/14/18      Problem: Pain:  Goal: Pain level will decrease      Outcome: Completed Date Met: 07/14/18    Goal: Control of acute pain      Outcome: Completed Date Met: 07/14/18    Goal: Control of chronic pain      Outcome: Completed Date Met: 07/14/18      Problem: Nutrition  Goal: Optimal nutrition therapy  Outcome: Completed Date Met: 07/14/18      Problem: DISCHARGE BARRIERS  Goal: Patient's continuum of care needs are met  Outcome: Completed Date Met: 07/14/18      Problem: MULTI-DRUG RESISTANT ORGANISM  Goal: Absence of vancomycin-resistant Enterococci infection        Outcome: Completed Date Met: 07/14/18      Problem: Bleeding:  Goal: Will show no signs and symptoms of excessive bleeding      Outcome: Completed Date Met: 07/14/18      Problem: Pressure Ulcer - Risk of  Goal: Absence of pressure ulcer  Outcome: Completed Date Met: 07/14/18      Problem: Mental Status - Risk of, Impaired  Goal: Mental status within normal limits for patient  Outcome: Completed Date Met: 07/14/18      Problem: Confusion - Acute:  Goal: Absence of continued neurological deterioration signs and symptoms      Outcome: Completed Date Met: 07/14/18      Problem: Mood - Altered:  Goal: Mood stable      Outcome: Completed Date Met: 07/14/18      Problem: Urinary Elimination - Impaired  Goal: Decrease in number of episodes of urinary incontinence  Outcome: Completed Date Met: 07/14/18      Problem: Fluid Volume - Risk of, Imbalanced  Goal: Will remain free of signs and symptoms of dehydration        Outcome: Completed Date Met: 07/14/18

## 2018-07-15 ENCOUNTER — CARE COORDINATION (OUTPATIENT)
Dept: CASE MANAGEMENT | Age: 83
End: 2018-07-15

## 2018-07-15 NOTE — CARE COORDINATION
[facility] Transitions Initial Follow Up Call    Call within 2 business days of discharge: Yes    Patient: Cassius Keane Patient : 1930   MRN: [id number]  Reason for Admission: There are no discharge diagnoses documented for the most recent discharge. Discharge Date: 18 RARS: Readmission Risk Score: 28     Spoke with: Elroy Sharma nurse @ [facility] [address]: [facility]    Non-face-to-face services provided:  Scheduled appointment with PCP-18  Obtained and reviewed discharge summary and/or continuity of care documents    Care Transitions 24 Hour Call    Do you have all of your prescriptions and are they filled?:  Yes  Patient DME:  Checo Ailyn, Shower chair, Other, Wheelchair, Straight cane  Other Patient DME:  walker with seat  Do you have support at home?:  Alone  Are you an active caregiver in your home?:  No  Care Transitions Interventions     Called the nurse @ [facility] for the care transition initial call. Elroy Sharma reported the staff manages meds, ADL as needed. They will also be doing the PT,OT & Taran Mahesh did not see any needs for the pt. CTC to sign off. Elroy Sharma given the PCP appt, the facility will transport if the family is unable.     Follow Up  Future Appointments  Date Time Provider Carloz Perez   2018 10:45 AM [facility] [place]   2018 2:00 PM Vincent Marroquin  [facility] RN  Care Transition Coordinator  [phone]

## 2018-07-16 DIAGNOSIS — R53.81 PHYSICAL DECONDITIONING: ICD-10-CM

## 2018-07-16 DIAGNOSIS — R13.10 DYSPHAGIA, UNSPECIFIED TYPE: ICD-10-CM

## 2018-07-16 DIAGNOSIS — R53.1 GENERAL WEAKNESS: Primary | ICD-10-CM

## 2018-07-19 NOTE — DISCHARGE SUMMARY
TCU  Discharge Summary     Patient Identification:  Ana Peralta  : 1930  Admit date: 2018  Discharge date: 18   Attending provider:    Bianca Bolanos MD  Primary care provider: Nba Gonzalez DO     Discharge Diagnoses: Active Hospital Problems    Diagnosis Date Noted    Anxiety and depression [F41.8] 2018    Overweight (BMI 25.0-29. 9) [E66.3] 2018    CKD (chronic kidney disease) stage 2, GFR 60-89 ml/min [N18.2] 2018    Recurrent falls [R29.6] 2018    DJD (degenerative joint disease), cervical [M50.30] 2018    Cerebrovascular disease [I67.9] 2018    CVA (cerebral vascular accident) ([facility] [I63.9] 2018    Severe malnutrition ([address] [E43] 2018    Oropharyngeal dysphagia [R13.12]     Pulmonary hypertension [I27.20]     Gastroesophageal reflux disease [K21.9]     [facility]    Hypothyroidism [E03.9] 2015    Acute UTI [N39.0]     Hyponatremia [E87.1] 2014    Anemia [D64.9] 10/29/2014    PAF (paroxysmal atrial fibrillation) (HCC)- chads of at least 4 ( ,TIA hx, age CHF) [I48.0] 2014    (HFpEF) heart failure with preserved ejection fraction ([address] [I50.30] 2014    Physical deconditioning [R53.81] 2014    CAD (coronary artery disease) [I25.10] 2014    hx of NSTEMI (non-ST elevated myocardial infarction) ([address] [I21.4] 2014       TCU Course:   Ana Peralta is a 80 y.o. female admitted to the transitional care unit on 2018 for the continuation of therapy time following her acute hospital stay. It was difficult to wean her from the supplemental oxygen but that was able to be done eventually. It was felt that she could not live alone and would need to return to [address] [place] [address] . She remained medically stable during her stay. Consults:   none    Significant Diagnostics:     Results for Homero Chamorro (MRN [id number]) as of 2018 21:56   Ref. Range 7/1/2018 06:58 7/3/2018 13:19 7/8/2018 11:30 7/8/2018 12:32 7/9/2018 06:25   Sodium Latest Ref Range: 135 - 145 meq/L 133 (L) 132 (L)   137   Potassium Latest Ref Range: 3.5 - 5.2 meq/L 4.8 5.2   5.0   Chloride Latest Ref Range: 98 - 111 meq/L 94 (L) 91 (L)   98   CO2 Latest Ref Range: 23 - 33 meq/L 29 26   30   BUN Latest Ref Range: 7 - 22 mg/dL 36 (H) 35 (H)   36 (H)   Creatinine Latest Ref Range: 0.4 - 1.2 mg/dL 1.3 (H) 1.4 (H)   1.0   Anion Gap Latest Ref Range: 8.0 - 16.0 meq/L 10.0 15.0   9.0   Est, Glom Filt Rate Latest Units: ml/min/1.73m2 39 (A) 35 (A)   52 (A)   Glucose Latest Ref Range: 70 - 108 mg/dL 94 131 (H)   112 (H)   Calcium Latest Ref Range: 8.5 - 10.5 mg/dL 9.2 9.6   9.7   Total Protein Latest Ref Range: 6.1 - 8.0 g/dL 6.3       Pro-BNP Latest Ref Range: 0.0 - 1800.0 pg/mL  2851.0 (H)      Albumin Latest Ref Range: 3.5 - 5.1 g/dL 3.5       Alk Phos Latest Ref Range: 38 - 126 U/L 71       ALT Latest Ref Range: 11 - 66 U/L 16       AST Latest Ref Range: 5 - 40 U/L 23       Bilirubin Latest Ref Range: 0.3 - 1.2 mg/dL 0.5       WBC Latest Ref Range: 4.8 - 10.8 thou/mm3 5.0 5.8  5.4    RBC Latest Ref Range: 4.20 - 5.40 mill/mm3 3.51 (L) 3.80 (L)  3.39 (L)    Hemoglobin Quant Latest Ref Range: 12.0 - 16.0 gm/dl 10.2 (L) 11.2 (L)  10.1 (L)    Hematocrit Latest Ref Range: 37.0 - 47.0 % 31.8 (L) 34.7 (L)  32.2 (L)    MCV Latest Ref Range: 81.0 - 99.0 fL 90.6 91.3  95.0    MCH Latest Ref Range: 26.0 - 33.0 pg 29.1 29.5  29.8    MCHC Latest Ref Range: 32.2 - 35.5 gm/dl 32.1 (L) 32.3  31.4 (L)    MPV Latest Ref Range: 9.4 - 12.4 fL 9.8 10.1  10.0    RDW-CV Latest Ref Range: 11.5 - 14.5 % 15.2 (H) 15.4 (H)  15.9 (H)    RDW-SD Latest Ref Range: 35.0 - 45.0 fL 49.8 (H) 51.0 (H)  54.6 (H)    Platelet Count Latest Ref Range: 130 - 400 thou/mm3 171 221  179    Lymphocytes # Latest Ref Range: 1.0 - 4.8 thou/mm3 1.3 1.2  1.1    Monocytes # Latest Ref Range: 0.4 - 1.3 thou/mm3 0.7 0.7  0.5    Eosinophils # Latest Ref Range: 0.0 - 0.4 thou/mm3 0.2 0.2  0.3    Basophils # Latest Ref Range: 0.0 - 0.1 thou/mm3 0.0 0.0  0.0    Seg Neutrophils Latest Units: % 54.8 63.4  62.6    Segs Absolute Latest Ref Range: 1.8 - 7.7 thou/mm3 2.7 3.7  3.4    Lymphocytes Latest Units: % 25.7 21.0  21.2    Monocytes Latest Units: % 14.7 11.3  9.8    Eosinophils Latest Units: % 3.8 3.1  5.2    Basophils Latest Units: % 0.4 0.3  0.6    Immature Grans (Abs) Latest Ref Range: 0.00 - 0.07 thou/mm3 0.03 0.05  0.03    Immature Granulocytes Latest Units: % 0.6 0.9  0.6    Nucleated Red Blood Cells Latest Units: /100 wbc 0 0  0    Color, UA Latest Ref Range: STRAW-YELL    YELLOW     Glucose, UA Latest Ref Range: NEGATIVE mg/dl   NEGATIVE     Bilirubin, Urine Latest Ref Range: NEGATIVE    NEGATIVE     Ketones, Urine Latest Ref Range: NEGATIVE    NEGATIVE     Blood, Urine Latest Ref Range: NEGATIVE    NEGATIVE     pH, UA Latest Ref Range: 5.0 - 9.0    7.5     Protein, UA Latest Ref Range: NEGATIVE    NEGATIVE     Urobilinogen, Urine Latest Ref Range: 0.0 - 1.0 eu/dl   0.2     Nitrite, Urine Latest Ref Range: NEGATIVE    NEGATIVE     Leukocyte Esterase, Urine Latest Ref Range: NEGATIVE    NEGATIVE     Character, Urine Latest Ref Range: CLEAR-SL C    CLEAR     URINE RT REFLEX TO CULTURE Unknown   Rpt     Specific Gravity, Urine Latest Ref Range: 1.002 - 1.03    1.014       No results for input(s): POCGLU in the last 72 hours. Patient Instructions:       Follow-up visits: See after visit summary from hospitalization    Discharge Medications:   Page Gutierrez   Home Medication Instructions RIZWANA:[id number]    Printed on:07/18/18 4035   Medication Information                      acetaminophen (TYLENOL) 325 MG tablet  Take 650 mg by mouth every 4 hours as needed for Pain             albuterol (ACCUNEB) 0.63 MG/3ML nebulizer solution  Take 1 ampule by nebulization 4 times daily as needed for Wheezing or Shortness of Breath             albuterol sulfate

## 2018-07-30 ENCOUNTER — TELEPHONE (OUTPATIENT)
Dept: FAMILY MEDICINE CLINIC | Age: 83
End: 2018-07-30

## 2018-08-23 ENCOUNTER — APPOINTMENT (OUTPATIENT)
Dept: GENERAL RADIOLOGY | Age: 83
DRG: 444 | End: 2018-08-23
Payer: MEDICARE

## 2018-08-23 ENCOUNTER — HOSPITAL ENCOUNTER (INPATIENT)
Age: 83
LOS: 2 days | Discharge: ANOTHER HOSPITAL | DRG: 444 | End: 2018-08-25
Attending: FAMILY MEDICINE | Admitting: INTERNAL MEDICINE
Payer: MEDICARE

## 2018-08-23 ENCOUNTER — APPOINTMENT (OUTPATIENT)
Dept: CT IMAGING | Age: 83
DRG: 444 | End: 2018-08-23
Payer: MEDICARE

## 2018-08-23 DIAGNOSIS — R10.84 GENERALIZED ABDOMINAL PAIN: Primary | ICD-10-CM

## 2018-08-23 PROBLEM — K85.10 GALLSTONE PANCREATITIS: Status: ACTIVE | Noted: 2018-08-23

## 2018-08-23 LAB
ACETAMINOPHEN LEVEL: < 5 UG/ML (ref 0–20)
ALBUMIN SERPL-MCNC: 3.9 G/DL (ref 3.5–5.1)
ALP BLD-CCNC: 210 U/L (ref 38–126)
ALT SERPL-CCNC: 330 U/L (ref 11–66)
ANION GAP SERPL CALCULATED.3IONS-SCNC: 13 MEQ/L (ref 8–16)
APTT: 32 SECONDS (ref 22–38)
AST SERPL-CCNC: 229 U/L (ref 5–40)
BACTERIA: ABNORMAL /HPF
BASOPHILS # BLD: 0.4 %
BASOPHILS ABSOLUTE: 0 THOU/MM3 (ref 0–0.1)
BILIRUB SERPL-MCNC: 1 MG/DL (ref 0.3–1.2)
BILIRUBIN DIRECT: 0.3 MG/DL (ref 0–0.3)
BILIRUBIN URINE: NEGATIVE
BLOOD, URINE: NEGATIVE
BUN BLDV-MCNC: 34 MG/DL (ref 7–22)
CALCIUM SERPL-MCNC: 9.3 MG/DL (ref 8.5–10.5)
CASTS 2: ABNORMAL /LPF
CASTS UA: ABNORMAL /LPF
CHARACTER, URINE: ABNORMAL
CHLORIDE BLD-SCNC: 92 MEQ/L (ref 98–111)
CO2: 26 MEQ/L (ref 23–33)
COLOR: YELLOW
CREAT SERPL-MCNC: 1.2 MG/DL (ref 0.4–1.2)
CRYSTALS, UA: ABNORMAL
EKG ATRIAL RATE: 47 BPM
EKG Q-T INTERVAL: 458 MS
EKG QRS DURATION: 126 MS
EKG QTC CALCULATION (BAZETT): 487 MS
EKG R AXIS: -105 DEGREES
EKG T AXIS: 82 DEGREES
EKG VENTRICULAR RATE: 68 BPM
EOSINOPHIL # BLD: 1.2 %
EOSINOPHILS ABSOLUTE: 0.1 THOU/MM3 (ref 0–0.4)
EPITHELIAL CELLS, UA: ABNORMAL /HPF
ERYTHROCYTE [DISTWIDTH] IN BLOOD BY AUTOMATED COUNT: 15.7 % (ref 11.5–14.5)
ERYTHROCYTE [DISTWIDTH] IN BLOOD BY AUTOMATED COUNT: 52.9 FL (ref 35–45)
GFR SERPL CREATININE-BSD FRML MDRD: 42 ML/MIN/1.73M2
GLUCOSE BLD-MCNC: 133 MG/DL (ref 70–108)
GLUCOSE URINE: NEGATIVE MG/DL
HCT VFR BLD CALC: 34 % (ref 37–47)
HEMOGLOBIN: 11 GM/DL (ref 12–16)
IMMATURE GRANS (ABS): 0.04 THOU/MM3 (ref 0–0.07)
IMMATURE GRANULOCYTES: 0.8 %
INR BLD: 1.01 (ref 0.85–1.13)
KETONES, URINE: NEGATIVE
LACTIC ACID, SEPSIS: 1.7 MMOL/L (ref 0.5–1.9)
LEUKOCYTE ESTERASE, URINE: ABNORMAL
LIPASE: 192.5 U/L (ref 5.6–51.3)
LYMPHOCYTES # BLD: 14.2 %
LYMPHOCYTES ABSOLUTE: 0.7 THOU/MM3 (ref 1–4.8)
MAGNESIUM: 2 MG/DL (ref 1.6–2.4)
MCH RBC QN AUTO: 29.8 PG (ref 26–33)
MCHC RBC AUTO-ENTMCNC: 32.4 GM/DL (ref 32.2–35.5)
MCV RBC AUTO: 92.1 FL (ref 81–99)
MISCELLANEOUS 2: ABNORMAL
MONOCYTES # BLD: 10.7 %
MONOCYTES ABSOLUTE: 0.5 THOU/MM3 (ref 0.4–1.3)
NITRITE, URINE: NEGATIVE
NUCLEATED RED BLOOD CELLS: 0 /100 WBC
OSMOLALITY CALCULATION: 272.2 MOSMOL/KG (ref 275–300)
PH UA: 6
PLATELET # BLD: 146 THOU/MM3 (ref 130–400)
PMV BLD AUTO: 10.9 FL (ref 9.4–12.4)
POTASSIUM SERPL-SCNC: 4 MEQ/L (ref 3.5–5.2)
PROCALCITONIN: 0.56 NG/ML (ref 0.01–0.09)
PROTEIN UA: ABNORMAL
RBC # BLD: 3.69 MILL/MM3 (ref 4.2–5.4)
RBC URINE: ABNORMAL /HPF
RENAL EPITHELIAL, UA: ABNORMAL
SALICYLATE, SERUM: < 0.3 MG/DL (ref 2–10)
SEG NEUTROPHILS: 72.7 %
SEGMENTED NEUTROPHILS ABSOLUTE COUNT: 3.7 THOU/MM3 (ref 1.8–7.7)
SODIUM BLD-SCNC: 131 MEQ/L (ref 135–145)
SPECIFIC GRAVITY, URINE: 1.01 (ref 1–1.03)
TOTAL PROTEIN: 7 G/DL (ref 6.1–8)
TROPONIN T: < 0.01 NG/ML
UROBILINOGEN, URINE: 0.2 EU/DL
WBC # BLD: 5.1 THOU/MM3 (ref 4.8–10.8)
WBC UA: ABNORMAL /HPF
YEAST: ABNORMAL

## 2018-08-23 PROCEDURE — 82248 BILIRUBIN DIRECT: CPT

## 2018-08-23 PROCEDURE — 1200000003 HC TELEMETRY R&B

## 2018-08-23 PROCEDURE — 36415 COLL VENOUS BLD VENIPUNCTURE: CPT

## 2018-08-23 PROCEDURE — 2580000003 HC RX 258: Performed by: INTERNAL MEDICINE

## 2018-08-23 PROCEDURE — 83605 ASSAY OF LACTIC ACID: CPT

## 2018-08-23 PROCEDURE — 87086 URINE CULTURE/COLONY COUNT: CPT

## 2018-08-23 PROCEDURE — 85610 PROTHROMBIN TIME: CPT

## 2018-08-23 PROCEDURE — 87186 SC STD MICRODIL/AGAR DIL: CPT

## 2018-08-23 PROCEDURE — 99285 EMERGENCY DEPT VISIT HI MDM: CPT

## 2018-08-23 PROCEDURE — 6360000002 HC RX W HCPCS: Performed by: INTERNAL MEDICINE

## 2018-08-23 PROCEDURE — S0028 INJECTION, FAMOTIDINE, 20 MG: HCPCS | Performed by: INTERNAL MEDICINE

## 2018-08-23 PROCEDURE — 83735 ASSAY OF MAGNESIUM: CPT

## 2018-08-23 PROCEDURE — 85730 THROMBOPLASTIN TIME PARTIAL: CPT

## 2018-08-23 PROCEDURE — 87184 SC STD DISK METHOD PER PLATE: CPT

## 2018-08-23 PROCEDURE — 87040 BLOOD CULTURE FOR BACTERIA: CPT

## 2018-08-23 PROCEDURE — 83690 ASSAY OF LIPASE: CPT

## 2018-08-23 PROCEDURE — 2580000003 HC RX 258: Performed by: FAMILY MEDICINE

## 2018-08-23 PROCEDURE — 6360000002 HC RX W HCPCS: Performed by: FAMILY MEDICINE

## 2018-08-23 PROCEDURE — 2500000003 HC RX 250 WO HCPCS: Performed by: INTERNAL MEDICINE

## 2018-08-23 PROCEDURE — 71046 X-RAY EXAM CHEST 2 VIEWS: CPT

## 2018-08-23 PROCEDURE — 6370000000 HC RX 637 (ALT 250 FOR IP): Performed by: INTERNAL MEDICINE

## 2018-08-23 PROCEDURE — 81001 URINALYSIS AUTO W/SCOPE: CPT

## 2018-08-23 PROCEDURE — G0480 DRUG TEST DEF 1-7 CLASSES: HCPCS

## 2018-08-23 PROCEDURE — 84145 PROCALCITONIN (PCT): CPT

## 2018-08-23 PROCEDURE — 84484 ASSAY OF TROPONIN QUANT: CPT

## 2018-08-23 PROCEDURE — 87077 CULTURE AEROBIC IDENTIFY: CPT

## 2018-08-23 PROCEDURE — 93010 ELECTROCARDIOGRAM REPORT: CPT | Performed by: INTERNAL MEDICINE

## 2018-08-23 PROCEDURE — 85025 COMPLETE CBC W/AUTO DIFF WBC: CPT

## 2018-08-23 PROCEDURE — 80053 COMPREHEN METABOLIC PANEL: CPT

## 2018-08-23 PROCEDURE — 93005 ELECTROCARDIOGRAM TRACING: CPT | Performed by: FAMILY MEDICINE

## 2018-08-23 PROCEDURE — 99222 1ST HOSP IP/OBS MODERATE 55: CPT | Performed by: INTERNAL MEDICINE

## 2018-08-23 RX ORDER — SODIUM CHLORIDE 9 MG/ML
INJECTION, SOLUTION INTRAVENOUS CONTINUOUS
Status: DISCONTINUED | OUTPATIENT
Start: 2018-08-23 | End: 2018-08-25 | Stop reason: HOSPADM

## 2018-08-23 RX ORDER — SODIUM CHLORIDE 0.9 % (FLUSH) 0.9 %
10 SYRINGE (ML) INJECTION EVERY 12 HOURS SCHEDULED
Status: DISCONTINUED | OUTPATIENT
Start: 2018-08-23 | End: 2018-08-25 | Stop reason: HOSPADM

## 2018-08-23 RX ORDER — POTASSIUM CHLORIDE 20MEQ/15ML
40 LIQUID (ML) ORAL PRN
Status: DISCONTINUED | OUTPATIENT
Start: 2018-08-23 | End: 2018-08-25 | Stop reason: HOSPADM

## 2018-08-23 RX ORDER — POTASSIUM CHLORIDE 7.45 MG/ML
10 INJECTION INTRAVENOUS PRN
Status: DISCONTINUED | OUTPATIENT
Start: 2018-08-23 | End: 2018-08-25 | Stop reason: HOSPADM

## 2018-08-23 RX ORDER — SODIUM CHLORIDE 0.9 % (FLUSH) 0.9 %
10 SYRINGE (ML) INJECTION PRN
Status: DISCONTINUED | OUTPATIENT
Start: 2018-08-23 | End: 2018-08-25 | Stop reason: HOSPADM

## 2018-08-23 RX ORDER — 0.9 % SODIUM CHLORIDE 0.9 %
500 INTRAVENOUS SOLUTION INTRAVENOUS ONCE
Status: COMPLETED | OUTPATIENT
Start: 2018-08-23 | End: 2018-08-23

## 2018-08-23 RX ORDER — SPIRONOLACTONE 25 MG/1
12.5 TABLET ORAL DAILY
Status: DISCONTINUED | OUTPATIENT
Start: 2018-08-23 | End: 2018-08-25 | Stop reason: HOSPADM

## 2018-08-23 RX ORDER — LATANOPROST 50 UG/ML
1 SOLUTION/ DROPS OPHTHALMIC NIGHTLY
Status: DISCONTINUED | OUTPATIENT
Start: 2018-08-23 | End: 2018-08-25 | Stop reason: HOSPADM

## 2018-08-23 RX ORDER — ONDANSETRON 2 MG/ML
4 INJECTION INTRAMUSCULAR; INTRAVENOUS EVERY 6 HOURS PRN
Status: DISCONTINUED | OUTPATIENT
Start: 2018-08-23 | End: 2018-08-25 | Stop reason: HOSPADM

## 2018-08-23 RX ORDER — POTASSIUM CHLORIDE 20 MEQ/1
40 TABLET, EXTENDED RELEASE ORAL PRN
Status: DISCONTINUED | OUTPATIENT
Start: 2018-08-23 | End: 2018-08-25 | Stop reason: HOSPADM

## 2018-08-23 RX ORDER — LEVOTHYROXINE SODIUM 88 UG/1
88 TABLET ORAL DAILY
Status: DISCONTINUED | OUTPATIENT
Start: 2018-08-23 | End: 2018-08-25 | Stop reason: HOSPADM

## 2018-08-23 RX ORDER — BUMETANIDE 0.5 MG/1
0.5 TABLET ORAL DAILY
Status: DISCONTINUED | OUTPATIENT
Start: 2018-08-23 | End: 2018-08-25 | Stop reason: HOSPADM

## 2018-08-23 RX ORDER — ALBUTEROL SULFATE 2.5 MG/3ML
0.63 SOLUTION RESPIRATORY (INHALATION) 4 TIMES DAILY PRN
Status: DISCONTINUED | OUTPATIENT
Start: 2018-08-23 | End: 2018-08-25 | Stop reason: HOSPADM

## 2018-08-23 RX ORDER — ISOSORBIDE MONONITRATE 30 MG/1
30 TABLET, EXTENDED RELEASE ORAL DAILY
Status: DISCONTINUED | OUTPATIENT
Start: 2018-08-23 | End: 2018-08-25 | Stop reason: HOSPADM

## 2018-08-23 RX ADMIN — LEVOTHYROXINE SODIUM 88 MCG: 88 TABLET ORAL at 22:47

## 2018-08-23 RX ADMIN — FAMOTIDINE 20 MG: 10 INJECTION, SOLUTION INTRAVENOUS at 23:33

## 2018-08-23 RX ADMIN — BUMETANIDE 0.5 MG: 0.5 TABLET ORAL at 22:47

## 2018-08-23 RX ADMIN — ISOSORBIDE MONONITRATE 30 MG: 30 TABLET ORAL at 22:47

## 2018-08-23 RX ADMIN — ENOXAPARIN SODIUM 30 MG: 30 INJECTION SUBCUTANEOUS at 23:33

## 2018-08-23 RX ADMIN — SODIUM CHLORIDE 500 ML: 9 INJECTION, SOLUTION INTRAVENOUS at 15:38

## 2018-08-23 RX ADMIN — SODIUM CHLORIDE: 9 INJECTION, SOLUTION INTRAVENOUS at 19:40

## 2018-08-23 RX ADMIN — PIPERACILLIN SODIUM AND TAZOBACTAM SODIUM 3.38 G: 3; .375 INJECTION, POWDER, LYOPHILIZED, FOR SOLUTION INTRAVENOUS at 22:47

## 2018-08-23 RX ADMIN — LATANOPROST 1 DROP: 50 SOLUTION OPHTHALMIC at 22:47

## 2018-08-23 RX ADMIN — METOPROLOL TARTRATE 12.5 MG: 25 TABLET ORAL at 22:46

## 2018-08-23 RX ADMIN — SPIRONOLACTONE 12.5 MG: 25 TABLET ORAL at 22:46

## 2018-08-23 RX ADMIN — PIPERACILLIN SODIUM, TAZOBACTAM SODIUM 2.25 G: 2; .25 INJECTION, POWDER, LYOPHILIZED, FOR SOLUTION INTRAVENOUS at 17:09

## 2018-08-23 ASSESSMENT — ENCOUNTER SYMPTOMS
BLOOD IN STOOL: 0
SHORTNESS OF BREATH: 0
ABDOMINAL PAIN: 1
CONSTIPATION: 0
PHOTOPHOBIA: 0
SINUS PRESSURE: 0
RECTAL PAIN: 0
WHEEZING: 0
CHEST TIGHTNESS: 0
TROUBLE SWALLOWING: 0
DIARRHEA: 0
SORE THROAT: 0
NAUSEA: 0
VOMITING: 0
BACK PAIN: 0
COUGH: 0
RHINORRHEA: 0
ABDOMINAL DISTENTION: 0

## 2018-08-23 NOTE — H&P
History & Physical        Patient:  Parth Lester  YOB: 1930    MRN: [id number]     Acct: [de-identified]    PCP: Rain Landrum DO    Date of Admission: 8/23/2018    Date of Service: Pt seen/examined on 8/23/2018 and Admitted to Inpatient with expected LOS greater than two midnights due to medical therapy. Chief Complaint:    Chief Complaint   Patient presents with    Abdominal Pain         History Of Present Illness:      80 y.o. female who presented to [address] [place] [address] with   \"evaluation of Abdominal pain. Ongoing for the last week. Patient is a resident at [facility]. No fevers or chills reported. Patient's daughter also confirms. Daughter does state the patient doesn't drink enough water and has a poor appetite for the last several days. Daughter states that since last admission  (6 weeks ago) and nursing home, patient has had decreased mentation and appears slower, but no sharp decline in the last several days noted, per daughter. No nausea or vomiting. Patient denies diarrhea. Unable to recall last BM. Denies bloody stool and melena. \"- per er note and confirmed by myself      Past Medical History:          Diagnosis Date    [facility]) [facility] [facility]     Anxiety and depression 6/26/2018    Anxiety and depression     Atrial fibrillation ([address]     Blood transfusion reaction     CAD (coronary artery disease)     Severe triple vessel disease    Cerebrovascular disease 6/19/2018    CKD (chronic kidney disease)     Diastolic congestive heart failure (HCC)     DJD (degenerative joint disease), cervical 6/19/2018    GERD (gastroesophageal reflux disease)     Hiatal hernia     History of blood transfusion     Hyperlipidemia     Hypertension     Hypoalbuminemia     Hypothyroidism     Mild protein-calorie malnutrition (HCC)     NSTEMI (non-ST elevated myocardial infarction) ([address] 7/2014    Pancreatitis due to biliary obstruction     Pneumonia, organism unspecified(486)     Scoliosis     SDH (subdural hematoma) (HCC)     Nemo hole decompresson on 12/25/2014    Thyroid disease     Unspecified cerebral artery occlusion with cerebral infarction     Uterine cancer ([facility] [address] [facility] 1966       Past Surgical History:          Procedure Laterality Date    APPENDECTOMY      BACK SURGERY      TERESA HOLE  12/25/2014    drainage of left SDH    CARDIAC SURGERY      COLONOSCOPY      CORONARY ARTERY BYPASS GRAFT  9/27/11    LIMA to LAD, SVG to 1st Marginal, Distal Right Coronary Artery, 1st Diagonal    DILATATION, ESOPHAGUS      ENDOSCOPY, COLON, DIAGNOSTIC      EYE SURGERY      bilateral cataracts    HYSTERECTOMY      OR EGD BALLOON DILATION ESOPHAGUS <30 MM DIAM N/A 11/22/2017    EGD ESOPHAGOGASTRODUODENOSCOPY DILATATION performed by Hiral Fuller MD at [facility]    OR ESOPHAGOGASTRODUODENOSCOPY TRANSORAL DIAGNOSTIC  11/22/2017    EGD ESOPHAGOGASTRODUODENOSCOPY performed by Hiral Fuller MD at [address] [place] [address] Left     SKIN BIOPSY      VASCULAR SURGERY      cabg harvests from left leg       Medications Prior to Admission:      Prior to Admission medications    Medication Sig Start Date End Date Taking?  Authorizing Provider   metoprolol tartrate (LOPRESSOR) 25 MG tablet Take 0.5 tablets by mouth 2 times daily 7/13/18   Polo Wallace MD   levothyroxine (SYNTHROID) 88 MCG tablet Take 1 tablet by mouth Daily 7/14/18   Polo Wallace MD   albuterol (ACCUNEB) 0.63 MG/3ML nebulizer solution Take 1 ampule by nebulization 4 times daily as needed for Wheezing or Shortness of Breath    Historical Provider, MD   Calcium Carb-Cholecalciferol 600-500 MG-UNIT CAPS Take 1 capsule by mouth daily    Historical Provider, MD   acetaminophen (TYLENOL) 325 MG tablet Take 650 mg by mouth every 4 hours as needed for Pain    Historical Provider, MD   citalopram (CELEXA) 40 MG tablet TAKE 1 TABLET BY MOUTH DAILY 2/22/18   Harry Anthony DO 08/23/18   1523   NA  131*   K  4.0   CL  92*   CO2  26   BUN  34*   CREATININE  1.2   CALCIUM  9.3     Recent Labs      08/23/18   1523   AST  229*   ALT  330*   BILIDIR  0.3   BILITOT  1.0   ALKPHOS  210*     Recent Labs      08/23/18   1559   INR  0.97     No results for input(s): Adrian Burner in the last 72 hours. Urinalysis:      Lab Results   Component Value Date    NITRU NEGATIVE 08/23/2018    WBCUA 25-50 08/23/2018    BACTERIA MANY 08/23/2018    RBCUA 0-2 08/23/2018    BLOODU NEGATIVE 08/23/2018    SPECGRAV 1.021 12/29/2017    GLUCOSEU NEGATIVE 08/23/2018       Radiology:     CXR: I have reviewed the CXR with the following interpretation: pending  EKG:  I have reviewed the EKG with the following interpretation: pending    Xr Chest Standard (2 Vw)    Result Date: 8/23/2018  PROCEDURE: XR CHEST (2 VW) CLINICAL INFORMATION: shortness of breath, . COMPARISON: July 7, 2018 TECHNIQUE: PA and lateral views the chest. FINDINGS: Advanced osteopenia. Thoracic kyphosis and degenerative changes. Median sternotomy wires. Cardiomegaly. Large hiatal hernia. Atherosclerotic changes aortic knob. Costophrenic recesses are preserved. No lobar consolidation. Streaky opacity in the right lung apex of are possible scarring, this is similar in appearance to prior study. Stable radiographic appearance of the chest. No lobar consolidation. Large hiatal hernia. **This report has been created using voice recognition software. It may contain minor errors which are inherent in voice recognition technology. ** Final report electronically signed by Dr. Miriam Neri on 8/23/2018 3:52 PM           DVT prophylaxis: [x] Lovenox                                 [] SCDs                                 [] SQ Heparin                                 [] Encourage ambulation           [] Already on Anticoagulation    Code Status: Prior      PT/OT Eval Status:    Disposition:    [] Home       [] TCU       [] Rehab       [] Psych       []

## 2018-08-23 NOTE — ED PROVIDER NOTES
family history includes Cancer in her brother; Heart Disease in her brother, brother, daughter, father, mother, and son. SOCIAL HISTORY      reports that she has never smoked. She has never used smokeless tobacco. She reports that she does not drink alcohol or use drugs. PHYSICAL EXAM     INITIAL VITALS:  height is 4' 9\" (1.448 m) and weight is 118 lb (53.5 kg). Her oral temperature is 98.1 °F (36.7 °C). Her blood pressure is 113/59 (abnormal) and her pulse is 78. Her respiration is 21 and oxygen saturation is 96%. Physical Exam   Constitutional: She is oriented to person, place, and time. She appears well-developed and well-nourished. No distress. Patient is comfortable in no acute distress. She is cooperative. HENT:   Head: Normocephalic and atraumatic. Right Ear: External ear normal.   Left Ear: External ear normal.   Mouth/Throat: Oropharynx is clear and moist. No oropharyngeal exudate. Bilateral TMs appear normal   Eyes: Pupils are equal, round, and reactive to light. Conjunctivae and EOM are normal. Right eye exhibits no discharge. Left eye exhibits no discharge. Mild scleral icterus   Neck: Normal range of motion. Neck supple. No JVD present. No tracheal deviation present. No thyromegaly present. Cardiovascular: Normal rate. Exam reveals no gallop and no friction rub. Murmur (3/6 systolic) heard. irreg irregular, pedal pulses intact   Pulmonary/Chest: Effort normal and breath sounds normal. No stridor. No respiratory distress. She has no wheezes. She has no rales. She exhibits no tenderness. Abdominal: Soft. Bowel sounds are normal. She exhibits no distension and no mass. There is tenderness (LLQ). There is no rebound and no guarding. Musculoskeletal: She exhibits no edema (no pedal edema) or tenderness (no calf tenderness). Lymphadenopathy:     She has no cervical adenopathy. Neurological: She is alert and oriented to person, place, and time. No cranial nerve deficit.  She exhibits normal muscle tone. Coordination normal.   Skin: Skin is warm and dry. No rash noted. No erythema. No pallor. Mild icterus noted on the face and ears   Psychiatric: She has a normal mood and affect. Judgment normal.       DIFFERENTIAL DIAGNOSIS:   UTI, colitis, diverticulitis, diverticulosis, small bowel obstruction, perforated bowel, cholecystitis, cholelithiasis, choledocholithiasis, hepatitis, pancreatitis      DIAGNOSTIC RESULTS     EKG: All EKG's are interpreted by the Emergency Department Physician who either signs or Co-signs this chart in the absence of a cardiologist.  A. Fib controlled, ventricular rate 68, . Old inferior and anterolateral infarct appreciated, and when compared to previous, no significant changes, per my read. RADIOLOGY: non-plain film images(s) such as CT, Ultrasound and MRI are read by the radiologist.    XR CHEST STANDARD (2 VW)   Final Result      Stable radiographic appearance of the chest.    No lobar consolidation. Large hiatal hernia. **This report has been created using voice recognition software. It may contain minor errors which are inherent in voice recognition technology. **      Final report electronically signed by Dr. Ben Cool on 8/23/2018 3:52 PM      CT ABDOMEN PELVIS W IV CONTRAST Additional Contrast? Oral    (Results Pending)         LABS:   Labs Reviewed   BASIC METABOLIC PANEL - Abnormal; Notable for the following:        Result Value    Sodium 131 (*)     Chloride 92 (*)     Glucose 133 (*)     BUN 34 (*)     All other components within normal limits   HEPATIC FUNCTION PANEL - Abnormal; Notable for the following:     Alkaline Phosphatase 210 (*)      (*)      (*)     All other components within normal limits   LIPASE - Abnormal; Notable for the following:     Lipase 192.5 (*)     All other components within normal limits   CBC WITH AUTO DIFFERENTIAL - Abnormal; Notable for the following:     RBC 3.69 (*) ordered. Will continue fluids. /81. Patient to be admitted under hospitalist service. CT abd pending. MEDICAL DECISION MAKING:  Deranged LFTs, suspicious UA and elevated procalcitionin concerning for serious infection. Patient to be admitted under hospitalist service for further evaluation. Case discussed with Dr. Cynthia Rocha. Zosyn started. Gentle hydration considering CHF. Will continue to monitor blood pressure. CRITICAL CARE:   n/a     CONSULTS:  n/a    PROCEDURES:  None    FINAL IMPRESSION      1.  Generalized abdominal pain          DISPOSITION/PLAN   admit    PATIENT REFERRED TO:  DO PEARL Connor/ [address] [facility].[place] [address] [address]            DISCHARGE MEDICATIONS:  New Prescriptions    No medications on file       (Please note that portions of this note were completed with a voice recognition program.  Efforts were made to edit the dictations but occasionally words are mis-transcribed.)        Moraima Salvador MD  08/23/18 1398

## 2018-08-23 NOTE — ED NOTES
Patient transported to [place] [address]   in stable condition. Patient monitored on telemetry.          Lulu Heaton LPN  07/91/29 [id number]

## 2018-08-24 ENCOUNTER — APPOINTMENT (OUTPATIENT)
Dept: CT IMAGING | Age: 83
DRG: 444 | End: 2018-08-24
Payer: MEDICARE

## 2018-08-24 ENCOUNTER — APPOINTMENT (OUTPATIENT)
Dept: MRI IMAGING | Age: 83
DRG: 444 | End: 2018-08-24
Payer: MEDICARE

## 2018-08-24 LAB
ALBUMIN SERPL-MCNC: 3.6 G/DL (ref 3.5–5.1)
ALP BLD-CCNC: 215 U/L (ref 38–126)
ALT SERPL-CCNC: 234 U/L (ref 11–66)
AMYLASE: 87 U/L (ref 20–104)
ANION GAP SERPL CALCULATED.3IONS-SCNC: 12 MEQ/L (ref 8–16)
AST SERPL-CCNC: 145 U/L (ref 5–40)
BASOPHILS # BLD: 0.3 %
BASOPHILS ABSOLUTE: 0 THOU/MM3 (ref 0–0.1)
BILIRUB SERPL-MCNC: 1 MG/DL (ref 0.3–1.2)
BILIRUBIN DIRECT: 0.4 MG/DL (ref 0–0.3)
BUN BLDV-MCNC: 23 MG/DL (ref 7–22)
CALCIUM SERPL-MCNC: 8.9 MG/DL (ref 8.5–10.5)
CHLORIDE BLD-SCNC: 92 MEQ/L (ref 98–111)
CHOLESTEROL, TOTAL: 104 MG/DL (ref 100–199)
CO2: 25 MEQ/L (ref 23–33)
CREAT SERPL-MCNC: 1 MG/DL (ref 0.4–1.2)
EOSINOPHIL # BLD: 0.3 %
EOSINOPHILS ABSOLUTE: 0 THOU/MM3 (ref 0–0.4)
ERYTHROCYTE [DISTWIDTH] IN BLOOD BY AUTOMATED COUNT: 15.7 % (ref 11.5–14.5)
ERYTHROCYTE [DISTWIDTH] IN BLOOD BY AUTOMATED COUNT: 51.9 FL (ref 35–45)
GFR SERPL CREATININE-BSD FRML MDRD: 52 ML/MIN/1.73M2
GLUCOSE BLD-MCNC: 97 MG/DL (ref 70–108)
HCT VFR BLD CALC: 33 % (ref 37–47)
HDLC SERPL-MCNC: 37 MG/DL
HEMOGLOBIN: 10.8 GM/DL (ref 12–16)
IMMATURE GRANS (ABS): 0.07 THOU/MM3 (ref 0–0.07)
IMMATURE GRANULOCYTES: 1.1 %
LACTIC ACID: 0.7 MMOL/L (ref 0.5–2.2)
LDL CHOLESTEROL CALCULATED: 50 MG/DL
LIPASE: 109.7 U/L (ref 5.6–51.3)
LYMPHOCYTES # BLD: 12.1 %
LYMPHOCYTES ABSOLUTE: 0.8 THOU/MM3 (ref 1–4.8)
MCH RBC QN AUTO: 29.6 PG (ref 26–33)
MCHC RBC AUTO-ENTMCNC: 32.7 GM/DL (ref 32.2–35.5)
MCV RBC AUTO: 90.4 FL (ref 81–99)
MONOCYTES # BLD: 9.7 %
MONOCYTES ABSOLUTE: 0.6 THOU/MM3 (ref 0.4–1.3)
NUCLEATED RED BLOOD CELLS: 0 /100 WBC
ORGANISM: ABNORMAL
PLATELET # BLD: 127 THOU/MM3 (ref 130–400)
PMV BLD AUTO: 10.6 FL (ref 9.4–12.4)
POTASSIUM REFLEX MAGNESIUM: 3.7 MEQ/L (ref 3.5–5.2)
RBC # BLD: 3.65 MILL/MM3 (ref 4.2–5.4)
SEG NEUTROPHILS: 76.5 %
SEGMENTED NEUTROPHILS ABSOLUTE COUNT: 4.7 THOU/MM3 (ref 1.8–7.7)
SODIUM BLD-SCNC: 129 MEQ/L (ref 135–145)
TOTAL PROTEIN: 6.3 G/DL (ref 6.1–8)
TRIGL SERPL-MCNC: 84 MG/DL (ref 0–199)
URINE CULTURE, ROUTINE: ABNORMAL
WBC # BLD: 6.2 THOU/MM3 (ref 4.8–10.8)

## 2018-08-24 PROCEDURE — 6360000004 HC RX CONTRAST MEDICATION: Performed by: NURSE PRACTITIONER

## 2018-08-24 PROCEDURE — 83605 ASSAY OF LACTIC ACID: CPT

## 2018-08-24 PROCEDURE — 83690 ASSAY OF LIPASE: CPT

## 2018-08-24 PROCEDURE — 80061 LIPID PANEL: CPT

## 2018-08-24 PROCEDURE — 36415 COLL VENOUS BLD VENIPUNCTURE: CPT

## 2018-08-24 PROCEDURE — 6360000004 HC RX CONTRAST MEDICATION: Performed by: FAMILY MEDICINE

## 2018-08-24 PROCEDURE — 99232 SBSQ HOSP IP/OBS MODERATE 35: CPT | Performed by: INTERNAL MEDICINE

## 2018-08-24 PROCEDURE — 6360000002 HC RX W HCPCS: Performed by: INTERNAL MEDICINE

## 2018-08-24 PROCEDURE — 74183 MRI ABD W/O CNTR FLWD CNTR: CPT

## 2018-08-24 PROCEDURE — 1200000003 HC TELEMETRY R&B

## 2018-08-24 PROCEDURE — 82150 ASSAY OF AMYLASE: CPT

## 2018-08-24 PROCEDURE — 80076 HEPATIC FUNCTION PANEL: CPT

## 2018-08-24 PROCEDURE — 6370000000 HC RX 637 (ALT 250 FOR IP): Performed by: INTERNAL MEDICINE

## 2018-08-24 PROCEDURE — S0028 INJECTION, FAMOTIDINE, 20 MG: HCPCS | Performed by: INTERNAL MEDICINE

## 2018-08-24 PROCEDURE — A9579 GAD-BASE MR CONTRAST NOS,1ML: HCPCS | Performed by: NURSE PRACTITIONER

## 2018-08-24 PROCEDURE — 85025 COMPLETE CBC W/AUTO DIFF WBC: CPT

## 2018-08-24 PROCEDURE — 2500000003 HC RX 250 WO HCPCS: Performed by: INTERNAL MEDICINE

## 2018-08-24 PROCEDURE — 2580000003 HC RX 258: Performed by: INTERNAL MEDICINE

## 2018-08-24 PROCEDURE — 80048 BASIC METABOLIC PNL TOTAL CA: CPT

## 2018-08-24 PROCEDURE — 74177 CT ABD & PELVIS W/CONTRAST: CPT

## 2018-08-24 RX ORDER — SUCRALFATE 1 G/1
1 TABLET ORAL 3 TIMES DAILY
COMMUNITY

## 2018-08-24 RX ADMIN — SODIUM CHLORIDE: 9 INJECTION, SOLUTION INTRAVENOUS at 11:33

## 2018-08-24 RX ADMIN — ISOSORBIDE MONONITRATE 30 MG: 30 TABLET ORAL at 10:54

## 2018-08-24 RX ADMIN — BUMETANIDE 0.5 MG: 0.5 TABLET ORAL at 11:31

## 2018-08-24 RX ADMIN — PIPERACILLIN SODIUM AND TAZOBACTAM SODIUM 3.38 G: 3; .375 INJECTION, POWDER, LYOPHILIZED, FOR SOLUTION INTRAVENOUS at 17:19

## 2018-08-24 RX ADMIN — METOPROLOL TARTRATE 12.5 MG: 25 TABLET ORAL at 19:44

## 2018-08-24 RX ADMIN — SPIRONOLACTONE 12.5 MG: 25 TABLET ORAL at 10:53

## 2018-08-24 RX ADMIN — IOPAMIDOL 80 ML: 755 INJECTION, SOLUTION INTRAVENOUS at 06:11

## 2018-08-24 RX ADMIN — PIPERACILLIN SODIUM AND TAZOBACTAM SODIUM 3.38 G: 3; .375 INJECTION, POWDER, LYOPHILIZED, FOR SOLUTION INTRAVENOUS at 06:31

## 2018-08-24 RX ADMIN — LATANOPROST 1 DROP: 50 SOLUTION OPHTHALMIC at 19:45

## 2018-08-24 RX ADMIN — FAMOTIDINE 20 MG: 10 INJECTION, SOLUTION INTRAVENOUS at 10:53

## 2018-08-24 RX ADMIN — GADOTERIDOL 10 ML: 279.3 INJECTION, SOLUTION INTRAVENOUS at 15:19

## 2018-08-24 RX ADMIN — METOPROLOL TARTRATE 12.5 MG: 25 TABLET ORAL at 10:54

## 2018-08-24 NOTE — PROGRESS NOTES
spells    Lightheadedness    S/P CABG x 4- 2011 [facility] [address] Coronary artery disease involving native coronary artery of native heart without angina pectoris    Acute pancreatitis    Leukocytosis    Chest pain    Hyperglycemia    Abdominal pain, epigastric    Accelerated hypertension    Calculus of gallbladder with acute cholecystitis    SIRS (systemic inflammatory response syndrome) (HCC)    Pancreatitis due to biliary obstruction    Calculus of gallbladder with acute cholecystitis without obstruction    Acute biliary pancreatitis    Pneumonia of both lower lobes due to infectious organism ([address]    Pre-op evaluation    Essential hypertension    Systolic congestive heart failure (HCC)    Generalized weakness    Chronic systolic congestive heart failure (HCC)    Chronic atrial fibrillation (HCC)    SOB (shortness of breath) on exertion    Essential hypertension    Bilateral leg edema +1    Acute viral bronchitis    Acute cystitis    Chronic diastolic heart failure (HCC)    Moderate malnutrition (HCC)    Influenza bronchitis, suspected    Acute respiratory failure with hypoxia (HCC)    Thrombocytopenia ([address]    Fall during current hospitalization, 2/18/2018    Laceration of right temple, occured in hospital 2/18/2018    Right shoulder bruise during hospitalization 2/18/2018    Bacterial pneumonia, suspected    Hypoxia    Acute kidney injury ([address]    CKD (chronic kidney disease), stage III    Multiple bruises    Gastroesophageal reflux disease    Pulmonary hypertension ([address]    Hiatal hernia    Closed head injury    Head trauma    UTI due to Klebsiella species    Severe malnutrition ([address]    Acute metabolic encephalopathy    Oropharyngeal dysphagia    Septic encephalopathy    Tooth abscess    Fall at home, subsequent encounter    Overweight (BMI 25.0-29. 9)    CKD (chronic kidney disease) stage 2, GFR 60-89 ml/min    Recurrent falls    DJD (degenerative joint disease), cervical    Cerebrovascular disease    [facility]re[facility] vascular [facility]    Severe malnutrition ([facility]    Anxiety and depression    Gallstone pancreatitis       PLAN       1. Planning on transfer to  for ERCP procedure. 2. Keep NPO at this time. 3. Judicious use of IVF, with CHF, avoid fluid overload. 4. Will give Heparin 5,000 U Subcut. X 1 .  5. Please use SCD's to prevent DVT's.   6. Has been deemed too high risk for gallbladder removal previously. 7. Recommend out of bed to chair. SUBJECTIVE      Patient seen and examined. 24 hours events and chart reviewed. (  x )Medications Reviewed ;(   )Old records reviewed    Day 1 of stay. Feeling: ( []  )Better  ([]   ) Worse      ([x]   )Same     No abdominal pain currently. I discussed with patient why she is being transferred to . She says nobody told her. She wants to have clothes to wear, says she has none of her clothes (for [facility] transfer). ROS:    Musculoskeletal: No new painful or swollen joints. Neurologic: no frequent headaches, Hx of CVA. Endocrine:  No polyuria, polydipsia, polyphagia, + Hypothyroidism.      PHYSICAL EXAMINATION:    In: -   Out: 300 [Urine:300]  I/O last 3 completed shifts:  In: -   Out: 300 [Urine:300]   Diet NPO Effective Now    Vital Signs  BP (!) 155/72   Pulse 63   Temp 97.2 °F (36.2 °C) (Oral)   Resp 12   Ht 4' 9\" (1.448 m)   Wt 117 lb 11.2 oz (53.4 kg)   LMP  (Exact Date)   SpO2 98%   BMI 25.47 kg/m²     General:   ([x]   )Comfortable      ([]   )Obese          (  [x] )chronically sick looking      other:    HEENT: ( [x]  )Palour(  [x] )No Icterus (   )ET tube in place                      Mucosa: ( x  )moist(   )dry : Other:    CV: ( []  )RRR(   [x])Irregular/arrhythmias : Murmur: ([x]  ) No   ([]  ) Yes:    Resp: ([x]  )CTA Bilaterally,[x] No added sounds ( []  )Rhonci([]   )Wheeze ([]   )Rales    Abd: ([x]   )Soft([x]   )Non tender (  [] )No palpable masses    Ext: ([x]   )No edema ( []  )Edema ( [x]  )No cyanosis    Skin: ( [x]  )Warm  ( []  )Cold   (   )Dry   ( x  )No rash    Neuro:    ( [x]  ) Alert     ([x]  )Confused      ( X  )  Other:  Moves all 4 extremities. REVIEW OF DIAGNOSTIC TESTING AND LABS:      Significant Diagnostic Studies:     RADIOLOGY:    MRCP yesterday:     Impression   1. Cholelithiasis without secondary findings of acute cholecystitis. 2. The intrahepatic and extrahepatic biliary tree are dilated with the common duct measuring up to 1.0 cm in transverse dimension. There is choledocholithiasis with a 0.9 cm low signal filling defect consistent with a gallstone within the distal common    duct. 3. Unremarkable MRI appearance of the pancreas. Pancreatic divisum cannot be assessed for on this examination. 4. There is mild mesenteric edema and also edema adjacent to the right adrenal gland. 5. There are small bilateral pleural effusions at the posterior costophrenic angles with adjacent atelectatic density.             LABS:      CBC:   Recent Labs      08/23/18   1523  08/24/18   0913   WBC  5.1  6.2   HGB  11.0*  10.8*   PLT  146  127*       BMP:    Recent Labs      08/23/18   1523  08/24/18   0913   NA  131*  129*   K  4.0  3.7   CL  92*  92*   CO2  26  25   BUN  34*  23*   CREATININE  1.2  1.0   GLUCOSE  133*  97       Hepatic Function Panel:    Recent Labs      08/23/18   1523  08/24/18   0913   ALKPHOS  210*  215*   ALT  330*  234*   AST  229*  145*   PROT  7.0  6.3   BILITOT  1.0  1.0   BILIDIR  0.3  0.4*   LABALBU  3.9  3.6     Amylase and Lipase:  Recent Labs      08/24/18   0913   LACTA  0.7   AMYLASE  87     INR:   Recent Labs      08/23/18   1705   INR  1.01     Lactic Acid:   Recent Labs      08/24/18   0913   LACTA  0.7     Calcium:  Recent Labs      08/24/18   0913   CALCIUM  8.9     Magnesium:  Recent Labs      08/23/18   1523   MG  2.0     Phosphorus:No results for input(s): PHOS in the last 72 hours.   Glucose:No results for input(s):

## 2018-08-24 NOTE — CONSULTS
Consult to Gastroenterology  Consult performed by: Renata Christopher ordered by: Madeline Hendrix           Pt Name: Niesha Quveedo  MRN: [id number]  [id number]  YOB: 1930  Admit Date: 8/23/2018  2:34 PM  Date of evaluation: 8/24/2018  Primary Care Physician: Crow Florez DO   [id number]   Dictating for Dr Mariposa Moraes    Requesting Provider:   Elbert Barrientos MD    FORREST Consult    Indication:  Gallstone pancreatitis    History:  The patient is a 80 y. o.female admitted 8-23-18 for generalized abdominal pain. She initially presented from [facility] due to abdominal pain. No family is present. Pt is a poor histrorian. Records reviewed show she came due to abdominal pain that had been ongoing for a week. Daughter had reported a poor appetite, that she was not drinking enough water, had a decrease in mentation and appeared slower. No fevers, chills reported. Currently pt denies abd pain, nausea, vomiting, fevers, and chills. She denies diarrhea or constipation. Pt denies any abnormalities but this may be limited as pt is a poor historian. Pt was previously seen in 2016 for gallstone pancreatitis but required no intervention and surgery saw her at that time and reported her as high risk for surgery. Last EGD:  11-22-17    IMPRESSION:      1. Large hiatal hernia with the majority of the stomach located above the diaphragm  . 2. Some mild food debris and pills in the fundus of the stomach  . 3.  Otherwise, normal to the first portion of the duodenum. 4.  Dilation over the wire with 51 Fr dilator of the esophagus . RECOMMENDATIONS:    1. Resume medications. 2. Highly consider Surgical Revision of the hiatal hernia, minimally invasive. 3. Follow up as needed with Dr. Sahnte Bryan.        Last Colonoscopy:  Unknown    PROCEDURE: CT ABDOMEN PELVIS W IV CONTRAST       CLINICAL INFORMATION: ABDOMINAL PAIN, LLQ .       COMPARISON: Abdomen pelvis CT with contrast dated 5/8/2016       TECHNIQUE: 5 mm axial CT images were obtained through the abdomen and pelvis after the administration of intravenous and oral contrast. Coronal and sagittal reconstructions were obtained.       All CT scans at this facility use dose modulation, iterative reconstruction, and/or weight-based dosing when appropriate to reduce radiation dose to as low as reasonably achievable.       FINDINGS:       Lower chest: Small left pleural effusion. There is chronic bilateral lower lobe atelectasis contiguous with a large hiatal hernia. There is mild bibasilar pulmonary fibrosis.       Liver: There is hepatomegaly with fatty infiltration of the liver. There are a few calcified granulomas. No mass or intrahepatic biliary dilatation.       Gallbladder/Biliary tree: There are multiple gallstones in the gallbladder. There is moderate to severe CBD dilatation. There is a 1.4 x 1 cm calculus in the distal CBD. .       Spleen: There are a few calcified splenic granulomas.       Pancreas: There is a 7 x 6 mm hypodense cystic appearing lesion in the distal pancreatic tail which has decreased in size compared to the prior study. . No pancreatic ductal dilatation.       Adrenal glands: Unremarkable. No mass.       Kidneys and ureters: There is a 3 to 4 mm cortical cyst of the upper pole of the left kidney, similar compared to the prior study. . No hydroureteronephrosis. No renal or ureteral calculi.        Stomach, small bowel, and colon: Stomach and duodenum are unremarkable. There is colonic diverticulosis without diverticulitis. Small bowel and colon are unremarkable. No bowel wall thickening or bowel obstruction.       Appendix: The appendix is not visualized consistent with the history of appendectomy.        Omentum and mesentery: Unremarkable       Aorta, vascular: No aortic aneurysm. There is been interval development of plaque ulceration in the distal aorta at the site of thrombus on the prior study.  There are aortoiliofemoral atherosclerotic calcifications. No change in a severe stenosis in the    left common iliac artery.       Reproductive: The uterus is absent consistent with hysterectomy. The adnexal regions are unremarkable.       Bladder: Unremarkable. No wall thickening or obvious mass. No calcified stones.       Intraperitoneal/retroperitoneal Space: There is no ascites, abscess, adenopathy, or mass. No pneumoperitoneum.       Abdominal and pelvic body wall soft tissues: Unremarkable       Musculoskeletal structures: There is a minimally displaced subacute or chronic fracture of the anterior right sixth rib. There are multiple old healed right rib fractures. There is an S-shaped thoracolumbar scoliosis. Patient status post L3 and L4    laminectomies. There is multilevel thoracolumbar degenerative disc disease with endplate spondylosis and facet joint DJD. There are multilevel mild lumbar subluxations most likely degenerative in nature.           Impression   Multiple gallstones in the gallbladder as well as a 1.4 x 1 cm calculus in the distal CBD with moderate to severe CBD dilatation. No intrahepatic biliary dilatation. Hepatomegaly with fatty infiltration of the liver. Small left pleural effusion. Bibasilar atelectasis and fibrosis. Decreasing size of a cystic lesion in the distal pancreatic tail now measuring 7 x 6 mm. Colonic diverticulosis without diverticulitis. No acute inflammatory/infectious process in the abdomen or pelvis.           **This report has been created using voice recognition software. It may contain minor errors which are inherent in voice recognition technology. **       Final report electronically signed by Dr. Jo Simms on 8/24/2018 6:51 AM         Active Hospital Problems    Diagnosis Date Noted    Gallstone pancreatitis [K85.10] 08/23/2018    Hypothyroidism [E03.9] 01/09/2015    Acute UTI [N39.0]     Dehydration [E86.0] 12/09/2014    Easy bruising [R23.8] 10/29/2014 Past Medical History:        Diagnosis Date    [facility]     Anxiety and depression 6/26/2018    Anxiety and depression     Atrial fibrillation ([facility]     Blood transfusion reaction     CAD (coronary artery disease)     Severe triple vessel disease    Cerebrovascular disease 6/19/2018    CKD (chronic kidney disease)     Diastolic congestive heart failure (HCC)     DJD (degenerative joint disease), cervical 6/19/2018    GERD (gastroesophageal reflux disease)     Hiatal hernia     History of blood transfusion     Hyperlipidemia     Hypertension     Hypoalbuminemia     Hypothyroidism     Mild protein-calorie malnutrition (HCC)     NSTEMI (non-ST elevated myocardial infarction) ([facility] 7/2014    Pancreatitis due to biliary obstruction     Pneumonia, organism unspecified(486)     Scoliosis     [facility] hematoma[facility]     Teresa hole decompresson on 12/25/2014    Thyroid disease     Unspecified cerebral artery occlusion with cerebral infarction     Uterine cancer ([facility] 1966     Past Surgical History:        Procedure Laterality Date    APPENDECTOMY      BACK SURGERY      TERESA HOLE  12/25/2014    drainage of left SDH    CARDIAC SURGERY      COLONOSCOPY      CORONARY ARTERY BYPASS GRAFT  9/27/11    LIMA to LAD, SVG to 1st Marginal, Distal Right Coronary Artery, 1st Diagonal    DILATATION, ESOPHAGUS      ENDOSCOPY, COLON, DIAGNOSTIC      EYE SURGERY      bilateral cataracts    HYSTERECTOMY      MT EGD BALLOON DILATION ESOPHAGUS <30 MM DIAM N/A 11/22/2017    EGD ESOPHAGOGASTRODUODENOSCOPY DILATATION performed by Teresa Ramirez MD at [facility]    MT ESOPHAGOGASTRODUODENOSCOPY TRANSORAL DIAGNOSTIC  11/22/2017    EGD ESOPHAGOGASTRODUODENOSCOPY performed by Teresa Ramirez MD at [address] [place] [address] Left     SKIN BIOPSY      VASCULAR SURGERY      cabg harvests from left leg     Allergies:  Adhesive tape;  Lisinopril; Morphine; and Narcan (ERGOCALCIFEROL) 46712 UNITS CAPS capsule TAKE 1 CAPSULE BY MOUTH EVERY 28 DAYS ON THE 18TH OF EVERY MONTH 1/23/17   Marzena Umanzor DO   bimatoprost (LUMIGAN) 0.01 % SOLN ophthalmic drops Place 1 drop into both eyes nightly    Historical Provider, MD   aspirin 81 MG EC tablet Take 81 mg by mouth daily. Historical Provider, MD   magnesium oxide (MAG-OX) 400 (241.3 MG) MG TABS tablet Take 1 tablet by mouth daily. 1/13/15   Gogo Hale MD   polyethylene glycol [facility]) powder Take 17 g by mouth daily.     Historical Provider, MD      Current Meds:       Current Facility-Administered Medications:     sodium chloride flush 0.9 % injection 10 mL, 10 mL, Intravenous, 2 times per day, Maryana Kaur DO    sodium chloride flush 0.9 % injection 10 mL, 10 mL, Intravenous, PRN, Maryana Kaur DO    magnesium hydroxide (MILK OF MAGNESIA) 400 MG/5ML suspension 30 mL, 30 mL, Oral, Daily PRN, Maryana Kaur DO    ondansetron (ZOFRAN) injection 4 mg, 4 mg, Intravenous, Q6H PRN, Maryana Kaur DO    enoxaparin (LOVENOX) injection 30 mg, 30 mg, Subcutaneous, Q24H, Maryana Kaur DO, 30 mg at 08/23/18 2333    0.9 % sodium chloride infusion, , Intravenous, Continuous, Maryana Kaur DO, Last Rate: 60 mL/hr at 08/23/18 1940    potassium chloride (KLOR-CON M) extended release tablet 40 mEq, 40 mEq, Oral, PRN **OR** potassium chloride 20 MEQ/15ML (10%) oral solution 40 mEq, 40 mEq, Oral, PRN **OR** potassium chloride 10 mEq/100 mL IVPB (Peripheral Line), 10 mEq, Intravenous, PRN, Maryana Kaur DO    magnesium sulfate 1 g in dextrose 5 % 100 mL IVPB, 1 g, Intravenous, PRN, Maryana Kaur DO    famotidine (PEPCID) injection 20 mg, 20 mg, Intravenous, Daily, Maryana Kaur DO, 20 mg at 08/23/18 2333    albuterol (PROVENTIL) nebulizer solution 0.63 mg, 0.63 mg, Nebulization, 4x Daily PRN, Maryana Kaur DO    latanoprost (XALATAN) 0.005 % ophthalmic solution 1 drop, 1 drop, Both Eyes, Nightly, Ariana Moise deemed to be high risk for surgery in 5-2016 to have gallbladder removed. Plan:  1. CBC, BMP, Lipase, HFP now  2. CBC, BMP, lipase, HFP in AM  3. MRI/MRCP to further evaluate  4. NPO  5. Zosyn   6. IV fluid at 60 ml per hour. We would like to increase this for pancreatitis but limited due to hx of CHF. 7. CT scan shows dilation and stone but pt pain has resolved and may have passed so will order MRI/MRCP to further evaluate. AM Labs pending and admission bili was normal.  If stone is present pt is high risk for procedures and will need transferred to tertiary center such as  for ERCP. 8. Follow    Addendum: [id number]  MRI/MRCP results reviewed. Choledocholithiasis is noted. Pt remains pain free but it is likely that the stone is fluctuating. Pt needs ERCP but is considered high risk due to health and age. Discussed case with pt and daughter an they are agreeable to transfer to . Hospitalist updated and aware of transfer and okay. Aware we will start process but they will need to sign transfer order/papers. One call transfer notified and have Dr Jojo Joiner number to discuss case with.        Assessment and Plan discussed with Dr Azra Mejía, APRN, CNP, 8/24/2018, 8:19 AM

## 2018-08-24 NOTE — PLAN OF CARE
Problem: DISCHARGE BARRIERS  Goal: Patient's continuum of care needs are met  Outcome: Ongoing  Patient and daughter plan to return to [address] [place] [address] with therapy. See SW note from 8-24-18.

## 2018-08-25 VITALS
OXYGEN SATURATION: 95 % | RESPIRATION RATE: 18 BRPM | WEIGHT: 117.7 LBS | HEART RATE: 60 BPM | HEIGHT: 57 IN | SYSTOLIC BLOOD PRESSURE: 163 MMHG | DIASTOLIC BLOOD PRESSURE: 70 MMHG | TEMPERATURE: 97.6 F | BODY MASS INDEX: 25.39 KG/M2

## 2018-08-25 LAB
ALBUMIN SERPL-MCNC: 4 G/DL (ref 3.5–5.1)
ALP BLD-CCNC: 201 U/L (ref 38–126)
ALT SERPL-CCNC: 189 U/L (ref 11–66)
ANION GAP SERPL CALCULATED.3IONS-SCNC: 18 MEQ/L (ref 8–16)
AST SERPL-CCNC: 91 U/L (ref 5–40)
BILIRUB SERPL-MCNC: 1.1 MG/DL (ref 0.3–1.2)
BILIRUBIN DIRECT: 0.3 MG/DL (ref 0–0.3)
BUN BLDV-MCNC: 22 MG/DL (ref 7–22)
CALCIUM SERPL-MCNC: 9.6 MG/DL (ref 8.5–10.5)
CHLORIDE BLD-SCNC: 93 MEQ/L (ref 98–111)
CO2: 23 MEQ/L (ref 23–33)
CREAT SERPL-MCNC: 1 MG/DL (ref 0.4–1.2)
ERYTHROCYTE [DISTWIDTH] IN BLOOD BY AUTOMATED COUNT: 15.4 % (ref 11.5–14.5)
ERYTHROCYTE [DISTWIDTH] IN BLOOD BY AUTOMATED COUNT: 51.4 FL (ref 35–45)
GFR SERPL CREATININE-BSD FRML MDRD: 52 ML/MIN/1.73M2
GLUCOSE BLD-MCNC: 70 MG/DL (ref 70–108)
HCT VFR BLD CALC: 35.6 % (ref 37–47)
HEMOGLOBIN: 11.7 GM/DL (ref 12–16)
LIPASE: 72.1 U/L (ref 5.6–51.3)
MCH RBC QN AUTO: 29.8 PG (ref 26–33)
MCHC RBC AUTO-ENTMCNC: 32.9 GM/DL (ref 32.2–35.5)
MCV RBC AUTO: 90.8 FL (ref 81–99)
ORGANISM: ABNORMAL
PLATELET # BLD: 169 THOU/MM3 (ref 130–400)
PMV BLD AUTO: 10.2 FL (ref 9.4–12.4)
POTASSIUM SERPL-SCNC: 3.6 MEQ/L (ref 3.5–5.2)
RBC # BLD: 3.92 MILL/MM3 (ref 4.2–5.4)
SODIUM BLD-SCNC: 134 MEQ/L (ref 135–145)
TOTAL PROTEIN: 6.9 G/DL (ref 6.1–8)
URINE CULTURE REFLEX: ABNORMAL
WBC # BLD: 5.5 THOU/MM3 (ref 4.8–10.8)

## 2018-08-25 PROCEDURE — 6360000002 HC RX W HCPCS: Performed by: INTERNAL MEDICINE

## 2018-08-25 PROCEDURE — 99239 HOSP IP/OBS DSCHRG MGMT >30: CPT | Performed by: INTERNAL MEDICINE

## 2018-08-25 PROCEDURE — 83690 ASSAY OF LIPASE: CPT

## 2018-08-25 PROCEDURE — 82248 BILIRUBIN DIRECT: CPT

## 2018-08-25 PROCEDURE — 6370000000 HC RX 637 (ALT 250 FOR IP): Performed by: INTERNAL MEDICINE

## 2018-08-25 PROCEDURE — 80053 COMPREHEN METABOLIC PANEL: CPT

## 2018-08-25 PROCEDURE — 36415 COLL VENOUS BLD VENIPUNCTURE: CPT

## 2018-08-25 PROCEDURE — 2709999900 HC NON-CHARGEABLE SUPPLY

## 2018-08-25 PROCEDURE — 85027 COMPLETE CBC AUTOMATED: CPT

## 2018-08-25 PROCEDURE — 2580000003 HC RX 258: Performed by: INTERNAL MEDICINE

## 2018-08-25 RX ORDER — HEPARIN SODIUM 5000 [USP'U]/ML
5000 INJECTION, SOLUTION INTRAVENOUS; SUBCUTANEOUS ONCE
Status: COMPLETED | OUTPATIENT
Start: 2018-08-25 | End: 2018-08-25

## 2018-08-25 RX ADMIN — SODIUM CHLORIDE: 9 INJECTION, SOLUTION INTRAVENOUS at 12:17

## 2018-08-25 RX ADMIN — METOPROLOL TARTRATE 12.5 MG: 25 TABLET ORAL at 10:00

## 2018-08-25 RX ADMIN — SPIRONOLACTONE 12.5 MG: 25 TABLET ORAL at 10:00

## 2018-08-25 RX ADMIN — BUMETANIDE 0.5 MG: 0.5 TABLET ORAL at 10:00

## 2018-08-25 RX ADMIN — HEPARIN SODIUM 5000 UNITS: 5000 INJECTION INTRAVENOUS; SUBCUTANEOUS at 12:17

## 2018-08-25 RX ADMIN — PIPERACILLIN SODIUM AND TAZOBACTAM SODIUM 3.38 G: 3; .375 INJECTION, POWDER, LYOPHILIZED, FOR SOLUTION INTRAVENOUS at 14:45

## 2018-08-25 RX ADMIN — LEVOTHYROXINE SODIUM 88 MCG: 88 TABLET ORAL at 05:31

## 2018-08-25 RX ADMIN — ISOSORBIDE MONONITRATE 30 MG: 30 TABLET ORAL at 10:00

## 2018-08-25 ASSESSMENT — PAIN DESCRIPTION - PAIN TYPE: TYPE: ACUTE PAIN

## 2018-08-25 ASSESSMENT — PAIN DESCRIPTION - LOCATION: LOCATION: ABDOMEN

## 2018-08-25 ASSESSMENT — PAIN SCALES - WONG BAKER
WONGBAKER_NUMERICALRESPONSE: 2
WONGBAKER_NUMERICALRESPONSE: 0

## 2018-08-25 NOTE — PROGRESS NOTES
[facility] called with [facility] room number (9056), but stated no transport would be available until 8/25 around [id number]. Dr Evin Herrmann signed transfer paperwork and was notified of pt's delayed departure.

## 2018-08-25 NOTE — PROGRESS NOTES
[facility] here to [facility]. Patient discharged with personal belongings. Telemetry monitor and wires removed from patient.

## 2018-08-25 NOTE — PROGRESS NOTES
Report called to [facility] and spoke with [facility].

## 2018-08-25 NOTE — DISCHARGE SUMMARY
mononitrate  30 mg Oral Daily    levothyroxine  88 mcg Oral Daily    metoprolol tartrate  12.5 mg Oral BID    spironolactone  12.5 mg Oral Daily    piperacillin-tazobactam  3.375 g Intravenous Q8H     Continuous Infusions:   sodium chloride 60 mL/hr at 08/25/18 1217     PRN Meds:.sodium chloride flush, magnesium hydroxide, ondansetron, potassium chloride **OR** potassium chloride **OR** potassium chloride, magnesium sulfate, albuterol    REVIEW OF SYSTEMS:   Pertinent positives as noted in the HPI. All other systems reviewed and negative. PHYSICAL EXAM:    BP (!) 146/65   Pulse 64   Temp 98.1 °F (36.7 °C) (Oral)   Resp 18   Ht 4' 9\" (1.448 m)   Wt 117 lb 11.2 oz (53.4 kg)   LMP  (Exact Date)   SpO2 95%   BMI 25.47 kg/m²     General appearance:  No apparent distress, appears stated age and cooperative. HEENT:  Normal cephalic, atraumatic without obvious deformity. Pupils equal, round, and reactive to light. Extra ocular muscles intact. Conjunctivae/corneas clear. Neck: Supple, with full range of motion. no jugular venous distention. Trachea midline. no carotid bruits  Respiratory:  Normal respiratory effort. Clear to auscultation, bilaterally without Rales/Wheezes/Rhonchi. Breath sounds equal bilaterally  Cardiovascular:  Regular rate and rhythm with normal S1/S2 without murmurs, rubs or gallops. PMI non displaced  Abdomen: Soft, non-tender, non-distended with normal bowel sounds. No guarding, rebound. Musculoskeletal:  No clubbing, cyanosis or edema bilaterally. Full range of motion without deformity. Skin: Skin color, texture, turgor normal.  No rashes or lesions, or suspicious lesions. Neurologic:  Neurovascularly intact without any focal sensory/motor deficits.  Cranial nerves: II-XII intact, grossly non-focal.  Psychiatric:  Alert and oriented, thought content appropriate, normal insight  Capillary Refill: Brisk,< 2 seconds   Peripheral Pulses: +2 palpable, equal bilaterally upper and lower extremities  Lymphatics: no lymphadenopathy      Labs:     Recent Labs      08/23/18   1523  08/24/18   0913   WBC  5.1  6.2   HGB  11.0*  10.8*   HCT  34.0*  33.0*   PLT  146  127*     Recent Labs      08/23/18   1523  08/24/18   0913   NA  131*  129*   K  4.0  3.7   CL  92*  92*   CO2  26  25   BUN  34*  23*   CREATININE  1.2  1.0   CALCIUM  9.3  8.9     Recent Labs      08/23/18   1523  08/24/18   0913   AST  229*  145*   ALT  330*  234*   BILIDIR  0.3  0.4*   BILITOT  1.0  1.0   ALKPHOS  210*  215*     Recent Labs      08/23/18   1705   INR  1.01     No results for input(s): CKTOTAL, TROPONINI in the last 72 hours. Urinalysis:      Lab Results   Component Value Date    NITRU NEGATIVE 08/23/2018    WBCUA 25-50 08/23/2018    BACTERIA MANY 08/23/2018    RBCUA 0-2 08/23/2018    BLOODU NEGATIVE 08/23/2018    SPECGRAV 1.021 12/29/2017    GLUCOSEU NEGATIVE 08/23/2018       Radiology:     CXR: I have reviewed the CXR with the following interpretation: pending  EKG:  I have reviewed the EKG with the following interpretation: pending    Xr Chest Standard (2 Vw)    Result Date: 8/23/2018  PROCEDURE: XR CHEST (2 VW) CLINICAL INFORMATION: shortness of breath, . COMPARISON: July 7, 2018 TECHNIQUE: PA and lateral views the chest. FINDINGS: Advanced osteopenia. Thoracic kyphosis and degenerative changes. Median sternotomy wires. Cardiomegaly. Large hiatal hernia. Atherosclerotic changes aortic knob. Costophrenic recesses are preserved. No lobar consolidation. Streaky opacity in the right lung apex of are possible scarring, this is similar in appearance to prior study. Stable radiographic appearance of the chest. No lobar consolidation. Large hiatal hernia. **This report has been created using voice recognition software. It may contain minor errors which are inherent in voice recognition technology. ** Final report electronically signed by Dr. Ritesh Dhaliwal on 8/23/2018 3:52 PM           DVT prophylaxis: [x] Lovenox [] SCDs                                 [] SQ Heparin                                 [] Encourage ambulation           [] Already on Anticoagulation    Code Status: Full Code      PT/OT Eval Status:    Disposition:    [] Home       [] TCU       [] Rehab       [] Psych       [] SNF       [x] [place]       [] Other-    ASSESSMENT:    Active Hospital Problems    Diagnosis Date Noted    Gallstone pancreatitis [K85.10] 08/23/2018    Hypothyroidism [E03.9] 01/09/2015    Acute UTI [N39.0]     Dehydration [E86.0] 12/09/2014    Easy bruising [R23.8] 10/29/2014       PLAN:    · Abdominal pain secondary to acute gallstone pancreatitis- MRCP results reviewed-patient has cholelithiasis without findings of acute cholecystitis does have intrahepatic and extrahepatic be retreated allegation with gallstones within the distal common bile duct- patient was deemed a very high risk previously and hence the surgery was canceled. · Plans for transfer to [facility] for further evaluation and care per GI recommendations  · Dr Gian Cuenca has spoke to Dr Charmaine Hartmann- gastroenterologist in [facility] and they have accepted patient. · Elevated liver enzymes likely secondary to pancreatitis- patient refused labs today am  · Large hiatal hernia history coronary artery disease and history of A. Fib- patient was previously due for's gallbladder surgery however was deemed high risk back in 2016  · Chronic and it disease stage III  · Chronic diastolic congestive heart failure with ejection fraction of 55%  · Klebsiella UTI- on Zosyn    Stable for transfer to [facility]    Discharge time 35 minutes    Thank you Cristina Triana DO for the opportunity to be involved in this patient's care.     Electronically signed by Tanner Galindo MD on 8/25/2018 at 2:04 PM

## 2018-08-29 LAB
BLOOD CULTURE, ROUTINE: NORMAL
BLOOD CULTURE, ROUTINE: NORMAL

## 2018-09-10 ENCOUNTER — APPOINTMENT (OUTPATIENT)
Dept: GENERAL RADIOLOGY | Age: 83
End: 2018-09-10
Payer: COMMERCIAL

## 2018-09-10 ENCOUNTER — APPOINTMENT (OUTPATIENT)
Dept: CT IMAGING | Age: 83
End: 2018-09-10
Payer: COMMERCIAL

## 2018-09-10 ENCOUNTER — HOSPITAL ENCOUNTER (EMERGENCY)
Age: 83
Discharge: HOME | End: 2018-09-10
Attending: EMERGENCY MEDICINE
Payer: COMMERCIAL

## 2018-09-10 VITALS
SYSTOLIC BLOOD PRESSURE: 125 MMHG | DIASTOLIC BLOOD PRESSURE: 82 MMHG | HEIGHT: 58 IN | WEIGHT: 115 LBS | HEART RATE: 58 BPM | TEMPERATURE: 98.2 F | RESPIRATION RATE: 15 BRPM | OXYGEN SATURATION: 93 % | BODY MASS INDEX: 24.14 KG/M2

## 2018-09-10 DIAGNOSIS — S40.012A CONTUSION OF LEFT SHOULDER, INITIAL ENCOUNTER: ICD-10-CM
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
## 2018-09-10 DIAGNOSIS — F01.50 VASCULAR DEMENTIA WITHOUT BEHAVIORAL DISTURBANCE (HCC): ICD-10-CM

## 2018-09-10 DIAGNOSIS — S09.90XA CLOSED HEAD INJURY, INITIAL ENCOUNTER: ICD-10-CM

## 2018-09-10 LAB
ALBUMIN SERPL-MCNC: 4.2 G/DL (ref 3.5–5.1)
ALP BLD-CCNC: 90 U/L (ref 38–126)
ALT SERPL-CCNC: 16 U/L (ref 11–66)
ANION GAP SERPL CALCULATED.3IONS-SCNC: 12 MEQ/L (ref 8–16)
AST SERPL-CCNC: 24 U/L (ref 5–40)
BACTERIA: ABNORMAL /HPF
BASOPHILS # BLD: 0.3 %
BASOPHILS ABSOLUTE: 0 THOU/MM3 (ref 0–0.1)
BILIRUB SERPL-MCNC: 1.6 MG/DL (ref 0.3–1.2)
BILIRUBIN DIRECT: 0.4 MG/DL (ref 0–0.3)
BILIRUBIN URINE: NEGATIVE
BLOOD, URINE: NEGATIVE
BUN BLDV-MCNC: 21 MG/DL (ref 7–22)
CALCIUM SERPL-MCNC: 9.7 MG/DL (ref 8.5–10.5)
CASTS 2: ABNORMAL /LPF
CASTS UA: ABNORMAL /LPF
CHARACTER, URINE: CLEAR
CHLORIDE BLD-SCNC: 96 MEQ/L (ref 98–111)
CO2: 30 MEQ/L (ref 23–33)
COLOR: YELLOW
CREAT SERPL-MCNC: 1.2 MG/DL (ref 0.4–1.2)
CRYSTALS, UA: ABNORMAL
EOSINOPHIL # BLD: 0.5 %
EOSINOPHILS ABSOLUTE: 0 THOU/MM3 (ref 0–0.4)
EPITHELIAL CELLS, UA: ABNORMAL /HPF
ERYTHROCYTE [DISTWIDTH] IN BLOOD BY AUTOMATED COUNT: 16.3 % (ref 11.5–14.5)
ERYTHROCYTE [DISTWIDTH] IN BLOOD BY AUTOMATED COUNT: 54.4 FL (ref 35–45)
GFR SERPL CREATININE-BSD FRML MDRD: 42 ML/MIN/1.73M2
GLUCOSE BLD-MCNC: 121 MG/DL (ref 70–108)
GLUCOSE URINE: NEGATIVE MG/DL
HCT VFR BLD CALC: 31.8 % (ref 37–47)
HEMOGLOBIN: 10 GM/DL (ref 12–16)
IMMATURE GRANS (ABS): 0.04 THOU/MM3 (ref 0–0.07)
IMMATURE GRANULOCYTES: 0.6 %
KETONES, URINE: NEGATIVE
LEUKOCYTE ESTERASE, URINE: NEGATIVE
LIPASE: 47.9 U/L (ref 5.6–51.3)
LYMPHOCYTES # BLD: 12.2 %
LYMPHOCYTES ABSOLUTE: 0.8 THOU/MM3 (ref 1–4.8)
MAGNESIUM: 2 MG/DL (ref 1.6–2.4)
MCH RBC QN AUTO: 29 PG (ref 26–33)
MCHC RBC AUTO-ENTMCNC: 31.4 GM/DL (ref 32.2–35.5)
MCV RBC AUTO: 92.2 FL (ref 81–99)
MISCELLANEOUS 2: ABNORMAL
MONOCYTES # BLD: 8.7 %
MONOCYTES ABSOLUTE: 0.6 THOU/MM3 (ref 0.4–1.3)
NITRITE, URINE: NEGATIVE
NUCLEATED RED BLOOD CELLS: 0 /100 WBC
OSMOLALITY CALCULATION: 279.9 MOSMOL/KG (ref 275–300)
PH UA: 7
PLATELET # BLD: 153 THOU/MM3 (ref 130–400)
PMV BLD AUTO: 10.3 FL (ref 9.4–12.4)
POTASSIUM SERPL-SCNC: 4 MEQ/L (ref 3.5–5.2)
PROTEIN UA: 30
RBC # BLD: 3.45 MILL/MM3 (ref 4.2–5.4)
RBC URINE: ABNORMAL /HPF
RENAL EPITHELIAL, UA: ABNORMAL
SEG NEUTROPHILS: 77.7 %
SEGMENTED NEUTROPHILS ABSOLUTE COUNT: 5.1 THOU/MM3 (ref 1.8–7.7)
SODIUM BLD-SCNC: 138 MEQ/L (ref 135–145)
SPECIFIC GRAVITY, URINE: 1.02 (ref 1–1.03)
TOTAL PROTEIN: 6.9 G/DL (ref 6.1–8)
UROBILINOGEN, URINE: 0.2 EU/DL
WBC # BLD: 6.6 THOU/MM3 (ref 4.8–10.8)
WBC UA: ABNORMAL /HPF
YEAST: ABNORMAL

## 2018-09-10 PROCEDURE — 72125 CT NECK SPINE W/O DYE: CPT

## 2018-09-10 PROCEDURE — 73030 X-RAY EXAM OF SHOULDER: CPT

## 2018-09-10 PROCEDURE — 80053 COMPREHEN METABOLIC PANEL: CPT

## 2018-09-10 PROCEDURE — 82248 BILIRUBIN DIRECT: CPT

## 2018-09-10 PROCEDURE — 36415 COLL VENOUS BLD VENIPUNCTURE: CPT

## 2018-09-10 PROCEDURE — 81001 URINALYSIS AUTO W/SCOPE: CPT

## 2018-09-10 PROCEDURE — 72170 X-RAY EXAM OF PELVIS: CPT

## 2018-09-10 PROCEDURE — 83735 ASSAY OF MAGNESIUM: CPT

## 2018-09-10 PROCEDURE — 71045 X-RAY EXAM CHEST 1 VIEW: CPT

## 2018-09-10 PROCEDURE — 99285 EMERGENCY DEPT VISIT HI MDM: CPT

## 2018-09-10 PROCEDURE — 83690 ASSAY OF LIPASE: CPT

## 2018-09-10 PROCEDURE — 70450 CT HEAD/BRAIN W/O DYE: CPT

## 2018-09-10 PROCEDURE — 85025 COMPLETE CBC W/AUTO DIFF WBC: CPT

## 2018-09-10 ASSESSMENT — PAIN SCALES - WONG BAKER: WONGBAKER_NUMERICALRESPONSE: 2

## 2018-09-10 NOTE — ED PROVIDER NOTES
[facility]  eMERGENCY dEPARTMENT eNCOUnter          CHIEF COMPLAINT       Chief Complaint   Patient presents with    Fall    Altered Mental Status       Nurses Notes reviewed and I agree except as noted in the HPI. HISTORY OF PRESENT ILLNESS    Isai Darden is a 80 y.o. female who presents to the Emergency Department for the evaluation of a fall that happened yesterday and decrease in mental status. The patient is confused and does not know why she is here. She denies pain or urinary problems. Patient has had a diagnosis of dementia. She has had multiple residences in the last month. She has only been on her current residence for approximately one week. Patient is confused at bedside she is definitely hard of hearing as well. No real physical complaints on her part. The patient is [facility] patient. The HPI was provided by the patient. REVIEW OF SYSTEMS     Review of Systems   Genitourinary: Negative for dysuria, hematuria and urgency. Musculoskeletal:        Fall   Psychiatric/Behavioral: Positive for confusion. Limited ROS due to decrease in mental status      PAST MEDICAL HISTORY    has a past medical history of ARNOLD (acute kidney injury) ([address] Anxiety and depression; Anxiety and depression; Atrial fibrillation ([address] Blood transfusion reaction; CAD (coronary artery disease); Cerebrovascular disease; CKD (chronic kidney disease); Diastolic congestive heart failure (HCC); DJD (degenerative joint disease), cervical; GERD (gastroesophageal reflux disease); Hiatal hernia; History of blood transfusion; Hyperlipidemia; Hypertension; Hypoalbuminemia; Hypothyroidism; Mild protein-calorie malnutrition ([address] NSTEMI (non-ST elevated myocardial infarction) ([address] Pancreatitis due to biliary obstruction; Pneumonia, organism unspecified(486); Scoliosis; SDH (subdural hematoma) ([address] Thyroid disease;  Unspecified cerebral artery occlusion with cerebral infarction; and Uterine cancer R-0Normal      pantoprazole (PROTONIX) 40 MG tablet TAKE 1 TABLET BY MOUTH TWICE DAILY BEFORE MEALS, Disp-180 tablet, R-0Normal      isosorbide mononitrate (IMDUR) 30 MG extended release tablet TAKE 1 TABLET BY MOUTH DAILY, Disp-90 tablet, R-3Normal      bumetanide (BUMEX) 0.5 MG tablet TAKE 1 TABLET BY MOUTH DAILY, Disp-90 tablet, R-3Normal      vitamin D (ERGOCALCIFEROL) 84059 UNITS CAPS capsule TAKE 1 CAPSULE BY MOUTH EVERY 28 DAYS ON THE  OF EVERY MONTH, Disp-3 capsule, R-3      bimatoprost (LUMIGAN) 0.01 % SOLN ophthalmic drops Place 1 drop into both eyes nightly      aspirin 81 MG EC tablet Take 81 mg by mouth daily. magnesium oxide (MAG-OX) 400 (241.3 MG) MG TABS tablet Take 1 tablet by mouth daily. , Disp-30 tablet, R-3      polyethylene glycol (GLYCOLAX) powder Take 17 g by mouth daily. ALLERGIES     is allergic to adhesive tape; lisinopril; morphine; and narcan [naloxone]. FAMILY HISTORY     indicated that her mother is . She indicated that her father is . She indicated that all of her three brothers are . She indicated that her daughter is alive. She indicated that her son is alive. family history includes Cancer in her brother; Heart Disease in her brother, brother, daughter, father, mother, and son. SOCIAL HISTORY      reports that she has never smoked. She has never used smokeless tobacco. She reports that she does not drink alcohol or use drugs. PHYSICAL EXAM     INITIAL VITALS:  height is 4' 10\" (1.473 m) and weight is 115 lb (52.2 kg). Her oral temperature is 98.2 °F (36.8 °C). Her blood pressure is 125/82 and her pulse is 58. Her respiration is 15 and oxygen saturation is 93%. Physical Exam   Constitutional: She is oriented to person, place, and time. She appears well-developed and well-nourished. No distress. Patient is confused   HENT:   Head: Normocephalic and atraumatic.    Right Ear: External ear normal.   Left Ear: External ear other components within normal limits   HEPATIC FUNCTION PANEL - Abnormal; Notable for the following: Total Bilirubin 1.6 (*)     Bilirubin, Direct 0.4 (*)     All other components within normal limits   URINE WITH REFLEXED MICRO - Abnormal; Notable for the following:     Protein, UA 30 (*)     All other components within normal limits   GLOMERULAR FILTRATION RATE, ESTIMATED - Abnormal; Notable for the following:     Est, Glom Filt Rate 42 (*)     All other components within normal limits   LIPASE   MAGNESIUM   ANION GAP   OSMOLALITY       EMERGENCY DEPARTMENT COURSE:   Vitals:    Vitals:    09/10/18 1101   BP: 125/82   Pulse: 58   Resp: 15   Temp: 98.2 °F (36.8 °C)   TempSrc: Oral   SpO2: 93%   Weight: 115 lb (52.2 kg)   Height: 4' 10\" (1.473 m)     11:06 AM: The patient was seen and evaluated. Appropriate labs were ordered and reviewed. The patient was seen within the ED today for the evaluation of fall and AMS. The patient arrived in no acute distress and in stable condition. Within the department, I observed the patient's vital signs to be within acceptable range. On exam, I appreciated benign exam. Radiological studies within the department revealed no acute findings. Laboratory work was reassuring. I observed the patient's condition to remain stable during the duration of her stay. I explained my proposed course of treatment to the patient, who was amenable to my decision, and I answered all questions that were asked. She was discharged home in stable condition, and the patient will return to the ED if her symptoms become more severe in nature or otherwise change. I advised the patient to follow-up with PCP in 2 days. I also discussed return to ED precautions with the patient who verbalized understanding. Patient had a fall. Patient does have physical deconditioning and generalized instability. Caregivers are instructed to make her use a walker. Patient has no acute physical findings today.   She does have what appears to be dementia. This appears to be worsening. There are no signs of infections or acute processes. Caregivers are instructed to have the patient follow up with primary care physician and to do so within the next 1-2 days, and to return the patient to the emergency room for any new or worsening complaints. CRITICAL CARE:   None     CONSULTS:  None    PROCEDURES:  None    FINAL IMPRESSION      1. Fall, initial encounter    2. Closed head injury, initial encounter    3. Contusion of left shoulder, initial encounter    4. Vascular dementia without behavioral disturbance          DISPOSITION/PLAN   Discharge    PATIENT REFERRED TO:  Noé Alvarez [address], [address] [id number]    Call in 2 days        DISCHARGE MEDICATIONS:  Discharge Medication List as of 9/10/2018  1:00 PM          (Please note that portions of this note were completed with a voice recognition program.  Efforts were made to edit the dictations but occasionally words are mis-transcribed.)    Scribe:  Anastacia Johnston 9/10/18 11:06 AM Scribing for and in the presence of Quoc Solorzano DO. Scribe: Anastacia Johnston 9/10/18 11:06 AM    Provider:  I personally performed the services described in the documentation, reviewed and edited the documentation which was dictated to the scribe in my presence, and it accurately records my words and actions.     Quoc Solorzano DO 9/10/18 9:02 PM       Quoc Solorzano DO  09/10/18 2103       Quoc Solorzano DO  09/10/18 2122

## 2018-09-28 ENCOUNTER — CARE COORDINATION (OUTPATIENT)
Dept: CARE COORDINATION | Age: 83
End: 2018-09-28

## 2018-10-08 ENCOUNTER — TELEPHONE (OUTPATIENT)
Dept: FAMILY MEDICINE CLINIC | Age: 83
End: 2018-10-08

## 2018-10-17 ENCOUNTER — OFFICE VISIT (OUTPATIENT)
Dept: FAMILY MEDICINE CLINIC | Age: 83
End: 2018-10-17
Payer: MEDICARE

## 2018-10-17 VITALS
RESPIRATION RATE: 16 BRPM | SYSTOLIC BLOOD PRESSURE: 128 MMHG | WEIGHT: 115.1 LBS | HEART RATE: 52 BPM | BODY MASS INDEX: 24.83 KG/M2 | HEIGHT: 57 IN | DIASTOLIC BLOOD PRESSURE: 60 MMHG

## 2018-10-17 DIAGNOSIS — F32.A DEPRESSION, UNSPECIFIED DEPRESSION TYPE: ICD-10-CM

## 2018-10-17 DIAGNOSIS — E43 SEVERE MALNUTRITION (HCC): ICD-10-CM

## 2018-10-17 DIAGNOSIS — I48.0 PAF (PAROXYSMAL ATRIAL FIBRILLATION) (HCC): ICD-10-CM

## 2018-10-17 DIAGNOSIS — R53.81 PHYSICAL DECONDITIONING: ICD-10-CM

## 2018-10-17 DIAGNOSIS — I27.20 PULMONARY HYPERTENSION (HCC): ICD-10-CM

## 2018-10-17 DIAGNOSIS — N18.2 CKD (CHRONIC KIDNEY DISEASE) STAGE 2, GFR 60-89 ML/MIN: ICD-10-CM

## 2018-10-17 DIAGNOSIS — R53.1 GENERALIZED WEAKNESS: ICD-10-CM

## 2018-10-17 DIAGNOSIS — H04.123 DRY EYES: Primary | ICD-10-CM

## 2018-10-17 DIAGNOSIS — I25.10 CORONARY ARTERY DISEASE INVOLVING NATIVE CORONARY ARTERY OF NATIVE HEART WITHOUT ANGINA PECTORIS: Chronic | ICD-10-CM

## 2018-10-17 DIAGNOSIS — E03.9 HYPOTHYROIDISM, UNSPECIFIED TYPE: Chronic | ICD-10-CM

## 2018-10-17 DIAGNOSIS — I38 VHD (VALVULAR HEART DISEASE): Chronic | ICD-10-CM

## 2018-10-17 DIAGNOSIS — E44.0 MODERATE MALNUTRITION (HCC): ICD-10-CM

## 2018-10-17 PROCEDURE — 1111F DSCHRG MED/CURRENT MED MERGE: CPT | Performed by: FAMILY MEDICINE

## 2018-10-17 PROCEDURE — G8420 CALC BMI NORM PARAMETERS: HCPCS | Performed by: FAMILY MEDICINE

## 2018-10-17 PROCEDURE — G8427 DOCREV CUR MEDS BY ELIG CLIN: HCPCS | Performed by: FAMILY MEDICINE

## 2018-10-17 PROCEDURE — G8484 FLU IMMUNIZE NO ADMIN: HCPCS | Performed by: FAMILY MEDICINE

## 2018-10-17 PROCEDURE — G8598 ASA/ANTIPLAT THER USED: HCPCS | Performed by: FAMILY MEDICINE

## 2018-10-17 PROCEDURE — 4040F PNEUMOC VAC/ADMIN/RCVD: CPT | Performed by: FAMILY MEDICINE

## 2018-10-17 PROCEDURE — 1036F TOBACCO NON-USER: CPT | Performed by: FAMILY MEDICINE

## 2018-10-17 PROCEDURE — 1090F PRES/ABSN URINE INCON ASSESS: CPT | Performed by: FAMILY MEDICINE

## 2018-10-17 PROCEDURE — 99214 OFFICE O/P EST MOD 30 MIN: CPT | Performed by: FAMILY MEDICINE

## 2018-10-17 PROCEDURE — 1101F PT FALLS ASSESS-DOCD LE1/YR: CPT | Performed by: FAMILY MEDICINE

## 2018-10-17 PROCEDURE — 1123F ACP DISCUSS/DSCN MKR DOCD: CPT | Performed by: FAMILY MEDICINE

## 2018-10-17 RX ORDER — LATANOPROST 50 UG/ML
1 SOLUTION/ DROPS OPHTHALMIC NIGHTLY
Status: ON HOLD | COMMUNITY
End: 2020-01-01

## 2018-10-17 ASSESSMENT — ENCOUNTER SYMPTOMS
EYE ITCHING: 1
RESPIRATORY NEGATIVE: 1
EYE REDNESS: 1
EYE PAIN: 0
GASTROINTESTINAL NEGATIVE: 1

## 2018-10-17 NOTE — PROGRESS NOTES
Volume depletion    Dyspnea and respiratory abnormalities    Pneumonia due to organism    Azotemia    VHD (valvular heart disease)    Tachycardia sinus versus atrial    Dizziness and at times vertigo when she gets up and walk    Junctional rhythm rate 54 and with underlying afib    Bradycardia    Dizzy spells    Lightheadedness    [facility]- [facility] Coronary artery disease involving native coronary artery of native heart without angina pectoris    Acute pancreatitis    Leukocytosis    Chest pain    Hyperglycemia    Abdominal pain, epigastric    Accelerated hypertension    Calculus of gallbladder with acute cholecystitis    SIRS (systemic inflammatory response syndrome) (HCC)    Pancreatitis due to biliary obstruction    Calculus of gallbladder with acute cholecystitis without obstruction    Acute biliary pancreatitis    Pneumonia of both lower lobes due to infectious organism [facility])    Essential hypertension    Systolic congestive heart failure (HCC)    Generalized weakness    Chronic systolic congestive heart failure (HCC)    Chronic atrial fibrillation (HCC)    SOB (shortness of breath) on exertion    Essential hypertension    Bilateral leg edema +1    Acute viral bronchitis    Acute cystitis    Chronic diastolic heart failure (HCC)    Moderate malnutrition (HCC)    Influenza bronchitis, suspected    Acute respiratory failure with hypoxia ([facility])    Thrombocytopenia ([address]    Fall during current hospitalization, 2/18/2018    Laceration of right temple, occured in hospital 2/18/2018    Right shoulder bruise during hospitalization 2/18/2018    Bacterial pneumonia, suspected    Hypoxia    Acute kidney injury ([address]    CKD (chronic kidney disease), stage III ([facility])    Multiple bruises    Gastroesophageal reflux disease    Pulmonary hypertension ([address]    Hiatal hernia    Closed head injury    Head trauma    UTI due to Klebsiella species    Severe malnutrition [facility]    Acute metabolic encephalopathy    Oropharyngeal dysphagia    Septic encephalopathy    Tooth abscess    Fall at home, subsequent encounter    Overweight (BMI 25.0-29. 9)    CKD (chronic kidney disease) stage 2, GFR 60-89 ml/min    Recurrent falls    DJD (degenerative joint disease), cervical    Cerebrovascular disease    CVA (cerebral vascular accident) ([facility]    Severe malnutrition ([facility]    Anxiety and depression    Gallstone pancreatitis     Past Surgical History:   Procedure Laterality Date    APPENDECTOMY      BACK SURGERY      TERESA HOLE  12/25/2014    drainage of left SDH    CARDIAC SURGERY      COLONOSCOPY      CORONARY ARTERY BYPASS GRAFT  9/27/11    LIMA to LAD, SVG to 1st Marginal, Distal Right Coronary Artery, 1st Diagonal    DILATATION, ESOPHAGUS      ENDOSCOPY, COLON, DIAGNOSTIC      EYE SURGERY      bilateral cataracts    HYSTERECTOMY      SD EGD BALLOON DILATION ESOPHAGUS <30 MM DIAM N/A 11/22/2017    EGD ESOPHAGOGASTRODUODENOSCOPY DILATATION performed by Rc Acosta MD at [facility]    SD ESOPHAGOGASTRODUODENOSCOPY TRANSORAL DIAGNOSTIC  11/22/2017    EGD ESOPHAGOGASTRODUODENOSCOPY performed by Rc Acosta MD at [address] [place] [address] Left     SKIN BIOPSY      VASCULAR SURGERY      cabg harvests from left leg     Prior to Admission medications    Medication Sig Start Date End Date Taking?  Authorizing Provider   latanoprost (XALATAN) 0.005 % ophthalmic solution Place 1 drop into both eyes nightly   Yes Historical Provider, MD   sucralfate (CARAFATE) 1 GM tablet Take 1 g by mouth 3 times daily    Yes Historical Provider, MD   metoprolol tartrate (LOPRESSOR) 25 MG tablet Take 0.5 tablets by mouth 2 times daily 7/13/18  Yes Margarita Robledo MD   levothyroxine (SYNTHROID) 88 MCG tablet Take 1 tablet by mouth Daily 7/14/18  Yes Margarita Robledo MD   albuterol (ACCUNEB) 0.63 MG/3ML nebulizer solution Take 1 ampule by nebulization 4 times daily

## 2018-10-18 ENCOUNTER — TELEPHONE (OUTPATIENT)
Dept: FAMILY MEDICINE CLINIC | Age: 83
End: 2018-10-18

## 2018-10-18 DIAGNOSIS — R49.1 LOSS OF VOICE: Primary | ICD-10-CM

## 2019-01-01 ENCOUNTER — OFFICE VISIT (OUTPATIENT)
Dept: FAMILY MEDICINE CLINIC | Age: 84
End: 2019-01-01
Payer: MEDICARE

## 2019-01-01 ENCOUNTER — CARE COORDINATION (OUTPATIENT)
Dept: CARE COORDINATION | Age: 84
End: 2019-01-01

## 2019-01-01 VITALS
RESPIRATION RATE: 15 BRPM | DIASTOLIC BLOOD PRESSURE: 60 MMHG | OXYGEN SATURATION: 96 % | SYSTOLIC BLOOD PRESSURE: 112 MMHG | HEART RATE: 76 BPM | WEIGHT: 115.4 LBS | TEMPERATURE: 97.7 F | BODY MASS INDEX: 24.97 KG/M2

## 2019-01-01 DIAGNOSIS — R26.81 UNSTABLE GAIT: ICD-10-CM

## 2019-01-01 DIAGNOSIS — I50.22 CHRONIC SYSTOLIC CONGESTIVE HEART FAILURE (HCC): ICD-10-CM

## 2019-01-01 DIAGNOSIS — E43 SEVERE MALNUTRITION (HCC): ICD-10-CM

## 2019-01-01 DIAGNOSIS — I48.0 PAROXYSMAL ATRIAL FIBRILLATION (HCC): ICD-10-CM

## 2019-01-01 DIAGNOSIS — N18.30 CKD (CHRONIC KIDNEY DISEASE), STAGE III (HCC): ICD-10-CM

## 2019-01-01 DIAGNOSIS — I50.32 CHRONIC DIASTOLIC HEART FAILURE (HCC): ICD-10-CM

## 2019-01-01 DIAGNOSIS — R63.0 DECREASED APPETITE: ICD-10-CM

## 2019-01-01 DIAGNOSIS — E44.0 MODERATE MALNUTRITION (HCC): ICD-10-CM

## 2019-01-01 DIAGNOSIS — I50.20 SYSTOLIC CONGESTIVE HEART FAILURE, UNSPECIFIED HF CHRONICITY (HCC): ICD-10-CM

## 2019-01-01 DIAGNOSIS — R53.83 FATIGUE, UNSPECIFIED TYPE: Primary | ICD-10-CM

## 2019-01-01 DIAGNOSIS — R53.1 GENERAL WEAKNESS: ICD-10-CM

## 2019-01-01 DIAGNOSIS — R40.0 DAYTIME SOMNOLENCE: ICD-10-CM

## 2019-01-01 DIAGNOSIS — I50.30 HEART FAILURE WITH PRESERVED EJECTION FRACTION, UNSPECIFIED HF CHRONICITY (HCC): ICD-10-CM

## 2019-01-01 DIAGNOSIS — D69.6 THROMBOCYTOPENIA (HCC): ICD-10-CM

## 2019-01-01 DIAGNOSIS — Z86.73 HISTORY OF CVA (CEREBROVASCULAR ACCIDENT): ICD-10-CM

## 2019-01-01 DIAGNOSIS — I27.20 PULMONARY HYPERTENSION (HCC): ICD-10-CM

## 2019-01-01 PROCEDURE — 1036F TOBACCO NON-USER: CPT | Performed by: FAMILY MEDICINE

## 2019-01-01 PROCEDURE — G8427 DOCREV CUR MEDS BY ELIG CLIN: HCPCS | Performed by: FAMILY MEDICINE

## 2019-01-01 PROCEDURE — G8482 FLU IMMUNIZE ORDER/ADMIN: HCPCS | Performed by: FAMILY MEDICINE

## 2019-01-01 PROCEDURE — G8420 CALC BMI NORM PARAMETERS: HCPCS | Performed by: FAMILY MEDICINE

## 2019-01-01 PROCEDURE — G8598 ASA/ANTIPLAT THER USED: HCPCS | Performed by: FAMILY MEDICINE

## 2019-01-01 PROCEDURE — 1123F ACP DISCUSS/DSCN MKR DOCD: CPT | Performed by: FAMILY MEDICINE

## 2019-01-01 PROCEDURE — 99214 OFFICE O/P EST MOD 30 MIN: CPT | Performed by: FAMILY MEDICINE

## 2019-01-01 PROCEDURE — 1090F PRES/ABSN URINE INCON ASSESS: CPT | Performed by: FAMILY MEDICINE

## 2019-01-01 PROCEDURE — 4040F PNEUMOC VAC/ADMIN/RCVD: CPT | Performed by: FAMILY MEDICINE

## 2019-01-01 SDOH — ECONOMIC STABILITY: TRANSPORTATION INSECURITY
IN THE PAST 12 MONTHS, HAS THE LACK OF TRANSPORTATION KEPT YOU FROM MEDICAL APPOINTMENTS OR FROM GETTING MEDICATIONS?: NO

## 2019-01-01 SDOH — ECONOMIC STABILITY: TRANSPORTATION INSECURITY
IN THE PAST 12 MONTHS, HAS LACK OF TRANSPORTATION KEPT YOU FROM MEETINGS, WORK, OR FROM GETTING THINGS NEEDED FOR DAILY LIVING?: NO

## 2019-01-01 ASSESSMENT — ENCOUNTER SYMPTOMS
GASTROINTESTINAL NEGATIVE: 1
RESPIRATORY NEGATIVE: 1

## 2019-09-02 ENCOUNTER — APPOINTMENT (OUTPATIENT)
Dept: CT IMAGING | Age: 84
End: 2019-09-02
Payer: MEDICARE

## 2019-09-02 ENCOUNTER — HOSPITAL ENCOUNTER (EMERGENCY)
Age: 84
Discharge: HOME | End: 2019-09-03
Payer: MEDICARE

## 2019-09-02 ENCOUNTER — APPOINTMENT (OUTPATIENT)
Dept: GENERAL RADIOLOGY | Age: 84
End: 2019-09-02
Payer: MEDICARE
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
## 2019-09-02 DIAGNOSIS — S01.01XA LACERATION OF SCALP, INITIAL ENCOUNTER: ICD-10-CM

## 2019-09-02 LAB
EKG ATRIAL RATE: 53 BPM
EKG Q-T INTERVAL: 456 MS
EKG QRS DURATION: 118 MS
EKG QTC CALCULATION (BAZETT): 502 MS
EKG R AXIS: -86 DEGREES
EKG T AXIS: 82 DEGREES
EKG VENTRICULAR RATE: 73 BPM

## 2019-09-02 PROCEDURE — 72125 CT NECK SPINE W/O DYE: CPT

## 2019-09-02 PROCEDURE — 73130 X-RAY EXAM OF HAND: CPT

## 2019-09-02 PROCEDURE — 70450 CT HEAD/BRAIN W/O DYE: CPT

## 2019-09-02 PROCEDURE — 99284 EMERGENCY DEPT VISIT MOD MDM: CPT

## 2019-09-02 PROCEDURE — 93005 ELECTROCARDIOGRAM TRACING: CPT | Performed by: NURSE PRACTITIONER

## 2019-09-02 ASSESSMENT — PAIN DESCRIPTION - LOCATION
LOCATION: FINGER (COMMENT WHICH ONE);HAND
LOCATION: FINGER (COMMENT WHICH ONE);HAND

## 2019-09-02 ASSESSMENT — PAIN DESCRIPTION - FREQUENCY
FREQUENCY: CONTINUOUS
FREQUENCY: CONTINUOUS

## 2019-09-02 ASSESSMENT — ENCOUNTER SYMPTOMS
BACK PAIN: 0
COUGH: 0
SORE THROAT: 0
DIARRHEA: 0
NAUSEA: 0
SINUS PAIN: 0
TROUBLE SWALLOWING: 0
COLOR CHANGE: 0
RECTAL PAIN: 0
CONSTIPATION: 0
ABDOMINAL PAIN: 0
SINUS PRESSURE: 0

## 2019-09-02 ASSESSMENT — PAIN DESCRIPTION - ORIENTATION
ORIENTATION: RIGHT
ORIENTATION: RIGHT

## 2019-09-02 ASSESSMENT — PAIN DESCRIPTION - DESCRIPTORS
DESCRIPTORS: ACHING
DESCRIPTORS: ACHING

## 2019-09-02 ASSESSMENT — PAIN SCALES - GENERAL
PAINLEVEL_OUTOF10: 3
PAINLEVEL_OUTOF10: 2

## 2019-09-02 ASSESSMENT — PAIN DESCRIPTION - PAIN TYPE
TYPE: ACUTE PAIN
TYPE: ACUTE PAIN

## 2019-09-03 ENCOUNTER — APPOINTMENT (OUTPATIENT)
Dept: GENERAL RADIOLOGY | Age: 84
End: 2019-09-03
Payer: MEDICARE

## 2019-09-03 ENCOUNTER — TELEPHONE (OUTPATIENT)
Dept: FAMILY MEDICINE CLINIC | Age: 84
End: 2019-09-03

## 2019-09-03 VITALS
DIASTOLIC BLOOD PRESSURE: 67 MMHG | WEIGHT: 110 LBS | HEART RATE: 78 BPM | TEMPERATURE: 98.6 F | OXYGEN SATURATION: 95 % | RESPIRATION RATE: 16 BRPM | HEIGHT: 57 IN | BODY MASS INDEX: 23.73 KG/M2 | SYSTOLIC BLOOD PRESSURE: 122 MMHG

## 2019-09-03 PROCEDURE — 93010 ELECTROCARDIOGRAM REPORT: CPT | Performed by: INTERNAL MEDICINE

## 2019-09-03 PROCEDURE — 2709999900 HC NON-CHARGEABLE SUPPLY

## 2019-09-03 PROCEDURE — 73030 X-RAY EXAM OF SHOULDER: CPT

## 2019-09-03 PROCEDURE — 71101 X-RAY EXAM UNILAT RIBS/CHEST: CPT

## 2019-09-03 ASSESSMENT — PAIN DESCRIPTION - ORIENTATION: ORIENTATION: RIGHT

## 2019-09-03 ASSESSMENT — PAIN DESCRIPTION - FREQUENCY: FREQUENCY: CONTINUOUS

## 2019-09-03 ASSESSMENT — PAIN SCALES - GENERAL: PAINLEVEL_OUTOF10: 2

## 2019-09-03 ASSESSMENT — PAIN DESCRIPTION - PAIN TYPE: TYPE: ACUTE PAIN

## 2019-09-03 ASSESSMENT — PAIN DESCRIPTION - DESCRIPTORS: DESCRIPTORS: ACHING

## 2019-09-03 ASSESSMENT — PAIN DESCRIPTION - LOCATION: LOCATION: FINGER (COMMENT WHICH ONE)

## 2019-09-03 NOTE — ED NOTES
Report called to [facility] assisted living. Paperwork [place] to [place] for transport back. Pt updated.       Rick Aly RN  09/02/19 [id number]

## 2019-09-03 NOTE — ED NOTES
Pt is leaving ED for further testing at this time while we are waiting on transport. New bruising appeared and was noticed on her right middle of back area.            Ana Lilia Goldman RN  09/03/19 1915

## 2019-09-03 NOTE — ED PROVIDER NOTES
light-headedness, numbness and headaches. Psychiatric/Behavioral: Negative for agitation, confusion, decreased concentration and self-injury. PAST MEDICAL HISTORY    has a past medical history of ARNOLD (acute kidney injury) ([facility] Anxiety and depression, Anxiety and depression, Atrial fibrillation ([address] Blood transfusion reaction, CAD (coronary artery disease), Cerebrovascular disease, CKD (chronic kidney disease), Diastolic congestive heart failure ([address] DJD (degenerative joint disease), cervical, GERD (gastroesophageal reflux disease), Hiatal hernia, History of blood transfusion, Hyperlipidemia, Hypertension, Hypoalbuminemia, Hypothyroidism, Mild protein-calorie malnutrition ([address] NSTEMI (non-ST elevated myocardial infarction) ([facility] Pancreatitis due to biliary obstruction, Pneumonia, organism unspecified(486), Scoliosis, SDH (subdural hematoma) ([facility] Thyroid disease, Unspecified cerebral artery occlusion with cerebral infarction, and Uterine cancer ([facility] SURGICAL HISTORY      has a past surgical history that includes Hysterectomy; Coronary artery bypass graft (9/27/11); Appendectomy; back surgery; Colonoscopy; Endoscopy, colon, diagnostic; eye surgery; Cardiac surgery; skin biopsy; [facility] (12/25/2014); Dilatation, esophagus; vascular surgery; Refractive surgery (Left); pr egd balloon dilation esophagus <30 mm diam (N/A, 11/22/2017); and pr esophagogastroduodenoscopy transoral diagnostic (11/22/2017).     CURRENT MEDICATIONS       Discharge Medication List as of 9/3/2019  1:05 AM      CONTINUE these medications which have NOT CHANGED    Details   latanoprost (XALATAN) 0.005 % ophthalmic solution Place 1 drop into both eyes nightlyHistorical Med      propylene glycol-glycerin (EQ ARTIFICIAL TEARS) 1-0.3 % SOLN ophthalmic solution Place 1 drop into both eyes 4 times daily as needed for Dry Eyes or Irritation, Both Eyes, 4 TIMES DAILY PRN Starting Wed 10/17/2018, Disp-1 Bottle, R-2, Print exhibits no distension and no mass. There is no tenderness. There is no guarding. Musculoskeletal:        Arms:       Hands:  Lymphadenopathy:     She has no cervical adenopathy. Neurological: She is alert and oriented to person, place, and time. She has normal strength. No sensory deficit. Coordination normal. GCS eye subscore is 4. GCS verbal subscore is 5. GCS motor subscore is 6. Skin: Skin is warm and dry. Capillary refill takes less than 2 seconds. No rash noted. She is not diaphoretic. No erythema. No pallor. DIFFERENTIAL DIAGNOSIS:   Fall, closed head injury, wrist sprain, finger fracture    DIAGNOSTIC RESULTS     EKG: All EKG's are interpreted by the Emergency Department Physician who either signs or Co-signs this chart in the absence of a cardiologist.    Paced rhythm of 73 bpm, , QTC of 502. RADIOLOGY: non-plainfilm images(s) such as CT, Ultrasound and MRI are read by the radiologist.    XR RIBS RIGHT INCLUDE CHEST (MIN 3 VIEWS)   Final Result    No acute rib fracture. Old healed right sixth, ninth and 10th rib fractures. No acute cardiopulmonary disease. Mild cardiomegaly status post CABG surgery. Large hiatal hernia. **This report has been created using voice recognition software. It may contain minor errors which are inherent in voice recognition technology. **      Final report electronically signed by Dr. Dariela Cuellar on 9/3/2019 12:45 AM      XR SHOULDER RIGHT (MIN 2 VIEWS)   Final Result    No acute fracture or dislocation. Additional findings as detailed above. **This report has been created using voice recognition software. It may contain minor errors which are inherent in voice recognition technology. **      Final report electronically signed by Dr. Dariela Cuellar on 9/3/2019 12:48 AM      CT Head WO Contrast   Final Result      No acute ischemic infarct, hemorrhage, or mass effect. Old infarcts as discussed above.    Aging changes as discussed

## 2019-09-03 NOTE — ED NOTES
Pt resting comfortably on cot at this time. No complains. VSS. Waiting on testing results to be finalized at this time. Pt updated on POC and daughter called at this time and was given information.       Carmelita Jorge RN  09/02/19 [id number]

## 2019-09-10 ENCOUNTER — CARE COORDINATION (OUTPATIENT)
Dept: CARE COORDINATION | Age: 84
End: 2019-09-10

## 2019-09-10 SDOH — ECONOMIC STABILITY: FOOD INSECURITY: WITHIN THE PAST 12 MONTHS, THE FOOD YOU BOUGHT JUST DIDN'T LAST AND YOU DIDN'T HAVE MONEY TO GET MORE.: NEVER TRUE

## 2019-09-10 SDOH — ECONOMIC STABILITY: FOOD INSECURITY: WITHIN THE PAST 12 MONTHS, YOU WORRIED THAT YOUR FOOD WOULD RUN OUT BEFORE YOU GOT MONEY TO BUY MORE.: NEVER TRUE

## 2019-09-10 SDOH — ECONOMIC STABILITY: INCOME INSECURITY: HOW HARD IS IT FOR YOU TO PAY FOR THE VERY BASICS LIKE FOOD, HOUSING, MEDICAL CARE, AND HEATING?: NOT HARD AT ALL

## 2019-09-20 ENCOUNTER — CARE COORDINATION (OUTPATIENT)
Dept: CARE COORDINATION | Age: 84
End: 2019-09-20

## 2019-09-27 LAB
CHOLESTEROL, TOTAL: 100 MG/DL
CHOLESTEROL/HDL RATIO: NORMAL
HDLC SERPL-MCNC: 39 MG/DL (ref 35–70)
LDL CHOLESTEROL CALCULATED: 49 MG/DL (ref 0–160)
TRIGL SERPL-MCNC: 60 MG/DL
TSH SERPL DL<=0.05 MIU/L-ACNC: 0.93 UIU/ML
VLDLC SERPL CALC-MCNC: 12 MG/DL

## 2019-10-17 ENCOUNTER — CARE COORDINATION (OUTPATIENT)
Dept: CARE COORDINATION | Age: 84
End: 2019-10-17

## 2019-12-17 PROBLEM — J20.8 ACUTE VIRAL BRONCHITIS: Status: RESOLVED | Noted: 2017-12-29 | Resolved: 2019-01-01

## 2019-12-17 PROBLEM — E44.0 MODERATE MALNUTRITION (HCC): Status: RESOLVED | Noted: 2018-02-16 | Resolved: 2019-01-01

## 2019-12-17 PROBLEM — S01.81XA LACERATION OF TEMPLE: Status: RESOLVED | Noted: 2018-02-18 | Resolved: 2019-01-01

## 2019-12-17 PROBLEM — N18.2 CKD (CHRONIC KIDNEY DISEASE) STAGE 2, GFR 60-89 ML/MIN: Status: RESOLVED | Noted: 2018-06-19 | Resolved: 2019-01-01

## 2019-12-17 PROBLEM — E43 SEVERE MALNUTRITION (HCC): Status: RESOLVED | Noted: 2018-06-14 | Resolved: 2019-01-01

## 2019-12-17 PROBLEM — N30.00 ACUTE CYSTITIS: Status: RESOLVED | Noted: 2017-12-29 | Resolved: 2019-01-01

## 2019-12-17 PROBLEM — J15.9 BACTERIAL PNEUMONIA: Status: RESOLVED | Noted: 2018-02-18 | Resolved: 2019-01-01
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
## 2019-12-17 PROBLEM — K85.10 GALLSTONE PANCREATITIS: Status: RESOLVED | Noted: 2018-08-23 | Resolved: 2019-01-01

## 2019-12-17 PROBLEM — J96.01 ACUTE RESPIRATORY FAILURE WITH HYPOXIA (HCC): Status: RESOLVED | Noted: 2018-02-16 | Resolved: 2019-01-01

## 2019-12-17 PROBLEM — R60.0 BILATERAL LEG EDEMA: Status: RESOLVED | Noted: 2017-12-29 | Resolved: 2019-01-01

## 2019-12-17 PROBLEM — J11.1 INFLUENZA: Status: RESOLVED | Noted: 2018-02-16 | Resolved: 2019-01-01

## 2019-12-17 PROBLEM — E43 SEVERE MALNUTRITION (HCC): Status: RESOLVED | Noted: 2018-06-19 | Resolved: 2019-01-01
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
## 2019-12-17 PROBLEM — S40.021S: Status: RESOLVED | Noted: 2018-02-18 | Resolved: 2019-01-01

## 2019-12-17 PROBLEM — E66.3 OVERWEIGHT (BMI 25.0-29.9): Status: RESOLVED | Noted: 2018-06-19 | Resolved: 2019-01-01

## 2020-01-01 ENCOUNTER — HOSPITAL ENCOUNTER (INPATIENT)
Age: 85
LOS: 2 days | Discharge: NURSING, REHAB OR LONG-TERM CARE | DRG: 872 | End: 2020-08-23
Attending: FAMILY MEDICINE | Admitting: FAMILY MEDICINE
Payer: MEDICARE

## 2020-01-01 ENCOUNTER — APPOINTMENT (OUTPATIENT)
Dept: GENERAL RADIOLOGY | Age: 85
DRG: 682 | End: 2020-01-01
Payer: MEDICARE

## 2020-01-01 ENCOUNTER — TELEPHONE (OUTPATIENT)
Dept: FAMILY MEDICINE CLINIC | Age: 85
End: 2020-01-01

## 2020-01-01 ENCOUNTER — CARE COORDINATION (OUTPATIENT)
Dept: CASE MANAGEMENT | Age: 85
End: 2020-01-01

## 2020-01-01 ENCOUNTER — VIRTUAL VISIT (OUTPATIENT)
Dept: FAMILY MEDICINE CLINIC | Age: 85
End: 2020-01-01
Payer: MEDICARE

## 2020-01-01 ENCOUNTER — HOSPITAL ENCOUNTER (EMERGENCY)
Age: 85
Discharge: HOME | End: 2020-09-19
Payer: MEDICARE

## 2020-01-01 ENCOUNTER — APPOINTMENT (OUTPATIENT)
Dept: GENERAL RADIOLOGY | Age: 85
End: 2020-01-01
Payer: MEDICARE

## 2020-01-01 ENCOUNTER — TELEPHONE (OUTPATIENT)
Dept: UROLOGY | Age: 85
End: 2020-01-01

## 2020-01-01 ENCOUNTER — APPOINTMENT (OUTPATIENT)
Dept: GENERAL RADIOLOGY | Age: 85
DRG: 872 | End: 2020-01-01
Payer: MEDICARE

## 2020-01-01 ENCOUNTER — OFFICE VISIT (OUTPATIENT)
Dept: UROLOGY | Age: 85
End: 2020-01-01
Payer: MEDICARE

## 2020-01-01 ENCOUNTER — HOSPITAL ENCOUNTER (INPATIENT)
Age: 85
LOS: 6 days | Discharge: NURSING, REHAB OR LONG-TERM CARE | DRG: 682 | End: 2020-07-15
Attending: EMERGENCY MEDICINE | Admitting: FAMILY MEDICINE
Payer: MEDICARE

## 2020-01-01 ENCOUNTER — TELEPHONE (OUTPATIENT)
Dept: ADMINISTRATIVE | Age: 85
End: 2020-01-01

## 2020-01-01 ENCOUNTER — APPOINTMENT (OUTPATIENT)
Dept: CT IMAGING | Age: 85
DRG: 682 | End: 2020-01-01
Payer: MEDICARE

## 2020-01-01 ENCOUNTER — HOSPITAL ENCOUNTER (EMERGENCY)
Age: 85
Discharge: HOME HEALTH | End: 2020-10-06
Payer: MEDICARE

## 2020-01-01 ENCOUNTER — OFFICE VISIT (OUTPATIENT)
Dept: FAMILY MEDICINE CLINIC | Age: 85
End: 2020-01-01
Payer: MEDICARE

## 2020-01-01 ENCOUNTER — APPOINTMENT (OUTPATIENT)
Dept: MRI IMAGING | Age: 85
DRG: 682 | End: 2020-01-01
Payer: MEDICARE

## 2020-01-01 VITALS
WEIGHT: 111.4 LBS | RESPIRATION RATE: 18 BRPM | TEMPERATURE: 97.2 F | DIASTOLIC BLOOD PRESSURE: 52 MMHG | HEART RATE: 68 BPM | SYSTOLIC BLOOD PRESSURE: 122 MMHG | BODY MASS INDEX: 24.11 KG/M2

## 2020-01-01 VITALS
HEART RATE: 59 BPM | OXYGEN SATURATION: 95 % | HEIGHT: 55 IN | WEIGHT: 108.1 LBS | RESPIRATION RATE: 16 BRPM | DIASTOLIC BLOOD PRESSURE: 60 MMHG | SYSTOLIC BLOOD PRESSURE: 108 MMHG | TEMPERATURE: 97.1 F | BODY MASS INDEX: 25.02 KG/M2

## 2020-01-01 VITALS
RESPIRATION RATE: 18 BRPM | BODY MASS INDEX: 24.61 KG/M2 | WEIGHT: 114.1 LBS | TEMPERATURE: 98.3 F | HEART RATE: 116 BPM | DIASTOLIC BLOOD PRESSURE: 69 MMHG | OXYGEN SATURATION: 93 % | SYSTOLIC BLOOD PRESSURE: 135 MMHG | HEIGHT: 57 IN

## 2020-01-01 VITALS
OXYGEN SATURATION: 94 % | RESPIRATION RATE: 18 BRPM | DIASTOLIC BLOOD PRESSURE: 66 MMHG | SYSTOLIC BLOOD PRESSURE: 151 MMHG | TEMPERATURE: 97.3 F | HEIGHT: 57 IN | HEART RATE: 71 BPM | BODY MASS INDEX: 24.4 KG/M2 | WEIGHT: 113.1 LBS

## 2020-01-01 VITALS
WEIGHT: 108 LBS | TEMPERATURE: 97.8 F | RESPIRATION RATE: 22 BRPM | SYSTOLIC BLOOD PRESSURE: 144 MMHG | OXYGEN SATURATION: 91 % | HEART RATE: 118 BPM | BODY MASS INDEX: 25.1 KG/M2 | DIASTOLIC BLOOD PRESSURE: 76 MMHG

## 2020-01-01 VITALS
SYSTOLIC BLOOD PRESSURE: 118 MMHG | RESPIRATION RATE: 20 BRPM | HEART RATE: 68 BPM | BODY MASS INDEX: 23.37 KG/M2 | TEMPERATURE: 97.8 F | DIASTOLIC BLOOD PRESSURE: 59 MMHG | OXYGEN SATURATION: 96 % | WEIGHT: 108 LBS

## 2020-01-01 VITALS — TEMPERATURE: 97.3 F | WEIGHT: 108 LBS | HEIGHT: 55 IN | BODY MASS INDEX: 24.99 KG/M2

## 2020-01-01 LAB
ACINETOBACTER BAUMANNII FILM ARRAY: NOT DETECTED
ALBUMIN SERPL-MCNC: 3.8 G/DL (ref 3.5–5.1)
ALBUMIN SERPL-MCNC: 3.9 G/DL (ref 3.5–5.1)
ALBUMIN SERPL-MCNC: 4.1 G/DL (ref 3.5–5.1)
ALP BLD-CCNC: 39 U/L (ref 38–126)
ALP BLD-CCNC: 40 U/L (ref 38–126)
ALP BLD-CCNC: 58 U/L (ref 38–126)
ALT SERPL-CCNC: 10 U/L (ref 11–66)
ALT SERPL-CCNC: 11 U/L (ref 11–66)
ALT SERPL-CCNC: 13 U/L (ref 11–66)
AMMONIA: 42 UMOL/L (ref 11–60)
AMMONIA: 46 UMOL/L (ref 11–60)
ANION GAP SERPL CALCULATED.3IONS-SCNC: 10 MEQ/L (ref 8–16)
ANION GAP SERPL CALCULATED.3IONS-SCNC: 11 MEQ/L (ref 8–16)
ANION GAP SERPL CALCULATED.3IONS-SCNC: 12 MEQ/L (ref 8–16)
ANION GAP SERPL CALCULATED.3IONS-SCNC: 12 MEQ/L (ref 8–16)
ANION GAP SERPL CALCULATED.3IONS-SCNC: 13 MEQ/L (ref 8–16)
ANION GAP SERPL CALCULATED.3IONS-SCNC: 14 MEQ/L (ref 8–16)
ANION GAP SERPL CALCULATED.3IONS-SCNC: 14 MEQ/L (ref 8–16)
ANION GAP SERPL CALCULATED.3IONS-SCNC: 9 MEQ/L (ref 8–16)
ANISOCYTOSIS: ABNORMAL
ANISOCYTOSIS: PRESENT
APTT: 27.8 SECONDS (ref 22–38)
AST SERPL-CCNC: 24 U/L (ref 5–40)
AST SERPL-CCNC: 26 U/L (ref 5–40)
AST SERPL-CCNC: 28 U/L (ref 5–40)
BACTERIA: ABNORMAL /HPF
BASOPHILIA: ABNORMAL
BASOPHILS # BLD: 0.3 %
BASOPHILS # BLD: 0.3 %
BASOPHILS # BLD: 0.4 %
BASOPHILS # BLD: 0.4 %
BASOPHILS # BLD: 0.5 %
BASOPHILS # BLD: 0.6 %
BASOPHILS ABSOLUTE: 0 THOU/MM3 (ref 0–0.1)
BILIRUB SERPL-MCNC: 0.8 MG/DL (ref 0.3–1.2)
BILIRUB SERPL-MCNC: 0.9 MG/DL (ref 0.3–1.2)
BILIRUB SERPL-MCNC: 1.1 MG/DL (ref 0.3–1.2)
BILIRUBIN DIRECT: < 0.2 MG/DL (ref 0–0.3)
BILIRUBIN URINE: NEGATIVE
BLOOD CULTURE, ROUTINE: ABNORMAL
BLOOD CULTURE, ROUTINE: ABNORMAL
BLOOD CULTURE, ROUTINE: NORMAL
BLOOD URINE, POC: ABNORMAL
BLOOD, URINE: NEGATIVE
BOTTLE TYPE: ABNORMAL
BUN BLDV-MCNC: 23 MG/DL (ref 7–22)
BUN BLDV-MCNC: 26 MG/DL (ref 7–22)
BUN BLDV-MCNC: 28 MG/DL (ref 7–22)
BUN BLDV-MCNC: 30 MG/DL (ref 7–22)
BUN BLDV-MCNC: 30 MG/DL (ref 7–22)
BUN BLDV-MCNC: 32 MG/DL (ref 7–22)
BUN BLDV-MCNC: 33 MG/DL (ref 7–22)
BUN BLDV-MCNC: 34 MG/DL (ref 7–22)
BUN BLDV-MCNC: 36 MG/DL (ref 7–22)
BUN BLDV-MCNC: 45 MG/DL (ref 7–22)
CALCIUM IONIZED: 1.06 MMOL/L (ref 1.12–1.32)
CALCIUM IONIZED: 1.12 MMOL/L (ref 1.12–1.32)
CALCIUM SERPL-MCNC: 8.7 MG/DL (ref 8.5–10.5)
CALCIUM SERPL-MCNC: 8.9 MG/DL (ref 8.5–10.5)
CALCIUM SERPL-MCNC: 8.9 MG/DL (ref 8.5–10.5)
CALCIUM SERPL-MCNC: 9 MG/DL (ref 8.5–10.5)
CALCIUM SERPL-MCNC: 9.1 MG/DL (ref 8.5–10.5)
CALCIUM SERPL-MCNC: 9.5 MG/DL (ref 8.5–10.5)
CALCIUM SERPL-MCNC: 9.5 MG/DL (ref 8.5–10.5)
CALCIUM SERPL-MCNC: 9.6 MG/DL (ref 8.5–10.5)
CANDIDA ALBICANS FILM ARRAY: NOT DETECTED
CANDIDA GLABRATA FILM ARRAY: NOT DETECTED
CANDIDA KRUSEI FILM ARRAY: NOT DETECTED
CANDIDA PARAPSILOSIS FILM ARRAY: NOT DETECTED
CANDIDA TROPICALIS FILM ARRAY: NOT DETECTED
CARBAPENEM RESITANT FILM ARRAY: ABNORMAL
CASTS 2: ABNORMAL /LPF
CASTS UA: ABNORMAL /LPF
CHARACTER, URINE: CLEAR
CHLORIDE BLD-SCNC: 100 MEQ/L (ref 98–111)
CHLORIDE BLD-SCNC: 102 MEQ/L (ref 98–111)
CHLORIDE BLD-SCNC: 104 MEQ/L (ref 98–111)
CHLORIDE BLD-SCNC: 105 MEQ/L (ref 98–111)
CHLORIDE BLD-SCNC: 106 MEQ/L (ref 98–111)
CHLORIDE BLD-SCNC: 111 MEQ/L (ref 98–111)
CHOLESTEROL, TOTAL: 95 MG/DL
CHOLESTEROL/HDL RATIO: ABNORMAL
CO2: 19 MEQ/L (ref 23–33)
CO2: 20 MEQ/L (ref 23–33)
CO2: 21 MEQ/L (ref 23–33)
CO2: 21 MEQ/L (ref 23–33)
CO2: 22 MEQ/L (ref 23–33)
CO2: 23 MEQ/L (ref 23–33)
CO2: 23 MEQ/L (ref 23–33)
CO2: 24 MEQ/L (ref 23–33)
CO2: 25 MEQ/L (ref 23–33)
CO2: 25 MEQ/L (ref 23–33)
COLOR, URINE: YELLOW
COLOR: YELLOW
CREAT SERPL-MCNC: 1 MG/DL (ref 0.4–1.2)
CREAT SERPL-MCNC: 1 MG/DL (ref 0.4–1.2)
CREAT SERPL-MCNC: 1.1 MG/DL (ref 0.4–1.2)
CREAT SERPL-MCNC: 1.2 MG/DL (ref 0.4–1.2)
CREAT SERPL-MCNC: 1.3 MG/DL (ref 0.4–1.2)
CREAT SERPL-MCNC: 1.3 MG/DL (ref 0.4–1.2)
CREAT SERPL-MCNC: 1.4 MG/DL (ref 0.4–1.2)
CREAT SERPL-MCNC: 1.8 MG/DL (ref 0.4–1.2)
CRENATED RBC'S: ABNORMAL
CRYSTALS, UA: ABNORMAL
EKG ATRIAL RATE: 105 BPM
EKG ATRIAL RATE: 107 BPM
EKG ATRIAL RATE: 125 BPM
EKG ATRIAL RATE: 64 BPM
EKG ATRIAL RATE: 72 BPM
EKG ATRIAL RATE: 97 BPM
EKG P AXIS: 56 DEGREES
EKG P-R INTERVAL: 156 MS
EKG Q-T INTERVAL: 380 MS
EKG Q-T INTERVAL: 390 MS
EKG Q-T INTERVAL: 394 MS
EKG Q-T INTERVAL: 398 MS
EKG Q-T INTERVAL: 424 MS
EKG Q-T INTERVAL: 464 MS
EKG QRS DURATION: 112 MS
EKG QRS DURATION: 112 MS
EKG QRS DURATION: 114 MS
EKG QRS DURATION: 116 MS
EKG QRS DURATION: 120 MS
EKG QRS DURATION: 124 MS
EKG QTC CALCULATION (BAZETT): 482 MS
EKG QTC CALCULATION (BAZETT): 489 MS
EKG QTC CALCULATION (BAZETT): 490 MS
EKG QTC CALCULATION (BAZETT): 502 MS
EKG QTC CALCULATION (BAZETT): 525 MS
EKG QTC CALCULATION (BAZETT): 526 MS
EKG R AXIS: -109 DEGREES
EKG R AXIS: -116 DEGREES
EKG R AXIS: -137 DEGREES
EKG R AXIS: -145 DEGREES
EKG R AXIS: -87 DEGREES
EKG R AXIS: -94 DEGREES
EKG T AXIS: 108 DEGREES
EKG T AXIS: 78 DEGREES
EKG T AXIS: 83 DEGREES
EKG T AXIS: 86 DEGREES
EKG T AXIS: 88 DEGREES
EKG T AXIS: 92 DEGREES
EKG VENTRICULAR RATE: 105 BPM
EKG VENTRICULAR RATE: 105 BPM
EKG VENTRICULAR RATE: 107 BPM
EKG VENTRICULAR RATE: 65 BPM
EKG VENTRICULAR RATE: 80 BPM
EKG VENTRICULAR RATE: 95 BPM
ENTERBACTER CLOACAE FILM ARRAY: NOT DETECTED
ENTERBACTERIACEAE FILM ARRAY: NOT DETECTED
ENTEROCOCCUS FILM ARRAY: NOT DETECTED
EOSINOPHIL # BLD: 0 %
EOSINOPHIL # BLD: 0 %
EOSINOPHIL # BLD: 0.1 %
EOSINOPHIL # BLD: 1 %
EOSINOPHIL # BLD: 1 %
EOSINOPHIL # BLD: 3.3 %
EOSINOPHILS ABSOLUTE: 0 THOU/MM3 (ref 0–0.4)
EOSINOPHILS ABSOLUTE: 0.1 THOU/MM3 (ref 0–0.4)
EOSINOPHILS ABSOLUTE: 0.1 THOU/MM3 (ref 0–0.4)
EOSINOPHILS ABSOLUTE: 0.2 THOU/MM3 (ref 0–0.4)
EPITHELIAL CELLS, UA: ABNORMAL /HPF
ERYTHROCYTE [DISTWIDTH] IN BLOOD BY AUTOMATED COUNT: 16.2 % (ref 11.5–14.5)
ERYTHROCYTE [DISTWIDTH] IN BLOOD BY AUTOMATED COUNT: 16.5 % (ref 11.5–14.5)
ERYTHROCYTE [DISTWIDTH] IN BLOOD BY AUTOMATED COUNT: 16.5 % (ref 11.5–14.5)
ERYTHROCYTE [DISTWIDTH] IN BLOOD BY AUTOMATED COUNT: 16.7 % (ref 11.5–14.5)
ERYTHROCYTE [DISTWIDTH] IN BLOOD BY AUTOMATED COUNT: 16.8 % (ref 11.5–14.5)
ERYTHROCYTE [DISTWIDTH] IN BLOOD BY AUTOMATED COUNT: 16.9 % (ref 11.5–14.5)
ERYTHROCYTE [DISTWIDTH] IN BLOOD BY AUTOMATED COUNT: 17 % (ref 11.5–14.5)
ERYTHROCYTE [DISTWIDTH] IN BLOOD BY AUTOMATED COUNT: 17.3 % (ref 11.5–14.5)
ERYTHROCYTE [DISTWIDTH] IN BLOOD BY AUTOMATED COUNT: 58.2 FL (ref 35–45)
ERYTHROCYTE [DISTWIDTH] IN BLOOD BY AUTOMATED COUNT: 58.8 FL (ref 35–45)
ERYTHROCYTE [DISTWIDTH] IN BLOOD BY AUTOMATED COUNT: 59.1 FL (ref 35–45)
ERYTHROCYTE [DISTWIDTH] IN BLOOD BY AUTOMATED COUNT: 60.2 FL (ref 35–45)
ERYTHROCYTE [DISTWIDTH] IN BLOOD BY AUTOMATED COUNT: 60.7 FL (ref 35–45)
ERYTHROCYTE [DISTWIDTH] IN BLOOD BY AUTOMATED COUNT: 61.1 FL (ref 35–45)
ERYTHROCYTE [DISTWIDTH] IN BLOOD BY AUTOMATED COUNT: 62.2 FL (ref 35–45)
ERYTHROCYTE [DISTWIDTH] IN BLOOD BY AUTOMATED COUNT: 63.7 FL (ref 35–45)
ESCHERICHIA COLI FILM ARRAY: NOT DETECTED
GFR SERPL CREATININE-BSD FRML MDRD: 26 ML/MIN/1.73M2
GFR SERPL CREATININE-BSD FRML MDRD: 35 ML/MIN/1.73M2
GFR SERPL CREATININE-BSD FRML MDRD: 38 ML/MIN/1.73M2
GFR SERPL CREATININE-BSD FRML MDRD: 38 ML/MIN/1.73M2
GFR SERPL CREATININE-BSD FRML MDRD: 42 ML/MIN/1.73M2
GFR SERPL CREATININE-BSD FRML MDRD: 47 ML/MIN/1.73M2
GFR SERPL CREATININE-BSD FRML MDRD: 52 ML/MIN/1.73M2
GFR SERPL CREATININE-BSD FRML MDRD: 52 ML/MIN/1.73M2
GLUCOSE BLD-MCNC: 100 MG/DL (ref 70–108)
GLUCOSE BLD-MCNC: 106 MG/DL (ref 70–108)
GLUCOSE BLD-MCNC: 111 MG/DL (ref 70–108)
GLUCOSE BLD-MCNC: 112 MG/DL (ref 70–108)
GLUCOSE BLD-MCNC: 112 MG/DL (ref 70–108)
GLUCOSE BLD-MCNC: 132 MG/DL (ref 70–108)
GLUCOSE BLD-MCNC: 136 MG/DL (ref 70–108)
GLUCOSE BLD-MCNC: 136 MG/DL (ref 70–108)
GLUCOSE BLD-MCNC: 146 MG/DL (ref 70–108)
GLUCOSE BLD-MCNC: 173 MG/DL (ref 70–108)
GLUCOSE URINE: NEGATIVE MG/DL
HAEMOPHILUS INFLUENZA FILM ARRAY: NOT DETECTED
HCT VFR BLD CALC: 28.8 % (ref 37–47)
HCT VFR BLD CALC: 31.2 % (ref 37–47)
HCT VFR BLD CALC: 32.2 % (ref 37–47)
HCT VFR BLD CALC: 32.6 % (ref 37–47)
HCT VFR BLD CALC: 33.1 % (ref 37–47)
HCT VFR BLD CALC: 33.9 % (ref 37–47)
HCT VFR BLD CALC: 36.1 % (ref 37–47)
HCT VFR BLD CALC: 36.2 % (ref 37–47)
HDLC SERPL-MCNC: 34 MG/DL (ref 35–70)
HEMOGLOBIN: 10 GM/DL (ref 12–16)
HEMOGLOBIN: 10.2 GM/DL (ref 12–16)
HEMOGLOBIN: 10.4 GM/DL (ref 12–16)
HEMOGLOBIN: 10.9 GM/DL (ref 12–16)
HEMOGLOBIN: 8.8 GM/DL (ref 12–16)
HEMOGLOBIN: 9 GM/DL (ref 12–16)
HEMOGLOBIN: 9.6 GM/DL (ref 12–16)
HEMOGLOBIN: 9.9 GM/DL (ref 12–16)
HYPOCHROMIA: PRESENT
HYPOCHROMIA: PRESENT
IMMATURE GRANS (ABS): 0.04 THOU/MM3 (ref 0–0.07)
IMMATURE GRANS (ABS): 0.04 THOU/MM3 (ref 0–0.07)
IMMATURE GRANS (ABS): 0.06 THOU/MM3 (ref 0–0.07)
IMMATURE GRANS (ABS): 0.08 THOU/MM3 (ref 0–0.07)
IMMATURE GRANULOCYTES: 0.6 %
IMMATURE GRANULOCYTES: 0.6 %
IMMATURE GRANULOCYTES: 0.8 %
IMMATURE GRANULOCYTES: 0.9 %
IMMATURE GRANULOCYTES: 0.9 %
IMMATURE GRANULOCYTES: 1 %
INR BLD: 1.09 (ref 0.85–1.13)
INR BLD: 1.22 (ref 0.85–1.13)
KETONES, URINE: ABNORMAL
KETONES, URINE: NEGATIVE
KLEBSIELLA OXYTOCA FILM ARRAY: NOT DETECTED
KLEBSIELLA PNEUMONIAE FILM ARRAY: NOT DETECTED
LACTIC ACID: 1.3 MMOL/L (ref 0.5–2.2)
LACTIC ACID: 1.4 MMOL/L (ref 0.5–2.2)
LACTIC ACID: 1.5 MMOL/L (ref 0.5–2.2)
LDL CHOLESTEROL CALCULATED: 45 MG/DL (ref 0–160)
LEUKOCYTE CLUMPS, URINE: NEGATIVE
LEUKOCYTE ESTERASE, URINE: ABNORMAL
LEUKOCYTE ESTERASE, URINE: ABNORMAL
LEUKOCYTE ESTERASE, URINE: NEGATIVE
LEUKOCYTE ESTERASE, URINE: NEGATIVE
LIPASE: 44.1 U/L (ref 5.6–51.3)
LISTERIA MONOCYTOGENES FILM ARRAY: NOT DETECTED
LYMPHOCYTES # BLD: 12.1 %
LYMPHOCYTES # BLD: 16.1 %
LYMPHOCYTES # BLD: 4.1 %
LYMPHOCYTES # BLD: 4.5 %
LYMPHOCYTES # BLD: 7.2 %
LYMPHOCYTES # BLD: 9.2 %
LYMPHOCYTES ABSOLUTE: 0.3 THOU/MM3 (ref 1–4.8)
LYMPHOCYTES ABSOLUTE: 0.4 THOU/MM3 (ref 1–4.8)
LYMPHOCYTES ABSOLUTE: 0.5 THOU/MM3 (ref 1–4.8)
LYMPHOCYTES ABSOLUTE: 0.5 THOU/MM3 (ref 1–4.8)
LYMPHOCYTES ABSOLUTE: 1.1 THOU/MM3 (ref 1–4.8)
LYMPHOCYTES ABSOLUTE: 1.1 THOU/MM3 (ref 1–4.8)
MAGNESIUM: 2.1 MG/DL (ref 1.6–2.4)
MAGNESIUM: 2.2 MG/DL (ref 1.6–2.4)
MCH RBC QN AUTO: 27.6 PG (ref 26–33)
MCH RBC QN AUTO: 29.2 PG (ref 26–33)
MCH RBC QN AUTO: 29.3 PG (ref 26–33)
MCH RBC QN AUTO: 29.3 PG (ref 26–33)
MCH RBC QN AUTO: 29.8 PG (ref 26–33)
MCH RBC QN AUTO: 29.9 PG (ref 26–33)
MCH RBC QN AUTO: 30.1 PG (ref 26–33)
MCH RBC QN AUTO: 30.2 PG (ref 26–33)
MCHC RBC AUTO-ENTMCNC: 28.7 GM/DL (ref 32.2–35.5)
MCHC RBC AUTO-ENTMCNC: 28.8 GM/DL (ref 32.2–35.5)
MCHC RBC AUTO-ENTMCNC: 29 GM/DL (ref 32.2–35.5)
MCHC RBC AUTO-ENTMCNC: 30.1 GM/DL (ref 32.2–35.5)
MCHC RBC AUTO-ENTMCNC: 30.2 GM/DL (ref 32.2–35.5)
MCHC RBC AUTO-ENTMCNC: 30.4 GM/DL (ref 32.2–35.5)
MCHC RBC AUTO-ENTMCNC: 30.6 GM/DL (ref 32.2–35.5)
MCHC RBC AUTO-ENTMCNC: 31.1 GM/DL (ref 32.2–35.5)
MCV RBC AUTO: 100.9 FL (ref 81–99)
MCV RBC AUTO: 101.7 FL (ref 81–99)
MCV RBC AUTO: 95.7 FL (ref 81–99)
MCV RBC AUTO: 96.4 FL (ref 81–99)
MCV RBC AUTO: 97 FL (ref 81–99)
MCV RBC AUTO: 99 FL (ref 81–99)
MCV RBC AUTO: 99.1 FL (ref 81–99)
MCV RBC AUTO: 99.2 FL (ref 81–99)
METHICILLIN RESISTANT FILM ARRAY: DETECTED
MISCELLANEOUS 2: ABNORMAL
MONOCYTES # BLD: 1.2 %
MONOCYTES # BLD: 2.9 %
MONOCYTES # BLD: 4.5 %
MONOCYTES # BLD: 7.3 %
MONOCYTES # BLD: 9 %
MONOCYTES # BLD: 9.4 %
MONOCYTES ABSOLUTE: 0.1 THOU/MM3 (ref 0.4–1.3)
MONOCYTES ABSOLUTE: 0.2 THOU/MM3 (ref 0.4–1.3)
MONOCYTES ABSOLUTE: 0.3 THOU/MM3 (ref 0.4–1.3)
MONOCYTES ABSOLUTE: 0.6 THOU/MM3 (ref 0.4–1.3)
MONOCYTES ABSOLUTE: 0.6 THOU/MM3 (ref 0.4–1.3)
MONOCYTES ABSOLUTE: 0.7 THOU/MM3 (ref 0.4–1.3)
MRSA SCREEN RT-PCR: NEGATIVE
MRSA SCREEN: NORMAL
NEISSERIA MENIGITIDIS FILM ARRAY: NOT DETECTED
NITRITE, URINE: NEGATIVE
NITRITE, URINE: POSITIVE
NITRITE, URINE: POSITIVE
NONHDLC SERPL-MCNC: ABNORMAL MG/DL
NUCLEATED RED BLOOD CELLS: 0 /100 WBC
ORGANISM: ABNORMAL
ORGANISM: ABNORMAL
OSMOLALITY CALCULATION: 283.9 MOSMOL/KG (ref 275–300)
OSMOLALITY CALCULATION: 286.9 MOSMOL/KG (ref 275–300)
OSMOLALITY CALCULATION: 291.7 MOSMOL/KG (ref 275–300)
PATHOLOGIST REVIEW: ABNORMAL
PERFORMING LAB: NORMAL
PH UA: 5.5 (ref 5–9)
PH UA: 6 (ref 5–9)
PH UA: 6.5 (ref 5–9)
PH UA: >= 9 (ref 5–9)
PH, URINE: 6.5 (ref 5–9)
PHOSPHORUS: 3.2 MG/DL (ref 2.4–4.7)
PHOSPHORUS: 3.8 MG/DL (ref 2.4–4.7)
PLATELET # BLD: 103 THOU/MM3 (ref 130–400)
PLATELET # BLD: 123 THOU/MM3 (ref 130–400)
PLATELET # BLD: 123 THOU/MM3 (ref 130–400)
PLATELET # BLD: 128 THOU/MM3 (ref 130–400)
PLATELET # BLD: 141 THOU/MM3 (ref 130–400)
PLATELET # BLD: 144 THOU/MM3 (ref 130–400)
PLATELET # BLD: 146 THOU/MM3 (ref 130–400)
PLATELET # BLD: 154 THOU/MM3 (ref 130–400)
PLATELET ESTIMATE: ADEQUATE
PLATELET ESTIMATE: ADEQUATE
PMV BLD AUTO: 10.2 FL (ref 9.4–12.4)
PMV BLD AUTO: 10.4 FL (ref 9.4–12.4)
PMV BLD AUTO: 10.7 FL (ref 9.4–12.4)
PMV BLD AUTO: 10.9 FL (ref 9.4–12.4)
PMV BLD AUTO: 10.9 FL (ref 9.4–12.4)
PMV BLD AUTO: 11.1 FL (ref 9.4–12.4)
POIKILOCYTES: ABNORMAL
POIKILOCYTES: ABNORMAL
POST VOID RESIDUAL (PVR): 0 ML
POTASSIUM REFLEX MAGNESIUM: 4.2 MEQ/L (ref 3.5–5.2)
POTASSIUM REFLEX MAGNESIUM: 4.6 MEQ/L (ref 3.5–5.2)
POTASSIUM REFLEX MAGNESIUM: 4.7 MEQ/L (ref 3.5–5.2)
POTASSIUM REFLEX MAGNESIUM: 4.9 MEQ/L (ref 3.5–5.2)
POTASSIUM REFLEX MAGNESIUM: 4.9 MEQ/L (ref 3.5–5.2)
POTASSIUM SERPL-SCNC: 4.2 MEQ/L (ref 3.5–5.2)
POTASSIUM SERPL-SCNC: 4.4 MEQ/L (ref 3.5–5.2)
POTASSIUM SERPL-SCNC: 4.6 MEQ/L (ref 3.5–5.2)
POTASSIUM SERPL-SCNC: 4.7 MEQ/L (ref 3.5–5.2)
POTASSIUM SERPL-SCNC: 5 MEQ/L (ref 3.5–5.2)
PRO-BNP: 3702 PG/ML (ref 0–1800)
PRO-BNP: 7115 PG/ML (ref 0–1800)
PROCALCITONIN: 0.15 NG/ML (ref 0.01–0.09)
PROCALCITONIN: 0.21 NG/ML (ref 0.01–0.09)
PROTEIN UA: 30
PROTEIN UA: ABNORMAL
PROTEIN UA: NEGATIVE
PROTEIN UA: NEGATIVE MG/DL
PROTEIN, URINE: ABNORMAL MG/DL
PROTEUS FILM ARRAY: NOT DETECTED
PSEUDOMONAS AERUGINOSA FILM ARRAY: NOT DETECTED
RBC # BLD: 2.91 MILL/MM3 (ref 4.2–5.4)
RBC # BLD: 3.26 MILL/MM3 (ref 4.2–5.4)
RBC # BLD: 3.28 MILL/MM3 (ref 4.2–5.4)
RBC # BLD: 3.32 MILL/MM3 (ref 4.2–5.4)
RBC # BLD: 3.38 MILL/MM3 (ref 4.2–5.4)
RBC # BLD: 3.42 MILL/MM3 (ref 4.2–5.4)
RBC # BLD: 3.56 MILL/MM3 (ref 4.2–5.4)
RBC # BLD: 3.64 MILL/MM3 (ref 4.2–5.4)
RBC URINE: ABNORMAL /HPF
REASON FOR REJECTION: NORMAL
REJECTED TEST: NORMAL
RENAL EPITHELIAL, UA: ABNORMAL
REPORT: NORMAL
SARS-COV-2, NAAT: NOT DETECTED
SARS-COV-2, NAAT: NOT DETECTED
SARS-COV-2: NOT DETECTED
SCAN OF BLOOD SMEAR: NORMAL
SCAN OF BLOOD SMEAR: NORMAL
SCHISTOCYTES: ABNORMAL
SEG NEUTROPHILS: 72.5 %
SEG NEUTROPHILS: 78.6 %
SEG NEUTROPHILS: 79.3 %
SEG NEUTROPHILS: 84.9 %
SEG NEUTROPHILS: 91.7 %
SEG NEUTROPHILS: 92.8 %
SEGMENTED NEUTROPHILS ABSOLUTE COUNT: 4.2 THOU/MM3 (ref 1.8–7.7)
SEGMENTED NEUTROPHILS ABSOLUTE COUNT: 5 THOU/MM3 (ref 1.8–7.7)
SEGMENTED NEUTROPHILS ABSOLUTE COUNT: 5.2 THOU/MM3 (ref 1.8–7.7)
SEGMENTED NEUTROPHILS ABSOLUTE COUNT: 5.4 THOU/MM3 (ref 1.8–7.7)
SEGMENTED NEUTROPHILS ABSOLUTE COUNT: 7.4 THOU/MM3 (ref 1.8–7.7)
SEGMENTED NEUTROPHILS ABSOLUTE COUNT: 8.4 THOU/MM3 (ref 1.8–7.7)
SERRATIA MARCESCENS FILM ARRAY: NOT DETECTED
SODIUM BLD-SCNC: 136 MEQ/L (ref 135–145)
SODIUM BLD-SCNC: 137 MEQ/L (ref 135–145)
SODIUM BLD-SCNC: 138 MEQ/L (ref 135–145)
SODIUM BLD-SCNC: 139 MEQ/L (ref 135–145)
SODIUM BLD-SCNC: 141 MEQ/L (ref 135–145)
SODIUM BLD-SCNC: 141 MEQ/L (ref 135–145)
SODIUM BLD-SCNC: 142 MEQ/L (ref 135–145)
SODIUM BLD-SCNC: 142 MEQ/L (ref 135–145)
SOURCE OF BLOOD CULTURE: ABNORMAL
SPECIFIC GRAVITY UA: 1.02 (ref 1–1.03)
SPECIFIC GRAVITY, URINE: 1.01 (ref 1–1.03)
SPECIFIC GRAVITY, URINE: 1.02 (ref 1–1.03)
STAPH AUREUS FILM ARRAY: NOT DETECTED
STAPHYLOCOCCUS FILM ARRAY: DETECTED
STREP AGALACTIAE FILM ARRAY: NOT DETECTED
STREP PNEUMONIAE FILM ARRAY: NOT DETECTED
STREP PYOCGENES FILM ARRAY: NOT DETECTED
STREPTOCOCCUS FILM ARRAY: NOT DETECTED
T4 FREE: 1.64 NG/DL (ref 0.93–1.76)
TOTAL PROTEIN: 6.4 G/DL (ref 6.1–8)
TOTAL PROTEIN: 6.5 G/DL (ref 6.1–8)
TOTAL PROTEIN: 6.7 G/DL (ref 6.1–8)
TRIGL SERPL-MCNC: 79 MG/DL
TROPONIN T: 0.01 NG/ML
TROPONIN T: 0.02 NG/ML
TROPONIN T: 0.02 NG/ML
TROPONIN T: < 0.01 NG/ML
TSH SERPL DL<=0.05 MIU/L-ACNC: 3.17 UIU/ML (ref 0.4–4.2)
TSH SERPL DL<=0.05 MIU/L-ACNC: 3.22 UIU/ML (ref 0.4–4.2)
TSH SERPL DL<=0.05 MIU/L-ACNC: 3.54 UIU/ML
TSH SERPL DL<=0.05 MIU/L-ACNC: 5.81 UIU/ML (ref 0.4–4.2)
URINE CULTURE REFLEX: ABNORMAL
UROBILINOGEN, URINE: 0.2 EU/DL (ref 0.2–1)
UROBILINOGEN, URINE: 0.2 EU/DL (ref 0–1)
VANCOMYCIN RESISTANT ENTEROCOCCUS: NEGATIVE
VANCOMYCIN RESISTANT FILM ARRAY: ABNORMAL
VLDLC SERPL CALC-MCNC: 16 MG/DL
WBC # BLD: 4.9 THOU/MM3 (ref 4.8–10.8)
WBC # BLD: 5.8 THOU/MM3 (ref 4.8–10.8)
WBC # BLD: 6.6 THOU/MM3 (ref 4.8–10.8)
WBC # BLD: 6.9 THOU/MM3 (ref 4.8–10.8)
WBC # BLD: 7.3 THOU/MM3 (ref 4.8–10.8)
WBC # BLD: 8 THOU/MM3 (ref 4.8–10.8)
WBC # BLD: 9.2 THOU/MM3 (ref 4.8–10.8)
WBC # BLD: 9.4 THOU/MM3 (ref 4.8–10.8)
WBC UA: ABNORMAL /HPF
YEAST: ABNORMAL

## 2020-01-01 PROCEDURE — 1036F TOBACCO NON-USER: CPT | Performed by: UROLOGY

## 2020-01-01 PROCEDURE — 94640 AIRWAY INHALATION TREATMENT: CPT

## 2020-01-01 PROCEDURE — 99223 1ST HOSP IP/OBS HIGH 75: CPT | Performed by: FAMILY MEDICINE

## 2020-01-01 PROCEDURE — 80048 BASIC METABOLIC PNL TOTAL CA: CPT

## 2020-01-01 PROCEDURE — 4040F PNEUMOC VAC/ADMIN/RCVD: CPT | Performed by: UROLOGY

## 2020-01-01 PROCEDURE — 1200000003 HC TELEMETRY R&B

## 2020-01-01 PROCEDURE — 1090F PRES/ABSN URINE INCON ASSESS: CPT | Performed by: FAMILY MEDICINE

## 2020-01-01 PROCEDURE — 70450 CT HEAD/BRAIN W/O DYE: CPT

## 2020-01-01 PROCEDURE — 6370000000 HC RX 637 (ALT 250 FOR IP): Performed by: STUDENT IN AN ORGANIZED HEALTH CARE EDUCATION/TRAINING PROGRAM

## 2020-01-01 PROCEDURE — 99213 OFFICE O/P EST LOW 20 MIN: CPT

## 2020-01-01 PROCEDURE — 81001 URINALYSIS AUTO W/SCOPE: CPT

## 2020-01-01 PROCEDURE — 51798 US URINE CAPACITY MEASURE: CPT | Performed by: UROLOGY

## 2020-01-01 PROCEDURE — 1123F ACP DISCUSS/DSCN MKR DOCD: CPT | Performed by: UROLOGY

## 2020-01-01 PROCEDURE — 2580000003 HC RX 258: Performed by: NURSE PRACTITIONER

## 2020-01-01 PROCEDURE — 82248 BILIRUBIN DIRECT: CPT

## 2020-01-01 PROCEDURE — 99285 EMERGENCY DEPT VISIT HI MDM: CPT

## 2020-01-01 PROCEDURE — 80053 COMPREHEN METABOLIC PANEL: CPT

## 2020-01-01 PROCEDURE — 6360000002 HC RX W HCPCS: Performed by: STUDENT IN AN ORGANIZED HEALTH CARE EDUCATION/TRAINING PROGRAM

## 2020-01-01 PROCEDURE — 93005 ELECTROCARDIOGRAM TRACING: CPT | Performed by: INTERNAL MEDICINE

## 2020-01-01 PROCEDURE — 2580000003 HC RX 258: Performed by: STUDENT IN AN ORGANIZED HEALTH CARE EDUCATION/TRAINING PROGRAM

## 2020-01-01 PROCEDURE — 2580000003 HC RX 258: Performed by: EMERGENCY MEDICINE

## 2020-01-01 PROCEDURE — 2060000000 HC ICU INTERMEDIATE R&B

## 2020-01-01 PROCEDURE — 82330 ASSAY OF CALCIUM: CPT

## 2020-01-01 PROCEDURE — 97116 GAIT TRAINING THERAPY: CPT

## 2020-01-01 PROCEDURE — 99238 HOSP IP/OBS DSCHRG MGMT 30/<: CPT | Performed by: INTERNAL MEDICINE

## 2020-01-01 PROCEDURE — G8427 DOCREV CUR MEDS BY ELIG CLIN: HCPCS | Performed by: UROLOGY

## 2020-01-01 PROCEDURE — 71045 X-RAY EXAM CHEST 1 VIEW: CPT

## 2020-01-01 PROCEDURE — 36415 COLL VENOUS BLD VENIPUNCTURE: CPT

## 2020-01-01 PROCEDURE — 85730 THROMBOPLASTIN TIME PARTIAL: CPT

## 2020-01-01 PROCEDURE — 72125 CT NECK SPINE W/O DYE: CPT

## 2020-01-01 PROCEDURE — G8417 CALC BMI ABV UP PARAM F/U: HCPCS | Performed by: FAMILY MEDICINE

## 2020-01-01 PROCEDURE — 99213 OFFICE O/P EST LOW 20 MIN: CPT | Performed by: NURSE PRACTITIONER

## 2020-01-01 PROCEDURE — 84484 ASSAY OF TROPONIN QUANT: CPT

## 2020-01-01 PROCEDURE — 99239 HOSP IP/OBS DSCHRG MGMT >30: CPT | Performed by: PHYSICIAN ASSISTANT

## 2020-01-01 PROCEDURE — 84145 PROCALCITONIN (PCT): CPT

## 2020-01-01 PROCEDURE — 85027 COMPLETE CBC AUTOMATED: CPT

## 2020-01-01 PROCEDURE — 87040 BLOOD CULTURE FOR BACTERIA: CPT

## 2020-01-01 PROCEDURE — 12011 RPR F/E/E/N/L/M 2.5 CM/<: CPT

## 2020-01-01 PROCEDURE — 96375 TX/PRO/DX INJ NEW DRUG ADDON: CPT

## 2020-01-01 PROCEDURE — 2500000003 HC RX 250 WO HCPCS: Performed by: EMERGENCY MEDICINE

## 2020-01-01 PROCEDURE — 94760 N-INVAS EAR/PLS OXIMETRY 1: CPT

## 2020-01-01 PROCEDURE — 87641 MR-STAPH DNA AMP PROBE: CPT

## 2020-01-01 PROCEDURE — 1123F ACP DISCUSS/DSCN MKR DOCD: CPT | Performed by: FAMILY MEDICINE

## 2020-01-01 PROCEDURE — 4040F PNEUMOC VAC/ADMIN/RCVD: CPT | Performed by: FAMILY MEDICINE

## 2020-01-01 PROCEDURE — 85025 COMPLETE CBC W/AUTO DIFF WBC: CPT

## 2020-01-01 PROCEDURE — 87186 SC STD MICRODIL/AGAR DIL: CPT

## 2020-01-01 PROCEDURE — 83605 ASSAY OF LACTIC ACID: CPT

## 2020-01-01 PROCEDURE — 85610 PROTHROMBIN TIME: CPT

## 2020-01-01 PROCEDURE — 2580000003 HC RX 258: Performed by: INTERNAL MEDICINE

## 2020-01-01 PROCEDURE — 99204 OFFICE O/P NEW MOD 45 MIN: CPT | Performed by: UROLOGY

## 2020-01-01 PROCEDURE — 84443 ASSAY THYROID STIM HORMONE: CPT

## 2020-01-01 PROCEDURE — 99213 OFFICE O/P EST LOW 20 MIN: CPT | Performed by: FAMILY MEDICINE

## 2020-01-01 PROCEDURE — 84100 ASSAY OF PHOSPHORUS: CPT

## 2020-01-01 PROCEDURE — 87147 CULTURE TYPE IMMUNOLOGIC: CPT

## 2020-01-01 PROCEDURE — G8417 CALC BMI ABV UP PARAM F/U: HCPCS | Performed by: UROLOGY

## 2020-01-01 PROCEDURE — G8427 DOCREV CUR MEDS BY ELIG CLIN: HCPCS | Performed by: FAMILY MEDICINE

## 2020-01-01 PROCEDURE — 93005 ELECTROCARDIOGRAM TRACING: CPT | Performed by: STUDENT IN AN ORGANIZED HEALTH CARE EDUCATION/TRAINING PROGRAM

## 2020-01-01 PROCEDURE — 73130 X-RAY EXAM OF HAND: CPT

## 2020-01-01 PROCEDURE — 97535 SELF CARE MNGMENT TRAINING: CPT

## 2020-01-01 PROCEDURE — 73562 X-RAY EXAM OF KNEE 3: CPT

## 2020-01-01 PROCEDURE — 99232 SBSQ HOSP IP/OBS MODERATE 35: CPT | Performed by: NURSE PRACTITIONER

## 2020-01-01 PROCEDURE — G8428 CUR MEDS NOT DOCUMENT: HCPCS | Performed by: FAMILY MEDICINE

## 2020-01-01 PROCEDURE — 99221 1ST HOSP IP/OBS SF/LOW 40: CPT | Performed by: PSYCHIATRY & NEUROLOGY

## 2020-01-01 PROCEDURE — 93010 ELECTROCARDIOGRAM REPORT: CPT | Performed by: INTERNAL MEDICINE

## 2020-01-01 PROCEDURE — 6370000000 HC RX 637 (ALT 250 FOR IP): Performed by: EMERGENCY MEDICINE

## 2020-01-01 PROCEDURE — 87086 URINE CULTURE/COLONY COUNT: CPT

## 2020-01-01 PROCEDURE — 97166 OT EVAL MOD COMPLEX 45 MIN: CPT

## 2020-01-01 PROCEDURE — 96374 THER/PROPH/DIAG INJ IV PUSH: CPT

## 2020-01-01 PROCEDURE — 96361 HYDRATE IV INFUSION ADD-ON: CPT

## 2020-01-01 PROCEDURE — 0509F URINE INCON PLAN DOCD: CPT | Performed by: UROLOGY

## 2020-01-01 PROCEDURE — 90715 TDAP VACCINE 7 YRS/> IM: CPT | Performed by: EMERGENCY MEDICINE

## 2020-01-01 PROCEDURE — U0002 COVID-19 LAB TEST NON-CDC: HCPCS

## 2020-01-01 PROCEDURE — 87077 CULTURE AEROBIC IDENTIFY: CPT

## 2020-01-01 PROCEDURE — 90471 IMMUNIZATION ADMIN: CPT | Performed by: EMERGENCY MEDICINE

## 2020-01-01 PROCEDURE — 83880 ASSAY OF NATRIURETIC PEPTIDE: CPT

## 2020-01-01 PROCEDURE — 51798 US URINE CAPACITY MEASURE: CPT

## 2020-01-01 PROCEDURE — 97110 THERAPEUTIC EXERCISES: CPT

## 2020-01-01 PROCEDURE — 99214 OFFICE O/P EST MOD 30 MIN: CPT | Performed by: FAMILY MEDICINE

## 2020-01-01 PROCEDURE — 93005 ELECTROCARDIOGRAM TRACING: CPT | Performed by: EMERGENCY MEDICINE

## 2020-01-01 PROCEDURE — 94761 N-INVAS EAR/PLS OXIMETRY MLT: CPT

## 2020-01-01 PROCEDURE — 0HQ1XZZ REPAIR FACE SKIN, EXTERNAL APPROACH: ICD-10-PCS | Performed by: EMERGENCY MEDICINE

## 2020-01-01 PROCEDURE — 82140 ASSAY OF AMMONIA: CPT

## 2020-01-01 PROCEDURE — 81003 URINALYSIS AUTO W/O SCOPE: CPT | Performed by: UROLOGY

## 2020-01-01 PROCEDURE — 1036F TOBACCO NON-USER: CPT | Performed by: FAMILY MEDICINE

## 2020-01-01 PROCEDURE — 1090F PRES/ABSN URINE INCON ASSESS: CPT | Performed by: UROLOGY

## 2020-01-01 PROCEDURE — 70551 MRI BRAIN STEM W/O DYE: CPT

## 2020-01-01 PROCEDURE — 70486 CT MAXILLOFACIAL W/O DYE: CPT

## 2020-01-01 PROCEDURE — G8484 FLU IMMUNIZE NO ADMIN: HCPCS | Performed by: UROLOGY

## 2020-01-01 PROCEDURE — 2500000003 HC RX 250 WO HCPCS: Performed by: STUDENT IN AN ORGANIZED HEALTH CARE EDUCATION/TRAINING PROGRAM

## 2020-01-01 PROCEDURE — 81003 URINALYSIS AUTO W/O SCOPE: CPT

## 2020-01-01 PROCEDURE — 99495 TRANSJ CARE MGMT MOD F2F 14D: CPT | Performed by: FAMILY MEDICINE

## 2020-01-01 PROCEDURE — 2700000000 HC OXYGEN THERAPY PER DAY

## 2020-01-01 PROCEDURE — 97530 THERAPEUTIC ACTIVITIES: CPT

## 2020-01-01 PROCEDURE — 83735 ASSAY OF MAGNESIUM: CPT

## 2020-01-01 PROCEDURE — 83690 ASSAY OF LIPASE: CPT

## 2020-01-01 PROCEDURE — 6360000002 HC RX W HCPCS: Performed by: EMERGENCY MEDICINE

## 2020-01-01 PROCEDURE — 97161 PT EVAL LOW COMPLEX 20 MIN: CPT

## 2020-01-01 PROCEDURE — 72170 X-RAY EXAM OF PELVIS: CPT

## 2020-01-01 PROCEDURE — 87081 CULTURE SCREEN ONLY: CPT

## 2020-01-01 PROCEDURE — 87801 DETECT AGNT MULT DNA AMPLI: CPT

## 2020-01-01 PROCEDURE — 87500 VANOMYCIN DNA AMP PROBE: CPT

## 2020-01-01 PROCEDURE — 92610 EVALUATE SWALLOWING FUNCTION: CPT

## 2020-01-01 PROCEDURE — 84439 ASSAY OF FREE THYROXINE: CPT

## 2020-01-01 PROCEDURE — 1111F DSCHRG MED/CURRENT MED MERGE: CPT | Performed by: FAMILY MEDICINE

## 2020-01-01 PROCEDURE — 99232 SBSQ HOSP IP/OBS MODERATE 35: CPT | Performed by: PSYCHIATRY & NEUROLOGY

## 2020-01-01 RX ORDER — SODIUM CHLORIDE 0.9 % (FLUSH) 0.9 %
10 SYRINGE (ML) INJECTION PRN
Status: DISCONTINUED | OUTPATIENT
Start: 2020-01-01 | End: 2020-01-01 | Stop reason: HOSPADM

## 2020-01-01 RX ORDER — CEFADROXIL 500 MG/1
500 CAPSULE ORAL 2 TIMES DAILY
Qty: 20 CAPSULE | Refills: 0 | Status: SHIPPED | OUTPATIENT
Start: 2020-01-01 | End: 2020-01-01

## 2020-01-01 RX ORDER — SODIUM CHLORIDE 9 MG/ML
INJECTION, SOLUTION INTRAVENOUS CONTINUOUS
Status: DISCONTINUED | OUTPATIENT
Start: 2020-01-01 | End: 2020-01-01 | Stop reason: HOSPADM

## 2020-01-01 RX ORDER — LEVOTHYROXINE SODIUM 88 UG/1
88 TABLET ORAL DAILY
Status: DISCONTINUED | OUTPATIENT
Start: 2020-01-01 | End: 2020-01-01 | Stop reason: HOSPADM

## 2020-01-01 RX ORDER — ISOSORBIDE MONONITRATE 30 MG/1
30 TABLET, EXTENDED RELEASE ORAL DAILY
Status: DISCONTINUED | OUTPATIENT
Start: 2020-01-01 | End: 2020-01-01 | Stop reason: HOSPADM

## 2020-01-01 RX ORDER — ASPIRIN 81 MG/1
81 TABLET ORAL DAILY
Status: DISCONTINUED | OUTPATIENT
Start: 2020-01-01 | End: 2020-01-01 | Stop reason: HOSPADM

## 2020-01-01 RX ORDER — CITALOPRAM 40 MG/1
40 TABLET ORAL DAILY
Status: DISCONTINUED | OUTPATIENT
Start: 2020-01-01 | End: 2020-01-01 | Stop reason: HOSPADM

## 2020-01-01 RX ORDER — CITALOPRAM 40 MG/1
TABLET ORAL
Qty: 90 TABLET | Refills: 3 | Status: SHIPPED | OUTPATIENT
Start: 2020-01-01 | End: 2020-01-01

## 2020-01-01 RX ORDER — PREDNISONE 20 MG/1
20 TABLET ORAL 2 TIMES DAILY
Qty: 10 TABLET | Refills: 0 | Status: SHIPPED | OUTPATIENT
Start: 2020-01-01 | End: 2020-01-01 | Stop reason: SDUPTHER

## 2020-01-01 RX ORDER — PANTOPRAZOLE SODIUM 40 MG/1
1 TABLET, DELAYED RELEASE ORAL 2 TIMES DAILY
COMMUNITY
Start: 2020-01-01

## 2020-01-01 RX ORDER — NYSTATIN 100000 [USP'U]/G
1 POWDER TOPICAL 3 TIMES DAILY PRN
COMMUNITY
Start: 2020-01-01

## 2020-01-01 RX ORDER — AZITHROMYCIN 250 MG/1
250 TABLET, FILM COATED ORAL DAILY
Qty: 4 TABLET | Refills: 0 | Status: SHIPPED | OUTPATIENT
Start: 2020-01-01 | End: 2020-01-01

## 2020-01-01 RX ORDER — SODIUM CHLORIDE 0.9 % (FLUSH) 0.9 %
10 SYRINGE (ML) INJECTION EVERY 12 HOURS SCHEDULED
Status: DISCONTINUED | OUTPATIENT
Start: 2020-01-01 | End: 2020-01-01 | Stop reason: HOSPADM

## 2020-01-01 RX ORDER — ACETAMINOPHEN 325 MG/1
650 TABLET ORAL EVERY 4 HOURS PRN
Status: DISCONTINUED | OUTPATIENT
Start: 2020-01-01 | End: 2020-01-01 | Stop reason: SDUPTHER

## 2020-01-01 RX ORDER — BUMETANIDE 0.5 MG/1
0.5 TABLET ORAL DAILY PRN
COMMUNITY

## 2020-01-01 RX ORDER — OYSTER SHELL CALCIUM WITH VITAMIN D 500; 200 MG/1; [IU]/1
1 TABLET, FILM COATED ORAL DAILY
Status: DISCONTINUED | OUTPATIENT
Start: 2020-01-01 | End: 2020-01-01 | Stop reason: HOSPADM

## 2020-01-01 RX ORDER — PREDNISONE 20 MG/1
20 TABLET ORAL DAILY
Qty: 4 TABLET | Refills: 0 | Status: SHIPPED | OUTPATIENT
Start: 2020-01-01 | End: 2020-01-01

## 2020-01-01 RX ORDER — POLYVINYL ALCOHOL 14 MG/ML
1 SOLUTION/ DROPS OPHTHALMIC 4 TIMES DAILY PRN
Status: DISCONTINUED | OUTPATIENT
Start: 2020-01-01 | End: 2020-01-01 | Stop reason: HOSPADM

## 2020-01-01 RX ORDER — POLYETHYLENE GLYCOL 3350 17 G/17G
17 POWDER, FOR SOLUTION ORAL DAILY PRN
Status: DISCONTINUED | OUTPATIENT
Start: 2020-01-01 | End: 2020-01-01 | Stop reason: HOSPADM

## 2020-01-01 RX ORDER — POLYETHYLENE GLYCOL 3350 17 G/17G
17 POWDER, FOR SOLUTION ORAL DAILY
Status: DISCONTINUED | OUTPATIENT
Start: 2020-01-01 | End: 2020-01-01 | Stop reason: HOSPADM

## 2020-01-01 RX ORDER — SULFAMETHOXAZOLE AND TRIMETHOPRIM 800; 160 MG/1; MG/1
1 TABLET ORAL 2 TIMES DAILY
Qty: 6 TABLET | Refills: 0 | Status: SHIPPED | OUTPATIENT
Start: 2020-01-01 | End: 2020-01-01

## 2020-01-01 RX ORDER — 0.9 % SODIUM CHLORIDE 0.9 %
30 INTRAVENOUS SOLUTION INTRAVENOUS ONCE
Status: COMPLETED | OUTPATIENT
Start: 2020-01-01 | End: 2020-01-01

## 2020-01-01 RX ORDER — CEFDINIR 300 MG/1
300 CAPSULE ORAL 2 TIMES DAILY
Qty: 10 CAPSULE | Refills: 0 | Status: SHIPPED | OUTPATIENT
Start: 2020-01-01 | End: 2020-01-01 | Stop reason: HOSPADM

## 2020-01-01 RX ORDER — SPIRONOLACTONE 25 MG/1
12.5 TABLET ORAL DAILY
Qty: 30 TABLET | Refills: 3
Start: 2020-01-01

## 2020-01-01 RX ORDER — MULTIVITAMIN
1 TABLET ORAL DAILY
COMMUNITY

## 2020-01-01 RX ORDER — ACETAMINOPHEN 325 MG/1
650 TABLET ORAL ONCE
Status: COMPLETED | OUTPATIENT
Start: 2020-01-01 | End: 2020-01-01

## 2020-01-01 RX ORDER — ATORVASTATIN CALCIUM 20 MG/1
20 TABLET, FILM COATED ORAL NIGHTLY
Status: DISCONTINUED | OUTPATIENT
Start: 2020-01-01 | End: 2020-01-01 | Stop reason: HOSPADM

## 2020-01-01 RX ORDER — LATANOPROST 50 UG/ML
1 SOLUTION/ DROPS OPHTHALMIC NIGHTLY
Status: DISCONTINUED | OUTPATIENT
Start: 2020-01-01 | End: 2020-01-01 | Stop reason: HOSPADM

## 2020-01-01 RX ORDER — ASPIRIN 81 MG/1
81 TABLET, CHEWABLE ORAL DAILY
Qty: 90 TABLET | Refills: 3 | Status: SHIPPED | OUTPATIENT
Start: 2020-01-01

## 2020-01-01 RX ORDER — SODIUM CHLORIDE 9 MG/ML
INJECTION, SOLUTION INTRAVENOUS CONTINUOUS
Status: DISCONTINUED | OUTPATIENT
Start: 2020-01-01 | End: 2020-01-01

## 2020-01-01 RX ORDER — ACETAMINOPHEN 500 MG
500 TABLET ORAL
COMMUNITY

## 2020-01-01 RX ORDER — ACETAMINOPHEN 325 MG/1
650 TABLET ORAL EVERY 6 HOURS PRN
Status: DISCONTINUED | OUTPATIENT
Start: 2020-01-01 | End: 2020-01-01 | Stop reason: HOSPADM

## 2020-01-01 RX ORDER — ALBUTEROL SULFATE 2.5 MG/3ML
2.5 SOLUTION RESPIRATORY (INHALATION) 4 TIMES DAILY
COMMUNITY

## 2020-01-01 RX ORDER — PANTOPRAZOLE SODIUM 40 MG/1
40 TABLET, DELAYED RELEASE ORAL 2 TIMES DAILY
Status: DISCONTINUED | OUTPATIENT
Start: 2020-01-01 | End: 2020-01-01 | Stop reason: HOSPADM

## 2020-01-01 RX ORDER — SUCRALFATE 1 G/1
1 TABLET ORAL 3 TIMES DAILY
Status: DISCONTINUED | OUTPATIENT
Start: 2020-01-01 | End: 2020-01-01 | Stop reason: HOSPADM

## 2020-01-01 RX ORDER — ERGOCALCIFEROL 1.25 MG/1
50000 CAPSULE ORAL
Status: DISCONTINUED | OUTPATIENT
Start: 2020-01-01 | End: 2020-01-01

## 2020-01-01 RX ORDER — 0.9 % SODIUM CHLORIDE 0.9 %
1000 INTRAVENOUS SOLUTION INTRAVENOUS ONCE
Status: COMPLETED | OUTPATIENT
Start: 2020-01-01 | End: 2020-01-01

## 2020-01-01 RX ORDER — SPIRONOLACTONE 25 MG/1
12.5 TABLET ORAL DAILY
Status: DISCONTINUED | OUTPATIENT
Start: 2020-01-01 | End: 2020-01-01 | Stop reason: HOSPADM

## 2020-01-01 RX ORDER — BUMETANIDE 0.5 MG/1
0.5 TABLET ORAL DAILY
Status: DISCONTINUED | OUTPATIENT
Start: 2020-01-01 | End: 2020-01-01 | Stop reason: HOSPADM

## 2020-01-01 RX ORDER — ACETAMINOPHEN 650 MG/1
650 SUPPOSITORY RECTAL EVERY 6 HOURS PRN
Status: DISCONTINUED | OUTPATIENT
Start: 2020-01-01 | End: 2020-01-01 | Stop reason: HOSPADM

## 2020-01-01 RX ORDER — CIPROFLOXACIN 250 MG/1
250 TABLET, FILM COATED ORAL 2 TIMES DAILY
Qty: 6 TABLET | Refills: 0 | Status: SHIPPED | OUTPATIENT
Start: 2020-01-01 | End: 2020-01-01 | Stop reason: SDUPTHER

## 2020-01-01 RX ORDER — PREDNISONE 20 MG/1
20 TABLET ORAL 2 TIMES DAILY
Qty: 10 TABLET | Refills: 0 | Status: SHIPPED | OUTPATIENT
Start: 2020-01-01 | End: 2020-01-01

## 2020-01-01 RX ORDER — METHYLPREDNISOLONE SODIUM SUCCINATE 125 MG/2ML
80 INJECTION, POWDER, LYOPHILIZED, FOR SOLUTION INTRAMUSCULAR; INTRAVENOUS ONCE
Status: COMPLETED | OUTPATIENT
Start: 2020-01-01 | End: 2020-01-01

## 2020-01-01 RX ORDER — CITALOPRAM HYDROBROMIDE 20 MG/10ML
40 SOLUTION, ORAL ORAL DAILY
Qty: 1800 ML | Refills: 3 | Status: SHIPPED | OUTPATIENT
Start: 2020-01-01

## 2020-01-01 RX ORDER — PREDNISONE 20 MG/1
20 TABLET ORAL DAILY
Status: DISCONTINUED | OUTPATIENT
Start: 2020-01-01 | End: 2020-01-01 | Stop reason: HOSPADM

## 2020-01-01 RX ORDER — MULTIVITAMIN WITH IRON
1 TABLET ORAL DAILY
Status: DISCONTINUED | OUTPATIENT
Start: 2020-01-01 | End: 2020-01-01 | Stop reason: HOSPADM

## 2020-01-01 RX ORDER — TAFLUPROST 0 MG/.3ML
1 SOLUTION/ DROPS OPHTHALMIC DAILY
COMMUNITY

## 2020-01-01 RX ORDER — MULTIVIT-MIN/FOLIC ACID/LUTEIN 400-250MCG
TABLET,CHEWABLE ORAL
Qty: 90 TABLET | Refills: 3 | Status: SHIPPED | OUTPATIENT
Start: 2020-01-01

## 2020-01-01 RX ORDER — IPRATROPIUM BROMIDE AND ALBUTEROL SULFATE 2.5; .5 MG/3ML; MG/3ML
1 SOLUTION RESPIRATORY (INHALATION) ONCE
Status: COMPLETED | OUTPATIENT
Start: 2020-01-01 | End: 2020-01-01

## 2020-01-01 RX ORDER — CEFDINIR 300 MG/1
300 CAPSULE ORAL 2 TIMES DAILY
Qty: 10 CAPSULE | Refills: 0 | Status: ON HOLD | OUTPATIENT
Start: 2020-01-01 | End: 2020-01-01 | Stop reason: HOSPADM

## 2020-01-01 RX ORDER — ALBUTEROL SULFATE 2.5 MG/3ML
2.5 SOLUTION RESPIRATORY (INHALATION) 4 TIMES DAILY
Status: DISCONTINUED | OUTPATIENT
Start: 2020-01-01 | End: 2020-01-01 | Stop reason: HOSPADM

## 2020-01-01 RX ORDER — CEPHALEXIN 500 MG/1
500 CAPSULE ORAL 4 TIMES DAILY
Qty: 28 CAPSULE | Refills: 0 | Status: SHIPPED | OUTPATIENT
Start: 2020-01-01 | End: 2020-01-01

## 2020-01-01 RX ORDER — AZITHROMYCIN 250 MG/1
500 TABLET, FILM COATED ORAL ONCE
Status: COMPLETED | OUTPATIENT
Start: 2020-01-01 | End: 2020-01-01

## 2020-01-01 RX ORDER — CIPROFLOXACIN 500 MG/1
500 TABLET, FILM COATED ORAL 2 TIMES DAILY
Qty: 10 TABLET | Refills: 0 | Status: SHIPPED | OUTPATIENT
Start: 2020-01-01 | End: 2020-01-01

## 2020-01-01 RX ORDER — ALBUTEROL SULFATE 2.5 MG/3ML
2.5 SOLUTION RESPIRATORY (INHALATION) 4 TIMES DAILY PRN
Status: DISCONTINUED | OUTPATIENT
Start: 2020-01-01 | End: 2020-01-01 | Stop reason: HOSPADM

## 2020-01-01 RX ORDER — NITROFURANTOIN 25; 75 MG/1; MG/1
100 CAPSULE ORAL DAILY
Qty: 90 CAPSULE | Refills: 3 | Status: SHIPPED | OUTPATIENT
Start: 2020-01-01

## 2020-01-01 RX ORDER — PROMETHAZINE HYDROCHLORIDE 25 MG/1
12.5 TABLET ORAL EVERY 6 HOURS PRN
Status: DISCONTINUED | OUTPATIENT
Start: 2020-01-01 | End: 2020-01-01 | Stop reason: HOSPADM

## 2020-01-01 RX ORDER — BUMETANIDE 0.5 MG/1
0.5 TABLET ORAL DAILY PRN
Qty: 90 TABLET | Refills: 0 | Status: ON HOLD
Start: 2020-01-01 | End: 2020-01-01 | Stop reason: SDUPTHER

## 2020-01-01 RX ORDER — ONDANSETRON 2 MG/ML
4 INJECTION INTRAMUSCULAR; INTRAVENOUS EVERY 6 HOURS PRN
Status: DISCONTINUED | OUTPATIENT
Start: 2020-01-01 | End: 2020-01-01 | Stop reason: HOSPADM

## 2020-01-01 RX ORDER — CEFDINIR 300 MG/1
300 CAPSULE ORAL 2 TIMES DAILY
Qty: 10 CAPSULE | Refills: 0
Start: 2020-01-01 | End: 2020-01-01

## 2020-01-01 RX ORDER — METHENAMINE HIPPURATE 1000 MG/1
1 TABLET ORAL 2 TIMES DAILY WITH MEALS
Qty: 60 TABLET | Refills: 0 | Status: SHIPPED | OUTPATIENT
Start: 2020-01-01

## 2020-01-01 RX ORDER — CIPROFLOXACIN 250 MG/1
250 TABLET, FILM COATED ORAL 2 TIMES DAILY
Qty: 6 TABLET | Refills: 0 | Status: ON HOLD | OUTPATIENT
Start: 2020-01-01 | End: 2020-01-01 | Stop reason: HOSPADM

## 2020-01-01 RX ORDER — DIPHENHYDRAMINE HYDROCHLORIDE 50 MG/ML
25 INJECTION INTRAMUSCULAR; INTRAVENOUS ONCE
Status: COMPLETED | OUTPATIENT
Start: 2020-01-01 | End: 2020-01-01

## 2020-01-01 RX ORDER — BUMETANIDE 1 MG/1
0.5 TABLET ORAL DAILY PRN
Status: DISCONTINUED | OUTPATIENT
Start: 2020-01-01 | End: 2020-01-01 | Stop reason: HOSPADM

## 2020-01-01 RX ORDER — DIPHENHYDRAMINE HCL 25 MG
25 CAPSULE ORAL EVERY 8 HOURS PRN
COMMUNITY

## 2020-01-01 RX ORDER — SULFAMETHOXAZOLE AND TRIMETHOPRIM 800; 160 MG/1; MG/1
1 TABLET ORAL 2 TIMES DAILY
Qty: 6 TABLET | Refills: 0 | Status: SHIPPED | OUTPATIENT
Start: 2020-01-01 | End: 2020-01-01 | Stop reason: SINTOL

## 2020-01-01 RX ADMIN — ATORVASTATIN CALCIUM 20 MG: 20 TABLET, FILM COATED ORAL at 20:11

## 2020-01-01 RX ADMIN — ISOSORBIDE MONONITRATE 30 MG: 30 TABLET ORAL at 09:30

## 2020-01-01 RX ADMIN — SUCRALFATE 1 G: 1 TABLET ORAL at 15:13

## 2020-01-01 RX ADMIN — SODIUM CHLORIDE: 9 INJECTION, SOLUTION INTRAVENOUS at 12:00

## 2020-01-01 RX ADMIN — METOPROLOL TARTRATE 12.5 MG: 25 TABLET ORAL at 08:30

## 2020-01-01 RX ADMIN — PREDNISONE 20 MG: 20 TABLET ORAL at 09:28

## 2020-01-01 RX ADMIN — LEVOTHYROXINE SODIUM 88 MCG: 88 TABLET ORAL at 06:09

## 2020-01-01 RX ADMIN — SUCRALFATE 1 G: 1 TABLET ORAL at 17:59

## 2020-01-01 RX ADMIN — ACETAMINOPHEN 650 MG: 325 TABLET ORAL at 06:02

## 2020-01-01 RX ADMIN — THERA TABS 1 TABLET: TAB at 09:28

## 2020-01-01 RX ADMIN — ALBUTEROL SULFATE 2.5 MG: 2.5 SOLUTION RESPIRATORY (INHALATION) at 13:58

## 2020-01-01 RX ADMIN — MAGNESIUM GLUCONATE 500 MG ORAL TABLET 400 MG: 500 TABLET ORAL at 09:28

## 2020-01-01 RX ADMIN — LEVOTHYROXINE SODIUM 88 MCG: 88 TABLET ORAL at 05:59

## 2020-01-01 RX ADMIN — ATORVASTATIN CALCIUM 20 MG: 20 TABLET, FILM COATED ORAL at 21:53

## 2020-01-01 RX ADMIN — SODIUM CHLORIDE 1000 ML: 9 INJECTION, SOLUTION INTRAVENOUS at 03:15

## 2020-01-01 RX ADMIN — SUCRALFATE 1 G: 1 TABLET ORAL at 15:16

## 2020-01-01 RX ADMIN — ALBUTEROL SULFATE 2.5 MG: 2.5 SOLUTION RESPIRATORY (INHALATION) at 17:08

## 2020-01-01 RX ADMIN — CEFTRIAXONE SODIUM 1 G: 1 INJECTION, POWDER, FOR SOLUTION INTRAMUSCULAR; INTRAVENOUS at 21:56

## 2020-01-01 RX ADMIN — ALBUTEROL SULFATE 2.5 MG: 2.5 SOLUTION RESPIRATORY (INHALATION) at 21:12

## 2020-01-01 RX ADMIN — PIPERACILLIN AND TAZOBACTAM 3.38 G: 3; .375 INJECTION, POWDER, LYOPHILIZED, FOR SOLUTION INTRAVENOUS at 09:39

## 2020-01-01 RX ADMIN — ISOSORBIDE MONONITRATE 30 MG: 30 TABLET ORAL at 08:56

## 2020-01-01 RX ADMIN — ALBUTEROL SULFATE 2.5 MG: 2.5 SOLUTION RESPIRATORY (INHALATION) at 09:46

## 2020-01-01 RX ADMIN — ISOSORBIDE MONONITRATE 30 MG: 30 TABLET ORAL at 08:31

## 2020-01-01 RX ADMIN — ISOSORBIDE MONONITRATE 30 MG: 30 TABLET ORAL at 08:30

## 2020-01-01 RX ADMIN — SODIUM CHLORIDE, PRESERVATIVE FREE 10 ML: 5 INJECTION INTRAVENOUS at 21:57

## 2020-01-01 RX ADMIN — SODIUM CHLORIDE: 9 INJECTION, SOLUTION INTRAVENOUS at 09:37

## 2020-01-01 RX ADMIN — METOPROLOL TARTRATE 12.5 MG: 25 TABLET ORAL at 20:33

## 2020-01-01 RX ADMIN — FAMOTIDINE 20 MG: 10 INJECTION INTRAVENOUS at 22:37

## 2020-01-01 RX ADMIN — SUCRALFATE 1 G: 1 TABLET ORAL at 08:19

## 2020-01-01 RX ADMIN — METOPROLOL TARTRATE 12.5 MG: 25 TABLET ORAL at 09:30

## 2020-01-01 RX ADMIN — PREDNISONE 20 MG: 20 TABLET ORAL at 20:03

## 2020-01-01 RX ADMIN — ASPIRIN 81 MG: 81 TABLET ORAL at 08:45

## 2020-01-01 RX ADMIN — SODIUM CHLORIDE, PRESERVATIVE FREE 10 ML: 5 INJECTION INTRAVENOUS at 08:34

## 2020-01-01 RX ADMIN — MICONAZOLE NITRATE: 20 POWDER TOPICAL at 09:28

## 2020-01-01 RX ADMIN — CITALOPRAM 40 MG: 40 TABLET, FILM COATED ORAL at 09:28

## 2020-01-01 RX ADMIN — PANTOPRAZOLE SODIUM 40 MG: 40 TABLET, DELAYED RELEASE ORAL at 06:06

## 2020-01-01 RX ADMIN — SUCRALFATE 1 G: 1 TABLET ORAL at 14:41

## 2020-01-01 RX ADMIN — ACETAMINOPHEN 650 MG: 325 TABLET ORAL at 20:03

## 2020-01-01 RX ADMIN — DIPHENHYDRAMINE HYDROCHLORIDE 25 MG: 50 INJECTION INTRAMUSCULAR; INTRAVENOUS at 22:36

## 2020-01-01 RX ADMIN — ACETAMINOPHEN 650 MG: 325 TABLET ORAL at 22:38

## 2020-01-01 RX ADMIN — PANTOPRAZOLE SODIUM 40 MG: 40 TABLET, DELAYED RELEASE ORAL at 20:08

## 2020-01-01 RX ADMIN — METOPROLOL TARTRATE 12.5 MG: 25 TABLET, FILM COATED ORAL at 09:28

## 2020-01-01 RX ADMIN — LEVOTHYROXINE SODIUM 88 MCG: 88 TABLET ORAL at 05:01

## 2020-01-01 RX ADMIN — SUCRALFATE 1 G: 1 TABLET ORAL at 20:22

## 2020-01-01 RX ADMIN — CITALOPRAM 40 MG: 40 TABLET, FILM COATED ORAL at 08:19

## 2020-01-01 RX ADMIN — ASPIRIN 81 MG: 81 TABLET ORAL at 08:19

## 2020-01-01 RX ADMIN — LEVOTHYROXINE SODIUM 88 MCG: 88 TABLET ORAL at 06:06

## 2020-01-01 RX ADMIN — MICONAZOLE NITRATE: 20 POWDER TOPICAL at 08:31

## 2020-01-01 RX ADMIN — METOPROLOL TARTRATE 12.5 MG: 25 TABLET ORAL at 00:35

## 2020-01-01 RX ADMIN — CEFTRIAXONE SODIUM 1 G: 1 INJECTION, POWDER, FOR SOLUTION INTRAMUSCULAR; INTRAVENOUS at 21:59

## 2020-01-01 RX ADMIN — SUCRALFATE 1 G: 1 TABLET ORAL at 06:06

## 2020-01-01 RX ADMIN — METOPROLOL TARTRATE 12.5 MG: 25 TABLET ORAL at 08:19

## 2020-01-01 RX ADMIN — SUCRALFATE 1 G: 1 TABLET ORAL at 21:53

## 2020-01-01 RX ADMIN — ACETAMINOPHEN 650 MG: 325 TABLET ORAL at 06:06

## 2020-01-01 RX ADMIN — CEFTRIAXONE SODIUM 1 G: 1 INJECTION, POWDER, FOR SOLUTION INTRAMUSCULAR; INTRAVENOUS at 21:30

## 2020-01-01 RX ADMIN — POLYETHYLENE GLYCOL 3350 17 G: 17 POWDER, FOR SOLUTION ORAL at 08:19

## 2020-01-01 RX ADMIN — METOPROLOL TARTRATE 12.5 MG: 25 TABLET ORAL at 08:40

## 2020-01-01 RX ADMIN — MICONAZOLE NITRATE: 20 POWDER TOPICAL at 20:07

## 2020-01-01 RX ADMIN — CITALOPRAM 40 MG: 40 TABLET, FILM COATED ORAL at 08:31

## 2020-01-01 RX ADMIN — ATORVASTATIN CALCIUM 20 MG: 20 TABLET, FILM COATED ORAL at 20:03

## 2020-01-01 RX ADMIN — LATANOPROST 1 DROP: 50 SOLUTION OPHTHALMIC at 20:07

## 2020-01-01 RX ADMIN — LATANOPROST 1 DROP: 50 SOLUTION OPHTHALMIC at 20:22

## 2020-01-01 RX ADMIN — SODIUM CHLORIDE, PRESERVATIVE FREE 10 ML: 5 INJECTION INTRAVENOUS at 08:40

## 2020-01-01 RX ADMIN — METOPROLOL TARTRATE 12.5 MG: 25 TABLET, FILM COATED ORAL at 20:07

## 2020-01-01 RX ADMIN — METOPROLOL TARTRATE 12.5 MG: 25 TABLET ORAL at 08:56

## 2020-01-01 RX ADMIN — SUCRALFATE 1 G: 1 TABLET ORAL at 20:33

## 2020-01-01 RX ADMIN — TETANUS TOXOID, REDUCED DIPHTHERIA TOXOID AND ACELLULAR PERTUSSIS VACCINE, ADSORBED 0.5 ML: 5; 2.5; 8; 8; 2.5 SUSPENSION INTRAMUSCULAR at 22:25

## 2020-01-01 RX ADMIN — PANTOPRAZOLE SODIUM 40 MG: 40 TABLET, DELAYED RELEASE ORAL at 20:03

## 2020-01-01 RX ADMIN — ISOSORBIDE MONONITRATE 30 MG: 30 TABLET ORAL at 08:46

## 2020-01-01 RX ADMIN — SODIUM CHLORIDE, PRESERVATIVE FREE 10 ML: 5 INJECTION INTRAVENOUS at 20:12

## 2020-01-01 RX ADMIN — SUCRALFATE 1 G: 1 TABLET ORAL at 00:35

## 2020-01-01 RX ADMIN — POLYETHYLENE GLYCOL 3350 17 G: 17 POWDER, FOR SOLUTION ORAL at 08:40

## 2020-01-01 RX ADMIN — POLYETHYLENE GLYCOL 3350 17 G: 17 POWDER, FOR SOLUTION ORAL at 08:31

## 2020-01-01 RX ADMIN — CITALOPRAM 40 MG: 40 TABLET, FILM COATED ORAL at 08:56

## 2020-01-01 RX ADMIN — METOPROLOL TARTRATE 12.5 MG: 25 TABLET, FILM COATED ORAL at 20:03

## 2020-01-01 RX ADMIN — POLYETHYLENE GLYCOL 3350 17 G: 17 POWDER, FOR SOLUTION ORAL at 09:30

## 2020-01-01 RX ADMIN — PREDNISONE 20 MG: 20 TABLET ORAL at 08:31

## 2020-01-01 RX ADMIN — PIPERACILLIN AND TAZOBACTAM 3.38 G: 3; .375 INJECTION, POWDER, LYOPHILIZED, FOR SOLUTION INTRAVENOUS at 09:44

## 2020-01-01 RX ADMIN — ASPIRIN 81 MG: 81 TABLET ORAL at 08:57

## 2020-01-01 RX ADMIN — MICONAZOLE NITRATE: 20 POWDER TOPICAL at 20:08

## 2020-01-01 RX ADMIN — ASPIRIN 81 MG: 81 TABLET ORAL at 09:30

## 2020-01-01 RX ADMIN — METOPROLOL TARTRATE 12.5 MG: 25 TABLET ORAL at 21:53

## 2020-01-01 RX ADMIN — ATORVASTATIN CALCIUM 20 MG: 20 TABLET, FILM COATED ORAL at 20:22

## 2020-01-01 RX ADMIN — ENOXAPARIN SODIUM 30 MG: 30 INJECTION SUBCUTANEOUS at 09:28

## 2020-01-01 RX ADMIN — CITALOPRAM 40 MG: 40 TABLET, FILM COATED ORAL at 09:30

## 2020-01-01 RX ADMIN — ALBUTEROL SULFATE 2.5 MG: 2.5 SOLUTION RESPIRATORY (INHALATION) at 13:35

## 2020-01-01 RX ADMIN — SUCRALFATE 1 G: 1 TABLET ORAL at 15:24

## 2020-01-01 RX ADMIN — ISOSORBIDE MONONITRATE 30 MG: 30 TABLET ORAL at 09:28

## 2020-01-01 RX ADMIN — METOPROLOL TARTRATE 12.5 MG: 25 TABLET ORAL at 20:11

## 2020-01-01 RX ADMIN — CITALOPRAM 40 MG: 40 TABLET, FILM COATED ORAL at 08:39

## 2020-01-01 RX ADMIN — CEFTRIAXONE SODIUM 1 G: 1 INJECTION, POWDER, FOR SOLUTION INTRAMUSCULAR; INTRAVENOUS at 22:44

## 2020-01-01 RX ADMIN — CITALOPRAM 40 MG: 40 TABLET, FILM COATED ORAL at 08:46

## 2020-01-01 RX ADMIN — SODIUM CHLORIDE 1470 ML: 9 INJECTION, SOLUTION INTRAVENOUS at 22:35

## 2020-01-01 RX ADMIN — SPIRONOLACTONE 12.5 MG: 25 TABLET ORAL at 09:28

## 2020-01-01 RX ADMIN — SUCRALFATE 1 G: 1 TABLET ORAL at 06:09

## 2020-01-01 RX ADMIN — SPIRONOLACTONE 12.5 MG: 25 TABLET ORAL at 08:30

## 2020-01-01 RX ADMIN — ALBUTEROL SULFATE 2.5 MG: 2.5 SOLUTION RESPIRATORY (INHALATION) at 10:04

## 2020-01-01 RX ADMIN — CEFTRIAXONE SODIUM 1 G: 1 INJECTION, POWDER, FOR SOLUTION INTRAMUSCULAR; INTRAVENOUS at 22:05

## 2020-01-01 RX ADMIN — ALBUTEROL SULFATE 2.5 MG: 2.5 SOLUTION RESPIRATORY (INHALATION) at 13:18

## 2020-01-01 RX ADMIN — POLYETHYLENE GLYCOL 3350 17 G: 17 POWDER, FOR SOLUTION ORAL at 08:45

## 2020-01-01 RX ADMIN — SUCRALFATE 1 G: 1 TABLET ORAL at 08:30

## 2020-01-01 RX ADMIN — METOPROLOL TARTRATE 12.5 MG: 25 TABLET, FILM COATED ORAL at 08:30

## 2020-01-01 RX ADMIN — ENOXAPARIN SODIUM 30 MG: 30 INJECTION SUBCUTANEOUS at 08:31

## 2020-01-01 RX ADMIN — ACETAMINOPHEN 650 MG: 325 TABLET ORAL at 20:07

## 2020-01-01 RX ADMIN — METOPROLOL TARTRATE 12.5 MG: 25 TABLET ORAL at 20:21

## 2020-01-01 RX ADMIN — ASPIRIN 81 MG: 81 TABLET ORAL at 08:39

## 2020-01-01 RX ADMIN — ATORVASTATIN CALCIUM 20 MG: 20 TABLET, FILM COATED ORAL at 20:33

## 2020-01-01 RX ADMIN — ATORVASTATIN CALCIUM 20 MG: 20 TABLET, FILM COATED ORAL at 20:07

## 2020-01-01 RX ADMIN — LATANOPROST 1 DROP: 50 SOLUTION OPHTHALMIC at 20:34

## 2020-01-01 RX ADMIN — SODIUM CHLORIDE, PRESERVATIVE FREE 10 ML: 5 INJECTION INTRAVENOUS at 08:26

## 2020-01-01 RX ADMIN — ENOXAPARIN SODIUM 30 MG: 30 INJECTION SUBCUTANEOUS at 09:36

## 2020-01-01 RX ADMIN — PANTOPRAZOLE SODIUM 40 MG: 40 TABLET, DELAYED RELEASE ORAL at 06:09

## 2020-01-01 RX ADMIN — SUCRALFATE 1 G: 1 TABLET ORAL at 20:12

## 2020-01-01 RX ADMIN — ALBUTEROL SULFATE 2.5 MG: 2.5 SOLUTION RESPIRATORY (INHALATION) at 17:12

## 2020-01-01 RX ADMIN — MAGNESIUM GLUCONATE 500 MG ORAL TABLET 400 MG: 500 TABLET ORAL at 08:31

## 2020-01-01 RX ADMIN — SODIUM CHLORIDE, PRESERVATIVE FREE 10 ML: 5 INJECTION INTRAVENOUS at 08:45

## 2020-01-01 RX ADMIN — SUCRALFATE 1 G: 1 TABLET ORAL at 08:39

## 2020-01-01 RX ADMIN — ASPIRIN 81 MG: 81 TABLET ORAL at 08:31

## 2020-01-01 RX ADMIN — Medication 1 TABLET: at 08:31

## 2020-01-01 RX ADMIN — AZITHROMYCIN MONOHYDRATE 500 MG: 250 TABLET ORAL at 09:57

## 2020-01-01 RX ADMIN — SUCRALFATE 1 G: 1 TABLET ORAL at 21:10

## 2020-01-01 RX ADMIN — PIPERACILLIN AND TAZOBACTAM 3.38 G: 3; .375 INJECTION, POWDER, LYOPHILIZED, FOR SOLUTION INTRAVENOUS at 06:18

## 2020-01-01 RX ADMIN — THERA TABS 1 TABLET: TAB at 08:31

## 2020-01-01 RX ADMIN — CITALOPRAM 40 MG: 40 TABLET, FILM COATED ORAL at 08:30

## 2020-01-01 RX ADMIN — ALBUTEROL SULFATE 2.5 MG: 2.5 SOLUTION RESPIRATORY (INHALATION) at 08:51

## 2020-01-01 RX ADMIN — LEVOTHYROXINE SODIUM 88 MCG: 88 TABLET ORAL at 08:19

## 2020-01-01 RX ADMIN — ASPIRIN 81 MG: 81 TABLET ORAL at 09:28

## 2020-01-01 RX ADMIN — ISOSORBIDE MONONITRATE 30 MG: 30 TABLET ORAL at 08:19

## 2020-01-01 RX ADMIN — SUCRALFATE 1 G: 1 TABLET ORAL at 15:08

## 2020-01-01 RX ADMIN — Medication 1 TABLET: at 09:28

## 2020-01-01 RX ADMIN — LEVOTHYROXINE SODIUM 88 MCG: 88 TABLET ORAL at 05:56

## 2020-01-01 RX ADMIN — METHYLPREDNISOLONE SODIUM SUCCINATE 80 MG: 125 INJECTION, POWDER, FOR SOLUTION INTRAMUSCULAR; INTRAVENOUS at 22:31

## 2020-01-01 RX ADMIN — ALBUTEROL SULFATE 2.5 MG: 2.5 SOLUTION RESPIRATORY (INHALATION) at 18:03

## 2020-01-01 RX ADMIN — LATANOPROST 1 DROP: 50 SOLUTION OPHTHALMIC at 21:56

## 2020-01-01 RX ADMIN — CEFTRIAXONE SODIUM 1 G: 1 INJECTION, POWDER, FOR SOLUTION INTRAMUSCULAR; INTRAVENOUS at 22:26

## 2020-01-01 RX ADMIN — PIPERACILLIN AND TAZOBACTAM 3.38 G: 3; .375 INJECTION, POWDER, LYOPHILIZED, FOR SOLUTION INTRAVENOUS at 23:03

## 2020-01-01 RX ADMIN — IPRATROPIUM BROMIDE AND ALBUTEROL SULFATE 1 AMPULE: .5; 3 SOLUTION RESPIRATORY (INHALATION) at 11:47

## 2020-01-01 RX ADMIN — LATANOPROST 1 DROP: 50 SOLUTION OPHTHALMIC at 20:12

## 2020-01-01 RX ADMIN — ASPIRIN 81 MG: 81 TABLET ORAL at 08:30

## 2020-01-01 RX ADMIN — ISOSORBIDE MONONITRATE 30 MG: 30 TABLET ORAL at 08:39

## 2020-01-01 RX ADMIN — METOPROLOL TARTRATE 12.5 MG: 25 TABLET ORAL at 08:46

## 2020-01-01 RX ADMIN — ATORVASTATIN CALCIUM 20 MG: 20 TABLET, FILM COATED ORAL at 21:10

## 2020-01-01 RX ADMIN — SODIUM CHLORIDE: 9 INJECTION, SOLUTION INTRAVENOUS at 04:05

## 2020-01-01 RX ADMIN — Medication 10 ML: at 20:04

## 2020-01-01 RX ADMIN — METOPROLOL TARTRATE 12.5 MG: 25 TABLET ORAL at 21:10

## 2020-01-01 RX ADMIN — METOPROLOL TARTRATE 12.5 MG: 25 TABLET, FILM COATED ORAL at 12:46

## 2020-01-01 RX ADMIN — SUCRALFATE 1 G: 1 TABLET ORAL at 08:46

## 2020-01-01 RX ADMIN — SUCRALFATE 1 G: 1 TABLET ORAL at 12:18

## 2020-01-01 RX ADMIN — LEVOTHYROXINE SODIUM 88 MCG: 88 TABLET ORAL at 06:29

## 2020-01-01 RX ADMIN — SUCRALFATE 1 G: 1 TABLET ORAL at 08:56

## 2020-01-01 RX ADMIN — SUCRALFATE 1 G: 1 TABLET ORAL at 09:30

## 2020-01-01 RX ADMIN — SODIUM CHLORIDE, PRESERVATIVE FREE 10 ML: 5 INJECTION INTRAVENOUS at 20:22

## 2020-01-01 ASSESSMENT — PAIN SCALES - WONG BAKER
WONGBAKER_NUMERICALRESPONSE: 0
WONGBAKER_NUMERICALRESPONSE: 6
WONGBAKER_NUMERICALRESPONSE: 0
WONGBAKER_NUMERICALRESPONSE: 2
WONGBAKER_NUMERICALRESPONSE: 0

## 2020-01-01 ASSESSMENT — PAIN SCALES - GENERAL
PAINLEVEL_OUTOF10: 0
PAINLEVEL_OUTOF10: 8
PAINLEVEL_OUTOF10: 0
PAINLEVEL_OUTOF10: 1
PAINLEVEL_OUTOF10: 0
PAINLEVEL_OUTOF10: 0
PAINLEVEL_OUTOF10: 1
PAINLEVEL_OUTOF10: 0
PAINLEVEL_OUTOF10: 1
PAINLEVEL_OUTOF10: 0
PAINLEVEL_OUTOF10: 3
PAINLEVEL_OUTOF10: 3
PAINLEVEL_OUTOF10: 0

## 2020-01-01 ASSESSMENT — ENCOUNTER SYMPTOMS
SHORTNESS OF BREATH: 1
WHEEZING: 0
DIARRHEA: 0
CHEST TIGHTNESS: 0
EYES NEGATIVE: 1
SORE THROAT: 0
GASTROINTESTINAL NEGATIVE: 1
TROUBLE SWALLOWING: 0
SHORTNESS OF BREATH: 0
GASTROINTESTINAL NEGATIVE: 1
BACK PAIN: 0
SHORTNESS OF BREATH: 0
SORE THROAT: 0
COUGH: 0
ABDOMINAL DISTENTION: 0
COUGH: 0
RESPIRATORY NEGATIVE: 1
VOICE CHANGE: 0
EYE REDNESS: 0
NAUSEA: 0
CONSTIPATION: 0
VOMITING: 0
ABDOMINAL PAIN: 0
WHEEZING: 0
ABDOMINAL PAIN: 0
RHINORRHEA: 0
DIARRHEA: 0
SINUS PRESSURE: 0
CHEST TIGHTNESS: 0
RESPIRATORY NEGATIVE: 1

## 2020-01-01 ASSESSMENT — PAIN DESCRIPTION - PAIN TYPE
TYPE: ACUTE PAIN

## 2020-01-01 ASSESSMENT — PAIN DESCRIPTION - PROGRESSION: CLINICAL_PROGRESSION: NOT CHANGED

## 2020-01-01 ASSESSMENT — PAIN DESCRIPTION - ONSET: ONSET: ON-GOING

## 2020-01-01 ASSESSMENT — PAIN DESCRIPTION - DESCRIPTORS: DESCRIPTORS: ACHING

## 2020-01-01 ASSESSMENT — PAIN DESCRIPTION - FREQUENCY: FREQUENCY: CONTINUOUS

## 2020-05-01 NOTE — TELEPHONE ENCOUNTER
[facility] fax received. Resident continues to C/O pain right upper part of hand and 2 fingers. Can they do a video visit ? Resident took Duricef BID for 10 days. Please advise. Thanks ! Fax scanned into media for provider.

## 2020-07-09 PROBLEM — R77.8 ELEVATED TROPONIN: Status: ACTIVE | Noted: 2020-01-01

## 2020-07-09 NOTE — ED NOTES
Bed: 038A  Expected date: 7/9/20  Expected time: 3:27 PM  Means of arrival: [facility]  Comments:     Jesus White RN  07/09/20 6113

## 2020-07-09 NOTE — ED NOTES
Pt resting in bed with call light in reach. Lab at bedside to assist with blood draw. Pt states no further needs at this time. Pt appears calm and cooperative at this time. Will continue to assess.        Rupinder Pennington, [address] [facility]  07/09/20 1997

## 2020-07-09 NOTE — TELEPHONE ENCOUNTER
Received a call from [facility] with Alymarce Packer stating that she contacted the patients daughter regarding the Cipro being sent to [place]. [facility] stated that her daughter is out of town and is asking for the script to be sent to the [address] Please advise.

## 2020-07-09 NOTE — ED NOTES
Pt presents to ED via transport from EMS from [address] [place] [address] following a fall that occurred today. Pt states she was walking and tripped over her feet resulting in fall. Pt has abrasions to left hand and fingers, a laceration to the chin, and a laceration to the left shin at this time. Pt rates pain a 8/10 at this time. Pt does not appear to be in acute distress at this time and is oriented to self, situation, time, and place at this time. Vital signs stable, will continue to assess.        Joey [facility]  07/09/20 [id number]

## 2020-07-09 NOTE — ED NOTES
Pt becoming increasingly agitated and attempting to get out of bed and states, \"I want to go home\". Pt advised of current plan of care. Charge nurse advised of potential need for telesitter for pt at this time.        [facility]  07/09/20 [id number]

## 2020-07-09 NOTE — TELEPHONE ENCOUNTER
Per Dr. Mariah Motta called [facility] and spoke with [facility] LPN and updated her on a positive UA with reflux and that Cipro 250mg 1 tab BID for 3 days was sent to [address] [place] [address].   [facility] LPN is to fax a order for the medication to the office to be signed by Dr. Mariah Motta

## 2020-07-10 NOTE — PROGRESS NOTES
Pt admitted to  8AB24 via from ED from ED. Complaints: Fall/ Elevated Trops. IV none infusing into the forearm right, condition patent and no redness at a rate of 0 mls/ hour. IV site free of s/s of infection or infiltration. Vital signs obtained. Assessment and data collection initiated. Two nurse skin assessment performed by Joselyn Schulz RN and Alexis Pleitez RN. Oriented to room. Policies and procedures for 8AB explained. All questions answered with no further questions at this time. Fall prevention and safety brochure discussed with patient. Bed alarm on. Call light in reach. Oriented to room. Izabela Yoon RN 7/9/2020 11:36 PM     Explained patients right to have family, representative or physician notified of their admission. Patient has Declined for physician to be notified. Patient has Declined for family/representative to be notified. Patient would like family notified once per shift?  No

## 2020-07-10 NOTE — PLAN OF CARE
Problem: Falls - Risk of:  Goal: Will remain free from falls  Description: Will remain free from falls  Outcome: Ongoing  Note: Pt has remained free from falls this shift. Fall precautions in place are falling star magnet, telle sitter, bed alarm on and call light/ bedside table within reach. Pt close to the nurses station and visualized with hourly roundings and more frequent as needed. Problem: Falls - Risk of:  Goal: Absence of physical injury  Description: Absence of physical injury  Outcome: Ongoing  Note: Pt has remained free from physical injury      Problem: Skin Integrity:  Goal: Will show no infection signs and symptoms  Description: Will show no infection signs and symptoms  Outcome: Ongoing  Note: Pt is on IV atb for a UTI. VS and labs being monitored     Problem: Skin Integrity:  Goal: Absence of new skin breakdown  Description: Absence of new skin breakdown  Outcome: Ongoing  Note: No new skin break down noted this shift. Problem: Pain:  Goal: Pain level will decrease  Description: Pain level will decrease  Outcome: Ongoing  Note: Pt stated a pain level of 0/10. A pain goal of 0/10. PRN medications ordered. Non pharm interventions are rest and reposition. Problem: Pain:  Goal: Control of acute pain  Description: Control of acute pain  Outcome: Ongoing  Note: No acute pain at this time.      Problem: Pain:  Goal: Control of chronic pain  Description: Control of chronic pain  Outcome: Ongoing  Note: No chronic pain stated at this time

## 2020-07-10 NOTE — CARE COORDINATION
7/10/20, 7:37 AM EDT  DISCHARGE PLANNING EVALUATION:    Reny Andrade       Admitted from: ER 2020/ [facility] day: 1   Location: [address] [place] [address] Reason for admit: Elevated troponin [R79.89]  Elevated troponin [R79.89] Status: Inpt  Admit order signed?: no  PMH:  has a past medical history of ARNOLD (acute kidney injury) ([address] Anxiety and depression, Anxiety and depression, Atrial fibrillation ([address] Blood transfusion reaction, CAD (coronary artery disease), Cerebrovascular disease, CKD (chronic kidney disease), Diastolic congestive heart failure ([address] DJD (degenerative joint disease), cervical, GERD (gastroesophageal reflux disease), Hiatal hernia, History of blood transfusion, Hyperlipidemia, Hypertension, Hypoalbuminemia, Hypothyroidism, Mild protein-calorie malnutrition ([address] NSTEMI (non-ST elevated myocardial infarction) ([address] Pancreatitis due to biliary obstruction, Pneumonia, organism unspecified(486), Scoliosis, SDH (subdural hematoma) ([address] Thyroid disease, Unspecified cerebral artery occlusion with cerebral infarction, and Uterine cancer ([address] Procedure: No  Pertinent abnormal Imagin20 CXR Imaging:Cardiomegaly and prominent interstitium suggesting interstitial edema. Correlation with symptoms is advised. Medications:  Scheduled Meds:   cefTRIAXone (ROCEPHIN) IV  1 g Intravenous Q24H    atorvastatin  20 mg Oral Nightly    aspirin  81 mg Oral Daily    [Held by provider] bumetanide  0.5 mg Oral Daily    citalopram  40 mg Oral Daily    isosorbide mononitrate  30 mg Oral Daily    levothyroxine  88 mcg Oral Daily    metoprolol tartrate  12.5 mg Oral BID    polyethylene glycol  17 g Oral Daily    latanoprost  1 drop Both Eyes Nightly    sucralfate  1 g Oral TID    [Held by provider] spironolactone  12.5 mg Oral Daily    sodium chloride flush  10 mL Intravenous 2 times per day     Continuous Infusions:   Pertinent Info/Orders/Treatment Plan: To ER from [address] [place] [address]  After a fall. Agitated in ER, telesitter placed. ECF reports recent increase in confusion. Oriented x 1. Hx CKD, A-fib and CAD (CABG 2011). Creat 1.4. Hgb 10. Troponin bumped x 2. BNP slightly elevated at 3702.0. Imaging reveals no fractures. Telemetry. PT/OT evals. Neuro checks. Diet: DIET CARDIAC;   Smoking status:  reports that she has never smoked. She has never used smokeless tobacco.   PCP: Richa Joshi DO  Readmission 30 days or less: No  Readmission Risk Score: 16%    Discharge Planning Evaluation  Current Residence:  Assisted living  Living Arrangements:  [place]:  None  Current Services PTA:     Potential Assistance Needed:  N/A  Potential Assistance Purchasing Medications:  No  Does patient want to participate in local refill/ meds to beds program?  No  Type of Home Care Services:  None  Patient expects to be discharged to:  [facility]  Expected Discharge date:  07/12/20  Follow Up Appointment: Best Day/ Time: Tuesday PM    Patient Goals/Plan/Treatment Preferences: Brief visit with pt today. Sitting up in bed looking at newspaper. Breakfast in front of her. She acknowledges she is from [employer] but she is clearly having some confusion but is quite pleasant. SW following for continuity of services. Transportation/Food Security/Housekeeping Addressed:  No issues identified.     Evaluation: yes

## 2020-07-10 NOTE — ED NOTES
Pt placed on 2 L/min nasal cannula at this time due to saturation levels dropping to 88% at this time on room air.        Joey, [facility]  07/09/20 8396

## 2020-07-10 NOTE — H&P
History and Physical    Assessment and Plan:        1. Fall, mechanical likely r/t gait instability: Uses walker at nursing home and has been noted patient to be more confused per nursing home. Multiple lacerations and contusions. No obvious deformity noted. Denies loss of consciousness. CT head, face, and cervical spine negative for fractures. Xray of right hand, left knee, and pelvis negative for fractures. No signs of rhabdomyolysis. - Pain control  - Fall precautions  - Consult PT/OT  2. Acute on chronic encephalopathy: A/Ox1 on exam. Noted to be more confused per nursing home. U/A positive for UTI. No liver disease. No history of COPD. Possible unrecognized dementia. Possible side effect of polypharmacy.  - Neuro checks  - Treatment of UTI as below  2. Elevated troponin possibly from demand ischemia: Baseline Hgb has been 10-11. No significant blood loss from injuries r/t fall. BP had low diastolic and mildly bradycardic on admission. Possible hypovolemia from dehydration with BUN/Creat 25. No significant changes in EKG compared to 9/2019. Troponin is downtrending. Denies chest pain at this time. - Monitor for further development of chest pain  3. Acute on chronic kidney injury, Stage III, likely pre-renal: Baseline creatinine 1-1.2 with GFR <60. BUN/Creat 25, mucous membranes dry. - Given 1L NS  - Recheck BMP in AM  4. Acute simple cystitis, uncomplicated: Afebrile. Noted to be more confused than baseline per nurse from [address] [place] [address]  Received 1x dose of rocephin in ED.  - Urine culture pending  - Continue Rocephin; deescalate once culture comes back  5. History of HFpEF of 50-55% compensated:  - Continue home medications  6. Normocytic anemia: Baseline Hgb 10-11. No signs of significant bleeding from fall. 7. History of Afib, rate controlled: No significant changes compared to previous EKG. Patient only on daily ASA 81mg. JAZZMINE-VASc score 6 with 9.7% of stroke.  HAS-BLED score 2 with 4.1% risk of bleeding. Will not start anticoagulation at this time given fall on presentation however would consider further discussion with family and nursing home physician. - Continue home medication  8. History of CAD: Had CABGx3 in 2011. Only taking ASA 81mg QD. No signs of chest pain at this time. 9. History of hypothyroidism: Last TSH 1.47 from 2016.   - Resumed synthroid  - Check TSH with reflex  10. History of hypertension, uncontrolled: Not urgent or emergent  - Continue home medications except aldactone for ARNOLD  11. ? Anxiety  - Resumed celexa  12. ? Takes bumex: Indications unclear. Holding for ARNOLD dehydration. 13. Unclear if patient is DNR-CC or DNR-CCA. Is still on many active medications at SNF, so will keep DNR-CCA at this time and verify with nursing home or family in the AM.     CC:  Fall, elevated troponin    HPI: Patient is a 80year old  female with past medical history of Afib, CAD s/p CABG 2011, CVA, CKD Stage 3, GERD, HTN, Hypothyroidism, HFpEF 50-55% She presents from [address] [place] [address] following a fall today with lacerations to her chin, fingers to right hand, left and right leg. She states she does not remember how she fell. She uses a walker at the nursing home. Per ED she stated she was walking and tripped over her feet resulting in fall. Denies loss of consciousness. She has no complaints of pain at this time and is in no acute distress. ROS: Review of Systems   Constitutional: Negative for fever. HENT: Negative. Eyes: Negative. Respiratory: Negative for chest tightness and shortness of breath. Cardiovascular: Negative for chest pain and palpitations. Gastrointestinal: Negative for abdominal pain, diarrhea, nausea and vomiting. Musculoskeletal: Positive for gait problem. Negative for myalgias. Skin: Positive for wound. Neurological: Negative for dizziness, weakness, light-headedness and headaches. Psychiatric/Behavioral: Positive for confusion.         PMH:    Past Medical History:   Diagnosis Date    [facility]) [facility]     Anxiety and depression 6/26/2018    Anxiety and depression     Atrial fibrillation ([facility]     Blood transfusion reaction     CAD (coronary artery disease)     Severe triple vessel disease    Cerebrovascular disease 6/19/2018    CKD (chronic kidney disease)     Diastolic congestive heart failure (HCC)     DJD (degenerative joint disease), cervical 6/19/2018    GERD (gastroesophageal reflux disease)     Hiatal hernia     History of blood transfusion     Hyperlipidemia     Hypertension     Hypoalbuminemia     Hypothyroidism     Mild protein-calorie malnutrition (HCC)     NSTEMI (non-ST elevated myocardial infarction) ([facility] 7/2014    Pancreatitis due to biliary obstruction     Pneumonia, organism unspecified(486)     Scoliosis     SDH (subdural hematoma) ([facility])     Teresa hole decompresson on 12/25/2014    Thyroid disease     Unspecified cerebral artery occlusion with cerebral infarction     Uterine cancer ([facility] 1966      SurgHx:   Past Surgical History:   Procedure Laterality Date    APPENDECTOMY      BACK SURGERY      TERESA HOLE  12/25/2014    drainage of left SDH    CARDIAC SURGERY      COLONOSCOPY      CORONARY ARTERY BYPASS GRAFT  9/27/11    LIMA to LAD, SVG to 1st Marginal, Distal Right Coronary Artery, 1st Diagonal    DILATATION, ESOPHAGUS      ENDOSCOPY, COLON, DIAGNOSTIC      EYE SURGERY      bilateral cataracts    HYSTERECTOMY      [place] EGD BALLOON DILATION ESOPHAGUS <30 MM DIAM N/A 11/22/2017    EGD ESOPHAGOGASTRODUODENOSCOPY DILATATION performed by Sammy Carl MD at [facility]    [place] ESOPHAGOGASTRODUODENOSCOPY TRANSORAL DIAGNOSTIC  11/22/2017    EGD ESOPHAGOGASTRODUODENOSCOPY performed by Sammy Carl MD at [address] [place] [address] Left     SKIN BIOPSY      VASCULAR SURGERY      cabg harvests from left leg     SHX:    Social History     Socioeconomic History    Marital status:      Spouse name: Not on file    Number of children: 3    Years of education: Not on file    Highest education level: Not on file   Occupational History    Occupation:      Employer: RETIRED   Social Needs    Financial resource strain: Not hard at all   [place] insecurity     Worry: Never true     Inability: Never true   [employer] needs     Medical: No     Non-medical: No   Tobacco Use    Smoking status: Never Smoker    Smokeless tobacco: Never Used   Substance and Sexual Activity    Alcohol use: No     Alcohol/week: 0.0 standard drinks    Drug use: No    Sexual activity: Not Currently   Lifestyle    Physical activity     Days per week: Not on file     Minutes per session: Not on file    Stress: Not on file   Relationships    Social connections     Talks on phone: Not on file     Gets together: Not on file     Attends Hoahaoism service: Not on file     Active member of club or organization: Not on file     Attends meetings of clubs or organizations: Not on file     Relationship status: Not on file    Intimate partner violence     Fear of current or ex partner: Not on file     Emotionally abused: Not on file     Physically abused: Not on file     Forced sexual activity: Not on file   Other Topics Concern    Not on file   Social History Narrative    Not on file      FHX:   Family History   Problem Relation Age of Onset    Heart Disease Mother     Heart Disease Father     Cancer Brother         lymphoma    Heart Disease Brother     Heart Disease Daughter     Heart Disease Son     Heart Disease Brother       Allergies:    Allergies   Allergen Reactions    Lisinopril Other (See Comments)    Morphine     Narcan [Naloxone] Other (See Comments)    Adhesive Tape Rash     plastic      Medications:       sodium chloride  1,000 mL Intravenous Once    cefTRIAXone (ROCEPHIN) IV  1 g Intravenous Q24H    atorvastatin  20 mg Oral Nightly    aspirin  81 mg Oral Daily    bumetanide Value Ref Range    Rejected Test bmpx tropt     Reason for Rejection see below    Protime-INR    Collection Time: 07/09/20  8:00 PM   Result Value Ref Range    INR 1.09 0.85 - 0.02   Basic Metabolic Panel w/ Reflex to MG    Collection Time: 07/09/20  8:34 PM   Result Value Ref Range    Sodium 139 135 - 145 meq/L    Potassium reflex Magnesium 4.7 3.5 - 5.2 meq/L    Chloride 100 98 - 111 meq/L    CO2 25 23 - 33 meq/L    Glucose 146 (H) 70 - 108 mg/dL    BUN 45 (H) 7 - 22 mg/dL    CREATININE 1.8 (H) 0.4 - 1.2 mg/dL    Calcium 9.5 8.5 - 10.5 mg/dL   Troponin    Collection Time: 07/09/20  8:34 PM   Result Value Ref Range    Troponin T 0.024 (A) ng/ml   Brain Natriuretic Peptide    Collection Time: 07/09/20  8:34 PM   Result Value Ref Range    Pro-BNP 3702.0 (H) 0.0 - 1800.0 pg/mL   Anion Gap    Collection Time: 07/09/20  8:34 PM   Result Value Ref Range    Anion Gap 14.0 8.0 - 16.0 meq/L   Glomerular Filtration Rate, Estimated    Collection Time: 07/09/20  8:34 PM   Result Value Ref Range    Est, Glom Filt Rate 26 (A) ml/min/1.73m2   Osmolality    Collection Time: 07/09/20  8:34 PM   Result Value Ref Range    Osmolality Calc 291.7 275.0 - 300 mOsmol/kg   Urine with Reflexed Micro    Collection Time: 07/09/20  8:40 PM   Result Value Ref Range    Glucose, Ur NEGATIVE NEGATIVE mg/dl    Bilirubin Urine NEGATIVE NEGATIVE    Ketones, Urine NEGATIVE NEGATIVE    Specific Gravity, Urine 1.013 1.002 - 1.03    Blood, Urine NEGATIVE NEGATIVE    pH, UA >=9.0 5.0 - 9.0    Protein, UA NEGATIVE NEGATIVE    Urobilinogen, Urine 0.2 0.0 - 1.0 eu/dl    Nitrite, Urine POSITIVE (A) NEGATIVE    Leukocyte Esterase, Urine NEGATIVE NEGATIVE    Color, UA YELLOW STRAW-YELL    Character, Urine CLEAR CLEAR-SL C    RBC, UA 0-2 0-2/hpf /hpf    WBC, UA 0-2 0-4/hpf /hpf    Epithelial Cells, UA 0-2 3-5/hpf /hpf    Bacteria, UA MANY FEW/NONE S /hpf    Casts UA NONE SEEN NONE SEEN /lpf    Crystals, UA NONE SEEN NONE SEEN    Renal Epithelial, UA NONE SEEN NONE SEEN    Yeast, UA NONE SEEN NONE SEEN    CASTS 2 NONE SEEN NONE SEEN /lpf    MISCELLANEOUS 2 NONE SEEN    Troponin    Collection Time: 07/10/20 12:32 AM   Result Value Ref Range    Troponin T 0.020 (A) ng/ml       Vital Signs:   Vitals:    07/09/20 1735 07/09/20 2014 07/09/20 2231 07/09/20 2330   BP: (!) 143/83 (!) 131/95 (!) 162/53 (!) 158/70   Pulse: 66 70 102 101   Resp: 18 18 18 18   Temp:    99.2 °F (37.3 °C)   TempSrc:    Oral   SpO2: 95% 94% (!) 88% 93%   Weight:       Height:         General: 81 y/o  female in no acute distress. HEENT: Normocephalic and atraumatic. No scleral icterus. PERLLA. Mucous membranes dry. Neck: Supple. Trachea midline. No JVD. Lungs: Clear to auscultation with equal air entry  Cardiac: RRR without murmurs, rubs, or gallops. S1 and S2 noted. Abdomen: Soft, non-tender to palpation. Normoactive bowel sounds in all quadrants. Extremities:  No clubbing, cyanosis x 4, edema    Vasculature: Capillary refill < 3 seconds. Skin:  Lacerations and contusions noted to chin, right hand, left leg, and right leg. Areas are not actively bleeding. No signs of symptoms of infection noted. Psych:  Alert and oriented x1. Affect appropriate  Neurologic:  No focal deficit. Data: (All radiographs, tracings, PFTs, and imaging are personally viewed and interpreted unless otherwise noted).     Outside data reviewed      Electronically signed by  Gee Ambrocio DO

## 2020-07-10 NOTE — ED PROVIDER NOTES
[address] [facility]"" [address] [address] EMERGENCY DEPT  EMERGENCY DEPARTMENT     Pt Name: Rosemarie Joshua  MRN: [id number]  [place] 2/9/1930  Date of evaluation: 7/9/2020  Provider: Chela Chamberlain DO, [address]       Chief Complaint   Patient presents with    Fall    Hand Pain     Cuts to hand     Laceration     Chin       HISTORY OF PRESENT ILLNESS    Rosemarie Joshua is a 80 y.o. female who presents to the emergency department from home nursing home for evaluation of a fall. Patient states she was using her walker and went over. She states she does not think she passed out. She reports bleeding and laceration to her chin, Left knee pain dull achy. Triage notes and Nursing notes were reviewed by myself. Any discrepancies are addressed above.     PAST MEDICAL HISTORY     Past Medical History:   Diagnosis Date    ARNOLD (acute kidney injury) ([address]     Anxiety and depression 6/26/2018    Anxiety and depression     Atrial fibrillation ([address]     Blood transfusion reaction     CAD (coronary artery disease)     Severe triple vessel disease    Cerebrovascular disease 6/19/2018    CKD (chronic kidney disease)     Diastolic congestive heart failure (HCC)     DJD (degenerative joint disease), cervical 6/19/2018    GERD (gastroesophageal reflux disease)     Hiatal hernia     History of blood transfusion     Hyperlipidemia     Hypertension     Hypoalbuminemia     Hypothyroidism     Mild protein-calorie malnutrition (HCC)     NSTEMI (non-ST elevated myocardial infarction) ([address] 7/2014    Pancreatitis due to biliary obstruction     Pneumonia, organism unspecified(486)     Scoliosis     SDH (subdural hematoma) (HCC)     [place] hole decompresson on 12/25/2014    Thyroid disease     Unspecified cerebral artery occlusion with cerebral infarction     Uterine cancer ([address] 1966       SURGICAL HISTORY       Past Surgical History:   Procedure Laterality Date    APPENDECTOMY      BACK SURGERY      TERESA HOLE 12/25/2014    drainage of left SDH    CARDIAC SURGERY      COLONOSCOPY      CORONARY ARTERY BYPASS GRAFT  9/27/11    LIMA to LAD, SVG to 1st Marginal, Distal Right Coronary Artery, 1st Diagonal    DILATATION, ESOPHAGUS      ENDOSCOPY, COLON, DIAGNOSTIC      EYE SURGERY      bilateral cataracts    HYSTERECTOMY      AL EGD BALLOON DILATION ESOPHAGUS <30 MM DIAM N/A 11/22/2017    EGD ESOPHAGOGASTRODUODENOSCOPY DILATATION performed by Ro Nelson MD at [facility]    AL ESOPHAGOGASTRODUODENOSCOPY TRANSORAL DIAGNOSTIC  11/22/2017    EGD ESOPHAGOGASTRODUODENOSCOPY performed by Ro Nelson MD at [address] [place] [address] Left     SKIN BIOPSY      VASCULAR SURGERY      cabg harvests from left leg       CURRENT MEDICATIONS       Previous Medications    ACETAMINOPHEN (TYLENOL) 325 MG TABLET    Take 650 mg by mouth every 4 hours as needed for Pain    ALBUTEROL (ACCUNEB) 0.63 MG/3ML NEBULIZER SOLUTION    Take 1 ampule by nebulization 4 times daily as needed for Wheezing or Shortness of Breath    ALBUTEROL SULFATE HFA (PROVENTIL HFA) 108 (90 BASE) MCG/ACT INHALER    Inhale 2 puffs into the lungs every 6 hours as needed for Wheezing or Shortness of Breath    ASPIRIN 81 MG EC TABLET    Take 81 mg by mouth daily.     ATORVASTATIN (LIPITOR) 20 MG TABLET    Take 1 tablet by mouth nightly    BUMETANIDE (BUMEX) 0.5 MG TABLET    TAKE 1 TABLET BY MOUTH DAILY    CALCIUM CARB-CHOLECALCIFEROL 600-500 MG-UNIT CAPS    Take 1 capsule by mouth daily    CIPROFLOXACIN (CIPRO) 250 MG TABLET    Take 1 tablet by mouth 2 times daily for 3 days    CITALOPRAM (CELEXA) 40 MG TABLET    TAKE 1 TABLET BY MOUTH DAILY    ISOSORBIDE MONONITRATE (IMDUR) 30 MG EXTENDED RELEASE TABLET    TAKE 1 TABLET BY MOUTH DAILY    LATANOPROST (XALATAN) 0.005 % OPHTHALMIC SOLUTION    Place 1 drop into both eyes nightly    LEVOTHYROXINE (SYNTHROID) 88 MCG TABLET    Take 1 tablet by mouth Daily    MAGNESIUM OXIDE (MAG-OX) 400 (241.3 MG) MG TABS TABLET    Take 1 tablet by mouth daily. METOPROLOL TARTRATE (LOPRESSOR) 25 MG TABLET    Take 0.5 tablets by mouth 2 times daily    MULTIPLE VITAMIN (MULTI-VITAMINS) TABS    TAKE 1 TABLET BY MOUTH DAILY    POLYETHYLENE GLYCOL (GLYCOLAX) POWDER    Take 17 g by mouth daily. PROPYLENE GLYCOL-GLYCERIN (EQ ARTIFICIAL TEARS) 1-0.3 % SOLN OPHTHALMIC SOLUTION    Place 1 drop into both eyes 4 times daily as needed for Dry Eyes or Irritation    SPIRONOLACTONE (ALDACTONE) 25 MG TABLET    Take 12.5 mg by mouth daily    SUCRALFATE (CARAFATE) 1 GM TABLET    Take 1 g by mouth 3 times daily     VITAMIN D (ERGOCALCIFEROL) 22290 UNITS CAPS CAPSULE    TAKE 1 CAPSULE BY MOUTH EVERY 28 DAYS ON THE 18TH OF EVERY MONTH       ALLERGIES     Lisinopril; Morphine; Narcan [naloxone]; and Adhesive tape    FAMILY HISTORY       Family History   Problem Relation Age of Onset    Heart Disease Mother     Heart Disease Father     Cancer Brother         lymphoma    Heart Disease Brother     Heart Disease Daughter     Heart Disease Son     Heart Disease Brother         SOCIAL HISTORY       Social History     Socioeconomic History    Marital status:       Spouse name: None    Number of children: 3    Years of education: None    Highest education level: None   Occupational History    Occupation:      Employer: RETIRED   Social Needs    Financial resource strain: Not hard at all   [email or web address] insecurity     Worry: Never true     Inability: Never true    Transportation needs     Medical: No     Non-medical: No   Tobacco Use    Smoking status: Never Smoker    Smokeless tobacco: Never Used   Substance and Sexual Activity    Alcohol use: No     Alcohol/week: 0.0 standard drinks    Drug use: No    Sexual activity: Not Currently   Lifestyle    Physical activity     Days per week: None     Minutes per session: None    Stress: None   Relationships    Social connections     Talks on phone: None     Gets together: None Right Ear: Tympanic membrane normal.      Left Ear: Tympanic membrane normal.      Nose: Congestion and rhinorrhea present. Mouth/Throat:      Mouth: Mucous membranes are dry. Eyes:      General:         Right eye: No discharge. Left eye: No discharge. Extraocular Movements: Extraocular movements intact. Conjunctiva/sclera: Conjunctivae normal.      Pupils: Pupils are equal, round, and reactive to light. Neck:      Musculoskeletal: Normal range of motion and neck supple. No muscular tenderness. Thyroid: No thyromegaly. Vascular: No JVD. Comments: Submandibular ecchymosis, 2 cm chin laceration  Cardiovascular:      Rate and Rhythm: Normal rate and regular rhythm. Heart sounds: Normal heart sounds. Pulmonary:      Effort: Pulmonary effort is normal. No respiratory distress. Breath sounds: Normal breath sounds. Abdominal:      General: Bowel sounds are normal. There is no distension. Palpations: Abdomen is soft. Tenderness: There is no abdominal tenderness. Musculoskeletal: Normal range of motion. General: No tenderness. Comments: Left anterior knee pain     Lymphadenopathy:      Cervical: No cervical adenopathy. Skin:     General: Skin is warm and dry. Capillary Refill: Capillary refill takes less than 2 seconds. Findings: No rash. Neurological:      Mental Status: She is alert and oriented to person, place, and time. She is not disoriented. GCS: GCS eye subscore is 4. GCS verbal subscore is 5. GCS motor subscore is 6. Cranial Nerves: No cranial nerve deficit. Sensory: No sensory deficit. Motor: No abnormal muscle tone or seizure activity. Coordination: Coordination normal.         DIAGNOSTIC RESULTS     EKG:(none if blank)  All EKG's are interpreted [facility] who either signs or Co-signs this chart in the absence of a cardiologist.    RBBB with junctional rhythm.  No ST-t wave changes. No STEMI    RADIOLOGY: (none if blank)   Interpretation per the Radiologistbelow, if available at the time of this note:    CT HEAD WO CONTRAST   Final Result   No acute intracranial findings. **This report has been created using voice recognition software. It may contain minor errors which are inherent in voice recognition technology. **      Final report electronically signed by Dr. Palmira Arrington on 7/9/2020 8:09 PM      CT CERVICAL SPINE WO CONTRAST   Final Result   No acute fracture. Degenerative changes as described. **This report has been created using voice recognition software. It may contain minor errors which are inherent in voice recognition technology. **      Final report electronically signed by Dr. Palmira Arrington on 7/9/2020 8:16 PM      CT Facial Bones WO Contrast   Final Result   No acute fracture. Chin laceration. **This report has been created using voice recognition software. It may contain minor errors which are inherent in voice recognition technology. **      Final report electronically signed by Dr. Palmira Arrington on 7/9/2020 8:20 PM      XR PELVIS (1-2 VIEWS)   Final Result    IMPRESSION:   Generalized osteopenia. No acute fracture or dislocation. **This report has been created using voice recognition software. It may contain minor errors which are inherent in voice recognition technology. **      Final report electronically signed by Dr. Palmira Arrington on 7/9/2020 7:28 PM      XR KNEE LEFT (3 VIEWS)   Final Result   Generalized osteopenia and degenerative changes. No acute fracture or dislocation. **This report has been created using voice recognition software. It may contain minor errors which are inherent in voice recognition technology. **      Final report electronically signed by Dr. Palmira Arrington on 7/9/2020 7:26 PM      XR HAND RIGHT (MIN 3 VIEWS)   Final Result    IMPRESSION:   No acute fracture or dislocation.          **This report has been created using voice recognition software. It may contain minor errors which are inherent in voice recognition technology. **      Final report electronically signed by Dr. Michaele Castleman on 7/9/2020 7:20 PM      XR CHEST PORTABLE   Final Result   Cardiomegaly and prominent interstitium suggesting interstitial edema. Correlation with symptoms is advised. **This report has been created using voice recognition software. It may contain minor errors which are inherent in voice recognition technology. **      Final report electronically signed by Dr. Michaele Castleman on 7/9/2020 7:23 PM          LABS:  Labs Reviewed   CBC WITH AUTO DIFFERENTIAL - Abnormal; Notable for the following components:       Result Value    RBC 3.64 (*)     Hemoglobin 10.9 (*)     Hematocrit 36.1 (*)     MCV 99.2 (*)     MCHC 30.2 (*)     RDW-CV 16.5 (*)     RDW-SD 60.2 (*)     All other components within normal limits   BASIC METABOLIC PANEL W/ REFLEX TO MG FOR LOW K - Abnormal; Notable for the following components:    Glucose 146 (*)     BUN 45 (*)     CREATININE 1.8 (*)     All other components within normal limits   TROPONIN - Abnormal; Notable for the following components:    Troponin T 0.024 (*)     All other components within normal limits   URINE WITH REFLEXED MICRO - Abnormal; Notable for the following components:    Nitrite, Urine POSITIVE (*)     All other components within normal limits   BRAIN NATRIURETIC PEPTIDE - Abnormal; Notable for the following components:    Pro-BNP 3702.0 (*)     All other components within normal limits   GLOMERULAR FILTRATION RATE, ESTIMATED - Abnormal; Notable for the following components:    Est, Glom Filt Rate 26 (*)     All other components within normal limits   PROTIME-INR   SPECIMEN REJECTION   ANION GAP   OSMOLALITY       All other labs were within normal range or not returned as of this dictation.   Please note, any cultures that may have been sent were not resulted at the time of this patient visit. EMERGENCY DEPARTMENT COURSE andMedical Decision Making:     MDM/ Fall from standing in nursing home  51-year-old female  Fall seems to be a mechanical fall per patient history although patient is a poor historian therefore blood work was checked. Her blood work demonstrates an acute kidney injury her chest x-ray shows some interstitial changes and her BNP is up 3400 her blood count is seen similar to baseline. Given the facial injury a CT of her head and neck was ordered which were found to be negative apart from known laceration of the submandibular chain. Her left knee was stable with mild anterior tenderness x-ray was performed and found negative. Her right hand had ecchymoses over 2 fingers negative for acute fractures. Patient was intermittently confused and agitated during her ED stay. She was found to have a nitrite positive UTI and an acute kidney injury. I discussed case with hospitalist who graciously accepted the admission Dr. Halina Strauss. ED Medications administered this visit:    Medications   cefTRIAXone (ROCEPHIN) 1 g IVPB in 50 mL D5W minibag (has no administration in time range)   Tetanus-Diphth-Acell Pertussis (BOOSTRIX) injection 0.5 mL (has no administration in time range)         Lac Repair  Date/Time: 7/10/2020 4:20 PM  Performed by: Pete Carty DO  Authorized by: Pete Carty DO   Consent: Verbal consent obtained. Risks and benefits: risks, benefits and alternatives were discussed  Consent given by: patient  Patient understanding: patient states understanding of the procedure being performed  Patient consent: the patient's understanding of the procedure matches consent given  Patient identity confirmed: verbally with patient and hospital-assigned identification number  Time out: Immediately prior to procedure a \"time out\" was called to verify the correct patient, procedure, equipment, support staff and site/side marked as required.   Body area: head/neck  Location details: chin  Laceration length: 2 cm  Tendon involvement: none  Nerve involvement: none  Vascular damage: no  Irrigation solution: saline  Amount of cleaning: standard  Skin closure: Steri-Strips (dermabond)  Approximation: close  Approximation difficulty: simple  Patient tolerance: Patient tolerated the procedure well with no immediate complications      : (None if blank)       CLINICAL       1. Fall, initial encounter    2. Closed head injury, initial encounter    3. Chin laceration, initial encounter    4. ARNOLD (acute kidney injury) ([facility]    5. Elevated troponin    6. Congestive heart failure, unspecified HF chronicity, unspecified heart failure type [facility])          DISPOSITION/PLAN   DISPOSITION Decision To Admit 07/09/2020 09:48:09 PM      PATIENT REFERRED TO:  No follow-up provider specified.     DISCHARGE MEDICATIONS:  New Prescriptions    No medications on file              (Please note that portions of this note were completed with a voice recognition program.  Efforts were made to edit the dictations but occasionallywords are mis-transcribed.)      Jose Daniel Macedo DO,FACEP (electronically signed)  Attending Physician, Emergency Department       Jose Daniel Macedo DO  07/10/20 3901

## 2020-07-10 NOTE — PROGRESS NOTES
Hospitalist Progress Note    Patient:  Maria Alejandra Covarrubias      Unit/Bed:8B-24/024-A    YOB: 1930    MRN: [id number]       Acct: [de-identified]     PCP: Vicente Irby DO    Date of Admission: 7/9/2020    Assessment/Plan:    1. Acute kidney injury on chronic kidney disease stage III. Downward trend to 1.4. Close to baseline 1-1.2. Continue IVF and monitor. Holding Aldactone and Bumex. Renally dose medications. Avoid nephrotoxic agents. 2. Urinary tract infection. (POA). Continue IV Rocephin. 3. Metabolic encephalopathy secondary to #1/2. Tele sitter. Question baseline. CT head no acute findings. 4. Chin laceration S/P fall. Likely mechanical in nature due to gait instability. Imaging negative for acute fracture. PT/OT. Local wound care. 5. Elevated troponin. Likely demand ischemia. Trended down. No CP or EKG concerns. 6. Chronic diastolic heart failure. EF 50-55%. No signs of decompensation. Holding diuretics. Daily weights. Low sodium diet. Strict intake and output. 7. Atrial fibrillation. ASA 81mg. JAZZMINE-VASc score 6 with 9.7% of stroke. HAS-BLED score 2 with 4.1% risk of bleeding. No OAC at this time due to fall risk. BB  8. Normocytic anemia. Hbg stable. 9. CAD s/p CABG x 3 vessels in 2011. Statin. Imdur. ASA. BB.   10. Hypothyroidism. Continue Synthroid. TSH pending. 11. Essential hypertension. BB  12. HX anxiety. Celexa. 13. GERD. Carafate        Chief Complaint: [facility] Course:     Patient is a 80year old  female with past medical history of Afib, CAD s/p CABG 2011, CVA, CKD Stage 3, GERD, HTN, Hypothyroidism, HFpEF 50-55% She presents from [address] [place] [address] following a fall today with lacerations to her chin, fingers to right hand, left and right leg. She states she does not remember how she fell. She uses a walker at the nursing home. Per ED she stated she was walking and tripped over her feet resulting in fall. Denies loss of consciousness.  She has no complaints of pain at this time and is in no acute distress. Subjective (past 24 hours): Pleasantly confused. Complaints of pain on her chin when touching it. Wants out of bed. Thinks she is at [address]. Low appetite this AM. Denies n/v.        Medications:  Reviewed    Infusion Medications   Scheduled Medications    cefTRIAXone (ROCEPHIN) IV  1 g Intravenous Q24H    atorvastatin  20 mg Oral Nightly    aspirin  81 mg Oral Daily    [Held by provider] bumetanide  0.5 mg Oral Daily    citalopram  40 mg Oral Daily    isosorbide mononitrate  30 mg Oral Daily    levothyroxine  88 mcg Oral Daily    metoprolol tartrate  12.5 mg Oral BID    polyethylene glycol  17 g Oral Daily    latanoprost  1 drop Both Eyes Nightly    sucralfate  1 g Oral TID    [Held by provider] spironolactone  12.5 mg Oral Daily    sodium chloride flush  10 mL Intravenous 2 times per day     PRN Meds: albuterol, glycerin-hypromellose-, sodium chloride flush, acetaminophen **OR** acetaminophen, polyethylene glycol, promethazine **OR** ondansetron      Intake/Output Summary (Last 24 hours) at 7/10/2020 1135  Last data filed at 7/10/2020 1033  Gross per 24 hour   Intake 510.34 ml   Output 100 ml   Net 410.34 ml       Diet:  DIET CARDIAC; Exam:  BP (!) 112/53   Pulse 53   Temp 98 °F (36.7 °C) (Oral)   Resp 16   Ht 4' 9\" (1.448 m)   Wt 111 lb 1.6 oz (50.4 kg)   SpO2 92%   BMI 24.04 kg/m²     General appearance: No apparent distress, appears stated age and cooperative. HEENT: Pupils equal, round, and reactive to light. Conjunctivae/corneas clear. Neck: Supple, with full range of motion. No jugular venous distention. Trachea midline. Respiratory:  Normal respiratory effort. Clear to auscultation, bilaterally without Rales/Wheezes/Rhonchi. Cardiovascular: Regular rate and rhythm with normal S1/S2 without murmurs, rubs or gallops. Abdomen: Soft, non-tender, non-distended with normal bowel sounds.   Musculoskeletal: passive and active ROM x 4 extremities. Skin: Skin color, texture, turgor normal.    Neurologic:  Neurovascularly intact without any focal sensory/motor deficits. Cranial nerves: II-XII intact, grossly non-focal.  Psychiatric: Alert and oriented, thought content appropriate  Capillary Refill: Brisk,< 3 seconds   Peripheral Pulses: +2 palpable, equal bilaterally       Labs:   Recent Labs     07/09/20  1918 07/10/20  0443   WBC 9.4 7.3   HGB 10.9* 10.0*   HCT 36.1* 32.2*    123*     Recent Labs     07/09/20 2034 07/10/20  0443    142   K 4.7 4.2    106   CO2 25 22*   BUN 45* 36*   CREATININE 1.8* 1.4*   CALCIUM 9.5 9.1     No results for input(s): AST, ALT, BILIDIR, BILITOT, ALKPHOS in the last 72 hours. Recent Labs     07/09/20  2000   INR 1.09     No results for input(s): Chris Tadeo in the last 72 hours. No results for input(s): PROCAL in the last 72 hours. Microbiology:      Urinalysis:      Lab Results   Component Value Date    NITRU POSITIVE 07/09/2020    WBCUA 0-2 07/09/2020    BACTERIA MANY 07/09/2020    RBCUA 0-2 07/09/2020    BLOODU NEGATIVE 07/09/2020    SPECGRAV 1.021 12/29/2017    GLUCOSEU NEGATIVE 07/09/2020       Radiology:  CT HEAD WO CONTRAST   Final Result   No acute intracranial findings. **This report has been created using voice recognition software. It may contain minor errors which are inherent in voice recognition technology. **      Final report electronically signed by Dr. Ria Toledo on 7/9/2020 8:09 PM      CT CERVICAL SPINE WO CONTRAST   Final Result   No acute fracture. Degenerative changes as described. **This report has been created using voice recognition software. It may contain minor errors which are inherent in voice recognition technology. **      Final report electronically signed by Dr. Ria Toledo on 7/9/2020 8:16 PM      CT Facial Bones WO Contrast   Final Result   No acute fracture. Chin laceration.          **This report has been created using voice recognition software. It may contain minor errors which are inherent in voice recognition technology. **      Final report electronically signed by Dr. Balaji West on 7/9/2020 8:20 PM      XR PELVIS (1-2 VIEWS)   Final Result    IMPRESSION:   Generalized osteopenia. No acute fracture or dislocation. **This report has been created using voice recognition software. It may contain minor errors which are inherent in voice recognition technology. **      Final report electronically signed by Dr. Balaji West on 7/9/2020 7:28 PM      XR KNEE LEFT (3 VIEWS)   Final Result   Generalized osteopenia and degenerative changes. No acute fracture or dislocation. **This report has been created using voice recognition software. It may contain minor errors which are inherent in voice recognition technology. **      Final report electronically signed by Dr. Balaji West on 7/9/2020 7:26 PM      XR HAND RIGHT (MIN 3 VIEWS)   Final Result    IMPRESSION:   No acute fracture or dislocation. **This report has been created using voice recognition software. It may contain minor errors which are inherent in voice recognition technology. **      Final report electronically signed by Dr. Balaji West on 7/9/2020 7:20 PM      XR CHEST PORTABLE   Final Result   Cardiomegaly and prominent interstitium suggesting interstitial edema. Correlation with symptoms is advised. **This report has been created using voice recognition software. It may contain minor errors which are inherent in voice recognition technology. **      Final report electronically signed by Dr. Balaji West on 7/9/2020 7:23 PM               Code Status: [facility]      Active Hospital Problems    Diagnosis Date Noted    Elevated troponin [R79.89] 07/09/2020       Electronically signed by NADINE Blake CNP on 7/10/2020 at 11:35 AM

## 2020-07-10 NOTE — ED NOTES
ED to inpatient nurses report    Chief Complaint   Patient presents with    Fall    Hand Pain     Cuts to hand     Laceration     Chin      Present to ED from [facility] [place]   LOC: alert and orientated to name and place  Vital signs   Vitals:    07/09/20 1558 07/09/20 1735 07/09/20 2014 07/09/20 2231   BP: (!) 125/45 (!) 143/83 (!) 131/95 (!) 162/53   Pulse: 55 66 70 102   Resp: 18 18 18 18   Temp: 99.2 °F (37.3 °C)      TempSrc: Oral      SpO2: 96% 95% 94% (!) 88%   Weight: 115 lb (52.2 kg)      Height: 4' 9\" (1.448 m)         Oxygen Baseline 2 L/min nasal cannula     Current needs required none Bipap/Cpap No  LDAs:   Peripheral IV 07/09/20 Left Antecubital (Active)     Mobility: Requires assistance * 2  Pending ED orders: None  Present condition: stable     Electronically signed by Modesto Maynard RN on 7/9/2020 at 10:45 PM     Modesto Maynard RN  07/09/20 0676

## 2020-07-10 NOTE — PROGRESS NOTES
[address] [address]  INPATIENT PHYSICAL THERAPY  EVALUATION  [facility] MED SURG 8B - 8B-24/024-A    Time In: 7937  Time Out: 5310  Timed Code Treatment Minutes: 10 Minutes  Minutes: 25          Date: 7/10/2020  Patient Name: Henri Love,  Gender:  female        MRN: [id number]  : 1930  (80 y.o.)  Referral Date : 07/10/20   Referring Practitioner: Bri Cool DO  Diagnosis: Elevated troponin  Additional Pertinent Hx: Pt is a 79 yo F with past medical history of Afib, CAD s/p CABG , CVA, CKD Stage 3, GERD, HTN, Hypothyroidism, HFpEF 50-55% She presents from [address] [place] [address] following a fall with lacerations to her chin, fingers to right hand, left and right leg. She states she does not remember how she fell. She uses a walker at the nursing home. Per ED she stated she was walking and tripped over her feet resulting in fall. Denies loss of consciousness. Restrictions/Precautions:  Restrictions/Precautions: Contact Precautions, Fall Risk    Subjective:  Chart Reviewed: Yes  Patient assessed for rehabilitation services?: Yes  Family / Caregiver Present: No  Subjective: Pt resting in bed, very confused, agreeable to PT. RN approved session. General:  Overall Orientation Status: Impaired  Orientation Level: Oriented to person, Disoriented to place, Disoriented to time, Disoriented to situation  Follows Commands: Impaired         Hearing: Within functional limits         Pain: 0/10:     Social/Functional History:    Lives With: Other (comment)([facility])  Type of Home: Assisted living  Home Layout: One level  Home Access: Level entry  Home Equipment: Rolling walker(Pt denies using a walker, per medical chart she uses a walker at baseline)                   Ambulation Assistance: Independent  Transfer Assistance: Independent          Additional Comments: Per SW/case management notes, pt only requires assistance with showering at [facility]. Pt uses a walker for mobility.  Pt very confused throughout PT evaluation, often reports she is \"going to assisted/care home\" also notes that she lives in [place] [facility]. Pt very poor historian. OBJECTIVE:  Range of Motion:  Bilateral Lower Extremity: WFL    Strength:  Bilateral Lower Extremity: Impaired - grossly 4-/5 to 4/5    Balance:  Static Standing Balance: Contact Guard Assistance  Dynamic Standing Balance: Minimal Assistance   Pt requires B UE support in standing at RW. Bed Mobility:  Supine to Sit: Stand By Assistance   HOB elevated, did not use rails. Transfers:  Sit to Stand: Contact Guard Assistance   From EOB to RW, verbal cues for safe hand placement. Ambulation:  Contact Guard Assistance  Distance: 2 ft  Surface: Level Tile  Device:Rolling Walker  Gait Deviations: Forward Flexed Posture, Decreased Gait Speed, Narrow Base of Support and Unsteady Gait  Pt ambulated from bed to chair, approx 2 feet, noted with shuffled/unsteady steps with use of RW. Exercise:  Patient was guided in 1 set(s) 10 reps of exercise to both lower extremities. Seated marches, Seated heel/toe raises and Long arc quads. Exercises were completed for increased independence with functional mobility. Functional Outcome Measures: Completed  AM-PAC Inpatient Mobility Raw Score : 17  AM-PAC Inpatient T-Scale Score : 42.13    ASSESSMENT:  Activity Tolerance:  Patient tolerance of  treatment: good. Pt tolerated seated there ex and transfer from bed to chair well. Pt very confused throughout treatment but did follow commands for participation. Treatment Initiated: Treatment and education initiated within context of evaluation. Evaluation time included review of current medical information, gathering information related to past medical, social and functional history, completion of standardized testing, formal and informal observation of tasks, assessment of data and development of plan of care and goals.   Treatment time included skilled education and facilitation of tasks to increase safety and independence with functional mobility for improved independence and quality of life. Assessment: Body structures, Functions, Activity limitations: Decreased functional mobility , Decreased safe awareness, Decreased balance, Decreased cognition, Decreased posture  Assessment: Pt presents with decreased B LE strength, decreased endurance, impaired standing balance and poor cognition. Pt only oriented to self during PT evaluation. Pt requires one assist to complete bed mobility and transfers safely. At this time, recommend skilled PT in the acute care setting with d/c to SNF for skilled PT and/or return to [facility] with 24/7 assistance as patient is not safe to be left alone due to confusion and balance deficits, pt will require 24/7 supervision and skilled PT in order to return to [facility]. REQUIRES PT FOLLOW UP: Yes      Discharge Recommendations: 24 hour supervision or assist, [address], Patient would benefit from continued therapy after discharge     Recommend skilled PT at SNF or 24/7 assist at [facility] with skilled PT as patient is unsafe to be alone with level of confusion at evaluation    Patient Education:  PT Education: Goals, PT Role, Precautions, General Safety, Transfer Training, Plan of Care, Pressure Relief, Functional Mobility Training    Equipment Recommendations: None     Plan:  Times per week: 3-5x GM  Times per day: Daily  Current Treatment Recommendations: Strengthening, Balance Training, Transfer Training, Endurance Training, Gait Training, Neuromuscular Re-education, Functional Mobility Training, Safety Education & Training, Home Exercise Program    Goals:  Patient goals : unable to voice appropriate goals  Short term goals  Time Frame for Short term goals: by discharge  Short term goal 1: Pt will perform transfers with SBA in order to reduce level of assistance from caregivers.    Short term goal 2: Pt will ambulate 22' with RW and SBA in order to safely ambulate to/from bathroom. Long term goals  Time Frame for Long term goals : n/a due to short length of stay    Following session, patient left in safe position with all fall risk precautions in place.

## 2020-07-11 NOTE — PLAN OF CARE
Problem: Falls - Risk of:  Goal: Will remain free from falls  Description: Will remain free from falls  Outcome: Ongoing  Note: No falls this shift. Falling star program and alarms in use. Telesitter at bedside as patient remains confused and impulsive. Goal: Absence of physical injury  Description: Absence of physical injury  Outcome: Ongoing  Note: Patient free from falls. No injuries noted. Problem: Skin Integrity:  Goal: Will show no infection signs and symptoms  Description: Will show no infection signs and symptoms  Outcome: Ongoing  Note: Skin assessed every shift. Encouraging repositioning for pressure ulcer prevention. Goal: Absence of new skin breakdown  Description: Absence of new skin breakdown  Outcome: Ongoing  Note: No new skin breakdown noted. Encouraging patient to turn and reposition. Problem: Pain:  Goal: Pain level will decrease  Description: Pain level will decrease  Outcome: Ongoing  Note: Patient denies pain when asked. PRN medication available if needed. Goal: Control of acute pain  Description: Control of acute pain  Outcome: Ongoing  Note: Patient states pain has been controlled well without use of medications. Goal: Control of chronic pain  Description: Control of chronic pain  Outcome: Ongoing  Note: Patient denies chronic pain when asked. Problem: DISCHARGE BARRIERS  Goal: Patient's continuum of care needs are met  Outcome: Ongoing  Note: SW following with patient for appropriate discharge planning. Problem: Musculor/Skeletal Functional Status  Goal: Highest potential functional level  Outcome: Ongoing  Note: Therapies working with patient to improve mobility and function. Care plan reviewed with patient. Patient verbalizes understanding of the plan of care and contributes to goal setting.

## 2020-07-11 NOTE — PROGRESS NOTES
Hospitalist Progress Note    Patient:  Lilia Vergara      Unit/Bed:8B-24/024-A    YOB: 1930    MRN: [id number]       Acct: [de-identified]     PCP: Bartolo Anderson DO    Date of Admission: 7/9/2020    Assessment/Plan:    1. Acute kidney injury on chronic kidney disease stage III. Downward trend to 1.3. Close to baseline 1-1.2. Continue IVF and monitor. Holding Aldactone and Bumex. Renally dose medications. Avoid nephrotoxic agents. 2. Urinary tract infection. (POA). Continue IV Rocephin. Await urine culture and sensivitiy. 3. Metabolic encephalopathy secondary to #1/2. Tele sitter. Per nursing home baseline she is alert and oriented. CT head no acute findings. 4. Chin laceration S/P fall. Likely mechanical in nature due to gait instability. Imaging negative for acute fracture. PT/OT. Local wound care. 5. Elevated troponin. Likely demand ischemia. Trended down. No CP or EKG concerns. 6. Chronic diastolic heart failure. EF 50-55%. No signs of decompensation. Holding diuretics. Daily weights. Low sodium diet. Strict intake and output. 7. Atrial fibrillation. ASA 81mg. JAZZMINE-VASc score 6 with 9.7% of stroke. HAS-BLED score 2 with 4.1% risk of bleeding. No OAC at this time due to fall risk. BB  8. Normocytic anemia. Hbg stable. 9. CAD s/p CABG x 3 vessels in 2011. Statin. Imdur. ASA. BB.   10. Hypothyroidism. Continue Synthroid. TSH 5.810. Free T4 1.64. No changes. 11. Essential hypertension. BB  12. HX anxiety. Celexa. 13. GERD. Carafate    Chief Complaint: [facility] AND [place] Course:     Patient is a 80year old  female with past medical history of Afib, CAD s/p CABG 2011, CVA, CKD Stage 3, GERD, HTN, Hypothyroidism, HFpEF 50-55% She presents from [address] [place] [address] following a fall today with lacerations to her chin, fingers to right hand, left and right leg. She states she does not remember how she fell. She uses a walker at the nursing home.  Per ED she stated she was rubs or gallops. Abdomen: Soft, non-tender, non-distended with normal bowel sounds. Musculoskeletal: passive and active ROM x 4 extremities. Skin: Skin color, texture, turgor normal.  Chin lac. Ecchymosis to chin, under left nostril and LLE. Neurologic:  Neurovascularly intact without any focal sensory/motor deficits. Cranial nerves: II-XII intact, grossly non-focal.  Psychiatric: Alert and oriented to person only, thought content inappropriate. Abnormal insight. Capillary Refill: Brisk,< 3 seconds   Peripheral Pulses: +2 palpable, equal bilaterally       Labs:   Recent Labs     07/09/20  1918 07/10/20  0443   WBC 9.4 7.3   HGB 10.9* 10.0*   HCT 36.1* 32.2*    123*     Recent Labs     07/09/20  2034 07/10/20  0443 07/11/20  0443    142 141   K 4.7 4.2 4.4    106 105   CO2 25 22* 23   BUN 45* 36* 30*   CREATININE 1.8* 1.4* 1.3*   CALCIUM 9.5 9.1 8.7     No results for input(s): AST, ALT, BILIDIR, BILITOT, ALKPHOS in the last 72 hours. Recent Labs     07/09/20  2000   INR 1.09     No results for input(s): Nahomy Gabriela in the last 72 hours. No results for input(s): PROCAL in the last 72 hours. Microbiology:      Urinalysis:      Lab Results   Component Value Date    NITRU POSITIVE 07/09/2020    WBCUA 0-2 07/09/2020    BACTERIA MANY 07/09/2020    RBCUA 0-2 07/09/2020    BLOODU NEGATIVE 07/09/2020    SPECGRAV 1.021 12/29/2017    GLUCOSEU NEGATIVE 07/09/2020       Radiology:  CT HEAD WO CONTRAST   Final Result   No acute intracranial findings. **This report has been created using voice recognition software. It may contain minor errors which are inherent in voice recognition technology. **      Final report electronically signed by Dr. Serjio Ibrahim on 7/9/2020 8:09 PM      CT CERVICAL SPINE WO CONTRAST   Final Result   No acute fracture. Degenerative changes as described. **This report has been created using voice recognition software.  It may contain minor errors which are inherent in voice recognition technology. **      Final report electronically signed by Dr. Marline Cuenca on 7/9/2020 8:16 PM      CT Facial Bones WO Contrast   Final Result   No acute fracture. Chin laceration. **This report has been created using voice recognition software. It may contain minor errors which are inherent in voice recognition technology. **      Final report electronically signed by Dr. Marline Cuenca on 7/9/2020 8:20 PM      XR PELVIS (1-2 VIEWS)   Final Result    IMPRESSION:   Generalized osteopenia. No acute fracture or dislocation. **This report has been created using voice recognition software. It may contain minor errors which are inherent in voice recognition technology. **      Final report electronically signed by Dr. Marline Cuenca on 7/9/2020 7:28 PM      XR KNEE LEFT (3 VIEWS)   Final Result   Generalized osteopenia and degenerative changes. No acute fracture or dislocation. **This report has been created using voice recognition software. It may contain minor errors which are inherent in voice recognition technology. **      Final report electronically signed by Dr. Marline Cuenca on 7/9/2020 7:26 PM      XR HAND RIGHT (MIN 3 VIEWS)   Final Result    IMPRESSION:   No acute fracture or dislocation. **This report has been created using voice recognition software. It may contain minor errors which are inherent in voice recognition technology. **      Final report electronically signed by Dr. Marline Cuenca on 7/9/2020 7:20 PM      XR CHEST PORTABLE   Final Result   Cardiomegaly and prominent interstitium suggesting interstitial edema. Correlation with symptoms is advised. **This report has been created using voice recognition software. It may contain minor errors which are inherent in voice recognition technology. **      Final report electronically signed by Dr. Marline Cuenca on 7/9/2020 7:23 PM               Code Status: [facility] Problems    Diagnosis Date Noted    Elevated troponin [R79.89] 07/09/2020       Electronically signed by NADINE Blake CNP on 7/11/2020 at 11:38 AM

## 2020-07-11 NOTE — FLOWSHEET NOTE
[address] [address] PROGRESS NOTE      Patient: Stephanie Avina  Room #: 8S-37/453-M            YOB: 1930  Age: 80 y.o. Gender: female            Admit Date & Time: 7/9/2020  3:56 PM    Assessment:  Navdeep Reyes, is a 80year old female from an assisted living, place. Navdeep Reyes was sitting up in a chair on 8b. She had an oxygen canuela. Her daughter was by her side. Pt did not talk and was shaking during the visit. Her daughter shared about her recent fall and her mom's wishes as far as end of life issues were concerned. She stated, \"mom did not even want to come to the hospital, but because she hit her heard,she had to be brought it. \"  Her daughter also said that her mom is not eating or drinking. This gives the daughter much concern as she seems her mother's health failing, rather fast.     Interventions:  As this  listened attentively, words of comfort were given, as well as questions as to end of life discussion. Eufemia's daughter, one of three children stated, we have had these talks with mom. \"She knows what she wants. \" said, Eufemia's daughter and we have come to terms with that. We had prayer. Outcomes: Words of comfort and encouragement given to pt's daughter and prayer for pt. Plan: This  asked the nurse if Pallitiative nurse would be consulted? 1.  Care Plan:  Continue spiritual and emotional care for patient and family. Including prayers.      Electronically signed by Mariela Jain on 7/11/2020 at 4:23 PM.  [facility]  [phone]

## 2020-07-12 NOTE — CONSULTS
NEUROLOGY CONSULT NOTE      Requesting Physician: Joaquín Dalton, *    Reason for Consult:  Evaluate for fell, confusion    History of Present Illness:  Renata Carey is a 80 y.o. female admitted to [address] [place] [address] on 7/9/2020.      80year old woman with hx of afib not on anticoagulant, but on aspirin, hx of CAD, CKD, she fell 3 days ago and had mutliple bruises and became confused. Patient was found to have UTI and was treated for it, however, her confusion has not improved much. Patient can't remember why she fell. No family at bedside. Reports no chest pain. No shortness of breath with exertion. Reports no neck pain. No vision changes. No dysphagia. No fever. No rash. No weight loss.     Past Medical History:        Diagnosis Date    [facility]acute [facility]) ([facility]     Anxiety and depression 6/26/2018    Anxiety and depression     Atrial fibrillation ([address]     Blood transfusion reaction     CAD (coronary artery disease)     Severe triple vessel disease    Cerebrovascular disease 6/19/2018    CKD (chronic kidney disease)     Diastolic congestive heart failure (HCC)     DJD (degenerative joint disease), cervical 6/19/2018    GERD (gastroesophageal reflux disease)     Hiatal hernia     History of blood transfusion     Hyperlipidemia     Hypertension     Hypoalbuminemia     Hypothyroidism     Mild protein-calorie malnutrition (HCC)     NSTEMI (non-ST elevated myocardial infarction) ([address] 7/2014    Pancreatitis due to biliary obstruction     Pneumonia, organism unspecified(486)     Scoliosis     SDH (subdural hematoma) (HCC)     [place] hole decompresson on 12/25/2014    Thyroid disease     Unspecified cerebral artery occlusion with cerebral infarction     Uterine cancer [facility]) 1966           Procedure Laterality Date    APPENDECTOMY      BACK SURGERY      TERESA HOLE  12/25/2014    drainage of left SDH    CARDIAC SURGERY      COLONOSCOPY      CORONARY ARTERY BYPASS GRAFT  9/27/11    LIMA to LAD, SVG to 1st Marginal, Distal Right Coronary Artery, 1st Diagonal    DILATATION, ESOPHAGUS      ENDOSCOPY, COLON, DIAGNOSTIC      EYE SURGERY      bilateral cataracts    HYSTERECTOMY      SC EGD BALLOON DILATION ESOPHAGUS <30 MM DIAM N/A 11/22/2017    EGD ESOPHAGOGASTRODUODENOSCOPY DILATATION performed by Rich Blue MD at [facility]    SC ESOPHAGOGASTRODUODENOSCOPY TRANSORAL DIAGNOSTIC  11/22/2017    EGD ESOPHAGOGASTRODUODENOSCOPY performed by Rich Blue MD at [address] [place] [address] Left     SKIN BIOPSY      VASCULAR SURGERY      cabg harvests from left leg       Allergies:     Allergies   Allergen Reactions    Lisinopril Other (See Comments)    Morphine     Narcan [Naloxone] Other (See Comments)    Adhesive Tape Rash     plastic        Current Medications:   cefTRIAXone (ROCEPHIN) 1 g IVPB in 50 mL D5W minibag, Q24H  albuterol (PROVENTIL) nebulizer solution 2.5 mg, 4x Daily PRN  atorvastatin (LIPITOR) tablet 20 mg, Nightly  aspirin EC tablet 81 mg, Daily  [Held by provider] bumetanide (BUMEX) tablet 0.5 mg, Daily  citalopram (CELEXA) tablet 40 mg, Daily  isosorbide mononitrate (IMDUR) extended release tablet 30 mg, Daily  levothyroxine (SYNTHROID) tablet 88 mcg, Daily  metoprolol tartrate (LOPRESSOR) tablet 12.5 mg, BID  polyethylene glycol (GLYCOLAX) packet 17 g, Daily  glycerin-hypromellose- 0.2-0.2-1 % opthalmic solution 1 drop, 4x Daily PRN  latanoprost (XALATAN) 0.005 % ophthalmic solution 1 drop, Nightly  sucralfate (CARAFATE) tablet 1 g, TID  [Held by provider] spironolactone (ALDACTONE) tablet 12.5 mg, Daily  sodium chloride flush 0.9 % injection 10 mL, 2 times per day  sodium chloride flush 0.9 % injection 10 mL, PRN  acetaminophen (TYLENOL) tablet 650 mg, Q6H PRN    Or  acetaminophen (TYLENOL) suppository 650 mg, Q6H PRN  polyethylene glycol (GLYCOLAX) packet 17 g, Daily PRN  promethazine (PHENERGAN) tablet 12.5 mg, Q6H upper and lower extremities except right middle finger, she can't move due to pain . Normal muscle tone . There is no muscle atrophy. There is no muscle fasciculation   Sensory is intact  Coordination: finger to nose intact  Gait and station not tested  Abnormal movement none. Long tracts are intact  Skin - bruise on the chin, right hand  Superficial temporal artery pulses are normal.   Musculoskeletal: Has no hand arthritis, no limitation of ROM in any of the four extremities,  no joint tenderness, deformity or swelling. There is no leg edema. The Heart was regular in rate and rhythm. No heart murmur  Chest- Clear  Abdomen: soft, intact bowel sounds. Labs:    CBC:   Recent Labs     07/09/20  1918 07/10/20  0443   WBC 9.4 7.3   HGB 10.9* 10.0*    123*   MCV 99.2* 97.0   MCH 29.9 30.1   MCHC 30.2* 31.1*     CMP:  Recent Labs     07/10/20  0443 07/11/20  0443 07/12/20  0520    141 142   K 4.2 4.4 4.2    105 106   CO2 22* 23 23   BUN 36* 30* 26*   CREATININE 1.4* 1.3* 1.0   LABGLOM 35* 38* 52*   GLUCOSE 112* 100 111*   CALCIUM 9.1 8.7 8.9     Liver:   Recent Labs     07/12/20  0520   AST 28   ALT 11   ALKPHOS 40   PROT 6.4   LABALBU 3.8   BILITOT 0.8     MRI BRAIN:  No results found for this or any previous visit. No results found for this or any previous visit. No results found for this or any previous visit. No results found for this or any previous visit. No results found for this or any previous visit. No results found for this or any previous visit. No results found for this or any previous visit. Results for orders placed during the hospital encounter of 07/09/20   CT HEAD WO CONTRAST    Narrative PROCEDURE: CT HEAD WO CONTRAST    CLINICAL INFORMATION: fall, injuy. COMPARISON: September 2, 2019    TECHNIQUE: Noncontrast 5 mm axial images were obtained through the brain.     All CT scans at this facility use dose modulation, iterative reconstruction, and/or weight-based dosing when appropriate to reduce radiation dose to as low as reasonably achievable. FINDINGS:    Generalized volume loss and small vessel ischemic changes. Prominent ventricles in keeping with central atrophy. Intracranial vascular calcifications. 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 No acute hemorrhage or midline shift. Mastoid air cells are patent. Paranasal sinuses are clear. Skull: Postsurgical changes left craniotomy. soft tissues: Unremarkable. Impression No acute intracranial findings. **This report has been created using voice recognition software. It may contain minor errors which are inherent in voice recognition technology. **    Final report electronically signed by Dr. Hilda Ruiz on 7/9/2020 8:09 PM         We reviewed the patient records and available information in the EHR       Impression:    80year old woman with hx of afib not on anticoagulant, had a fall, since then became confused but improved. Recommendations:    - unclear why she fell  - will get MRI brain to r/o stroke  - con't aspirin for now  - con't UTI treatment  - this could be due to UTI, but we will to r/o other causes  - PT/OT consult  - will follow                                             1. Case was discussed with primary service. 2. All questions were answered. It was my pleasure to evaluate Henri Love today. Please call with questions.       Electronically signed by Linda Hernandez MD on 7/12/2020 at 4:16 PM    Jess Conteh MD  Attending Neurologist/Neurointensivist

## 2020-07-12 NOTE — PLAN OF CARE
Problem: Falls - Risk of:  Goal: Will remain free from falls  Description: Will remain free from falls  7/12/2020 1112 by Judith Davenport RN  Outcome: Ongoing  Note: No falls this shift. Falling star program and alarms in use. Patient uses call light appropriately. Problem: Falls - Risk of:  Goal: Absence of physical injury  Description: Absence of physical injury  7/12/2020 1112 by Judith Davenport RN  Outcome: Ongoing  Note: Patient free from falls. No injuries noted. Problem: Skin Integrity:  Goal: Will show no infection signs and symptoms  Description: Will show no infection signs and symptoms  7/12/2020 1112 by Judith Davenport RN  Outcome: Ongoing  Note: Afebrile. Patient continues on IV antibiotics for UTI. Problem: Skin Integrity:  Goal: Absence of new skin breakdown  Description: Absence of new skin breakdown  7/12/2020 1112 by Judith Davenport RN  Outcome: Ongoing  Note: No new skin breakdown noted. Attempting to turn and reposition patient every 2 hours but patient does refuse at times. Blanchable redness noted. Problem: Pain:  Goal: Pain level will decrease  Description: Pain level will decrease  7/12/2020 1112 by Judith Davenport RN  Outcome: Ongoing  Note: Patient denies pain when asked. PRN Tylenol available as needed. Problem: Pain:  Goal: Control of acute pain  Description: Control of acute pain  7/12/2020 1112 by Judith Davenport RN  Outcome: Ongoing  Note: Patient denies acute pain when asked. Pain goal 0/10. Problem: Pain:  Goal: Control of chronic pain  Description: Control of chronic pain  7/12/2020 1112 by Judith Davenport RN  Outcome: Ongoing  Note: Patient denies chronic pain when asked. Pain goal 0/10. Problem: DISCHARGE BARRIERS  Goal: Patient's continuum of care needs are met  7/12/2020 1112 by Judith Davenport RN  Outcome: Ongoing  Note: SW following with patient for appropriate discharge planning.       Problem: Musculor/Skeletal Functional Status  Goal:

## 2020-07-12 NOTE — PLAN OF CARE
Problem: Falls - Risk of:  Goal: Will remain free from falls  Description: Will remain free from falls  7/11/2020 2217 by Lafaye Brunner, RN  Outcome: Ongoing  Note: Patient absent of falls this shift, fall band intact, bed alarm set, falling star magnet in place. Problem: Falls - Risk of:  Goal: Will remain free from falls  Description: Will remain free from falls  7/11/2020 2211 by Lafaye Brunner, RN  Outcome: Ongoing     Problem: Falls - Risk of:  Goal: Absence of physical injury  Description: Absence of physical injury  7/11/2020 2217 by Lafaye Brunner, RN  Outcome: Ongoing  Note: Pt is absence of physical injury this shift. Patient absent of falls this shift, fall band intact, bed alarm set, falling star magnet in place. Problem: Falls - Risk of:  Goal: Absence of physical injury  Description: Absence of physical injury  7/11/2020 2211 by Lafaye Brunner, RN  Outcome: Ongoing     Problem: Skin Integrity:  Goal: Will show no infection signs and symptoms  Description: Will show no infection signs and symptoms  7/11/2020 2217 by Lafaye Brunner, RN  Outcome: Ongoing  Note: Pt will show no s/s of infection this shift. No new skin breakdown noted this shift. Problem: Skin Integrity:  Goal: Will show no infection signs and symptoms  Description: Will show no infection signs and symptoms  7/11/2020 2211 by Lafaye Brunner, RN  Outcome: Ongoing     Problem: Skin Integrity:  Goal: Absence of new skin breakdown  Description: Absence of new skin breakdown  7/11/2020 2217 by Lafaye Brunner, RN  Outcome: Ongoing  Note: No new skin breakdown noted this shift.       Problem: Skin Integrity:  Goal: Absence of new skin breakdown  Description: Absence of new skin breakdown  7/11/2020 2211 by Lafaye Brunner, RN  Outcome: Ongoing     Problem: Pain:  Goal: Pain level will decrease  Description: Pain level will decrease  7/11/2020 2217 by Lafaye Brunner, RN  Outcome: Ongoing  Note: Patient voices pain 0/10. Patients pain goal is 0/10. Patients pain goal is a 0. Non-pharmacological interventions include: rest and repositioning. Problem: Pain:  Goal: Pain level will decrease  Description: Pain level will decrease  7/11/2020 2211 by Jace Duckworth RN  Outcome: Ongoing     Problem: Pain:  Goal: Control of acute pain  Description: Control of acute pain  7/11/2020 2217 by Jace Duckworth RN  Outcome: Ongoing  Note: Pt stated no pain this shift. Problem: Pain:  Goal: Control of acute pain  Description: Control of acute pain  7/11/2020 2211 by Jace Duckworth RN  Outcome: Ongoing     Problem: Pain:  Goal: Control of chronic pain  Description: Control of chronic pain  7/11/2020 2217 by Jace Duckworth RN  Outcome: Ongoing  Note: Pt stated no pain this shift. Problem: Pain:  Goal: Control of chronic pain  Description: Control of chronic pain  7/11/2020 2211 by Jace Duckworth RN  Outcome: Ongoing     Problem: DISCHARGE BARRIERS  Goal: Patient's continuum of care needs are met  7/11/2020 2217 by Jace Duckworth RN  Outcome: Ongoing  Note: Pt is to be discharged back to Assisted living at [address] [place], [place] [address] Problem: DISCHARGE BARRIERS  Goal: Patient's continuum of care needs are met  7/11/2020 2211 by Jace Duckworth RN  Outcome: Ongoing     Problem: Musculor/Skeletal Functional Status  Goal: Highest potential functional level  7/11/2020 2217 by Jace Duckworth RN  Outcome: Ongoing  Note: Pt is working with therapy to improve muscular and skeletal functions. Problem: Musculor/Skeletal Functional Status  Goal: Highest potential functional level  7/11/2020 2211 by Jace Duckworth RN  Outcome: Ongoing    Care plan reviewed with patient. Patient verbalizes understanding of the plan of care and contributes to goal setting.

## 2020-07-12 NOTE — PROGRESS NOTES
Hospitalist Progress Note    Patient:  Adilene Johnston      Unit/Bed:8B-24/024-A    YOB: 1930    MRN: [id number]       Acct: [de-identified]     PCP: Larry Jones DO    Date of Admission: 7/9/2020    Assessment/Plan:    1. Acute kidney injury on chronic kidney disease stage III resolved with hydration. Stop IVF. Holding Aldactone and Bumex. Renally dose medications. Avoid nephrotoxic agents. 2. Urinary tract infection. (POA). Continue IV Rocephin. Awaiting urine culture and sensivitiy. 3. Metabolic encephalopathy secondary to #1/2. Minimal improvement since admission. Tele sitter. Per nursing home baseline she is alert and oriented. CT head no acute findings. Check LFTs and Ammonia level. Consider neurology consult. ? Concussive syndrome after fall? 4. Chin laceration S/P fall. Likely mechanical in nature due to gait instability. Imaging negative for acute fracture. PT/OT. Local wound care. 5. Elevated troponin. Likely demand ischemia. Trended down. No CP or EKG concerns. 6. Chronic diastolic heart failure. EF 50-55%. No signs of decompensation. Holding diuretics. Daily weights. Low sodium diet. Strict intake and output. 7. Atrial fibrillation. ASA 81mg. JAZZMINE-VASc score 6 with 9.7% of stroke. HAS-BLED score 2 with 4.1% risk of bleeding. No OAC at this time due to fall risk. BB  8. Normocytic anemia. Hbg stable. 9. CAD s/p CABG x 3 vessels in 2011. Statin. Imdur. ASA. BB.   10. Hypothyroidism. Continue Synthroid. TSH 5.810. Free T4 1.64. No changes. 11. Essential hypertension. BB  12. HX anxiety. Celexa. 13. GERD. Carafate    Chief Complaint: [facility] AND [facility] Course:     Patient is a 80year old  female with past medical history of Afib, CAD s/p CABG 2011, CVA, CKD Stage 3, GERD, HTN, Hypothyroidism, HFpEF 50-55% She presents from [address] [place] [address] following a fall today with lacerations to her chin, fingers to right hand, left and right leg.  She states she does not midline. Respiratory:  Normal respiratory effort. Clear to auscultation, bilaterally without Rales/Wheezes/Rhonchi. Cardiovascular: Regular rate and rhythm with normal S1/S2 without murmurs, rubs or gallops. Abdomen: Soft, non-tender, non-distended with normal bowel sounds. Musculoskeletal: passive and active ROM x 4 extremities. Skin: Skin color, texture, turgor normal.  Chin lac. Ecchymosis to chin, under left nostril and LLE. Neurologic:  Neurovascularly intact without any focal sensory/motor deficits. Cranial nerves: II-XII intact, grossly non-focal.  Psychiatric: Alert and oriented to person only, thought content inappropriate. Abnormal insight. Capillary Refill: Brisk,< 3 seconds   Peripheral Pulses: +2 palpable, equal bilaterally       Labs:   Recent Labs     07/09/20 1918 07/10/20  0443   WBC 9.4 7.3   HGB 10.9* 10.0*   HCT 36.1* 32.2*    123*     Recent Labs     07/10/20  0443 07/11/20  0443 07/12/20  0520    141 142   K 4.2 4.4 4.2    105 106   CO2 22* 23 23   BUN 36* 30* 26*   CREATININE 1.4* 1.3* 1.0   CALCIUM 9.1 8.7 8.9     No results for input(s): AST, ALT, BILIDIR, BILITOT, ALKPHOS in the last 72 hours. Recent Labs     07/09/20  2000   INR 1.09     No results for input(s): Joyce Ginny in the last 72 hours. No results for input(s): PROCAL in the last 72 hours. Microbiology:      Urinalysis:      Lab Results   Component Value Date    NITRU POSITIVE 07/09/2020    WBCUA 0-2 07/09/2020    BACTERIA MANY 07/09/2020    RBCUA 0-2 07/09/2020    BLOODU NEGATIVE 07/09/2020    SPECGRAV 1.021 12/29/2017    GLUCOSEU NEGATIVE 07/09/2020       Radiology:  CT HEAD WO CONTRAST   Final Result   No acute intracranial findings. **This report has been created using voice recognition software. It may contain minor errors which are inherent in voice recognition technology. **      Final report electronically signed by Dr. Palmira Arrington on 7/9/2020 8:09 PM      CT CERVICAL errors which are inherent in voice recognition technology. **      Final report electronically signed by Dr. Roanne Romberg on 7/9/2020 7:23 PM               Code Status: [facility]      Active Hospital Problems    Diagnosis Date Noted    Elevated troponin [R79.89] 07/09/2020       Electronically signed by NADINE Wu CNP on 7/12/2020 at 2:16 PM

## 2020-07-13 NOTE — PROGRESS NOTES
Assistance, Moderate Assistance, X 1, with increased time for completion, cues for hand placement, with verbal cues, Min from bedside chair, Mod from low toilet seat  Stand to Sit:Contact Guard Assistance    Ambulation:  Contact Guard Assistance  Distance: ~8ft x2  Surface: Level Tile  Device:Rolling Walker  Gait Deviations: Forward Flexed Posture, Slow Aviva, Decreased Step Length Bilaterally, Decreased Weight Shift Bilaterally, Decreased Gait Speed, Decreased Heel Strike Bilaterally, Mild Path Deviations and assist for RW navigation    Balance:  Static Standing Balance: Contact Guard Assistance, stood for pericare and hand washing, ~2-3min x2, cues for hand placement, slight posterior lean, mild unsteadiness    Exercise:  None this session. Functional Outcome Measures: Completed  AM-PAC Inpatient Mobility Raw Score : 17  AM-PAC Inpatient T-Scale Score : 42.13    ASSESSMENT:  Assessment: Patient progressing toward established goals. Activity Tolerance:  Patient tolerance of  treatment: fair. Equipment Recommendations:   Discharge Recommendations:  24 hour supervision or assist, [address], Patient would benefit from continued therapy after discharge    Plan: Times per week: 3-5x GM  Times per day: Daily  Current Treatment Recommendations: Strengthening, Balance Training, Transfer Training, Endurance Training, Gait Training, Neuromuscular Re-education, Functional Mobility Training, Safety Education & Training, Home Exercise Program    Patient Education  Patient Education: Plan of Care, Transfers, Gait, Up in Chair for All Meals, standing balance/tolerance    Goals:  Patient goals : unable to voice appropriate goals  Short term goals  Time Frame for Short term goals: by discharge  Short term goal 1: Pt will perform transfers with SBA in order to reduce level of assistance from caregivers.    Short term goal 2: Pt will ambulate 22' with RW and SBA in order to safely ambulate to/from bathroom. Long term goals  Time Frame for Long term goals : n/a due to short length of stay    Following session, patient left in safe position with all fall risk precautions in place.

## 2020-07-13 NOTE — PROGRESS NOTES
[address] [facility]  INPATIENT OCCUPATIONAL THERAPY  STRZ MED SURG 8B  EVALUATION    Time:    Time In: 0559  Time Out: 1022  Timed Code Treatment Minutes: 15 Minutes  Minutes: 27          Date: 2020  Patient Name: Nazario Frank,   Gender: female      MRN: [id number]  : 1930  (80 y.o.)  Referring Practitioner: Dr. Dharmesh Raymond  Diagnosis: elevated troponin  Additional Pertinent Hx: 80year old  female with past medical history of Afib, CAD s/p CABG , CVA, CKD Stage 3, GERD, HTN, Hypothyroidism, HFpEF 50-55% She presents from [address] [place] [address] following a fall today with lacerations to her chin, fingers to right hand, left and right leg. She states she does not remember how she fell. She uses a walker at the nursing home. Per ED she stated she was walking and tripped over her feet resulting in fall    Restrictions/Precautions:  Restrictions/Precautions: Contact Precautions, Fall Risk    Subjective  Chart Reviewed: Yes, Orders, Progress Notes, History and Physical  Patient assessed for rehabilitation services?: Yes    Subjective: Pt sitting in bed finishing up breakfast. PT requiring encouragemen tot participate    Pain:       Social/Functional History:  Lives With: Other (comment)  Type of Home: Assisted living([facility])  Home Layout: One level  Home Access: Level entry  Home Equipment: Rolling walker   Bathroom Shower/Tub: Walk-in shower  Bathroom Toilet: Handicap height       ADL Assistance: Needs assistance  Ambulation Assistance: Independent  Transfer Assistance: Independent          Additional Comments: Per SW/case management notes, pt only requires assistance with showering at [facility]. Pt uses a walker for mobility. Pt very confused throughout PT evaluation, often reports she is \"going to [facility]\" also notes that she lives in [place] on [facility]. Pt very poor historian.      VISION:Corrected    HEARING:  hard of hearing    COGNITION: Decreased Recall, Decreased Insight, Decreased Problem Solving and Decreased Safety Awareness    RANGE OF MOTION:  Bilateral Upper Extremity:  WNL    STRENGTH:  Bilateral Upper Extremity:  bilateral UE genreal weakness and decondtioning throughout    ADL:   Lower Extremity Dressing: Maximum Assistance. donning depends after incontinence  Toileting: Maximum Assistance. for hygiene and mod A for clothign amangement  Toilet Transfer: Minimal Assistance. from standard toilet. BALANCE:  Standing Balance: Minimal Assistance. during dynamic standing balance with no UE support. PT with 1 LOb requiring mod A for correction     BED MOBILITY:  Supine to Sit: Moderate Assistance      TRANSFERS:  Sit to Stand:  Minimal Assistance. from eOB   Stand to Sit: Minimal Assistance. into bedside chair [facility] educaiton on safety to turn all the way aorund prior to sitting    FUNCTIONAL MOBILITY:  Assistive Device: Rolling Walker  Assist Level:  Minimal Assistance. Distance: To and from bathroom  educaiton on safety awareness during          Activity Tolerance:  Patient tolerance of  treatment: fair. Assessment:  Assessment: Pt demo decreased ability to complete her ADL tasks and mobility at OF. Pt would benefit from skilled OT Serivces to improve her deficits and inrease safety to be more more indep with aDL tasks. Performance deficits / Impairments: Decreased functional mobility , Decreased safe awareness, Decreased balance, Decreased ADL status, Decreased cognition, Decreased endurance, Decreased strength  Prognosis: Fair  REQUIRES OT FOLLOW UP: Yes  Decision Making: Medium Complexity    Treatment Initiated: Treatment and education initiated within context of evaluation. Evaluation time included review of current medical information, gathering information related to past medical, social and functional history, completion of standardized testing, formal and informal observation of tasks, assessment of data and development of plan of care and goals.   Treatment time included

## 2020-07-13 NOTE — PROGRESS NOTES
Hospitalist Progress Note    Patient:  Callie German      Unit/Bed:8B-24/024-A    YOB: 1930    MRN: [id number]       Acct: [de-identified]     PCP: Caprice Mckeon DO    Date of Admission: 7/9/2020    Assessment/Plan:    1. Acute kidney injury on chronic kidney disease stage III resolved with hydration. S/P IVF. Holding Aldactone and Bumex. Renally dose medications. Avoid nephrotoxic agents. 2. Urinary tract infection. (POA). Continue IV Rocephin. Awaiting urine culture and sensivitiy. 3. Metabolic encephalopathy secondary to #1/2. Minimal improvement since admission. Tele sitter. Per nursing home baseline she is alert and oriented. CT head no acute findings. LFTs and Ammonia level normal. Neurology consult. MRI this AM.   4. Chin laceration S/P fall. Likely mechanical in nature due to gait instability. Imaging negative for acute fracture. PT/OT. Local wound care. 5. Elevated troponin. Likely demand ischemia. Trended down. No CP or EKG concerns. 6. Chronic diastolic heart failure. EF 50-55%. No signs of decompensation. Holding diuretics. Daily weights. Low sodium diet. Strict intake and output. 7. Atrial fibrillation. ASA 81mg. JAZZMINE-VASc score 6 with 9.7% of stroke. HAS-BLED score 2 with 4.1% risk of bleeding. No OAC at this time due to fall risk. BB  8. Normocytic anemia. Hbg stable. 9. CAD s/p CABG x 3 vessels in 2011. Statin. Imdur. ASA. BB.   10. Hypothyroidism. Continue Synthroid. TSH 5.810. Free T4 1.64. No changes. 11. Essential hypertension. BB  12. HX anxiety. Celexa. 13. GERD. Carafate    Chief Complaint: [facility] AND [facility] Course:     Patient is a 80year old  female with past medical history of Afib, CAD s/p CABG 2011, CVA, CKD Stage 3, GERD, HTN, Hypothyroidism, HFpEF 50-55% She presents from [address] [place] [address] following a fall today with lacerations to her chin, fingers to right hand, left and right leg. She states she does not remember how she fell.  She murmurs, rubs or gallops. Abdomen: Soft, non-tender, non-distended with normal bowel sounds. Musculoskeletal: passive and active ROM x 4 extremities. Skin: Skin color, texture, turgor normal.  Chin lac. Ecchymosis to chin, under left nostril and LLE. Neurologic:  Neurovascularly intact without any focal sensory/motor deficits. Cranial nerves: II-XII intact, grossly non-focal.  Psychiatric: Alert and oriented to person only, thought content inappropriate. Abnormal insight. Capillary Refill: Brisk,< 3 seconds   Peripheral Pulses: +2 palpable, equal bilaterally       Labs:   No results for input(s): WBC, HGB, HCT, PLT in the last 72 hours. Recent Labs     07/11/20  0443 07/12/20  0520 07/13/20  0403    142 137   K 4.4 4.2 4.7    106 105   CO2 23 23 21*   BUN 30* 26* 23*   CREATININE 1.3* 1.0 1.0   CALCIUM 8.7 8.9 8.7     Recent Labs     07/12/20  0520   AST 28   ALT 11   BILIDIR <0.2   BILITOT 0.8   ALKPHOS 40     No results for input(s): INR in the last 72 hours. No results for input(s): Zettie Binet in the last 72 hours. No results for input(s): PROCAL in the last 72 hours. Microbiology:      Urinalysis:      Lab Results   Component Value Date    NITRU POSITIVE 07/09/2020    WBCUA 0-2 07/09/2020    BACTERIA MANY 07/09/2020    RBCUA 0-2 07/09/2020    BLOODU NEGATIVE 07/09/2020    SPECGRAV 1.021 12/29/2017    GLUCOSEU NEGATIVE 07/09/2020       Radiology:  MRI BRAIN WO CONTRAST   Final Result       1. No evidence of acute intracranial abnormality. 2. Multiple lacunar infarcts superimposed on moderate to severe chronic microvascular angiopathy and moderate volume loss. **This report has been created using voice recognition software. It may contain minor errors which are inherent in voice recognition technology. **      Final report electronically signed by Dr. Amelia Abraham MD on 7/13/2020 8:53 AM      CT HEAD WO CONTRAST   Final Result   No acute intracranial findings. **This report has been created using voice recognition software. It may contain minor errors which are inherent in voice recognition technology. **      Final report electronically signed by Dr. Lacey Jacinto on 7/9/2020 8:09 PM      CT CERVICAL SPINE WO CONTRAST   Final Result   No acute fracture. Degenerative changes as described. **This report has been created using voice recognition software. It may contain minor errors which are inherent in voice recognition technology. **      Final report electronically signed by Dr. Lacey Jacinto on 7/9/2020 8:16 PM      CT Facial Bones WO Contrast   Final Result   No acute fracture. Chin laceration. **This report has been created using voice recognition software. It may contain minor errors which are inherent in voice recognition technology. **      Final report electronically signed by Dr. Lacey Jacinto on 7/9/2020 8:20 PM      XR PELVIS (1-2 VIEWS)   Final Result    IMPRESSION:   Generalized osteopenia. No acute fracture or dislocation. **This report has been created using voice recognition software. It may contain minor errors which are inherent in voice recognition technology. **      Final report electronically signed by Dr. Lacey Jacinto on 7/9/2020 7:28 PM      XR KNEE LEFT (3 VIEWS)   Final Result   Generalized osteopenia and degenerative changes. No acute fracture or dislocation. **This report has been created using voice recognition software. It may contain minor errors which are inherent in voice recognition technology. **      Final report electronically signed by Dr. Lacey Jacinto on 7/9/2020 7:26 PM      XR HAND RIGHT (MIN 3 VIEWS)   Final Result    IMPRESSION:   No acute fracture or dislocation. **This report has been created using voice recognition software. It may contain minor errors which are inherent in voice recognition technology. **      Final report electronically signed by Dr. Lacey Jacinto on 7/9/2020 7:20 PM      XR CHEST PORTABLE   Final Result   Cardiomegaly and prominent interstitium suggesting interstitial edema. Correlation with symptoms is advised. **This report has been created using voice recognition software. It may contain minor errors which are inherent in voice recognition technology. **      Final report electronically signed by Dr. Samra Carter on 7/9/2020 7:23 PM               Code Status: [facility]      Active Hospital Problems    Diagnosis Date Noted    Elevated troponin [R79.89] 07/09/2020       Electronically signed by NADINE Cueva CNP on 7/13/2020 at 10:43 AM

## 2020-07-13 NOTE — PLAN OF CARE
Problem: Falls - Risk of:  Goal: Will remain free from falls  Description: Will remain free from falls  7/13/2020 1019 by Katie Marquis RN  Outcome: Ongoing  Note: Patient free from falls. Bed alarm, chair alarm, call light within reach, non skid footwear on when up. Problem: Falls - Risk of:  Goal: Absence of physical injury  Description: Absence of physical injury  7/13/2020 1019 by Katie Marquis RN  Outcome: Ongoing  Note: Patient free from falls. Bed alarm, chair alarm, call light within reach, non skid footwear on when up. Problem: Skin Integrity:  Goal: Will show no infection signs and symptoms  Description: Will show no infection signs and symptoms  7/13/2020 1019 by Katie Marquis RN  Outcome: Ongoing  Note: No signs or symptoms of infection noted     Problem: Skin Integrity:  Goal: Absence of new skin breakdown  Description: Absence of new skin breakdown  7/13/2020 1019 by Katie Marquis RN  Outcome: Ongoing  Note: Patient has scattered bruising and abrasions all over body. Problem: Pain:  Goal: Pain level will decrease  Description: Pain level will decrease  7/13/2020 1019 by Katie Marquis RN  Outcome: Ongoing  Note: Patients pain level is a 0/10. Patients pain goal is none. Patient repositioned. Pain goal met at this time. Problem: DISCHARGE BARRIERS  Goal: Patient's continuum of care needs are met  7/13/2020 1019 by Katie Marquis RN  Outcome: Ongoing  Note: Plan is to return to [address] [place] [address] Dr MAYORGA or ECF of choice     Problem: Musculor/Skeletal Functional Status  Goal: Highest potential functional level  7/13/2020 1019 by Katie Marquis RN  Outcome: Ongoing  Note: Continues to work with therapy     Problem: Confusion - Acute:  Goal: Mental status will be restored to baseline  Description: Mental status will be restored to baseline  7/13/2020 1019 by Katie Marquis RN  Outcome: Ongoing  Note: Patient continues with altered mental status.      Problem: Discharge Planning:  Goal: Participates in care planning  Description: Participates in care planning  7/13/2020 1019 by Roxana Huston RN  Outcome: Ongoing  Note: Plans to return to [facility]     Problem: Injury - Risk of, Physical Injury:  Goal: Will remain free from falls  Description: Will remain free from falls  7/13/2020 1019 by Roxana Huston RN  Outcome: Ongoing  Note: Patient free from falls. Bed alarm, chair alarm, call light within reach, non skid footwear on when up. Problem: Injury - Risk of, Physical Injury:  Goal: Absence of physical injury  Description: Absence of physical injury  7/13/2020 1019 by Roxana Huston RN  Outcome: Ongoing  Note: Patient free from falls. Bed alarm, chair alarm, call light within reach, non skid footwear on when up. Problem: Mood - Altered:  Goal: Mood stable  Description: Mood stable  7/13/2020 1019 by Roxana Huston RN  Outcome: Ongoing  Note: Patient polite and appropriate with staff. Care plan reviewed with patient. Patient verbalize understanding of the plan of care and contribute to goal setting.

## 2020-07-13 NOTE — CARE COORDINATION
7/13/20, 2:03 PM EDT    DISCHARGE ON GOING [address] day: 4  Location: -24/024-A Reason for admit: Elevated troponin [R79.89]  Elevated troponin [R79.89]   Procedure: na    07/13  MRI brain  1. No evidence of acute intracranial abnormality. 2. Multiple lacunar infarcts superimposed on moderate to severe chronic microvascular angiopathy and moderate volume loss. Treatment Plan of Care: telesitter, neuro checks, IVF, holding Aldactone and Bumex, IV Rocephin for UTI, PT/OT, neuro consulted, wound care to chin post fall, daily weights, strict I/O, telemetry SB 58, follow creatinine. Barriers to Discharge: not medically redy  PCP: Curry Sethi DO  Readmission Risk Score: 14%  Patient Goals/Plan/Treatment Preferences: plans to return to [facility]; SW following.

## 2020-07-13 NOTE — PLAN OF CARE
Problem: Falls - Risk of:  Goal: Will remain free from falls  Description: Will remain free from falls  7/13/2020 0049 by Ronda Yanes RN  Outcome: Ongoing  Note: Absence of falls this shift. Fall prevention in place. Fall band applied. Bed alarm activated as needed. Problem: Falls - Risk of:  Goal: Absence of physical injury  Description: Absence of physical injury  7/13/2020 0049 by Ronda Yanes RN  Outcome: Ongoing  Note: Absence of physical injury. Problem: Skin Integrity:  Goal: Will show no infection signs and symptoms  Description: Will show no infection signs and symptoms  7/13/2020 0049 by Ronda Yanes RN  Outcome: Ongoing  Note: No s/s of infection. IV antibiotics given per order. Problem: Skin Integrity:  Goal: Absence of new skin breakdown  Description: Absence of new skin breakdown  7/13/2020 0049 by Ronda Yanes RN  Outcome: Ongoing  Note: Pt reminded to turn and reposition self often while in bed to prevent skin breakdown. Problem: Pain:  Goal: Pain level will decrease  Description: Pain level will decrease  7/13/2020 0049 by Ronda Yanes RN  Outcome: Ongoing  Note: Pt complains of pain at a 0/10. Non pharmacological interventions completed which include rest, and repositioning. Pt states pain goal at a 0/10. Pain assessed every 4 hours, and as needed. PRN pain medications given as ordered. Problem: Pain:  Goal: Control of acute pain  Description: Control of acute pain  7/13/2020 0049 by Ronda Yanes RN  Outcome: Ongoing  Note: Pt complains of pain at a 0/10. Non pharmacological interventions completed which include rest, and repositioning. Pt states pain goal at a 0/10. Pain assessed every 4 hours, and as needed. PRN pain medications given as ordered. Problem: Pain:  Goal: Control of chronic pain  Description: Control of chronic pain  7/13/2020 0049 by Ronda Yanes RN  Outcome: Ongoing  Note: Pt complains of pain at a 0/10.  Non pharmacological interventions completed which include rest, and repositioning. Pt states pain goal at a 0/10. Pain assessed every 4 hours, and as needed. PRN pain medications given as ordered. Problem: DISCHARGE BARRIERS  Goal: Patient's continuum of care needs are met  7/13/2020 0049 by Nataly Blanco RN  Outcome: Ongoing  Note:  working with pt and family for discharge needs. Problem: Musculor/Skeletal Functional Status  Goal: Highest potential functional level  7/13/2020 0049 by Nataly Blanco RN  Outcome: Ongoing  Note: PT/OT working with pt. Problem: Confusion - Acute:  Goal: Absence of continued neurological deterioration signs and symptoms  Description: Absence of continued neurological deterioration signs and symptoms  Outcome: Ongoing  Note: Pt remains confused. Reoriented pt to surroundings. Tele sitter at bedside. Problem: Confusion - Acute:  Goal: Mental status will be restored to baseline  Description: Mental status will be restored to baseline  Outcome: Ongoing  Note: Pt alert to name only. Tele-sitter at bedside. Problem: Discharge Planning:  Goal: Ability to perform activities of daily living will improve  Description: Ability to perform activities of daily living will improve  Outcome: Ongoing  Note: Pt encouraged to perform ADL's. Problem: Discharge Planning:  Goal: Participates in care planning  Description: Participates in care planning  Outcome: Ongoing  Note: Pt plans to be discharged to [facility] when appropriate. Problem: Injury - Risk of, Physical Injury:  Goal: Will remain free from falls  Description: Will remain free from falls  7/13/2020 0049 by Nataly Blanco RN  Outcome: Ongoing  Note: Absence of falls this shift. Fall prevention in place. Fall band applied. Bed alarm activated as needed. Problem: Injury - Risk of, Physical Injury:  Goal: Absence of physical injury  Description: Absence of physical injury  7/13/2020 0049 by Nataly Blanco RN  Outcome: Ongoing  Note: Absence of physical injury. Problem: Mood - Altered:  Goal: Mood stable  Description: Mood stable  Outcome: Ongoing  Note: Pt calm, and cooperative. Problem: Mood - Altered:  Goal: Absence of abusive behavior  Description: Absence of abusive behavior  Outcome: Ongoing  Note: Absence of abusive behaviors. Problem: Mood - Altered:  Goal: Verbalizations of feeling emotionally comfortable while being cared for will increase  Description: Verbalizations of feeling emotionally comfortable while being cared for will increase  Outcome: Ongoing  Note: Pt disoriented to room and surroundings. Attempted to redirect pt. Problem: Sleep Pattern Disturbance:  Goal: Appears well-rested  Description: Appears well-rested  Outcome: Ongoing  Note: Pt resting well this shift. Care plan reviewed with patient. Patient verbalize understanding of the plan of care and contribute to goal setting.

## 2020-07-14 NOTE — CARE COORDINATION
7/14/20, 11:54 AM EDT    DISCHARGE PLANNING EVALUATION      Spoke with patient, is was oriented to self and place. She wants to return to [address] at discharge. Spoke with daughter Shaun Ignacio, she wants patient to return to [address], not ECF. Advised planning discharge tomorrow. Prudence Magnus will transport, will be in around 1:00 pm tomorrow. Spoke with [facility], they want patient to return. COVID test completed 7/10 is good for return.

## 2020-07-14 NOTE — CARE COORDINATION
7/14/20, 9:46 AM EDT    DISCHARGE ON GOING [address] [facility] day: 5  Location: -24/024-A Reason for admit: Elevated troponin [R79.89]  Elevated troponin [R79.89]   Procedure: 07/13  MRI brain  1. No evidence of acute intracranial abnormality. 2. Multiple lacunar infarcts superimposed on moderate to severe chronic microvascular angiopathy and moderate volume loss.      Treatment Plan of Care: Neuro consulted yesterday and has seen. MRI neg for CVA. Neuro suggests encephalopathy related to UTI. IV Rocephin continued. PT/OT following. SW following. Barriers to Discharge: Medical readiness. PCP: Bibi Luque DO  Readmission Risk Score: 15%  Patient Goals/Plan/Treatment Preferences: Return to [facility], new PT/OT. SW following for potential need for additional services at discharge.

## 2020-07-14 NOTE — PROGRESS NOTES
Hospitalist Progress Note    Patient:  Christianne Bosworth      Unit/Bed:8B-24/024-A    YOB: 1930    MRN: [id number]       Acct: [de-identified]     PCP: Shukri Schreiber DO    Date of Admission: 7/9/2020    Assessment/Plan:    1. Acute kidney injury on chronic kidney disease stage III resolved with hydration. S/P IVF. Holding Aldactone and Bumex. Renally dose medications. Avoid nephrotoxic agents. 2. Urinary tract infection. (POA). Continue IV Rocephin. Awaiting urine culture and sensivitiy. 3. Metabolic encephalopathy secondary to #1/2. Mentation greatly improved in the last 24 hours. Tele sitter. Per nursing home baseline she is alert and oriented. CT head no acute findings. LFTs and Ammonia level normal. Neurology seen. MRI no acute findings. 4. Chin laceration S/P fall. Likely mechanical in nature due to gait instability. Imaging negative for acute fracture. PT/OT. Local wound care. 5. Elevated troponin. Likely demand ischemia. Trended down. No CP or EKG concerns. 6. Chronic diastolic heart failure. EF 50-55%. No signs of decompensation. Holding diuretics. Daily weights. Low sodium diet. Strict intake and output. 7. Atrial fibrillation. ASA 81mg. JAZZMINE-VASc score 6 with 9.7% of stroke. HAS-BLED score 2 with 4.1% risk of bleeding. No OAC at this time due to fall risk. BB  8. Normocytic anemia. Hbg stable. 9. CAD s/p CABG x 3 vessels in 2011. Statin. Imdur. ASA. BB.   10. Hypothyroidism. Continue Synthroid. Euthyroid. 11. Essential hypertension. BB  12. HX anxiety. Celexa. 13. GERD. Carafate    Dispo: patient is not alert and oriented. She is fearful to return to assist living and would like to look into SNF. Social work notified. Will work on placement. Chief Complaint: [facility] AND [place] Course:     Admitted following a fall. Confused. Baseline alert and oriented per AL. CT negative. Noted to have a UTI. Started on IV Rocephin. Had very slow improvement in mentation. Neurology consulted 7/12. MRI completed 7/13 with no acute findings. 7/14 mentation much improved and near baseline. Patient now requesting ECF placement. Subjective (past 24 hours): alert and oriented to person, place and situation. Denies pain. States she is weak. Medications:  Reviewed    Infusion Medications     Scheduled Medications    cefTRIAXone (ROCEPHIN) IV  1 g Intravenous Q24H    atorvastatin  20 mg Oral Nightly    aspirin  81 mg Oral Daily    [Held by provider] bumetanide  0.5 mg Oral Daily    citalopram  40 mg Oral Daily    isosorbide mononitrate  30 mg Oral Daily    levothyroxine  88 mcg Oral Daily    metoprolol tartrate  12.5 mg Oral BID    polyethylene glycol  17 g Oral Daily    latanoprost  1 drop Both Eyes Nightly    sucralfate  1 g Oral TID    [Held by provider] spironolactone  12.5 mg Oral Daily    sodium chloride flush  10 mL Intravenous 2 times per day     PRN Meds: albuterol, glycerin-hypromellose-, sodium chloride flush, acetaminophen **OR** acetaminophen, polyethylene glycol, promethazine **OR** ondansetron      Intake/Output Summary (Last 24 hours) at 7/14/2020 1047  Last data filed at 7/14/2020 0249  Gross per 24 hour   Intake 300 ml   Output 250 ml   Net 50 ml       Diet:  DIET CARDIAC; Low Sodium (2 GM)    Exam:  BP (!) 162/62   Pulse 66   Temp 98.2 °F (36.8 °C) (Oral)   Resp 16   Ht 4' 9\" (1.448 m)   Wt 113 lb 1.6 oz (51.3 kg)   SpO2 94%   BMI 24.47 kg/m²     General appearance: No apparent distress, appears stated age and cooperative. HEENT: Pupils equal, round, and reactive to light. Conjunctivae/corneas clear. Neck: Supple, with full range of motion. No jugular venous distention. Trachea midline. Respiratory:  Normal respiratory effort. Clear to auscultation, bilaterally without Rales/Wheezes/Rhonchi. Cardiovascular: Regular rate and rhythm with normal S1/S2 without murmurs, rubs or gallops.   Abdomen: Soft, non-tender, non-distended minor errors which are inherent in voice recognition technology. **      Final report electronically signed by Dr. El Grewal on 7/9/2020 8:09 PM      CT CERVICAL SPINE WO CONTRAST   Final Result   No acute fracture. Degenerative changes as described. **This report has been created using voice recognition software. It may contain minor errors which are inherent in voice recognition technology. **      Final report electronically signed by Dr. El Grewal on 7/9/2020 8:16 PM      CT Facial Bones WO Contrast   Final Result   No acute fracture. Chin laceration. **This report has been created using voice recognition software. It may contain minor errors which are inherent in voice recognition technology. **      Final report electronically signed by Dr. El Grewal on 7/9/2020 8:20 PM      XR PELVIS (1-2 VIEWS)   Final Result    IMPRESSION:   Generalized osteopenia. No acute fracture or dislocation. **This report has been created using voice recognition software. It may contain minor errors which are inherent in voice recognition technology. **      Final report electronically signed by Dr. El Grewal on 7/9/2020 7:28 PM      XR KNEE LEFT (3 VIEWS)   Final Result   Generalized osteopenia and degenerative changes. No acute fracture or dislocation. **This report has been created using voice recognition software. It may contain minor errors which are inherent in voice recognition technology. **      Final report electronically signed by Dr. El Grewal on 7/9/2020 7:26 PM      XR HAND RIGHT (MIN 3 VIEWS)   Final Result    IMPRESSION:   No acute fracture or dislocation. **This report has been created using voice recognition software. It may contain minor errors which are inherent in voice recognition technology. **      Final report electronically signed by Dr. El Grewal on 7/9/2020 7:20 PM      XR CHEST PORTABLE   Final Result   Cardiomegaly and prominent interstitium suggesting interstitial edema. Correlation with symptoms is advised. **This report has been created using voice recognition software. It may contain minor errors which are inherent in voice recognition technology. **      Final report electronically signed by Dr. Katy Herman on 7/9/2020 7:23 PM               Code Status: [facility]      Active Hospital Problems    Diagnosis Date Noted    Elevated troponin [R79.89] 07/09/2020       Electronically signed by NADINE Nunez CNP on 7/14/2020 at 10:47 AM

## 2020-07-14 NOTE — PLAN OF CARE
Problem: Falls - Risk of:  Goal: Will remain free from falls  Description: Will remain free from falls  Outcome: Ongoing  Note: Absence of falls this shift. Fall prevention in place. Fall band applied. Bed alarm activated as needed. Problem: Falls - Risk of:  Goal: Absence of physical injury  Description: Absence of physical injury  Outcome: Ongoing  Note: Absence of physical injury. Problem: Skin Integrity:  Goal: Will show no infection signs and symptoms  Description: Will show no infection signs and symptoms  Outcome: Ongoing  Note: Pt receiving IV antibiotics for UTI. Pt continues to have confusion at times. Problem: Skin Integrity:  Goal: Absence of new skin breakdown  Description: Absence of new skin breakdown  Outcome: Ongoing  Note: No new skin breakdown. Pt turned and repositioned every two hours and as needed. Problem: Pain:  Goal: Pain level will decrease  Description: Pain level will decrease  Outcome: Ongoing  Note: Pt complains of pain at a 0/10. Non pharmacological interventions completed which include rest, and repositioned. Pt states pain goal at a 0/10. Pain assessed every 4 hours, and as needed. PRN pain medications given as ordered. Problem: DISCHARGE BARRIERS  Goal: Patient's continuum of care needs are met  Outcome: Ongoing  Note:  working with pt and family for discharge needs. Problem: Musculor/Skeletal Functional Status  Goal: Highest potential functional level  Outcome: Ongoing  Note: PT/OT evaluating pt. Problem: Confusion - Acute:  Goal: Mental status will be restored to baseline  Description: Mental status will be restored to baseline  Outcome: Ongoing  Note: Pt continues to have confusion at times. Pt alert to name only. Problem: Discharge Planning:  Goal: Participates in care planning  Description: Participates in care planning  Outcome: Ongoing  Note: Pt planning to be discharged to [facility]      Problem: Injury - Risk of, Physical Injury:  Goal: Will remain free from falls  Description: Will remain free from falls  Outcome: Ongoing  Note: Absence of falls this shift. Fall prevention in place. Fall band applied. Bed alarm activated as needed. Problem: Injury - Risk of, Physical Injury:  Goal: Absence of physical injury  Description: Absence of physical injury  Outcome: Ongoing  Note: Absence of physical injury. Problem: Mood - Altered:  Goal: Mood stable  Description: Mood stable  Outcome: Ongoing  Note: Pt calm, and cooperative. Care plan reviewed with patient. Patient verbalize understanding of the plan of care and contribute to goal setting.

## 2020-07-14 NOTE — PROGRESS NOTES
NEUROLOGY INPATIENT PROGRESS NOTE    Patient- Madhav Cline    MRN -  [id number]   Acct # - [de-identified]      - 1930    80 y.o. Subjective: The patient is seen as follow up for confusion. Patient is alert at this time. She looks much better to me today than yesterday, she is more conversant and have more insight today. Objective:   BP (!) 150/51   Pulse 68   Temp 99 °F (37.2 °C) (Oral)   Resp 16   Ht 4' 9\" (1.448 m)   Wt 112 lb 9.6 oz (51.1 kg)   SpO2 96%   BMI 24.37 kg/m²       Intake/Output Summary (Last 24 hours) at 2020  Last data filed at 2020  Gross per 24 hour   Intake 410 ml   Output 375 ml   Net 35 ml       Physical Exam:  General:  Awake, up in chair,  not  in distress. Language is intact. AAO x 1, admitted that she does not know the date and place, she knows she is not at home  HEENT: pink conjunctiva, unicteric sclera, moist oral mucosa. There is no neck lymphadenopathy. Neck: There is no carotid bruits. The Neck is supple. Neuro: CN 2-12 grossly intact with no focal deficits. Power 5/5 Throughout symmetric, . Long tracts are intact. Cerebellar exam is Intact. Sensory exam is intact to light touch. Gait is not teseted. No abnormal movement. Osteo: There is no LROM of any of the 4 extremity joints, no joint tenderness. Leg edema none  Skin: bruises on the chin  Abdomen is soft intact bowel sounds. ROS:    Cardiac: no chest pain. No palpitations.   Renal : no flank pain, no hematuria  Skin: no rash    Medications:     cefTRIAXone (ROCEPHIN) IV  1 g Intravenous Q24H    atorvastatin  20 mg Oral Nightly    aspirin  81 mg Oral Daily    [Held by provider] bumetanide  0.5 mg Oral Daily    citalopram  40 mg Oral Daily    isosorbide mononitrate  30 mg Oral Daily    levothyroxine  88 mcg Oral Daily    metoprolol tartrate  12.5 mg Oral BID    polyethylene glycol  17 g Oral Daily    latanoprost  1 drop Both Eyes Nightly technology. **    Final report electronically signed by Dr. Akin Ring MD on 7/13/2020 8:53 AM     No results found for this or any previous visit. No results found for this or any previous visit. Results for orders placed during the hospital encounter of 07/09/20   CT HEAD WO CONTRAST    Narrative PROCEDURE: CT HEAD WO CONTRAST    CLINICAL INFORMATION: [facility]. COMPARISON: September 2, 2019    TECHNIQUE: Noncontrast 5 mm axial images were obtained through the brain. All CT scans at this facility use dose modulation, iterative reconstruction, and/or weight-based dosing when appropriate to reduce radiation dose to as low as reasonably achievable. FINDINGS:    Generalized volume loss and small vessel ischemic changes. Prominent ventricles in keeping with central atrophy. Intracranial vascular calcifications. 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 No acute hemorrhage or midline shift. Mastoid air cells are patent. Paranasal sinuses are clear. Skull: Postsurgical changes left craniotomy. soft tissues: Unremarkable. Impression No acute intracranial findings. **This report has been created using voice recognition software. It may contain minor errors which are inherent in voice recognition technology. **    Final report electronically signed by Dr. Lacey Jacinto on 7/9/2020 8:09 PM         Assessment:    80year old woman with hx of afib not on anticoagulant, but on aspirin, fell, now found to have UTI and confusion. Plan:    1. MRI is neg for stroke  2. Patient is improving, this is most likely UTI encephalopathy  3. She has afib, and given the way she fell and had such a large bruise, anticoagulant of any type may not be a good idea, aspirin 81mg daily is fine  4. PT/OT  5.  Neurology will sign off, please call if there is any question    Jaime Arroyo MD, 7/13/2020 10:10 PM       Florian Dominique MD  Attending Neurologist/Neurointensivist

## 2020-07-14 NOTE — PROGRESS NOTES
[address]  PHYSICAL THERAPY MISSED TREATMENT NOTE  [facility]    Date: 2020  Patient Name: Maribell Dawson        MRN: [id number]   : 1930  ([address]  Gender: female   Referring Practitioner: Dr. Reynaldo Ortega  Referring Practitioner: Rick Rodríguez DO  Diagnosis: elevated troponin  Diagnosis: Elevated troponin         REASON FOR MISSED TREATMENT:  Missed Treat. RN approved session. Patient laying in bed upon arrival and politely declined therapy at this time. Patient declined out of bed activities. Will try back later as time allows.

## 2020-07-15 NOTE — DISCHARGE SUMMARY
Hospitalist Discharge Summary    Patient: Ana M Raygoza  YOB: 1930  MRN: [id number]   Acct: [de-identified]    Primary Care Physician: Rupali Peck DO    Admit date  7/9/2020    Discharge date:  7/15/2020  Disposition: NH      Discharge Assessment and Plan:    1. ARNOLD on CKD, stage III: Resolved s/p IV hydration.   - At discharge, will change Bumex from daily to prn for increase in daily weight, edema, SOB. Recheck BMP in 3-5 days and follow up with PCP. 2. Acute cystitis: POA. Pt was treated with 5 days IV Rocephin. Urine was never sent for culture. She improved clinically with ceftriaxone, will continue cefdinir at discharge since there is no culture to base antibiotic therapy on. Follow up with PCP if symptoms persist or worsen. 3. Metabolic encephalothy: D/t #2.  - Neurology seen and signed off. MRI is negative for acute pathology. LFTs and Ammonia wnl. Pt with baseline dementia, per family she is improved since arrival and close to baseline. 4. Mechanical fall with chin lacteration: Imaging negative for acute fracture. Pt discharged to NH. [address] stopped d/t fall risk. 5. Elevated troponin: Mild, likely demand related. Trended down. No CP or EKG changes. 6. Chronic atrial fibrillation: rate controlled with BB. ASA 81mg. JAZZMINE-VASc score 6 with 9.7% of stroke. HAS-BLED score 2 with 4.1% risk of bleeding. No OAC at this time due to fall risk, Neurology agrees. Follow up with Cardio/PCP. 7. Chronic diastolic HFpEF: EF 67-00%. No signs of decompensation. Continue spironolactone and BB at discharge, Bumex changed to prn. Monitor daily weight, low sodium diet. Follow up with Cardio/PCP as scheduled. 8. Chronic normocytic anemia: Hgb stable, repeat CBC OP. Recommend anemia workup with PCP     9. CAD s/p CABG x 3 vessels in 2011: noted, cont ASA, statin, Imdur, BB.     10. Hypothyroidism: cont synthroid. TSH wnl. 11. Essential HTN: stable, cont home meds.        Chief normal.  No rashes or lesions. Neurologic:  Neurovascularly intact without any focal sensory/motor deficits.  Cranial nerves: II-XII intact, grossly non-focal.  Psychiatric: Alert and oriented, thought content inappropriate, abnormal insight  Capillary Refill: Brisk,< 3 seconds   Peripheral Pulses: +2 palpable, equal bilaterally     Labs :  Recent Results (from the past 72 hour(s))   Ammonia    Collection Time: 07/13/20  4:03 AM   Result Value Ref Range    Ammonia 46 11 - 60 umol/L   Basic Metabolic Panel    Collection Time: 07/13/20  4:03 AM   Result Value Ref Range    Sodium 137 135 - 145 meq/L    Potassium 4.7 3.5 - 5.2 meq/L    Chloride 105 98 - 111 meq/L    CO2 21 (L) 23 - 33 meq/L    Glucose 112 (H) 70 - 108 mg/dL    BUN 23 (H) 7 - 22 mg/dL    CREATININE 1.0 0.4 - 1.2 mg/dL    Calcium 8.7 8.5 - 10.5 mg/dL   Anion Gap    Collection Time: 07/13/20  4:03 AM   Result Value Ref Range    Anion Gap 11.0 8.0 - 16.0 meq/L   Glomerular Filtration Rate, Estimated    Collection Time: 07/13/20  4:03 AM   Result Value Ref Range    Est, Glom Filt Rate 52 (A) ml/min/1.73m2   TSH with Reflex    Collection Time: 07/14/20  9:17 AM   Result Value Ref Range    TSH 3.170 0.400 - 4.20 uIU/mL   CBC auto differential    Collection Time: 07/14/20  9:17 AM   Result Value Ref Range    WBC 6.9 4.8 - 10.8 thou/mm3    RBC 2.91 (L) 4.20 - 5.40 mill/mm3    Hemoglobin 8.8 (L) 12.0 - 16.0 gm/dl    Hematocrit 28.8 (L) 37.0 - 47.0 %    MCV 99.0 81.0 - 99.0 fL    MCH 30.2 26.0 - 33.0 pg    MCHC 30.6 (L) 32.2 - 35.5 gm/dl    RDW-CV 16.2 (H) 11.5 - 14.5 %    RDW-SD 58.2 (H) 35.0 - 45.0 fL    Platelets 598 (L) 572 - 400 thou/mm3    MPV 10.9 9.4 - 12.4 fL    Seg Neutrophils 72.5 %    Lymphocytes 16.1 %    Monocytes 9.4 %    Eosinophils 1.0 %    Basophils 0.4 %    Immature Granulocytes 0.6 %    Segs Absolute 5.0 1.8 - 7.7 thou/mm3    Lymphocytes Absolute 1.1 1.0 - 4.8 thou/mm3    Monocytes Absolute 0.6 0.4 - 1.3 thou/mm3    Eosinophils Absolute 0.1 0.0 - 0.4 thou/mm3    Basophils Absolute 0.0 0.0 - 0.1 thou/mm3    Immature Grans (Abs) 0.04 0.00 - 0.07 thou/mm3    nRBC 0 /100 wbc        Microbiology:    Blood culture #1:   Lab Results   Component Value Date    BC No growth-preliminary  No growth   08/23/2018     Blood culture #2:No results found for: BLOODCULT2  Organism:  No results found for: LABGRAM  MRSA culture only:No results found for: [address]  Urine culture:   Lab Results   Component Value Date    LABURIN  08/23/2018     Growth of Contaminants. The mixture of organisms present  probably represent skin jorge, fecal jorge or distal urethral  jorge. No further evaluation of this specimen is possible. Lab Results   Component Value Date    ORG Growth of Contaminants 08/23/2018      Respiratory culture: No results found for: CULTRESP  Aerobic and Anaerobic :  No results found for: LABAERO  No results found for: LABANAE    Urinalysis:     Lab Results   Component Value Date    NITRU POSITIVE 07/09/2020    WBCUA 0-2 07/09/2020    BACTERIA MANY 07/09/2020    RBCUA 0-2 07/09/2020    BLOODU NEGATIVE 07/09/2020    SPECGRAV 1.021 12/29/2017    GLUCOSEU NEGATIVE 07/09/2020       Radiology:  Xr Pelvis (1-2 Views)    Result Date: 7/9/2020  PROCEDURE: XR PELVIS (1-2 VIEWS) CLINICAL INFORMATION: fall. COMPARISON: September 10, 2018 TECHNIQUE: AP view of the pelvis. FINDINGS: Generalized osteopenia. Stool throughout the colon. Vascular calcification left groin. Narrowing of the SI joints and hip joints. No definite acute fracture. Correlation for point tenderness. IMPRESSION: Generalized osteopenia. No acute fracture or dislocation. **This report has been created using voice recognition software. It may contain minor errors which are inherent in voice recognition technology. ** Final report electronically signed by Dr. Katy Herman on 7/9/2020 7:28 PM    Xr Hand Right (min 3 Views)    Result Date: 7/9/2020  PROCEDURE: XR HAND RIGHT (MIN 3 VIEWS) CLINICAL INFORMATION: ecchymosis . COMPARISON: September 2, 2019 TECHNIQUE:  3 views of the right hand. FINDINGS: Generalized osteopenia. Advanced generative changes first carpal metacarpal joint space. Narrowing of the radiocarpal joint space. No acute fracture or dislocation. Interphalangeal joint space narrowing. IMPRESSION: No acute fracture or dislocation. **This report has been created using voice recognition software. It may contain minor errors which are inherent in voice recognition technology. ** Final report electronically signed by Dr. Michaele Castleman on 7/9/2020 7:20 PM    Xr Knee Left (3 Views)    Result Date: 7/9/2020  PROCEDURE: XR KNEE LEFT (3 VIEWS) CLINICAL INFORMATION: fall, ecchymosis. COMPARISON: No prior study. TECHNIQUE: 4 views of the left knee include an AP view, a lateral view and bilateral oblique views. FINDINGS: Osteopenia. Bony excrescences extending from the distal femur and proximal tibia. Calcification in the lateral compartment. No acute fracture or dislocation. No significant suprapatellar joint effusion. Surgical clips posterior knee. Generalized osteopenia and degenerative changes. No acute fracture or dislocation. **This report has been created using voice recognition software. It may contain minor errors which are inherent in voice recognition technology. ** Final report electronically signed by Dr. Michaele Castleman on 7/9/2020 7:26 PM    Ct Head Wo Contrast    Result Date: 7/9/2020  PROCEDURE: CT HEAD WO CONTRAST CLINICAL INFORMATION: fall, injuy. COMPARISON: September 2, 2019 TECHNIQUE: Noncontrast 5 mm axial images were obtained through the brain. All CT scans at this facility use dose modulation, iterative reconstruction, and/or weight-based dosing when appropriate to reduce radiation dose to as low as reasonably achievable. FINDINGS: Generalized volume loss and small vessel ischemic changes. Prominent ventricles in keeping with central atrophy. Intracranial vascular calcifications.  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 No acute hemorrhage or midline shift. Mastoid air cells are patent. Paranasal sinuses are clear. Skull: Postsurgical changes left craniotomy. soft tissues: Unremarkable. No acute intracranial findings. **This report has been created using voice recognition software. It may contain minor errors which are inherent in voice recognition technology. ** Final report electronically signed by Dr. Lizzy George on 7/9/2020 8:09 PM    Ct Facial Bones Wo Contrast    Result Date: 7/9/2020  PROCEDURE: CT FACIAL BONES WO CONTRAST CLINICAL INFORMATION: Trauma. COMPARISON: June 7, 2018 TECHNIQUE: 3 mm non contrast axial CT images were obtained through the facial bones and orbits. 3 mm coronal reconstructions were obtained. All CT scans at this facility use dose modulation, iterative reconstruction, and/or weight-based dosing when appropriate to reduce radiation dose to as low as reasonably achievable. FINDINGS: Nasal bone is intact. Paranasal sinuses and mastoid air cells are clear. Orbits are symmetric. Perimandibular soft tissue swelling with chin laceration. Advanced TMJ disease with flattening of the mandibular condyles. No acute fracture. Chin laceration. **This report has been created using voice recognition software. It may contain minor errors which are inherent in voice recognition technology. ** Final report electronically signed by Dr. Lizzy George on 7/9/2020 8:20 PM    Ct Cervical Spine Wo Contrast    Result Date: 7/9/2020  PROCEDURE: CT CERVICAL SPINE WO CONTRAST CLINICAL INFORMATION: fall, injury. COMPARISON: September 2, 2019 TECHNIQUE: 3 mm noncontrast axial images were obtained through the cervical spine with sagittal and coronal reconstructions. All CT scans at this facility use dose modulation, iterative reconstruction, and/or weight-based dosing when appropriate to reduce radiation dose to as low as reasonably achievable.  FINDINGS: Mastoid air cells are TYLENOL     * albuterol sulfate  (90 Base) MCG/ACT inhaler  Commonly known as:  Proventil HFA  Inhale 2 puffs into the lungs every 6 hours as needed for Wheezing or Shortness of Breath     * albuterol 0.63 MG/3ML nebulizer solution  Commonly known as:  ACCUNEB     aspirin 81 MG EC tablet     atorvastatin 20 MG tablet  Commonly known as:  LIPITOR  Take 1 tablet by mouth nightly     Calcium Carb-Cholecalciferol 600-500 MG-UNIT Caps     citalopram 40 MG tablet  Commonly known as:  CELEXA  TAKE 1 TABLET BY MOUTH DAILY     isosorbide mononitrate 30 MG extended release tablet  Commonly known as:  IMDUR  TAKE 1 TABLET BY MOUTH DAILY     latanoprost 0.005 % ophthalmic solution  Commonly known as:  XALATAN     levothyroxine 88 MCG tablet  Commonly known as:  SYNTHROID     magnesium oxide 400 (241.3 Mg) MG Tabs tablet  Commonly known as:  MAG-OX  Take 1 tablet by mouth daily. metoprolol tartrate 25 MG tablet  Commonly known as:  LOPRESSOR     Multi-Vitamins Tabs  TAKE 1 TABLET BY MOUTH DAILY     polyethylene glycol 17 GM/SCOOP powder  Commonly known as:  GLYCOLAX     propylene glycol-glycerin 1-0.3 % Soln ophthalmic solution  Commonly known as:  EQ Artificial Tears  Place 1 drop into both eyes 4 times daily as needed for Dry Eyes or Irritation     spironolactone 25 MG tablet  Commonly known as:  ALDACTONE     sucralfate 1 GM tablet  Commonly known as:  CARAFATE     vitamin D 1.25 MG (06130 UT) Caps capsule  Commonly known as:  ERGOCALCIFEROL  TAKE 1 CAPSULE BY MOUTH EVERY 28 DAYS ON THE 18TH OF EVERY MONTH         * This list has 2 medication(s) that are the same as other medications prescribed for you. Read the directions carefully, and ask your doctor or other care provider to review them with you.             STOP taking these medications    ciprofloxacin 250 MG tablet  Commonly known as:  CIPRO           Where to Get Your Medications      Information about where to get these medications is not yet available Ask your nurse or doctor about these medications  · bumetanide 0.5 MG tablet  · cefdinir 300 MG capsule          Patient Instructions:    Discharge lab work: BMP, CBC  Activity: activity as tolerated  Diet: No diet orders on file      Follow-up visits:   [facility] [address] [place] [address]  [phone]      follow up with physician at facility          Disposition: SNF  Condition at Discharge: Stable    Time Spent: 50 minutes    Signed: Thank you Curry Sethi DO for the opportunity to be involved in this patient's care.     Electronically signed by Fely Miranda PA-C on 7/15/2020 at 7:07 PM  Discharging Hospitalist

## 2020-07-15 NOTE — PROGRESS NOTES
Discharge teaching and instructions for diagnosis/procedure of fall completed with patient using teach back method. AVS reviewed . Printed prescriptions, follow up appointments and care of self at home.  Discharge in a wheelchair to assisted living per family

## 2020-07-15 NOTE — DISCHARGE INSTR - COC
Continuity of Care Form    Patient Name: Delphine Nur   :  1930  MRN:  [id number]    Admit date:  2020  Discharge date:  ***    Code Status Order: DNR-CCA   Advance Directives:   [facility] Documentation     Date/Time Healthcare Directive Type of Healthcare Directive Copy in [address] Agent's Name Healthcare Agent's Phone Number    [phone]  Yes, patient has an advance directive for healthcare treatment  Durable power of  for health care  Yes, copy in [facility] [address] of   --  --    20  Yes, patient has an advance directive for healthcare treatment  Durable power of  for health care;Living will  Yes, copy in chart  Healthcare power of   Isa Yarbrough or Estefania Kamara  --          Admitting Physician:  Kiana Schroeder MD  PCP: Ashish Lira DO    Discharging Nurse: [facility] Unit/Room#: 8B-24/024-A  Discharging Unit Phone Number: ***    Emergency Contact:   Extended Emergency Contact Information  Primary Emergency Contact: Formerly named [facility] [address] of [address] [place] [address] Phone: [phone]  Relation: Child  Secondary Emergency Contact: [address] of [address] [place] [address] Phone: [phone]  Relation: Child    Past Surgical History:  Past Surgical History:   Procedure Laterality Date    APPENDECTOMY      BACK SURGERY     Charlotta Ada HOLE  2014    drainage of left SDH    CARDIAC SURGERY      COLONOSCOPY      CORONARY ARTERY BYPASS GRAFT  11    LIMA to LAD, SVG to 1st Marginal, Distal Right Coronary Artery, 1st Diagonal    DILATATION, ESOPHAGUS      ENDOSCOPY, COLON, DIAGNOSTIC      EYE SURGERY      bilateral cataracts    HYSTERECTOMY      NM EGD BALLOON DILATION ESOPHAGUS <30 MM DIAM N/A 2017    EGD ESOPHAGOGASTRODUODENOSCOPY DILATATION performed by Rosemary Agee MD at [address] [place] [address] ESOPHAGOGASTRODUODENOSCOPY TRANSORAL DIAGNOSTIC 11/22/2017    EGD ESOPHAGOGASTRODUODENOSCOPY performed by Keisha Johnston MD at [facility] Left     SKIN BIOPSY      VASCULAR SURGERY      cabg harvests from left leg       Immunization History:   Immunization History   Administered Date(s) Administered    Influenza Vaccine, unspecified formulation 10/01/2016    Influenza Virus Vaccine 10/24/2010, 10/06/2014, 10/01/2015    Influenza, High Dose (Fluzone 65 yrs and older) 10/07/2015, 10/03/2016, 10/18/2017, 10/29/2018    Influenza, MDCK Quadv, IM, PF (Flucelvax 4 yrs and older) 10/24/2019    PPD Test 07/26/2014, 11/03/2014, 03/19/2015, 05/19/2016, 06/19/2018, 06/26/2018    Pneumococcal Conjugate 13-valent ([id number]) 10/07/2015    Pneumococcal Conjugate Vaccine 10/24/2002    Pneumococcal Polysaccharide ([id number]) 10/24/2002, 10/01/2015    Tdap (Boostrix, Adacel) 07/09/2020       Active Problems:  Patient Active Problem List   Diagnosis Code    Physical deconditioning R53.81    (HFpEF) heart failure with preserved ejection fraction (HCC) I50.30    Hyperlipidemia E78.5    Atrial fibrillation with slow ventricular response (HCC) I48.91    Easy bruising R23.8    Hypothyroidism E03.9    VHD (valvular heart disease) I38    Coronary artery disease involving native coronary artery of native heart without angina pectoris I25.10    Essential hypertension H16    Systolic congestive heart failure (HCC) I50.20    Generalized weakness R53.1    Chronic systolic congestive heart failure (HCC) I50.22    Chronic diastolic heart failure (HCC) I50.32    Thrombocytopenia (HCC) D69.6    CKD (chronic kidney disease), stage III (HCC) N18.3    Pulmonary hypertension ([facility] [address] I27.20    Fall at home, subsequent encounter W19. XXXD, Y92.009    Recurrent falls R29.6    DJD (degenerative joint disease), cervical M47.812    CVA (cerebral vascular accident) ([facility] [address] I63.9    Anxiety and depression F41.9, F32.9    Elevated troponin R79.89 Isolation/Infection:   Isolation          Contact        Patient Infection Status     Infection Onset Added Last Indicated Last Indicated By Review Planned Expiration Resolved Resolved By    VRE  07/28/14 07/28/14 Doris De Santiago RN        Resolved    COVID-19 Rule Out 07/10/20 07/10/20 07/10/20 COVID-19 (Ordered)   07/12/20 Rule-Out Test Resulted          Nurse Assessment:  Last Vital Signs: BP (!) 151/66   Pulse 71   Temp 97.3 °F (36.3 °C) (Axillary)   Resp 18   Ht 4' 9\" (1.448 m)   Wt 113 lb 1.6 oz (51.3 kg)   SpO2 94%   BMI 24.47 kg/m²     Last documented pain score (0-10 scale): Pain Level: 1  Last Weight:   Wt Readings from Last 1 Encounters:   07/14/20 113 lb 1.6 oz (51.3 kg)     Mental Status:  {IP PT MENTAL STATUS:20030}    IV Access:  { ED IV ACCESS:995884335}    Nursing Mobility/ADLs:  Walking   {CHP DME RKKM:226635719}  Transfer  {CHP DME OBFF:256889327}  Bathing  {CHP DME UEFP:330777645}  Dressing  {CHP DME JGWK:214605966}  Toileting  {CHP DME XCMO:031731885}  Feeding  {CHP DME NPVT:444044110}  Med Admin  {P DME CROE:404092146}  Med Delivery   { ED MED Delivery:440715129}    Wound Care Documentation and Therapy:  Wound 02/15/18 Other (Comment) Back Medial red (Active)   Number of days: 880        Elimination:  Continence:   · Bowel: {YES / YQ:70009}  · Bladder: {YES / PA:99696}  Urinary Catheter: {Urinary Catheter:221451220}   Colostomy/Ileostomy/Ileal Conduit: {YES / GC:37074}       Date of Last BM: ***    Intake/Output Summary (Last 24 hours) at 7/15/2020 1249  Last data filed at 7/15/2020 1047  Gross per 24 hour   Intake 400 ml   Output 1000 ml   Net -600 ml     I/O last 3 completed shifts:   In: 500 [P.O.:500]  Out: 1000 [Urine:1000]    Safety Concerns:     [address] ED Safety Concerns:731283653}    Impairments/Disabilities:      [address] ED Impairments/Disabilities:161853347}    Nutrition Therapy:  Current Nutrition Therapy:   [address] WARD Diet List:926565213}    Routes of Feeding: {P DME

## 2020-07-16 NOTE — CARE COORDINATION
[facility] Transitions Initial Follow Up Call    Call within 2 business days of discharge: Yes    Patient: Eagle Conti Patient : 1930   MRN: [id number]  Reason for Admission: Fall  Discharge Date: 7/15/20 RARS: Readmission Risk Score: 16      Last Discharge [facility]       Complaint Diagnosis Description Type Department Provider    20 Fall; Hand Pain; Laceration Fall, initial encounter . .. ED to Hosp-Admission (Discharged) (ADMITTED) ИВАН Payton PA-C; Jack Aggarwal. .. First attempt to reach [address] [place] [address] Dr MAYORGA nurse. Left message to call transition care nurse from [facility] - LORA NARVAEZ @ [phone]. Pt has home health order, unsure what agency. Called [facility] & left message, inquiring if [facility] has this pt. Follow Up  Future Appointments   Date Time Provider Carloz Perez   2020  3:00 PM Angela Espino DO [facility] Transitions Initial Follow Up Call    Call within 2 business days of discharge: Yes    Patient: Eagle oCnti Patient : 1930   MRN: [id number]  Reason for Admission: Fall  Discharge Date: 7/15/20 RARS: Readmission Risk Score: 16      Last Discharge [facility]       Complaint Diagnosis Description Type Department Provider    20 Fall; Hand Pain; Laceration Fall, initial encounter . .. ED to Hosp-Admission (Discharged) (ADMITTED) ИВАН Payton PA-C; Jack Aggarwal. ..            Spoke with: Juarez Alfred nurse @ [facility] [address]: [address]    Non-face-to-face services provided:  Scheduled appointment with PCP-20  Obtained and reviewed discharge summary and/or continuity of care documents  Assessment and support for treatment adherence and medication management-reviewed meds with Lindy Tian Transitions 24 Hour Call    Do you have any ongoing symptoms?:  No  Do you have a copy of your discharge instructions?:  Yes  Do you have all of your prescriptions and are they filled?:  No  Have you scheduled your follow up appointment?:  No  Were you discharged with any Home Care or Post Acute Services:  Yes  Post Acute Services:  Home Health (Comment: using the PT OT @ [address] [place] [address] )  Patient DME:  Jerri Boor, Shower chair, Other, Wheelchair, Straight cane  Other Patient DME:  walker with seat  Do you have support at home?:  Alone  Do you feel like you have everything you need to keep you well at home?:  Yes  Are you an active caregiver in your home?:  No  Care Transitions Interventions  No Identified Needs     Doug Purcell nurse from [address] [place] [address]  returned the call. The staff manages pt meds & assists with the ADL. Nurse reported the pt is at baseline. Called daughter to schedule PCP appt  Return call from [facility], notified Doug Purcell reported the staff @ [address] [place] [address]  will do the PT. Nurse denied any needs or concerns.   CTN will sign off    Follow Up  Future Appointments   Date Time Provider Carloz Perez   7/20/2020  3:00 PM Kristy Galeas  [place] Carroll RN  Care Transition Nurse  [phone]

## 2020-07-17 NOTE — CARE COORDINATION
7/17/20, 3:20 PM EDT    Patient goals/plan/ treatment preferences discussed by  and . Patient goals/plan/ treatment preferences reviewed with patient/ family. Patient/ family verbalize understanding of discharge plan and are in agreement with goal/plan/treatment preferences. Understanding was demonstrated using the teach back method. AVS provided by RN at time of discharge, which includes all necessary medical information pertaining to the patients current course of illness, treatment, post-discharge goals of care, and treatment preferences. IMM Letter  IMM Letter given to Patient/Family/Significant other/Guardian/POA/by[de-identified] Ruthann WARNER Case Manager. IMM Letter date given[de-identified] 07/15/20  IMM Letter time given[de-identified] 1200       Late Entry  Patient discharged to [place] [address] on 7/15. [facility] notified of discharge, AVS and MARS faxed and RN called report.

## 2020-07-20 NOTE — PROGRESS NOTES
Visit Information    Have you changed or started any medications since your last visit including any over-the-counter medicines, vitamins, or herbal medicines? no   Are you having any side effects from any of your medications? -  no  Have you stopped taking any of your medications? Is so, why? -  no    Have you seen any other physician or provider since your last visit? Yes - Records Obtained  Have you had any other diagnostic tests since your last visit? Yes - Records Obtained  Have you been seen in the emergency room and/or had an admission to a hospital since we last saw you? Yes - Records Obtained  Have you had your routine dental cleaning in the past 6 months? n/a    Have you activated your [employer] account? If not, what are your barriers?  No: declined     Patient Care Team:  Mela Swartz DO as PCP - General (Family Medicine)  Mela Swartz DO as PCP - [facility] Provider  Cornelius Crowder MD as Consulting Physician (Cardiology)  Willie Fountain MD (Otolaryngology)    Medical History Review  Past Medical, Family, and Social History reviewed and does not contribute to the patient presenting condition    Health Maintenance   Topic Date Due    Shingles Vaccine (1 of 2) 02/09/1980    Annual Wellness Visit (AWV)  06/20/2019    Flu vaccine (1) 09/01/2020    Lipid screen  09/27/2020    Potassium monitoring  07/13/2021    Creatinine monitoring  07/13/2021    TSH testing  07/14/2021    DTaP/Tdap/Td vaccine (2 - Td) 07/09/2030    Pneumococcal 65+ years Vaccine  Completed    Hepatitis A vaccine  Aged Out    Hepatitis B vaccine  Aged Out    Hib vaccine  Aged Out    Meningococcal (ACWY) vaccine  Aged Out

## 2020-08-08 PROBLEM — R77.8 ELEVATED TROPONIN: Status: RESOLVED | Noted: 2020-01-01 | Resolved: 2020-01-01

## 2020-08-19 NOTE — TELEPHONE ENCOUNTER
Jareth Orion called from [facility] stating that she faxed over the patients UA with C&S results for you to review. IN the Media tab. She stated that the patient is having increased confusion this AM with no other S/S. Jareth Ariza is requesting that a script be faxed to their facility and they will send it to their pharmacy. The paper script is required for the chart. Fax script to [phone]    Call Jareth Ariza back if there are any other instructions.

## 2020-08-20 NOTE — TELEPHONE ENCOUNTER
Ok to use Benadryl cream also if they'd like, this too is OTC. Itching should resolve once she gets oral Benadryl.

## 2020-08-20 NOTE — TELEPHONE ENCOUNTER
Zulay with [facility] # [phone] calling and requesting order for anti-itch cream for patient. Patient scratching arms and they are red. They have benadryl coming from the pharmacy but family is requesting cream also.   Please advise and fax orders to #[phone]

## 2020-08-21 PROBLEM — N39.0 UTI (URINARY TRACT INFECTION): Status: ACTIVE | Noted: 2020-01-01

## 2020-08-21 NOTE — PLAN OF CARE
Problem: Impaired respiratory status  Goal: Clear lung sounds  Outcome: Ongoing  Note: Albuterol to improve breath sounds.

## 2020-08-21 NOTE — ED PROVIDER NOTES
Eugene Matt [facility] COMPLAINT       Chief Complaint   Patient presents with    Fever    Rash     After taking Batrim this AM    Urinary Tract Infection     Being treated with JOSR HILL       Nurses Notes reviewed and I agree except as noted in the HPI. HISTORY OF PRESENT ILLNESS    Sybil Argueta is a 80 y.o. female who presents to the Emergency Department for the evaluation of fever, rash. The patient was recently diagnosed with a urinary tract infection. She was placed on Bactrim and received the first dose either last evening or this morning. The patient is a resident at assisted living facility. Nursing staff contacted the patient's daughter because the patient broke out into a severe rash. She has a rash covering her chest, bilateral arms and bilateral legs. She also appeared somewhat short of breath. EMS was activated and the patient was brought to the emergency department for evaluation. The daughter states she believes the patient received a dose of Benadryl at the assisted living facility. On arrival, the patient has a temperature of 102.1. She has a deep red, purpuric type rash that she states is very pruritic. Patient has a history of physical deconditioning. She does not eat or drink well. She has had problems with dehydration in the past.  She also has a history of thrombocytopenia, chronic kidney disease, pulmonary hypertension, atrial fibrillation, systolic congestive heart failure. The HPI was provided by the patient. REVIEW OF SYSTEMS     Review of Systems   Constitutional: Positive for fatigue and fever. HENT: Negative for sore throat, trouble swallowing and voice change. Respiratory: Positive for shortness of breath. Negative for cough, chest tightness and wheezing. Cardiovascular: Negative for chest pain and palpitations. Gastrointestinal: Negative for abdominal pain.    Genitourinary: Positive for decreased urine volume. Skin: Positive for rash. Neurological: Positive for weakness and light-headedness. PAST MEDICAL HISTORY    has a past medical history of ARNOLD (acute kidney injury) ([facility] [address] Anxiety and depression, Anxiety and depression, Atrial fibrillation ([address] Blood transfusion reaction, CAD (coronary artery disease), Cerebrovascular disease, CKD (chronic kidney disease), Diastolic congestive heart failure ([address] DJD (degenerative joint disease), cervical, GERD (gastroesophageal reflux disease), Hiatal hernia, History of blood transfusion, Hyperlipidemia, Hypertension, Hypoalbuminemia, Hypothyroidism, Mild protein-calorie malnutrition ([address] NSTEMI (non-ST elevated myocardial infarction) ([address] Pancreatitis due to biliary obstruction, Pneumonia, organism unspecified(486), Scoliosis, SDH (subdural hematoma) ([facility] [address] Thyroid disease, Unspecified cerebral artery occlusion with cerebral infarction, and Uterine cancer ([facility] [address] SURGICAL HISTORY      has a past surgical history that includes Hysterectomy; Coronary artery bypass graft (9/27/11); Appendectomy; back surgery; Colonoscopy; Endoscopy, colon, diagnostic; eye surgery; Cardiac surgery; skin biopsy; Karrenetta Shear (12/25/2014); Dilatation, esophagus; vascular surgery; Refractive surgery (Left); pr egd balloon dilation esophagus <30 mm diam (N/A, 11/22/2017); and pr esophagogastroduodenoscopy transoral diagnostic (11/22/2017). CURRENT MEDICATIONS       Previous Medications    ACETAMINOPHEN (TYLENOL) 325 MG TABLET    Take 650 mg by mouth every 4 hours as needed for Pain    ALBUTEROL (ACCUNEB) 0.63 MG/3ML NEBULIZER SOLUTION    Take 1 ampule by nebulization 4 times daily as needed for Wheezing or Shortness of Breath    ALBUTEROL SULFATE HFA (PROVENTIL HFA) 108 (90 BASE) MCG/ACT INHALER    Inhale 2 puffs into the lungs every 6 hours as needed for Wheezing or Shortness of Breath    ASPIRIN 81 MG EC TABLET    Take 81 mg by mouth daily.     ATORVASTATIN (LIPITOR) 20 MG TABLET    Take 1 tablet by mouth nightly    BUMETANIDE (BUMEX) 0.5 MG TABLET    Take 1 tablet by mouth daily as needed (increase in daily weight >3lbs, swelling, shortness of breath) TAKE 1 TABLET BY MOUTH DAILY    CALCIUM CARB-CHOLECALCIFEROL 600-500 MG-UNIT CAPS    Take 1 capsule by mouth daily    CEFDINIR (OMNICEF) 300 MG CAPSULE    Take 1 capsule by mouth 2 times daily for 5 days    CITALOPRAM (CELEXA) 40 MG TABLET    TAKE 1 TABLET BY MOUTH DAILY    ISOSORBIDE MONONITRATE (IMDUR) 30 MG EXTENDED RELEASE TABLET    TAKE 1 TABLET BY MOUTH DAILY    LATANOPROST (XALATAN) 0.005 % OPHTHALMIC SOLUTION    Place 1 drop into both eyes nightly    LEVOTHYROXINE (SYNTHROID) 88 MCG TABLET    Take 1 tablet by mouth Daily    MAGNESIUM OXIDE (MAG-OX) 400 (241.3 MG) MG TABS TABLET    Take 1 tablet by mouth daily. METOPROLOL TARTRATE (LOPRESSOR) 25 MG TABLET    Take 0.5 tablets by mouth 2 times daily    MULTIPLE VITAMIN (MULTI-VITAMINS) TABS    TAKE 1 TABLET BY MOUTH DAILY    NYSTATIN (MYCOSTATIN) 402536 UNIT/GM POWDER    Apply 1 Dose topically 3 times daily as needed    PANTOPRAZOLE (PROTONIX) 40 MG TABLET    Take 1 tablet by mouth 2 times daily    POLYETHYLENE GLYCOL (GLYCOLAX) POWDER    Take 17 g by mouth daily. PROPYLENE GLYCOL-GLYCERIN (EQ ARTIFICIAL TEARS) 1-0.3 % SOLN OPHTHALMIC SOLUTION    Place 1 drop into both eyes 4 times daily as needed for Dry Eyes or Irritation    SPIRONOLACTONE (ALDACTONE) 25 MG TABLET    Take 12.5 mg by mouth daily    SUCRALFATE (CARAFATE) 1 GM TABLET    Take 1 g by mouth 3 times daily     SULFAMETHOXAZOLE-TRIMETHOPRIM (BACTRIM DS) 800-160 MG PER TABLET    Take 1 tablet by mouth 2 times daily for 3 days    VITAMIN D (ERGOCALCIFEROL) 15428 UNITS CAPS CAPSULE    TAKE 1 CAPSULE BY MOUTH EVERY 28 DAYS ON THE 18TH OF EVERY MONTH       ALLERGIES     is allergic to lisinopril; morphine; narcan [naloxone]; and adhesive tape.     FAMILY HISTORY     She indicated that her mother is . She indicated that her father is . She indicated that all of her three brothers are . She indicated that her daughter is alive. She indicated that her son is alive. family history includes Cancer in her brother; Heart Disease in her brother, brother, daughter, father, mother, and son. SOCIAL HISTORY      reports that she has never smoked. She has never used smokeless tobacco. She reports that she does not drink alcohol or use drugs. PHYSICAL EXAM     INITIAL VITALS:  weight is 108 lb (49 kg). Her rectal temperature is 100.2 °F (37.9 °C). Her blood pressure is 102/58 (abnormal) and her pulse is 107. Her respiration is 19 and oxygen saturation is 94%. Physical Exam  Constitutional:       General: She is in acute distress. Appearance: She is ill-appearing. HENT:      Head: Normocephalic. Mouth/Throat:      Mouth: Mucous membranes are dry. Pharynx: No oropharyngeal exudate or posterior oropharyngeal erythema. Cardiovascular:      Rate and Rhythm: Normal rate. Heart sounds: No murmur. Pulmonary:      Effort: Tachypnea present. No accessory muscle usage. Breath sounds: No decreased air movement. No decreased breath sounds or wheezing. Abdominal:      General: Abdomen is flat. Bowel sounds are normal. There is no distension. Skin:     General: Skin is warm and dry. Capillary Refill: Capillary refill takes less than 2 seconds. Findings: Rash (diffuse purpuric deep red rash coverning arms, legs, chest, abdomen and back. sparing neck and face) present. Comments: The patient's rash has large lesions that are purpuric. Non-blanchable. Do not see any lesions in mucous membranes of mouth, nose, anal region. Neurological:      General: No focal deficit present.           DIFFERENTIAL DIAGNOSIS:   Allergic reaction, Laclede Oris syndrome, UTI, sepsis    DIAGNOSTIC RESULTS     EKG: All EKG's are interpreted by the Emergency Department Physician who either signs or Co-signs this chart in the absence of a cardiologist.    Sinus tachycardia ventricular rate 105 bpm.  Low voltage QRS. MO interval 156, QRS duration 112, corrected  ms. RADIOLOGY: non-plainfilm images(s) such as CT, Ultrasound and MRI are read by the radiologist.    XR CHEST PORTABLE   Final Result   Opacities in the right upper lung which could represent infiltrates or atelectasis. **This report has been created using voice recognition software. It may contain minor errors which are inherent in voice recognition technology. **      Final report electronically signed by Dr Pamela iYp on 8/20/2020 10:19 PM          LABS:     Labs Reviewed   CBC WITH AUTO DIFFERENTIAL - Abnormal; Notable for the following components:       Result Value    RBC 3.38 (*)     Hemoglobin 9.9 (*)     Hematocrit 32.6 (*)     MCHC 30.4 (*)     RDW-CV 16.5 (*)     RDW-SD 58.8 (*)     Segs Absolute 8.4 (*)     Lymphocytes Absolute 0.4 (*)     Monocytes Absolute 0.3 (*)     Immature Grans (Abs) 0.08 (*)     All other components within normal limits   COMPREHENSIVE METABOLIC PANEL W/ REFLEX TO MG FOR LOW K - Abnormal; Notable for the following components:    Glucose 136 (*)     CREATININE 1.3 (*)     BUN 30 (*)     ALT 10 (*)     All other components within normal limits   TROPONIN - Abnormal; Notable for the following components:    Troponin T 0.013 (*)     All other components within normal limits   PROCALCITONIN - Abnormal; Notable for the following components:    Procalcitonin 0.21 (*)     All other components within normal limits   BRAIN NATRIURETIC PEPTIDE - Abnormal; Notable for the following components:    Pro-BNP 7115.0 (*)     All other components within normal limits   GLOMERULAR FILTRATION RATE, ESTIMATED - Abnormal; Notable for the following components:    Est, Glom Filt Rate 38 (*)     All other components within normal limits   URINE WITH REFLEXED MICRO - Abnormal; Notable for the following components:    Ketones, Urine TRACE (*)     Protein, UA TRACE (*)     Nitrite, Urine POSITIVE (*)     Leukocyte Esterase, Urine SMALL (*)     All other components within normal limits   CULTURE, BLOOD 1    Narrative:     Epic Plan - [id number]   CULTURE, BLOOD 2   CULTURE, REFLEXED, URINE    Narrative:     Allen Eaton - [id number]   TSH WITHOUT REFLEX   LACTIC ACID, PLASMA   ANION GAP   OSMOLALITY   COVID-19       EMERGENCY DEPARTMENT COURSE:   Vitals:    Vitals:    08/21/20 0045 08/21/20 0220 08/21/20 0329 08/21/20 0424   BP: 124/74 (!) 109/57 123/64 (!) 102/58   Pulse: 102 101 101 107   Resp: 20 20 21 19   Temp: 100.2 °F (37.9 °C)      TempSrc: Rectal      SpO2: 95% 91% 92% 94%   Weight:           10:02 PM EDT: The patient was seen and evaluated. Patient was itching severely to her arms. She does not appear to have any airway issues. She has purpuric rash covering trunk and extremities. Patient is treated with Benadryl, Solu-Medrol and Pepcid IV. She is given IV fluids at 30 mL/kg bolus due to possible sepsis. Given Tylenol 650 for fever. Patient's white blood cell count today is 9.2. Hemoglobin 9.9, hematocrit 32.6. Patient's electrolytes are reassuring. BUN 30, creatinine 1.3. Her lactic acid is not elevated it is 1.3. Procalcitonin 0.21.  proBNP 7115 chest x-ray shows cardiomegaly but no pulmonary vascular congestion. There are opacities in right upper lung which could represent infiltrates or atelectasis. Urinalysis is nitrite positive, small leukocyte esterase, many bacteria. I have concerns for Tiffanie Vazquez syndrome with the patient's fever and purpuric rash. I consulted with , ED attending who evaluated the patient and also share same concern. Patient does have noted hypoxia. Patient has O2 saturations 85 to 86% when on room air. On 2 L/min, O2 saturations 95%. Patient was kept on continuous oxygen while evaluated in the ED.   Patient does not have any wheezing or stridor. MDM:  Patient has non-blanchable purpuric rash covering a large portion of her body. Likely allergic reaction to Bactrim, concern for Alida Gaby syndrome. I contacted Dr. Frederick Faye the hospitalist service who graciously agreed to accept the patient to the hospital for further evaluation and treatment. I will withhold further antibiotic treatment until evaluated by hospitalist.    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  Efforts were made to edit the dictations but occasionally words are mis-transcribed.)    The patient was given an opportunity to see the Emergency Attending. The patient voiced understanding that I was a Mid-LevelProvider and was in agreement with being seen independently by myself.           Tomy Heimlich, NADINE - CNP  08/21/20 [address] [facility], NADINE - CNP  08/21/20 9411

## 2020-08-21 NOTE — H&P
range. Continue Daily Levothyroxine. 5.) HTN:  Has been mildly hypotensive since admission. Hold BP medications as necessary. Treat with IVF. 6.) Compensated HFpEF (EF 50-55% 2/2018): No crackles heard in lungs, no swelling seen in feet. Does not appear to be clinically decompensated at this point. BNP 7115, Troponin 0.013, which is close to baseline. Monitor for change with IVF. 7.) CKD:  Cr 1.3, BUN 30, not significantly elevated from baseline. Trend with BMP to monitor for ARNOLD. 8.) Paroxsymal Afib:  Currently in NSR. Keep on Telemetry. On Lovenox in hospital.  CHADS 2 VASC Score is Moderate to High, recommended patient should be on anticoagulation outpatient. 9.) Chronic Normocytic Anemia: HgB is 9.9 on arrival.  Patient has had anemia in past, likely related to CKD and other chronic diseases. Checking daily CBC. History Of Present Illness:      Ms. Carlos Manuel Díaz is a 80year old female with a past medical history of Hypothyroidism, HTN, HFpEF (50-55% 2/2018), CKD, and Dementia who presented to the ER do to fever, UTI and a rash noted at the nursing home she is in. The patient is unable to provide a detailed history of what happened, but chart review indicates that she was taking Omnicef (unsure how long) or a UTI, but then was switched to Bactrim. Within 1 hour of receiving Bactrim, she developed a rash and fever. The nursing home gave her a dose of [place] aryl and sent her to the ER. She was given Benadryl, Famotidine, and Solu-Medrol in the ER for the rash, and was started on Sepsis protocol and was given IVF fluids. When seen, the patient states that she is itchy all over and has some pain in her back and legs. She denies any dysuria on exam.  She denies fever, but does state she feels cold.     Past Medical History:          Diagnosis Date    ARNOLD [facility]acute kidney injury) ([facility] [address]     Anxiety and depression 6/26/2018    Anxiety and depression     Atrial fibrillation ([address]     Blood transfusion reaction     CAD (coronary artery disease)     Severe triple vessel disease    Cerebrovascular disease 6/19/2018    CKD (chronic kidney disease)     Diastolic congestive heart failure (HCC)     DJD (degenerative joint disease), cervical 6/19/2018    GERD (gastroesophageal reflux disease)     Hiatal hernia     History of blood transfusion     Hyperlipidemia     Hypertension     Hypoalbuminemia     Hypothyroidism     Mild protein-calorie malnutrition (HCC)     NSTEMI (non-ST elevated myocardial infarction) ([facility] [address] 7/2014    Pancreatitis due to biliary obstruction     Pneumonia, organism unspecified(486)     Scoliosis     SDH (subdural hematoma) (HCC)     Teresa hole decompresson on 12/25/2014    Thyroid disease     Unspecified cerebral artery occlusion with cerebral infarction     Uterine cancer ([facility] [address] 1966       Past Surgical History:          Procedure Laterality Date    APPENDECTOMY      BACK SURGERY      TERESA HOLE  12/25/2014    drainage of left SDH    CARDIAC SURGERY      COLONOSCOPY      CORONARY ARTERY BYPASS GRAFT  9/27/11    LIMA to LAD, SVG to 1st Marginal, Distal Right Coronary Artery, 1st Diagonal    DILATATION, ESOPHAGUS      ENDOSCOPY, COLON, DIAGNOSTIC      EYE SURGERY      bilateral cataracts    HYSTERECTOMY      WI EGD BALLOON DILATION ESOPHAGUS <30 MM DIAM N/A 11/22/2017    EGD ESOPHAGOGASTRODUODENOSCOPY DILATATION performed by Izabel Galvez MD at [facility]    WI ESOPHAGOGASTRODUODENOSCOPY TRANSORAL DIAGNOSTIC  11/22/2017    EGD ESOPHAGOGASTRODUODENOSCOPY performed by Izabel Galvez MD at [address] [place] [address] Left     SKIN BIOPSY      VASCULAR SURGERY      cabg harvests from left leg       Medications Prior to Admission:      Prior to Admission medications    Medication Sig Start Date End Date Taking?  Authorizing Provider   cefdinir (OMNICEF) 300 MG capsule Take 1 capsule by mouth 2 times daily for 5 days 8/20/20 8/25/20 Soraida Mello DO   nystatin (MYCOSTATIN) 790506 UNIT/GM powder Apply 1 Dose topically 3 times daily as needed 4/16/20   Historical Provider, MD   pantoprazole (PROTONIX) 40 MG tablet Take 1 tablet by mouth 2 times daily 7/11/20   Historical Provider, MD   citalopram (CELEXA) 40 MG tablet TAKE 1 TABLET BY MOUTH DAILY 7/16/20   Soraida Mello DO   bumetanide (BUMEX) 0.5 MG tablet Take 1 tablet by mouth daily as needed (increase in daily weight >3lbs, swelling, shortness of breath) TAKE 1 TABLET BY MOUTH DAILY 7/15/20   Velvet Councilman, PA-C   latanoprost (XALATAN) 0.005 % ophthalmic solution Place 1 drop into both eyes nightly    Historical Provider, MD   propylene glycol-glycerin (EQ ARTIFICIAL TEARS) 1-0.3 % SOLN ophthalmic solution Place 1 drop into both eyes 4 times daily as needed for Dry Eyes or Irritation 10/17/18   Soraida Mello DO   sucralfate (CARAFATE) 1 GM tablet Take 1 g by mouth 3 times daily     Historical Provider, MD   metoprolol tartrate (LOPRESSOR) 25 MG tablet Take 0.5 tablets by mouth 2 times daily 7/13/18   Irma Cuello MD   levothyroxine (SYNTHROID) 88 MCG tablet Take 1 tablet by mouth Daily 7/14/18   Irma Cuello MD   albuterol (ACCUNEB) 0.63 MG/3ML nebulizer solution Take 1 ampule by nebulization 4 times daily as needed for Wheezing or Shortness of Breath    Historical Provider, MD   Calcium Carb-Cholecalciferol 600-500 MG-UNIT CAPS Take 1 capsule by mouth daily    Historical Provider, MD   acetaminophen (TYLENOL) 325 MG tablet Take 650 mg by mouth every 4 hours as needed for Pain    Historical Provider, MD   atorvastatin (LIPITOR) 20 MG tablet Take 1 tablet by mouth nightly 2/20/18   Unique Zendejas MD   albuterol sulfate HFA (PROVENTIL HFA) 108 (90 Base) MCG/ACT inhaler Inhale 2 puffs into the lungs every 6 hours as needed for Wheezing or Shortness of Breath 2/20/18   Unique Zendejas MD   spironolactone (ALDACTONE) 25 MG tablet Take 12.5 mg by mouth daily infiltrate. XR CHEST PORTABLE   Final Result   Opacities in the right upper lung which could represent infiltrates or atelectasis. **This report has been created using voice recognition software. It may contain minor errors which are inherent in voice recognition technology. **      Final report electronically signed by Dr Hayde Estrada on 8/20/2020 10:19 PM           DVT prophylaxis: Lovenox    Code Status: [facility] Problems    Diagnosis Date Noted    UTI (urinary tract infection) [N39.0] 08/21/2020     Thank you Davis Saunders DO for the opportunity to be involved in this patient's care.     Electronically signed by Shruthi Green DO on 8/21/2020 at 5:56 AM

## 2020-08-21 NOTE — PROGRESS NOTES
Pharmacy Medication History Note      List of current medications patient is taking is complete. Source of information: [facility] [address] list    Changes made to medication list:  Medications removed (include reason, ex. therapy complete or physician discontinued):  Latanaprost eye drops - now on Zioptan    Medications added/doses adjusted:  Changed artificial tears from 4 times daily prn to 4 times daily scheduled  Added Zioptan 0.0015% 1 drop into each eye once daily  Added daily-rafael 1 tablet once daily    Other notes (ex. Recent course of antibiotics, Coumadin dosing):  Denies use of other OTC or herbal medications.       Allergies reviewed      Electronically signed by Azul Cano [facility] on 8/21/2020 at 9:32 AM

## 2020-08-21 NOTE — ED NOTES
Bedside report received from [facility]. PT resting in bed with eyes closed and RR reg. PT sternal rubbed and pt became more awake. Pt did not want to open eyes the patient now moving arms in bed and mumbling. VS reassessed. RR reg.  Will continue to monitor      Sheila Flores RN  08/21/20 0045

## 2020-08-21 NOTE — FLOWSHEET NOTE
08/21/20 0557   Patient Goal of the Day(Whiteboard)   Patient Daily Goal reviewed with Patient   Patient's reason for admission Allergic reaction to Bactrim / Sepsis from UTI   Precautions   Precautions Fall risk;Contact   Negative Pressure Room No   Positive Pressure Room No   Safe Environment   Arm Bands On ID; Allergies; Code Status; Fall   Call Light Within Reach Yes   Overbed Table Within Reach Yes   Bed In Lowest Position Yes   Bed Wheels Locked Yes   Siderails Up 3/4   NonSkid Footwear Patient in bed   Fall Risk Interventions   Toilet Every 2 Hours-In Advance of Need Yes  (External catheter placed)   Hourly Visual Checks Agitated; Awake;Confused; In bed   Fall Visual Posted Armband; Fall sign posted   Room Door Open Yes   Alarm On Bed   Mobility   Activity In bed   Repositioned Lying left side;Semi fowlers;Pillow support   Head of Bed Elevated  HOB 30   Heels/Feet Heel(s) elevated off bed; Foot of bed elevated   Range of Motion Active; All extremities   Patient Arrival To Unit 0503   Transport Method Stretcher   Comfort and Environment Interventions   Comfort Repositioned; Warm [place]   Additional Comfort/Environmental Interventions Special mattress   Special Mattress Low air loss       Pt admitted to  4K11 from ED and via cart/stretcher. Complaints: Septic from UTI / allergic reaction from Bactrim while at [facility]. IV in place on LFA - NS hanging on IV and is clamped off - MD orders NS @ 75 ml / hr and is began. IV site free of s/s of infection or infiltration - wrapped to prevent patient from pulling on IV. Vital signs obtained. Assessment and data collection initiated. Two nurse skin assessment performed by [facility] RN and [place] RN. Oriented to room. Policies and procedures for  explained. All questions answered with no further questions at this time. Fall prevention and safety brochure discussed with patient. Bed alarm on. Call light in reach.     Would you like your Primary Care Physician notified? Yes - will be sent to [facility] on discharge   The best day to schedule a follow up Dr appointment is:  Monday a.m. Patient has many red areas all over body which are dry and flaky - most are non-blanchable. Blanchable redness noted to buttocks and Bilateral Heels - skin prep applied to bilateral heels and zinc paste applied to buttocks. Patient is non-coherent at this time and A+O x 0 - unable to follow commands. Calms down with warm blanket and lower stimuli. IV is wrapped with kerlix to prevent patient from pulling out IV. This RN spoke with daughter, Sloane Leger, over phone to got baseline information from family member. Patient normally able to hold conversation on phone unless very confused, then can't dial number. MD Chuck Patel was in room during admission and is aware of patient's condition, including skin integrity.

## 2020-08-21 NOTE — ED NOTES
Pt resting on cot in position of comfort with eyes closed, easily aroused. Pt mental status remains at base line for pt. IV continues to infuse with no s/s of infection or infiltration. Daughter remains at bedside. Rectal temp rechecked. Will continue to monitor.      Akash Benavides RN  08/21/20 4211

## 2020-08-21 NOTE — ED NOTES
Pt resting on cot in position of comfort with eyes closed, easily awoken. Pt remains at mental baseline. Resps easy and unlabored. IV shows no s/s of infection or infiltration and continues to infuse. Will continue to monitor. Updated family at bedside on poc.      Adri Lehman RN  08/20/20 6946

## 2020-08-21 NOTE — PROGRESS NOTES
Pharmacy Renal Adjustment    Jarrod Vivas is a 80 y.o. female. Pharmacy renally adjust the following medications per P&T approved policy: enoxaparin    Recent Labs     08/20/20 2226 08/21/20  0557   BUN 30* 28*       Recent Labs     08/20/20 2226 08/21/20  0557   CREATININE 1.3* 1.4*       CrCl cannot be calculated (Unknown ideal weight.).   Calculated CrCl: 19    Height:   Ht Readings from Last 1 Encounters:   07/09/20 4' 9\" (1.448 m)     Weight:  Wt Readings from Last 1 Encounters:   08/20/20 108 lb (49 kg)      Baseline SCr: 1.0    Plan: Adjustments based on renal function:          Decrease enoxaparin 40mg daily to enoxaparin 30mg daily          Decrease Zosyn to 3.375g q12h extended infusion    Katty Alcantara, PharmD, BCPS 8/21/2020 7:26 AM

## 2020-08-21 NOTE — CARE COORDINATION
8/21/20, 7:41 AM EDT  DISCHARGE PLANNING EVALUATION:    America Mendiola       Admitted from: ED 8/20/2020/ [facility] day: 0   Location: -11/011-A Reason for admit: UTI (urinary tract infection) [N39.0] Status: IP  Admit order signed?: yes  PMH:  has a past medical history of ARNOLD (acute kidney injury) ([address] Anxiety and depression, Anxiety and depression, Atrial fibrillation ([address] Blood transfusion reaction, CAD (coronary artery disease), Cerebrovascular disease, CKD (chronic kidney disease), Diastolic congestive heart failure ([address] DJD (degenerative joint disease), cervical, GERD (gastroesophageal reflux disease), Hiatal hernia, History of blood transfusion, Hyperlipidemia, Hypertension, Hypoalbuminemia, Hypothyroidism, Mild protein-calorie malnutrition ([address] NSTEMI (non-ST elevated myocardial infarction) ([address] Pancreatitis due to biliary obstruction, Pneumonia, organism unspecified(486), Scoliosis, SDH (subdural hematoma) ([address] Thyroid disease, Unspecified cerebral artery occlusion with cerebral infarction, and Uterine cancer ([address] Procedure:   8/20 CXR  Opacities in the right upper lung which could represent infiltrates or atelectasis.    Medications:  Scheduled Meds:   sodium chloride flush  10 mL Intravenous 2 times per day    enoxaparin  40 mg Subcutaneous Daily    albuterol  2.5 mg Nebulization 4x daily    aspirin  81 mg Oral Daily    atorvastatin  20 mg Oral Nightly    Calcium Carb-Cholecalciferol  1 capsule Oral Daily    citalopram  1 tablet Oral Daily    isosorbide mononitrate  1 tablet Oral Daily    latanoprost  1 drop Both Eyes Nightly    levothyroxine  88 mcg Oral Daily    magnesium oxide  400 mg Oral Daily    metoprolol tartrate  12.5 mg Oral BID    Multi-Vitamins  1 tablet Oral Daily    miconazole   Topical BID    pantoprazole  40 mg Oral BID    polyethylene glycol  17 g Oral Daily    spironolactone  12.5 mg Oral Daily    sucralfate  1 g Oral TID    [START ON 8/28/2020] vitamin D  50,000 Units Oral Q30 Days    piperacillin-tazobactam  3.375 g Intravenous Q6H     Continuous Infusions:   sodium chloride        Pertinent Info/Orders/Treatment Plan:  Client admitted with UTI treated with IV AB (has rash), oxygen 4L; ID following. (+) UA: nitrites, elevated BNP; monitor  Diet: Diet NPO Effective Now   Smoking status:  reports that she has never smoked. She has never used smokeless tobacco.   PCP: Sander Frank DO  Readmission 30 days or less: none  Readmission Risk Score: 21%    Patient Goals/Plan/Treatment Preferences: plans return [facility] [place]; has cane, walker, nebulizer, son/POA Erin @ [phone], monitor for home oxygen eval if not weaned off; will ask physician for this and therapy when stable (nonverbal and does not follow commands)  Transportation/Food Security/Housekeeping Addressed:  No issues identified.  Evaluation: yes  8/24/20, 9:08 AM EDT    Patient goals/plan/ treatment preferences discussed by  and . Patient goals/plan/ treatment preferences reviewed with patient/ family. Patient/ family verbalize understanding of discharge plan and are in agreement with goal/plan/treatment preferences. Understanding was demonstrated using the teach back method. AVS provided by RN at time of discharge, which includes all necessary medical information pertaining to the patients current course of illness, treatment, post-discharge goals of care, and treatment preferences.         IMM Letter  IMM Letter date given[de-identified] 08/21/20

## 2020-08-21 NOTE — ED NOTES
Pt resting on cot in position of comfort with eyes closed. Pt mental status remains at baseline for pt. Denies pain. IV continues to infuse showing no s/s of infection or infiltration. Daughter left, will call with updated room assignment when pt is admitted. Monitor remains in place. Will continue to monitor.      Audelia Lee RN  08/21/20 2975

## 2020-08-21 NOTE — ED NOTES
ED nurse-to-nurse bedside report    Chief Complaint   Patient presents with    Fever    Rash     After taking Batrim this AM    Urinary Tract Infection     Being treated with Omnicef      LOC: alert to only name  Vital signs   Vitals:    08/20/20 2338 08/21/20 0045 08/21/20 0220 08/21/20 0329   BP: 115/77 124/74 (!) 109/57 123/64   Pulse: 103 102 101 101   Resp: 20 20 20 21   Temp:  100.2 °F (37.9 °C)     TempSrc:  Rectal     SpO2: 96% 95% 91% 92%   Weight:          Pain:    Pain Interventions: N/A  Pain Goal: 0  Oxygen: Yes    Current needs required 2-3L   Telemetry: Yes  LDAs:   Peripheral IV 08/20/20 Left Antecubital (Active)   Site Assessment Clean;Dry; Intact 08/21/20 0330   Line Status Infusing 08/21/20 0330   Dressing Status Clean;Dry; Intact 08/21/20 0330   Dressing Intervention New 08/21/20 0221     Continuous Infusions:   Mobility: Requires assistance * 1  Laura Fall Risk Score: Fall Risk 12/17/2019 6/8/2018 6/27/2017 12/29/2016 12/22/2015   2 or more falls in past year? yes yes no no no   Fall with injury in past year?  yes yes no no no     Fall Interventions: [address] Alarm, side rails x2  Report given to: Ria Thomas RN  08/21/20 0268

## 2020-08-21 NOTE — ED NOTES
Pt resting in bed calm with eyes closed and RR reg. VS reassessed.  Will continue to monitor      Gypsy Silvana, TIMMY  08/21/20 8338

## 2020-08-21 NOTE — CONSULTS
SDH (subdural hematoma) (HCC)     Teresa hole decompresson on 12/25/2014    Thyroid disease     Unspecified cerebral artery occlusion with cerebral infarction     Uterine cancer ([facility] [address] 1966       PAST SURGICAL HISTORY     Past Surgical History:   Procedure Laterality Date    APPENDECTOMY      BACK SURGERY      TERESA HOLE  12/25/2014    drainage of left SDH    CARDIAC SURGERY      COLONOSCOPY      CORONARY ARTERY BYPASS GRAFT  9/27/11    LIMA to LAD, SVG to 1st Marginal, Distal Right Coronary Artery, 1st Diagonal    DILATATION, ESOPHAGUS      ENDOSCOPY, COLON, DIAGNOSTIC      EYE SURGERY      bilateral cataracts    HYSTERECTOMY      VT EGD BALLOON DILATION ESOPHAGUS <30 MM DIAM N/A 11/22/2017    EGD ESOPHAGOGASTRODUODENOSCOPY DILATATION performed by Melissa Hines MD at [facility]    VT ESOPHAGOGASTRODUODENOSCOPY TRANSORAL DIAGNOSTIC  11/22/2017    EGD ESOPHAGOGASTRODUODENOSCOPY performed by Melissa Hines MD at [address] [place] [address] Left     SKIN BIOPSY      VASCULAR SURGERY      cabg harvests from left leg         MEDICATIONS:       Scheduled Meds:   sodium chloride flush  10 mL Intravenous 2 times per day    enoxaparin  30 mg Subcutaneous Daily    albuterol  2.5 mg Nebulization 4x daily    aspirin  81 mg Oral Daily    atorvastatin  20 mg Oral Nightly    Calcium Carb-Cholecalciferol  1 capsule Oral Daily    citalopram  1 tablet Oral Daily    isosorbide mononitrate  1 tablet Oral Daily    latanoprost  1 drop Both Eyes Nightly    levothyroxine  88 mcg Oral Daily    magnesium oxide  400 mg Oral Daily    metoprolol tartrate  12.5 mg Oral BID    Multi-Vitamins  1 tablet Oral Daily    miconazole   Topical BID    pantoprazole  40 mg Oral BID    polyethylene glycol  17 g Oral Daily    spironolactone  12.5 mg Oral Daily    sucralfate  1 g Oral TID    [START ON 8/28/2020] vitamin D  50,000 Units Oral Q30 Days    piperacillin-tazobactam  3.375 g Intravenous Q12H Continuous Infusions:   sodium chloride       PRN Meds:sodium chloride flush, acetaminophen **OR** acetaminophen, bumetanide, propylene glycol-glycerin  Allergies:   ALLERGIES:    Bactrim [sulfamethoxazole-trimethoprim]; Lisinopril; Morphine; Narcan [naloxone]; Sulfa antibiotics; and Adhesive tape        SOCIAL HISTORY:     TOBACCO:   reports that she has never smoked. She has never used smokeless tobacco.     ETOH:   reports no history of alcohol use. Patient currently lives in a nursing home      FAMILY HISTORY:         Problem Relation Age of Onset    Heart Disease Mother     Heart Disease Father     Cancer Brother         lymphoma    Heart Disease Brother     Heart Disease Daughter     Heart Disease Son     Heart Disease Brother        REVIEW OF SYSTEMS:     Unable to obtain from patient    PHYSICAL EXAM:     /65   Pulse 102   Temp 97.6 °F (36.4 °C) (Rectal)   Resp 18   Ht 4' 9\" (1.448 m)   Wt 111 lb 4.8 oz (50.5 kg)   SpO2 96%   BMI 24.09 kg/m²   General apperance:  Awake, alert,chronically sick looking  HEENT: pink conjunctiva, unicteric sclera,very dry oral mucosa, no blister on the oral mucosa  Chest:  Bilateral air entry  Cardiovascular:  RRR ,S1S2, no murmur or gallop. Abdomen:  Soft, non tender to palpation. Extremities: dry skin with decreased skin turgor, has rash distributed on the lower extremites and upper extermities , macular some appear round shaped  Skin:  Warm and dry. CNS:awake, has underlying dementia        LABS:     CBC:   Recent Labs     08/20/20 2226 08/21/20  0557   WBC 9.2 5.8   HGB 9.9* 10.2*    123*     BMP:    Recent Labs     08/20/20 2226 08/21/20  0557    139   K 4.9 4.9    106   CO2 24 20*   BUN 30* 28*   CREATININE 1.3* 1.4*   GLUCOSE 136* 173*     Calcium:  Recent Labs     08/21/20  0557   CALCIUM 9.0     Ionized Calcium:No results for input(s): IONCA in the last 72 hours.   Magnesium:  Recent Labs     08/21/20  0557   MG 2.1 Phosphorus:  Recent Labs     08/21/20  0557   PHOS 3.8      Recent Labs     08/20/20  2226   ALKPHOS 39   ALT 10*   AST 26   PROT 6.5   BILITOT 1.1   LABALBU 4.1     Amylase and Lipase:  Recent Labs     08/21/20  0557   LACTA 1.5     Lactic Acid:   Recent Labs     08/21/20  0557   LACTA 1.5      UA:   Recent Labs     08/20/20  2253   PHUR 5.5   COLORU YELLOW   PROTEINU TRACE*   BLOODU NEGATIVE   RBCUA NONE SEEN   WBCUA 15-25   BACTERIA MANY   NITRU POSITIVE*   GLUCOSEU NEGATIVE   BILIRUBINUR NEGATIVE   UROBILINOGEN 0.2   KETUA TRACE*          Problem list of patient      Patient Active Problem List   Diagnosis Code    Physical deconditioning R53.81    (HFpEF) heart failure with preserved ejection fraction (HCC) I50.30    Hyperlipidemia E78.5    Atrial fibrillation with slow ventricular response (HCC) I48.91    Easy bruising R23.8    Hypothyroidism E03.9    VHD (valvular heart disease) I38    Coronary artery disease involving native coronary artery of native heart without angina pectoris I25.10    Essential hypertension J38    Systolic congestive heart failure (HCC) I50.20    Generalized weakness R53.1    Chronic systolic congestive heart failure (HCC) I50.22    Chronic diastolic heart failure (HCC) I50.32    Thrombocytopenia ([facility]) D69.6    CKD (chronic kidney disease), stage III ([facility]) N18.3    Pulmonary hypertension ([facility] I27.20    Fall at home, subsequent encounter W19. XXXD, Y92.009    Recurrent falls R29.6    DJD (degenerative joint disease), cervical M47.812    CVA (cerebral vascular accident) ([facility] I63.9    Anxiety and depression F41.9, F32.9    ARNOLD (acute kidney injury) ([facility] N17.9    UTI (urinary tract infection) N39.0           Impression and Recommendation:   Rash related to bactrim use: fixed drug reaction:her oral mucosa is not involved  UTI  Fever could be due to the drug or UTI  Continue iv zosyn  Dehydration: continue gentle hydration  Follow urine and blood cx report  If she continue to have itching: consider low dose steroid  Infection Control:     Discharge Planning (Coordination of out patient care: Thank you Tadeo Pacheco MD for allowing me to participate in this patient's care.     Sherrill Murray MD,FACP 8/21/2020 9:02 AM

## 2020-08-21 NOTE — PROGRESS NOTES
Levothyroxine.     HTN  Patient was hypotensive on admission, improved. S/p IVF resuscitation. -on Lopressor (12.5 mg PO BID), Imdur (30 mg daily), Bumex (0.5 mg PO daily)     Compensated HFpEF (EF 50-55% 2/2018): No crackles heard in lungs, no swelling seen in feet. Does not appear to be clinically decompensated at this point. BNP 7115, Troponin 0.013, which is close to baseline.    -Monitor for change with IVF.    CKD:  Cr 1.3, BUN 30, not significantly elevated from baseline.    -Trend with BMP to monitor for ARNOLD.     Paroxsymal Afib:    Currently in NSR. -Keep on Telemetry. On Lovenox in hospital. CHADS 2 VASC Score is Moderate to High, recommended patient should be on anticoagulation outpatient.     Chronic Normocytic Anemia: HgB is 9.9 on arrival. Patient has had anemia in past, likely related to CKD and other chronic diseases. -trend daily CBC. DVT ppx: Lovenox 30 mg subQ  Disposition: likely nursing home on discharge    Chief Complaint: [facility] Course:  Ms. Michelle Holm is a 80year old female with a past medical history of Hypothyroidism, HTN, HFpEF (50-55% 2/2018), CKD, and Dementia who presented to the ER do to fever, UTI and a rash noted at the nursing home she is in. The patient is unable to provide a detailed history of what happened, but chart review indicates that she was taking Omnicef (unsure how long) or a UTI, but then was switched to Bactrim. Within 1 hour of receiving Bactrim, she developed a rash and fever. The nursing home gave her a dose of [place] aryl and sent her to the ER. She was given Benadryl, Famotidine, and Solu-Medrol in the ER for the rash, and was started on Sepsis protocol and was given IVF fluids. When seen, the patient states that she is itchy all over and has some pain in her back and legs. She denies any dysuria on exam.  She denies fever, but does state she feels cold.     Of note, per further chart review, it appears that she was started on Bactrim 7/23/2020 per her family [facility] outpatient, which was then switched to Cefdinir yesterday 8/20/2020. Subjective (past 24 hours): Unable to obtain reliable history. Reports having no itchiness, no dyspnea, chest pain, nausea, vomiting, or abdominal pain. Oriented to self but not to place, person, or time. ROS (12 point review of systems completed. Pertinent positives noted. Otherwise ROS is negative)     Medications:  Reviewed    Infusion Medications    sodium chloride 75 mL/hr at 08/21/20 2346     Scheduled Medications    sodium chloride flush  10 mL Intravenous 2 times per day    enoxaparin  30 mg Subcutaneous Daily    albuterol  2.5 mg Nebulization 4x daily    aspirin  81 mg Oral Daily    atorvastatin  20 mg Oral Nightly    calcium-vitamin D  1 tablet Oral Daily    citalopram  40 mg Oral Daily    isosorbide mononitrate  30 mg Oral Daily    latanoprost  1 drop Both Eyes Nightly    levothyroxine  88 mcg Oral Daily    magnesium oxide  400 mg Oral Daily    metoprolol tartrate  12.5 mg Oral BID    multivitamin  1 tablet Oral Daily    miconazole   Topical BID    pantoprazole  40 mg Oral BID    polyethylene glycol  17 g Oral Daily    spironolactone  12.5 mg Oral Daily    sucralfate  1 g Oral TID    [START ON 8/28/2020] vitamin D  50,000 Units Oral Q30 Days    piperacillin-tazobactam  3.375 g Intravenous Q12H     PRN Meds: sodium chloride flush, acetaminophen **OR** acetaminophen, bumetanide, polyvinyl alcohol    No intake or output data in the 24 hours ending 08/21/20 1015    Diet:  Diet NPO Effective Now    Exam:  /60   Pulse 106   Temp 98.1 °F (36.7 °C) (Axillary)   Resp 18   Ht 4' 9\" (1.448 m)   Wt 111 lb 4.8 oz (50.5 kg)   SpO2 99%   BMI 24.09 kg/m²   General appearance: No apparent distress, appears stated age and cooperative. HEENT: Pupils equal, round, and reactive to light. Conjunctivae/corneas clear. Dry blood noted on the lips and lower teeth. Dry mucosal membrane.  No erythema or lesions noted in the mouth. Neck: Supple, with full range of motion. No jugular venous distention. Trachea midline. Respiratory: Normal respiratory effort. Limited exam due to patient's poor effort but clear to auscultation, bilaterally without Rales/Wheezes/Rhonchi. Cardiovascular: Regular rate and rhythm with normal S1/S2 without murmurs, rubs or gallops. Abdomen: Soft, non-tender, non-distended with normal bowel sounds. Musculoskeletal: active ROM x 4 extremities. Extremities: No BLE edema. Skin: purpuric rash diffusely on distal BLE and BUE with few spots on her chest (Left sided). No blisters, no open lesions. Some sloughing of skin noted. Small multiple red spots on lips, appears to be dry blood. Neurologic:  Neurovascularly intact without any focal sensory/motor deficits. Cranial nerves: II-XII intact, grossly non-focal.  Psychiatric: Alert and oriented, thought content appropriate, normal insight  Capillary Refill: Brisk,< 3 seconds   Peripheral Pulses: +2 palpable, equal bilaterally     Labs:   Recent Labs     08/20/20 2226 08/21/20  0557   WBC 9.2 5.8   HGB 9.9* 10.2*   HCT 32.6* 33.9*    123*     Recent Labs     08/20/20 2226 08/21/20  0557    139   K 4.9 4.9    106   CO2 24 20*   BUN 30* 28*   CREATININE 1.3* 1.4*   CALCIUM 9.5 9.0   PHOS  --  3.8     Recent Labs     08/20/20 2226   AST 26   ALT 10*   BILITOT 1.1   ALKPHOS 39     No results for input(s): INR in the last 72 hours. No results for input(s): Norvel Aayush in the last 72 hours.     Microbiology:    Blood culture #1:   Lab Results   Component Value Date    BC No growth-preliminary  No growth   08/23/2018       Blood culture #2:No results found for: Gabrielle Mohr    Organism:  Lab Results   Component Value Date    ORG Growth of Contaminants 08/23/2018       No results found for: LABGRAM    MRSA culture only:No results found for: [facility]    Urine culture:   Lab Results   Component Value Date    Hank Medina 08/23/2018     Growth of Contaminants. The mixture of organisms present  probably represent skin jorge, fecal jorge or distal urethral  jorge. No further evaluation of this specimen is possible. Respiratory culture: No results found for: CULTRESP    Aerobic and Anaerobic :  No results found for: LABAERO  No results found for: LABANAE    Urinalysis:      Lab Results   Component Value Date    NITRU POSITIVE 08/20/2020    WBCUA 15-25 08/20/2020    BACTERIA MANY 08/20/2020    RBCUA NONE SEEN 08/20/2020    BLOODU NEGATIVE 08/20/2020    SPECGRAV 1.021 12/29/2017    GLUCOSEU NEGATIVE 08/20/2020       Radiology:  XR CHEST PORTABLE   Final Result   Opacities in the right upper lung which could represent infiltrates or atelectasis. **This report has been created using voice recognition software. It may contain minor errors which are inherent in voice recognition technology. **      Final report electronically signed by Dr Adilson Perez on 8/20/2020 10:19 PM        Xr Chest Portable    Result Date: 8/20/2020  PROCEDURE: XR CHEST PORTABLE CLINICAL INFORMATION: 51-year-old female with fever. COMPARISON: Chest x-ray 7/9/2020. TECHNIQUE: AP upright view of the chest was obtained. FINDINGS: There is cardiomegaly. There are opacities in the right upper lung which could represent infiltrates or atelectasis. Metallic wire sutures in the sternum. There are atherosclerotic calcifications in the aortic arch. There is no significant pleural effusion or pneumothorax. Visualized portions of the upper abdomen are within normal limits. The osseous structures are intact. No acute fractures or suspicious osseous lesions. Opacities in the right upper lung which could represent infiltrates or atelectasis. **This report has been created using voice recognition software. It may contain minor errors which are inherent in voice recognition technology. ** Final report electronically signed by Dr Adilson Perez on 8/20/2020 10:19 PM          Doug Dalton MD   PGY1, Internal Medicine   8/21/2020 at 10:15 AM

## 2020-08-21 NOTE — ED TRIAGE NOTES
Pt presents to ED via [facility] for c/o fever and rash. ECF reports pt was dx with UTI and started on Bactrim this morning. Pt instantly broke out in rash on arm and progressively got worse. Pt presents to ED with rash like spots from neck to feet. ECF reports pt was taken off Bactrim and started on Omnicef this evening. Pt was given Benadryl at 1941 this evening. ECF reports pt started with a fever today as well. Pt was given Tylenol at 1700. Upon arrival, pt is A&O appropriate for pt (hx of dementia), resps easy and unlabored. Oxygen sat 87% on room air. Pt placed on 4L via nc, oxygen sats to 97-99%. IV establised PTA. Pt comes in clenching L chest. Pt will not articulate if chest pain or not. EKG completed upon arrival. Monitor applied and VS as noted. Posey alarm placed on pt gown for safety and daughter at bedside educated on. Awaiting provider assessment. Will monitor.

## 2020-08-21 NOTE — PROGRESS NOTES
Wound ostomy consulted for rash to body surface. Patient in bed upon arrival. Minimally responsive. Per documentation, patient was being treated with bactrim for UTI. Developed rash and fever. Bactrim discontinued. Pt received dose of steroid. Assessment and photo below. Covers removed for evaluation. Patient noted to have flat, intact rash to entirety of body. Notably worse to bilateral upper extremities. Scattered redness to trunk and BLE. Patient does not appear to have been scratching skin at this time. Unable to recommend hydrocortisone cream at this time. Advise staff to continue to monitor. If rash blisters, place moisture wicking underpads to absorb fluid. Continue to turn every 2 hours, offload. Pt on hercules dream air support surface with alternating pressure and microclimate control. Bed in low, call light in reach. Wound ostomy to sign off at this time. Call as needed. Wound type: Rash to BLE  Wound color: Red, purple  Drainage amount: None  Odor: None  Krystyna wound: Dry, intact    Thank you for allowing us to participate in the care of your patient. TIME   Wound/ostomy individual minutes  Time In: 1130  Time Out: 1140  Minutes: 10  Time does not include documentation.

## 2020-08-22 NOTE — PROGRESS NOTES
Hospitalist Progress Note      Patient:  Sherryle Pae    Unit/Bed:4K-11/011-A  YOB: 1930  MRN: [id number]   Acct: [de-identified]   PCP: Mela Swartz DO  Date of Admission: 8/20/2020    Assessment/Plan:  UTI  Has a history of recent recurrent UTIs, likely due to inadequate fluid intake and dehydration. Patient reports no urinary symptoms on evaluation. MRSA screen NEG. UCx growing gram positive cocci in clusters, reportedly likely contaminants.   -Continue IVF Hydration at 75 cc/hr and Zosyn for UTI  -follow up BCx and Ucx  -Tylenol PRN for Fever    Fixed drug reaction  Purpuric and pruritic rash located mostly on distal portions of her extremities, mild rash on chest. Per chart review, appears to be allergic reaction to Cefdinir as it was started yesterday 8/20/2020 per her primary care outpatient. Prior to this, she was started on Bactrim on 7/20/2020 for acute cystitis without hematuria outpatient that was stopped yesterday and switched to Cefdinir. Per patient's daughter, she started to have the rash before she received Cefdinir. There is discrepancy in reported history and clinic notes so will need to contact the nursing home for clarification. Low suspicion for SJS, no involvement of oral mucosa. -continue IV Zosyn  -ID following. Follow up for further recs, appreciate their assistance. -started on prednisone 20 mg PO daily yesterday as patient was complaining of itchiness. Reports having no pruritus this AM.  - Avoid Benadryl due to Dementia     Altered Mental Status -improving  Likely secondary to Sepsis in the setting of UTI and partly due to the allergic reaction. Continue to be tremulous on exam. Patient has some altered mentation at baseline but normally able to answer questions appropriately and hold a conversation per her daughter. Per daughter, she is currently not at her baseline.  Improved this AM, able to follow commands better and answer questions more appropriately. Oriented to self but not to place  -Monitor for resolution with treatment  -continue IV hydration, frequent re-orientation, minimize lines     Hypothyroidism:  TSH in normal range. Continue Daily Levothyroxine.     HTN  Patient was hypotensive on admission, improved. S/p IVF resuscitation. -on Lopressor (12.5 mg PO BID), Imdur (30 mg daily), Bumex (0.5 mg PO daily)     Compensated HFpEF (EF 50-55% 2/2018): No crackles heard in lungs, no swelling seen in feet. Does not appear to be clinically decompensated at this point. BNP 7115, Troponin 0.013, which is close to baseline.    -Monitor for change with IVF.    CKD:  Cr 1.4 stable, BUN 32, not significantly elevated from baseline.    -Trend with BMP and urinary output to monitor for ARNOLD. Paroxsymal Afib:    Patient went into Afib with no RVR overnight, remained asymptomatic. On repeat evaluation, patient now in NSR. -Keep on Telemetry. On Lovenox in hospital. CHADS 2 VASC Score is Moderate to High, recommended patient should be on anticoagulation outpatient.     Chronic Normocytic Anemia: HgB is 9.9 on arrival. Patient has had anemia in past, likely related to CKD and other chronic diseases. -trend daily CBC. DVT ppx: Lovenox 30 mg subQ  Disposition: likely nursing home on discharge    Chief Complaint: Crawford Cathymichelven Course:  Ms. Lukas Santos is a 80year old female with a past medical history of Hypothyroidism, HTN, HFpEF (50-55% 2/2018), CKD, and Dementia who presented to the ER do to fever, UTI and a rash noted at the nursing home she is in. The patient is unable to provide a detailed history of what happened, but chart review indicates that she was taking Omnicef (unsure how long) or a UTI, but then was switched to Bactrim. Within 1 hour of receiving Bactrim, she developed a rash and fever. The nursing home gave her a dose of [place] aryl and sent her to the ER.   She was given Benadryl, Famotidine, and Solu-Medrol in the ER for the rash, and was started on Sepsis protocol and was given IVF fluids. When seen, the patient states that she is itchy all over and has some pain in her back and legs. She denies any dysuria on exam.  She denies fever, but does state she feels cold. Of note, per further chart review, it appears that she was started on Bactrim 7/23/2020 per her [facility] outpatient, which was then switched to Cefdinir yesterday 8/20/2020. Interval (past 24 hours): Afib with no RVR overnight, pt remained asymptomatic. Subjective (past 24 hours):   Reports having improvement in her rash. Reports having no itchiness, no dyspnea, chest pain, nausea, vomiting, or abdominal pain. Oriented to self but not to place, person, or time. ROS (12 point review of systems completed. Pertinent positives noted.  Otherwise ROS is negative)     Medications:  Reviewed    Infusion Medications    sodium chloride 100 mL/hr at 08/22/20 0405     Scheduled Medications    sodium chloride flush  10 mL Intravenous 2 times per day    enoxaparin  30 mg Subcutaneous Daily    albuterol  2.5 mg Nebulization 4x daily    aspirin  81 mg Oral Daily    atorvastatin  20 mg Oral Nightly    calcium-vitamin D  1 tablet Oral Daily    citalopram  40 mg Oral Daily    isosorbide mononitrate  30 mg Oral Daily    latanoprost  1 drop Both Eyes Nightly    levothyroxine  88 mcg Oral Daily    magnesium oxide  400 mg Oral Daily    metoprolol tartrate  12.5 mg Oral BID    multivitamin  1 tablet Oral Daily    miconazole   Topical BID    pantoprazole  40 mg Oral BID    polyethylene glycol  17 g Oral Daily    spironolactone  12.5 mg Oral Daily    sucralfate  1 g Oral TID    piperacillin-tazobactam  3.375 g Intravenous Q12H    predniSONE  20 mg Oral Daily     PRN Meds: sodium chloride flush, acetaminophen **OR** acetaminophen, bumetanide, polyvinyl alcohol      Intake/Output Summary (Last 24 hours) at 8/22/2020 0700  Last data filed at 8/22/2020 5204  Gross per 24 hour   Intake 1918.94 ml   Output 150 ml   Net 1768.94 ml       Diet:  Diet NPO Effective Now    Exam:  BP (!) 150/73   Pulse 88   Temp 98 °F (36.7 °C) (Oral)   Resp 18   Ht 4' 9\" (1.448 m)   Wt 107 lb 8 oz (48.8 kg)   SpO2 91%   BMI 23.26 kg/m²   General appearance: No apparent distress, appears stated age and cooperative. Sleeping comfortably in bed. HEENT: Pupils equal, round, and reactive to light. Conjunctivae/corneas clear. Dry blood noted on the lips and lower teeth. Dry mucosal membrane. No erythema or lesions noted in the mouth. Neck: Supple, with full range of motion. No jugular venous distention. Trachea midline. Respiratory: Normal respiratory effort. clear to auscultation, bilaterally without Rales/Wheezes/Rhonchi. Cardiovascular: irregularly irregular rhythm with normal S1/S2 without murmurs, rubs or gallops. Abdomen: Soft, non-tender, non-distended with normal bowel sounds. Musculoskeletal: active ROM x 4 extremities. Extremities: No BLE edema. 4-5 cm hematoma noted around right elbow, likely traumatic from IV access, nontender  Skin: purpuric rash diffusely on distal BLE and BUE with few spots on her chest (Left sided). No blisters, no open lesions. Some sloughing of skin noted. Small multiple red spots on lips, appears to be dry blood. Neurologic:  Neurovascularly intact without any focal sensory/motor deficits.  Cranial nerves: II-XII intact, grossly non-focal.  Psychiatric: Alert and oriented, thought content appropriate, normal insight  Capillary Refill: Brisk,< 3 seconds   Peripheral Pulses: +2 palpable, equal bilaterally     Labs:   Recent Labs     08/20/20 2226 08/21/20  0557 08/22/20  0551   WBC 9.2 5.8 4.9   HGB 9.9* 10.2* 10.4*   HCT 32.6* 33.9* 36.2*    123* 144     Recent Labs     08/20/20 2226 08/21/20  0557 08/22/20  0551    139 136   K 4.9 4.9 5.0    106 105   CO2 24 20* 19*   BUN 30* 28* 32*   CREATININE 1. 3* 1.4* 1.4*   CALCIUM 9.5 9.0 8.7   PHOS  --  3.8 3.2     Recent Labs     08/20/20  2226   AST 26   ALT 10*   BILITOT 1.1   ALKPHOS 39     Recent Labs     08/22/20  0551   INR 1.22*     No results for input(s): Renold Hodgkins in the last 72 hours. Microbiology:    Blood culture #1:   Lab Results   Component Value Date    BC No growth-preliminary  08/21/2020       Blood culture #2:No results found for: Jorge Christianson    Organism:  Lab Results   Component Value Date    ORG gram positive cocci in clusters 08/20/2020       No results found for: LABGRAM    MRSA culture only:No results found for: [facility]    Urine culture:   Lab Results   Component Value Date    Balbina Rothman  08/23/2018     Growth of Contaminants. The mixture of organisms present  probably represent skin jorge, fecal jorge or distal urethral  jorge. No further evaluation of this specimen is possible. Respiratory culture: No results found for: CULTRESP    Aerobic and Anaerobic :  No results found for: LABAERO  No results found for: LABANAE    Urinalysis:      Lab Results   Component Value Date    NITRU POSITIVE 08/20/2020    WBCUA 15-25 08/20/2020    BACTERIA MANY 08/20/2020    RBCUA NONE SEEN 08/20/2020    BLOODU NEGATIVE 08/20/2020    SPECGRAV 1.021 12/29/2017    GLUCOSEU NEGATIVE 08/20/2020       Radiology:  XR CHEST PORTABLE   Final Result   Opacities in the right upper lung which could represent infiltrates or atelectasis. **This report has been created using voice recognition software. It may contain minor errors which are inherent in voice recognition technology. **      Final report electronically signed by Dr Sydnee Bentley on 8/20/2020 10:19 PM        Xr Chest Portable    Result Date: 8/20/2020  PROCEDURE: XR CHEST PORTABLE CLINICAL INFORMATION: 24-year-old female with fever. COMPARISON: Chest x-ray 7/9/2020. TECHNIQUE: AP upright view of the chest was obtained. FINDINGS: There is cardiomegaly.  There are opacities in the right upper lung which could represent infiltrates or atelectasis. Metallic wire sutures in the sternum. There are atherosclerotic calcifications in the aortic arch. There is no significant pleural effusion or pneumothorax. Visualized portions of the upper abdomen are within normal limits. The osseous structures are intact. No acute fractures or suspicious osseous lesions. Opacities in the right upper lung which could represent infiltrates or atelectasis. **This report has been created using voice recognition software. It may contain minor errors which are inherent in voice recognition technology. ** Final report electronically signed by Dr Hayde Estrada on 8/20/2020 10:19 PM          Kuldeep Law MD   PGY1, Internal Medicine   8/22/2020 at 7:00 AM

## 2020-08-22 NOTE — PROGRESS NOTES
[address] ICU STEPDOWN TELEMETRY 4K  Bedside Swallowing Evaluation      SLP Individual Minutes  Time In: 1265  Time Out: 1401  Minutes: 10  Timed Code Treatment Minutes: 0 Minutes       Date: 2020  Patient Name: Juarez Arvizu      CSN: [id number]   : 1930  (80 y.o.)  Gender: female   Referring Physician: Ronold Pallas, DO   Diagnosis: UTI  Secondary Diagnosis: Dysphagia     History of Present Illness/Injury: Pt admit with above dx. Please refer to H&P for full pt medical hx. Pt with hx of dementia. ST consult with BSE to further assess and determine appropriate diet recommendations and need for further skilled follow up. Past Medical History:   Diagnosis Date    [facility]OLD [facility]acute [facility]) [facility] [facility]     Anxiety and depression 2018    Anxiety and depression     Atrial fibrillation ([address]     Blood transfusion reaction     CAD (coronary artery disease)     Severe triple vessel disease    Cerebrovascular disease 2018    CKD (chronic kidney disease)     COPD (chronic obstructive pulmonary disease) (HCC)     Dementia (HCC)     Diastolic congestive heart failure (HCC)     DJD (degenerative joint disease), cervical 2018    Generalized weakness     GERD (gastroesophageal reflux disease)     Hiatal hernia     History of blood transfusion     Hyperlipidemia     Hypertension     Hypoalbuminemia     Hypothyroidism     Mild protein-calorie malnutrition (HCC)     NSTEMI (non-ST elevated myocardial infarction) ([address] 2014    Pancreatitis due to biliary obstruction     Pneumonia, organism unspecified(486)     Scoliosis     SDH (subdural hematoma) (HCC)     Yamileth hole decompresson on 2014    Thyroid disease     Unspecified cerebral artery occlusion with cerebral infarction     Uterine cancer ([address] 1966       SUBJECTIVE:  Pt seen upright in bed. Significantly improved mood and cooperation this date.  Pt able to carry on conversation with ST this date. Perseveration on wishes to go home. Max redirections required. Daughter present at end of assessment. Pt pointing towards upper esophagus and stating slow traveling of liquids. Spoke with daughter re: pt complaints. Daughter endorses GI consult and scoping in the past with hiatal hernia observed. Further intervention (I.e. PEG tube) deferred by family given pt's stated age. OBJECTIVE:    Pain:  No pain reported. Current Diet: NPO    Respiratory Status:  Independent    Behavioral Observation:  Alert    Oral Mechanism Evaluation:      Facial / Labial WFL    Lingual WFL    Dentition Impaired Some missing dentition    Velum Not Tested Unable to visualize given decreased oral opening    Vocal Quality WFL    Sensation Not Tested    Cough Not Tested      Patient Evaluated Using:  Puree, hard solid, thin liquids by cup/straw     Oral Phase:  Impaired:  Impaired AP Movement, Impaired Mastication and Reduced Bolus Formation    Pharyngeal Phase: Impaired:  Delayed Swallow and Decreased Hyolaryngeal Elevation    Signs and Symptoms of Laryngeal Penetration/Aspiration: Delayed Cough    Impresssions: Pt presents with mild oropharyngeal dysphagia evidenced by the above skilled level observations. Pt with slow bolus formation and AP transit resulting in mild oral stasis with reliance on liquid wash in order to clear. Pt with what appeared to be a delayed swallow and decreased laryngeal elevation upon manual palpation. Pt with delayed cough s/p liquid intake by straw as well as with mixed consistency (liquid wash with trials of hard solid). No overt s/s of aspiration s/p puree and thin liquids by cup. Recommendations for upgrade to minced/moist and thin liquids with restriction on straw. Daughter reports at baseline pt tolerating a regular diet and thin liquids. Will follow up with dysphagia management.      **Pt c/o globus sensation near upper esophagus, daughter's endorsement for GI consultation and assessment with hiatal hernia found upon examination and family deferral for pt intervention given stated advanced age. RECOMMENDATIONS/ASSESSMENT:   Modified Barium Swallow:  MBS is not indicated at this time. Will recommend as appropriate  Diet Recommendations:  Minced/moist and thin liquids   Strategies:  Full Upright Position, Small Bite/Sip, No Straw, Pulmonary Monitoring, Supervision, Medication in Applesauce, Alternate Solids and Liquids, Limit Distractions and Monitor for Fatigue   Rehabilitation Potential: good    EDUCATION:  Learner: Patient and daughter  Education:  Reviewed results and recommendations of this evaluation, Reviewed diet and strategies and Reviewed ST goals and Plan of Care  Evaluation of Education: Verbalizes understanding, Demonstrates with assistance and Needs further instruction    PLAN:  Skilled SLP intervention on acute care 3-5 x per week or until goals met and/or pt plateaus in function. Specific interventions for next session may include: dysphagia management. PATIENT GOAL:    Did not state. Will further assess during treatment. SHORT TERM GOALS:    Short-term Goals  Timeframe for Short-term Goals: 2 weeks  Goal 1: Pt will consume a minced/moist diet and thin liquids (no straws) without overt s/s of aspiration and/or pulmonary compromise to meet nutritional/hydration measures safely. Goal 2: Pt will consume advanced PO trials with ST only without overt s/s of aspiration to progress towards least restrictive PO diet.       LONG TERM GOALS:  No established LTG's given short ANUPAM Dooley 8/22/2020

## 2020-08-22 NOTE — PROGRESS NOTES
Pharmacy Note  BioFire Result    Anderson Humphries is a 80 y.o. female, with a positive blood culture result    [employer] message received from Rafi , laboratory employee on 8/22/2020 at 12:16 AM    First Gram stain result: gram positive cocci in clusters    BioFire BCID result: Staphylococcus     BioFire BCID and gram stain congruent? Yes    Suspected source? Possible contaminant    Patient chart reviewed for signs/symptoms of infection: Yes  /75   Pulse 106   Temp 97.2 °F (36.2 °C) (Oral)   Resp 18   Ht 4' 9\" (1.448 m)   Wt 111 lb 4.8 oz (50.5 kg)   SpO2 95%   BMI 24.09 kg/m²   Lab Results   Component Value Date    WBC 5.8 08/21/2020       Allergies reviewed  Bactrim [sulfamethoxazole-trimethoprim]; Lisinopril; Morphine; Narcan [naloxone]; Sulfa antibiotics; and Adhesive tape    Renal function reviewed  CrCl cannot be calculated (Unknown ideal weight.). Current antibiotic regimen: Zosyn    Intervention needed: Not at this time    Individual contacted: Nurse Roxanna Lesch    Recommendations: Patient to be reviewed in the morning by MD.    Recommendations accepted?  Yes    Natalia Harris  8/22/2020 12:16 AM

## 2020-08-23 NOTE — PROGRESS NOTES
8/22 at 1900  Patient pulled out IV site. Will attempt to re access for IVF and IV ATB - patient has a plan of returning to [facility] Sunday, 8/24.     8/22 at 2000  Attempted to access IV for patient, unsuccessful.     8/22 at 2030  Another RN attempted to access IV for patient - patient refusing. Will attempt again later. 8/22 at 2100  Again attempted to access IV - patient continues to refuse. MD Made aware. 8/23 at [facility]  MD in room and attempts to speak with patient and try to convince her to let staff attempt an IV. Patient continues to refuse.

## 2020-08-23 NOTE — DISCHARGE SUMMARY
for few more days.     Altered Mental Status -improving  Likely secondary to Sepsis in the setting of UTI and partly due to the allergic reaction. Continue to be tremulous on exam. Patient has some altered mentation at baseline but normally able to answer questions appropriately and hold a conversation per her daughter. Per daughter, she is currently not at her baseline. Improved this AM, able to follow commands better and answer questions more appropriately. Oriented to self but not to place  -Monitor for resolution with treatment  -continue IV hydration, frequent re-orientation, minimize lines    8/23: Unknown baseline but she is much more communicative today. Baseline likely has dementia.     Hypothyroidism:  TSH in normal range. Continue Daily Levothyroxine.     HTN  Patient was hypotensive on admission, improved. S/p IVF resuscitation. -on Lopressor (12.5 mg PO BID), Imdur (30 mg daily), Bumex (0.5 mg PO daily)     Compensated HFpEF (EF 50-55% 2/2018):    No crackles heard in lungs, no swelling seen in feet. Does not appear to be clinically decompensated at this point.  BNP 7115, Troponin 0.013, which is close to baseline.    -Monitor for change with IVF.    CKD:  Cr 1.4 stable, BUN 32, not significantly elevated from baseline.    -Trend with BMP and urinary output to monitor for ARNOLD.     Paroxsymal Afib:    Patient went into Afib with no RVR overnight, remained asymptomatic. On repeat evaluation, patient now in NSR. -Keep on Telemetry. On Lovenox in hospital. CHADS 2 VASC Score is Moderate to High, recommended patient should be on anticoagulation outpatient.     Chronic Normocytic Anemia: HgB is 9.9 on arrival. Patient has had anemia in past, likely related to CKD and other chronic diseases. -trend daily CBC. Disposition: Discharge today to nursing home.  Follow up with PCP within a week.       Initial H and P:-    80year old female with a past medical history of Hypothyroidism, HTN, HFpEF (50-55% 2/2018), CKD, and Dementia who presented to the ER do to fever, UTI and a rash noted at the nursing home she is in. The patient is unable to provide a detailed history of what happened, but chart review indicates that she was taking Omnicef (unsure how long) or a UTI, but then was switched to Bactrim.  Within 1 hour of receiving Bactrim, she developed a rash and fever. The nursing home gave her a dose of [place] aryl and sent her to the ER. Frank Hayward was given Benadryl, Famotidine, and Solu-Medrol in the ER for the rash, and was started on Sepsis protocol and was given IVF fluids. When seen, the patient states that she is itchy all over and has some pain in her back and legs.  She denies any dysuria on exam.  She denies fever, but does state she feels cold.     Of note, per further chart review, it appears that she was started on Bactrim 7/23/2020 per her family [facility] outpatient, which was then switched to Cefdinir yesterday 8/20/2020. Physical Exam:-  Vitals:   Patient Vitals for the past 24 hrs:   BP Temp Temp src Pulse Resp SpO2 Weight   08/23/20 1004 -- -- -- -- -- 91 % --   08/23/20 0810 122/76 97.5 °F (36.4 °C) Oral 104 18 96 % --   08/23/20 0315 (!) 150/72 97.7 °F (36.5 °C) Oral 93 20 90 % 114 lb 1.6 oz (51.8 kg)   08/23/20 0030 120/86 98.3 °F (36.8 °C) Oral 86 18 92 % --   08/22/20 2007 132/67 98.6 °F (37 °C) Oral 96 18 93 % --   08/22/20 1618 (!) 139/58 98.6 °F (37 °C) Oral 81 18 90 % --     Weight:   Weight: 114 lb 1.6 oz (51.8 kg)   24 hour intake/output:     Intake/Output Summary (Last 24 hours) at 8/23/2020 1204  Last data filed at 8/23/2020 0449  Gross per 24 hour   Intake 1310.69 ml   Output 200 ml   Net 1110.69 ml       1. General appearance: No apparent distress, appears stated age and cooperative. 2. HEENT: Normal cephalic, atraumatic without obvious deformity. Pupils equal, round, and reactive to light. Extra ocular muscles intact. Conjunctivae/corneas clear.   3. Neck: Supple, with full range of motion. No jugular venous distention. Trachea midline. 4. Respiratory:  Normal respiratory effort. Clear to auscultation, bilaterally without Rales/Wheezes/Rhonchi. 5. Cardiovascular: Regular rate and rhythm with normal S1/S2 without murmurs, rubs or gallops. 6. Abdomen: Soft, non-tender, non-distended with normal bowel sounds. 7. Musculoskeletal:  No clubbing, cyanosis or edema bilaterally. 8. Skin: Skin color, texture, turgor normal.  No rashes or lesions. 9. Neurologic:  Neurovascularly intact without any focal sensory/motor deficits. Cranial nerves: II-XII intact, grossly non-focal.  10. Psychiatric: Alert and oriented, thought content appropriate, normal insight  11. Capillary Refill: Brisk,< 3 seconds   12.  Peripheral Pulses: +2 palpable, equal bilaterally       Discharge Medications:-      Medication List      START taking these medications    ciprofloxacin 500 MG tablet  Commonly known as:  CIPRO  Take 1 tablet by mouth 2 times daily for 5 days     predniSONE 20 MG tablet  Commonly known as:  DELTASONE  Take 1 tablet by mouth daily for 4 days  Start taking on:  August 24, 2020        CHANGE how you take these medications    metoprolol tartrate 25 MG tablet  Commonly known as:  LOPRESSOR  Take 0.5 tablets by mouth 2 times daily Hold if SBP<120 or HR<60  What changed:  additional instructions     spironolactone 25 MG tablet  Commonly known as:  ALDACTONE  Take 0.5 tablets by mouth daily Hold if SBP<110  What changed:  additional instructions        CONTINUE taking these medications    * acetaminophen 500 MG tablet  Commonly known as:  TYLENOL     * acetaminophen 325 MG tablet  Commonly known as:  TYLENOL     albuterol (2.5 MG/3ML) 0.083% nebulizer solution  Commonly known as:  PROVENTIL     aspirin 81 MG EC tablet     atorvastatin 20 MG tablet  Commonly known as:  LIPITOR  Take 1 tablet by mouth nightly     bumetanide 0.5 MG tablet  Commonly known as:  BUMEX     Calcium Carb-Cholecalciferol 600-500 MG-UNIT Caps     carboxymethylcellulose 1 % ophthalmic solution     citalopram 40 MG tablet  Commonly known as:  CELEXA  TAKE 1 TABLET BY MOUTH DAILY     Daily Danielle Tabs     diphenhydrAMINE 25 MG capsule  Commonly known as:  BENADRYL     isosorbide mononitrate 30 MG extended release tablet  Commonly known as:  IMDUR  TAKE 1 TABLET BY MOUTH DAILY     levothyroxine 88 MCG tablet  Commonly known as:  SYNTHROID     magnesium oxide 400 (241.3 Mg) MG Tabs tablet  Commonly known as:  MAG-OX  Take 1 tablet by mouth daily. nystatin 797683 UNIT/GM powder  Commonly known as:  MYCOSTATIN     pantoprazole 40 MG tablet  Commonly known as:  PROTONIX     polyethylene glycol 17 GM/SCOOP powder  Commonly known as:  GLYCOLAX     sucralfate 1 GM tablet  Commonly known as:  CARAFATE     Zioptan 0.0015 % Soln  Generic drug:  Tafluprost (PF)         * This list has 2 medication(s) that are the same as other medications prescribed for you. Read the directions carefully, and ask your doctor or other care provider to review them with you. STOP taking these medications    cefdinir 300 MG capsule  Commonly known as:  OMNICEF     propylene glycol-glycerin 1-0.3 % Soln ophthalmic solution  Commonly known as:  EQ Artificial Tears     sulfamethoxazole-trimethoprim 800-160 MG per tablet  Commonly known as:   Bactrim DS     vitamin D 1.25 MG (52936 UT) Caps capsule  Commonly known as:  ERGOCALCIFEROL           Where to Get Your Medications      You can get these medications from any pharmacy    Bring a paper prescription for each of these medications  · ciprofloxacin 500 MG tablet  · predniSONE 20 MG tablet     Information about where to get these medications is not yet available    Ask your nurse or doctor about these medications  · metoprolol tartrate 25 MG tablet  · spironolactone 25 MG tablet          Labs :-  Recent Results (from the past 72 hour(s))   EKG Dizziness    Collection Time: 08/20/20  8:24 PM   Result Value Ref Range Ventricular Rate 105 BPM    Atrial Rate 105 BPM    P-R Interval 156 ms    QRS Duration 112 ms    Q-T Interval 380 ms    QTc Calculation (Bazett) 502 ms    P Axis 56 degrees    R Axis -94 degrees    T Axis 108 degrees   CBC auto differential    Collection Time: 08/20/20 10:26 PM   Result Value Ref Range    WBC 9.2 4.8 - 10.8 thou/mm3    RBC 3.38 (L) 4.20 - 5.40 mill/mm3    Hemoglobin 9.9 (L) 12.0 - 16.0 gm/dl    Hematocrit 32.6 (L) 37.0 - 47.0 %    MCV 96.4 81.0 - 99.0 fL    MCH 29.3 26.0 - 33.0 pg    MCHC 30.4 (L) 32.2 - 35.5 gm/dl    RDW-CV 16.5 (H) 11.5 - 14.5 %    RDW-SD 58.8 (H) 35.0 - 45.0 fL    Platelets 886 458 - 638 thou/mm3    MPV 11.1 9.4 - 12.4 fL    Seg Neutrophils 91.7 %    Lymphocytes 4.1 %    Monocytes 2.9 %    Eosinophils 0.1 %    Basophils 0.3 %    Immature Granulocytes 0.9 %    Segs Absolute 8.4 (H) 1.8 - 7.7 thou/mm3    Lymphocytes Absolute 0.4 (L) 1.0 - 4.8 thou/mm3    Monocytes Absolute 0.3 (L) 0.4 - 1.3 thou/mm3    Eosinophils Absolute 0.0 0.0 - 0.4 thou/mm3    Basophils Absolute 0.0 0.0 - 0.1 thou/mm3    Immature Grans (Abs) 0.08 (H) 0.00 - 0.07 thou/mm3    nRBC 0 /100 wbc   Comprehensive Metabolic Panel w/ Reflex to MG    Collection Time: 08/20/20 10:26 PM   Result Value Ref Range    Glucose 136 (H) 70 - 108 mg/dL    CREATININE 1.3 (H) 0.4 - 1.2 mg/dL    BUN 30 (H) 7 - 22 mg/dL    Sodium 138 135 - 145 meq/L    Potassium reflex Magnesium 4.9 3.5 - 5.2 meq/L    Chloride 102 98 - 111 meq/L    CO2 24 23 - 33 meq/L    Calcium 9.5 8.5 - 10.5 mg/dL    AST 26 5 - 40 U/L    Alkaline Phosphatase 39 38 - 126 U/L    Total Protein 6.5 6.1 - 8.0 g/dL    Alb 4.1 3.5 - 5.1 g/dL    Total Bilirubin 1.1 0.3 - 1.2 mg/dL    ALT 10 (L) 11 - 66 U/L   TSH without Reflex    Collection Time: 08/20/20 10:26 PM   Result Value Ref Range    TSH 3.220 0.400 - 4.20 uIU/mL   Culture, Blood 1    Collection Time: 08/20/20 10:26 PM    Specimen: Blood   Result Value Ref Range    Blood Culture, Routine No growth-preliminary Lactic acid, plasma    Collection Time: 08/20/20 10:26 PM   Result Value Ref Range    Lactic Acid 1.3 0.5 - 2.2 mmol/L   Troponin    Collection Time: 08/20/20 10:26 PM   Result Value Ref Range    Troponin T 0.013 (A) ng/ml   Procalcitonin    Collection Time: 08/20/20 10:26 PM   Result Value Ref Range    Procalcitonin 0.21 (H) 0.01 - 0.09 ng/mL   Brain Natriuretic Peptide    Collection Time: 08/20/20 10:26 PM   Result Value Ref Range    Pro-BNP 7115.0 (H) 0.0 - 1800.0 pg/mL   Anion Gap    Collection Time: 08/20/20 10:26 PM   Result Value Ref Range    Anion Gap 12.0 8.0 - 16.0 meq/L   Glomerular Filtration Rate, Estimated    Collection Time: 08/20/20 10:26 PM   Result Value Ref Range    Est, Glom Filt Rate 38 (A) ml/min/1.73m2   Osmolality    Collection Time: 08/20/20 10:26 PM   Result Value Ref Range    Osmolality Calc 283.9 275.0 - 300 mOsmol/kg   Culture, Blood 2    Collection Time: 08/20/20 10:35 PM    Specimen: Blood   Result Value Ref Range    Blood Culture, Routine No growth-preliminary  (A)     Organism Staphylococcus (coagulase negative) (A)     Blood Culture, Routine       possible contamination; clinical correlation required    Blood ID, Molecular    Collection Time: 08/20/20 10:35 PM   Result Value Ref Range    CARBAPENEM RESITANT FILM ARRAY NA     METHICILLIN RESISTANT FILM ARRAY Detected (A)     VANCOMYCIN RESISTANT FILM ARRAY NA     ENTEROCOCCUS FILM ARRAY Not Detected     LISTERIA MONOCYTOGENES FILM ARRAY Not Detected     STAPHYLOCOCCUS FILM ARRAY Detected (A)     STAPH AUREUS FILM ARRAY Not Detected     STREPTOCOCCUS FILM ARRAY Not Detected     STREP AGALACTIAE FILM ARRAY Not Detected     STREP PNEUMONIAE FILM ARRAY Not Detected     STREP [address] Not Detected     ACINETOBACTER BAUMANNII FILM ARRAY Not Detected     ENTERBACTERIACEAE FILM ARRAY Not Detected     ENTERBACTER CLOACAE FILM ARRAY Not Detected     ESCHERICHIA COLI FILM ARRAY Not Detected     KLEBSIELLA OXYTOCA FILM ARRAY Not Detected     KLEBSIELLA PNEUMONIAE FILM ARRAY Not Detected     PROTEUS FILM ARRAY Not Detected     SERRATIA MARCESCENS FILM ARRAY Not Detected     HAEMOPHILUS INFLUENZA FILM ARRAY Not Detected     NEISSERIA MENIGITIDIS FILM ARRAY Not Detected     PSEUDOMONAS AERUGINOSA FILM ARRAY Not Detected     MELANI ALBICANS FILM ARRAY Not Detected     MELANI GLABRATA FILM ARRAY Not Detected     MELANI KRUSEI FILM ARRAY Not Detected     CANDIDA PARAPSILOSIS FILM ARRAY Not Detected     CANDIDA TROPICALIS FILM ARRAY Not Detected     Bottle Type AEROBIC     Source of Blood Culture PERIPHERAL    Urine with Reflexed Micro    Collection Time: 08/20/20 10:53 PM   Result Value Ref Range    Glucose, Ur NEGATIVE NEGATIVE mg/dl    Bilirubin Urine NEGATIVE NEGATIVE    Ketones, Urine TRACE (A) NEGATIVE    Specific Gravity, Urine 1.022 1.002 - 1.03    Blood, Urine NEGATIVE NEGATIVE    pH, UA 5.5 5.0 - 9.0    Protein, UA TRACE (A) NEGATIVE    Urobilinogen, Urine 0.2 0.0 - 1.0 eu/dl    Nitrite, Urine POSITIVE (A) NEGATIVE    Leukocyte Esterase, Urine SMALL (A) NEGATIVE    Color, UA YELLOW STRAW-YELL    Character, Urine CLEAR CLEAR-SL C    RBC, UA NONE SEEN 0-2/hpf /hpf    WBC, UA 15-25 0-4/hpf /hpf    Epithelial Cells, UA NONE SEEN 3-5/hpf /hpf    Bacteria, UA MANY FEW/NONE S /hpf    Casts UA NONE SEEN NONE SEEN /lpf    Crystals, UA NONE SEEN NONE SEEN    Renal Epithelial, UA NONE SEEN NONE SEEN    Yeast, UA NONE SEEN NONE SEEN    CASTS 2 NONE SEEN NONE SEEN /lpf    MISCELLANEOUS 2 NONE SEEN    Culture, Reflexed, Urine    Collection Time: 08/20/20 10:53 PM    Specimen: Urine, catheter   Result Value Ref Range    Organism Klebsiella pneumoniae (A)     Urine Culture Reflex [place] count: >100,000 CFU/mL         Susceptibility    Klebsiella pneumoniae - BACTERIAL SUSCEPTIBILITY PANEL BY MCKAY     amoxicillin-clavulanate 4 Sensitive mcg/mL     cefOXitin <=4 Sensitive mcg/mL     cefTRIAXone <=1 Sensitive mcg/mL     ampicillin 16 Resistant mcg/mL gentamicin <=1 Sensitive mcg/mL     trimethoprim-sulfamethoxazole <=20 Sensitive mcg/mL     tetracycline <=1 Sensitive mcg/mL     nitrofurantoin 64 Intermediate mcg/mL   COVID-19    Collection Time: 08/21/20 12:02 AM   Result Value Ref Range    SARS-CoV-2, NAAT NOT DETECTED NOT DETECT   MRSA by PCR    Collection Time: 08/21/20  5:15 AM   Result Value Ref Range    MRSA SCREEN RT-PCR NEGATIVE    VRE Screen by PCR    Collection Time: 08/21/20  5:15 AM    Specimen: Rectal Swab   Result Value Ref Range    Vancomycin Resistant Enterococcus NEGATIVE    Culture, MRSA, Screening    Collection Time: 08/21/20  5:15 AM    Specimen: Rectum   Result Value Ref Range    MRSA SCREEN No MRSA isolated     Culture, Blood 1    Collection Time: 08/21/20  5:57 AM    Specimen: Blood   Result Value Ref Range    Blood Culture, Routine No growth-preliminary     CBC auto differential    Collection Time: 08/21/20  5:57 AM   Result Value Ref Range    WBC 5.8 4.8 - 10.8 thou/mm3    RBC 3.42 (L) 4.20 - 5.40 mill/mm3    Hemoglobin 10.2 (L) 12.0 - 16.0 gm/dl    Hematocrit 33.9 (L) 37.0 - 47.0 %    MCV 99.1 (H) 81.0 - 99.0 fL    MCH 29.8 26.0 - 33.0 pg    MCHC 30.1 (L) 32.2 - 35.5 gm/dl    RDW-CV 16.8 (H) 11.5 - 14.5 %    RDW-SD 61.1 (H) 35.0 - 45.0 fL    Platelets 879 (L) 716 - 400 thou/mm3    MPV 10.9 9.4 - 12.4 fL    Seg Neutrophils 92.8 %    Lymphocytes 4.5 %    Monocytes 1.2 %    Eosinophils 0.0 %    Basophils 0.5 %    Immature Granulocytes 1.0 %    Segs Absolute 5.4 1.8 - 7.7 thou/mm3    Lymphocytes Absolute 0.3 (L) 1.0 - 4.8 thou/mm3    Monocytes Absolute 0.1 (L) 0.4 - 1.3 thou/mm3    Eosinophils Absolute 0.0 0.0 - 0.4 thou/mm3    Basophils Absolute 0.0 0.0 - 0.1 thou/mm3    Immature Grans (Abs) 0.06 0.00 - 0.07 thou/mm3    nRBC 0 /100 wbc   Ionized Calcium    Collection Time: 08/21/20  5:57 AM   Result Value Ref Range    Calcium, Ion 1.06 (L) 1.12 - 1.32 mmol/L   Lactic acid, plasma    Collection Time: 08/21/20  5:57 AM   Result Value Ref Range    Lactic Acid 1.5 0.5 - 2.2 mmol/L   Magnesium    Collection Time: 08/21/20  5:57 AM   Result Value Ref Range    Magnesium 2.1 1.6 - 2.4 mg/dL   Phosphorus    Collection Time: 08/21/20  5:57 AM   Result Value Ref Range    Phosphorus 3.8 2.4 - 4.7 mg/dL   Basic Metabolic Panel w/ Reflex to MG    Collection Time: 08/21/20  5:57 AM   Result Value Ref Range    Sodium 139 135 - 145 meq/L    Potassium reflex Magnesium 4.9 3.5 - 5.2 meq/L    Chloride 106 98 - 111 meq/L    CO2 20 (L) 23 - 33 meq/L    Glucose 173 (H) 70 - 108 mg/dL    BUN 28 (H) 7 - 22 mg/dL    CREATININE 1.4 (H) 0.4 - 1.2 mg/dL    Calcium 9.0 8.5 - 10.5 mg/dL   Anion Gap    Collection Time: 08/21/20  5:57 AM   Result Value Ref Range    Anion Gap 13.0 8.0 - 16.0 meq/L   Glomerular Filtration Rate, Estimated    Collection Time: 08/21/20  5:57 AM   Result Value Ref Range    Est, Glom Filt Rate 35 (A) ml/min/1.73m2   Culture, Blood 1    Collection Time: 08/21/20  7:57 AM    Specimen: Blood   Result Value Ref Range    Blood Culture, Routine No growth-preliminary     Protime-INR    Collection Time: 08/22/20  5:51 AM   Result Value Ref Range    INR 1.22 (H) 0.85 - 1.13   APTT    Collection Time: 08/22/20  5:51 AM   Result Value Ref Range    aPTT 27.8 22.0 - 38.0 seconds   CBC auto differential    Collection Time: 08/22/20  5:51 AM   Result Value Ref Range    WBC 4.9 4.8 - 10.8 thou/mm3    RBC 3.56 (L) 4.20 - 5.40 mill/mm3    Hemoglobin 10.4 (L) 12.0 - 16.0 gm/dl    Hematocrit 36.2 (L) 37.0 - 47.0 %    .7 (H) 81.0 - 99.0 fL    MCH 29.2 26.0 - 33.0 pg    MCHC 28.7 (L) 32.2 - 35.5 gm/dl    RDW-CV 16.9 (H) 11.5 - 14.5 %    RDW-SD 63.7 (H) 35.0 - 45.0 fL    Platelets 250 345 - 667 thou/mm3    MPV 10.7 9.4 - 12.4 fL    Pathologist Gualberto Blue M.D.      Seg Neutrophils 84.9 %    Lymphocytes 9.2 %    Monocytes 4.5 %    Eosinophils 0.0 %    Basophils 0.6 %    Immature Granulocytes 0.8 %    Platelet Estimate ADEQUATE Adequate    Segs Absolute 4.2 1.8 - 7.7 thou/mm3    Lymphocytes Absolute 0.5 (L) 1.0 - 4.8 thou/mm3    Monocytes Absolute 0.2 (L) 0.4 - 1.3 thou/mm3    Eosinophils Absolute 0.0 0.0 - 0.4 thou/mm3    Basophils Absolute 0.0 0.0 - 0.1 thou/mm3    Immature Grans (Abs) 0.04 0.00 - 0.07 thou/mm3    nRBC 0 /100 wbc    Anisocytosis Absent Absent    BASOPHILIA 1+ Absent    CRENATED RBC'S 1+ Absent    Hypochromia PRESENT Absent    Poikilocytes 1+ Absent    Schistocytes 1+ Absent   Ionized Calcium    Collection Time: 08/22/20  5:51 AM   Result Value Ref Range    Calcium, Ion 1.12 1.12 - 1.32 mmol/L   Lactic acid, plasma    Collection Time: 08/22/20  5:51 AM   Result Value Ref Range    Lactic Acid 1.4 0.5 - 2.2 mmol/L   Magnesium    Collection Time: 08/22/20  5:51 AM   Result Value Ref Range    Magnesium 2.2 1.6 - 2.4 mg/dL   Phosphorus    Collection Time: 08/22/20  5:51 AM   Result Value Ref Range    Phosphorus 3.2 2.4 - 4.7 mg/dL   Basic Metabolic Panel    Collection Time: 08/22/20  5:51 AM   Result Value Ref Range    Sodium 136 135 - 145 meq/L    Potassium 5.0 3.5 - 5.2 meq/L    Chloride 105 98 - 111 meq/L    CO2 19 (L) 23 - 33 meq/L    Glucose 132 (H) 70 - 108 mg/dL    BUN 32 (H) 7 - 22 mg/dL    CREATININE 1.4 (H) 0.4 - 1.2 mg/dL    Calcium 8.7 8.5 - 10.5 mg/dL   Anion Gap    Collection Time: 08/22/20  5:51 AM   Result Value Ref Range    Anion Gap 12.0 8.0 - 16.0 meq/L   Glomerular Filtration Rate, Estimated    Collection Time: 08/22/20  5:51 AM   Result Value Ref Range    Est, Glom Filt Rate 35 (A) ml/min/1.73m2   Scan of Blood Smear    Collection Time: 08/22/20  5:51 AM   Result Value Ref Range    SCAN OF BLOOD SMEAR see below    EKG 12 Lead    Collection Time: 08/22/20  8:38 AM   Result Value Ref Range    Ventricular Rate 80 BPM    Atrial Rate 72 BPM    QRS Duration 114 ms    Q-T Interval 424 ms    QTc Calculation (Bazett) 489 ms    R Axis -137 degrees    T Axis 78 degrees   EKG 12 Lead    Collection Time: 08/22/20  2:09 PM   Result Value Ref Range Ventricular Rate 107 BPM    Atrial Rate 107 BPM    QRS Duration 112 ms    Q-T Interval 394 ms    QTc Calculation (Bazett) 525 ms    R Axis -116 degrees    T Axis 86 degrees   EKG 12 Lead    Collection Time: 08/22/20  2:10 PM   Result Value Ref Range    Ventricular Rate 105 BPM    Atrial Rate 97 BPM    QRS Duration 116 ms    Q-T Interval 398 ms    QTc Calculation (Bazett) 526 ms    R Axis -109 degrees    T Axis 83 degrees   Basic Metabolic Panel    Collection Time: 08/23/20  5:51 AM   Result Value Ref Range    Sodium 141 135 - 145 meq/L    Potassium 4.6 3.5 - 5.2 meq/L    Chloride 111 98 - 111 meq/L    CO2 21 (L) 23 - 33 meq/L    Glucose 106 70 - 108 mg/dL    BUN 34 (H) 7 - 22 mg/dL    CREATININE 1.2 0.4 - 1.2 mg/dL    Calcium 8.9 8.5 - 10.5 mg/dL   CBC    Collection Time: 08/23/20  5:51 AM   Result Value Ref Range    WBC 8.0 4.8 - 10.8 thou/mm3    RBC 3.28 (L) 4.20 - 5.40 mill/mm3    Hemoglobin 9.6 (L) 12.0 - 16.0 gm/dl    Hematocrit 33.1 (L) 37.0 - 47.0 %    .9 (H) 81.0 - 99.0 fL    MCH 29.3 26.0 - 33.0 pg    MCHC 29.0 (L) 32.2 - 35.5 gm/dl    RDW-CV 17.0 (H) 11.5 - 14.5 %    RDW-SD 62.2 (H) 35.0 - 45.0 fL    Platelets 890 077 - 306 thou/mm3    MPV 10.7 9.4 - 12.4 fL   Anion Gap    Collection Time: 08/23/20  5:51 AM   Result Value Ref Range    Anion Gap 9.0 8.0 - 16.0 meq/L   Glomerular Filtration Rate, Estimated    Collection Time: 08/23/20  5:51 AM   Result Value Ref Range    Est, Glom Filt Rate 42 (A) ml/min/1.73m2        Microbiology:    Blood culture #1:   Lab Results   Component Value Date    BC No growth-preliminary  08/21/2020       Blood culture #2:No results found for: BLOODCULT2    Organism:  No results found for: LABGRAM    MRSA culture only:No results found for: [facility]    Urine culture:   Lab Results   Component Value Date    LABURIN  08/23/2018     Growth of Contaminants. The mixture of organisms present  probably represent skin jorge, fecal jorge or distal urethral  jorge.   No further evaluation of this specimen is possible. Lab Results   Component Value Date    ORG Klebsiella pneumoniae 08/20/2020        Respiratory culture: No results found for: CULTRESP    Aerobic and Anaerobic :  No results found for: LABAERO  No results found for: LABANAE    Urinalysis:      Lab Results   Component Value Date    NITRU POSITIVE 08/20/2020    WBCUA 15-25 08/20/2020    BACTERIA MANY 08/20/2020    RBCUA NONE SEEN 08/20/2020    BLOODU NEGATIVE 08/20/2020    SPECGRAV 1.021 12/29/2017    GLUCOSEU NEGATIVE 08/20/2020       Radiology:-  Xr Chest Portable    Result Date: 8/20/2020  PROCEDURE: XR CHEST PORTABLE CLINICAL INFORMATION: 70-year-old female with fever. COMPARISON: Chest x-ray 7/9/2020. TECHNIQUE: AP upright view of the chest was obtained. FINDINGS: There is cardiomegaly. There are opacities in the right upper lung which could represent infiltrates or atelectasis. Metallic wire sutures in the sternum. There are atherosclerotic calcifications in the aortic arch. There is no significant pleural effusion or pneumothorax. Visualized portions of the upper abdomen are within normal limits. The osseous structures are intact. No acute fractures or suspicious osseous lesions. Opacities in the right upper lung which could represent infiltrates or atelectasis. **This report has been created using voice recognition software. It may contain minor errors which are inherent in voice recognition technology. ** Final report electronically signed by Dr Sudheer Ivey on 8/20/2020 10:19 PM       Follow-up scheduled after discharge :-    in the next few days with Nancy Alvarez, DO    Consultations during this hospital stay:-  [] NONE [] Cardiology  [] Nephrology  [] Hemo onco   [] GI   [] ID  [] Endocrine  [] Pulm    [] Neuro    [] Psych   [] Urology  [] ENT   [] G SURGERY   []Ortho    []CV surg    [] Palliative  [] Hospice [] Pain management   []    []TCU   [] PT/OT  OTHERS:-    Disposition: long term care facility  Condition at Discharge: Stable    Time Spent:- 25 minutes    Electronically signed by Tito Zaragoza MD on 8/23/2020 at 12:04 PM  Discharging Hospitalist

## 2020-08-23 NOTE — DISCHARGE INSTR - COC
Continuity of Care Form    Patient Name: Coty Hernadez   :  1930  MRN:  [id number]    Admit date:  2020  Discharge date:  2020      Code Status Order: DNR-CCA   Advance Directives:   Advance Care Flowsheet Documentation     Date/Time Healthcare Directive Type of Healthcare Directive Copy in [address] Agent's Name Healthcare Agent's Phone Number    [phone]  Yes, patient has an advance directive for healthcare treatment  Durable power of  for health care  Yes, copy in chart  Adult [address]  [phone]          Admitting Physician:  Meseret Eng MD  PCP: Myra Peralta DO    Discharging Nurse: [facility] Unit/Room#: 4K-11/011-A  Discharging Unit Phone Number: [phone]    Emergency Contact:   Extended Emergency Contact Information  Primary Emergency Contact: Deedra Schwab of [address] [place] [address] Phone: [phone]  Mobile Phone: [phone]  Relation: Child  Secondary Emergency Contact: [address] of [address] [place] [address] Phone: [phone]  Mobile Phone: [phone]  Relation: Child    Past Surgical History:  Past Surgical History:   Procedure Laterality Date    APPENDECTOMY      BACK SURGERY     Gwendloyn Salgado HOLE  2014    drainage of left SDH    CARDIAC SURGERY      COLONOSCOPY      CORONARY ARTERY BYPASS GRAFT  11    LIMA to LAD, SVG to 1st Marginal, Distal Right Coronary Artery, 1st Diagonal    DILATATION, ESOPHAGUS      ENDOSCOPY, COLON, DIAGNOSTIC      EYE SURGERY      bilateral cataracts    HYSTERECTOMY      GA EGD BALLOON DILATION ESOPHAGUS <30 MM DIAM N/A 2017    EGD ESOPHAGOGASTRODUODENOSCOPY DILATATION performed by Holden Cohen MD at [facility] ESOPHAGOGASTRODUODENOSCOPY TRANSORAL DIAGNOSTIC  2017    EGD ESOPHAGOGASTRODUODENOSCOPY performed by Holden Cohen MD at [facility]     SKIN Video (Video Swallowing Test): not done    Treatments at the Time of Hospital Discharge:   Respiratory Treatments: See [facility]  Oxygen Therapy:  is not on home oxygen therapy. Ventilator:    - No ventilator support    Rehab Therapies: Physical Therapy, Occupational Therapy and Speech/Language Therapy  Weight Bearing Status/Restrictions: No weight bearing restirctions  Other Medical Equipment (for information only, NOT a DME order):  walker  Other Treatments: N/A    Patient's personal belongings (please select all that are sent with patient):  {[facility] DME Belongings:[id number]}    RN SIGNATURE:  Electronically signed by Rasheed Guerrero RN on 8/23/20 at 10:42 AM EDT    CASE MANAGEMENT/SOCIAL WORK SECTION    Inpatient Status Date: ***    Readmission Risk Assessment Score:  Readmission Risk              Risk of Unplanned Readmission:        27           Discharging to Facility/ Agency   · Name:   · Address:  · Phone:  · Fax:    Dialysis Facility (if applicable)   · Name:  · Address:  · Dialysis Schedule:  · Phone:  · Fax:    / signature: {Esignature:[id number]}    PHYSICIAN SECTION    Prognosis: Good    Condition at Discharge: Stable    Rehab Potential (if transferring to Rehab): Fair    Recommended Labs or Other Treatments After Discharge: None    Physician Certification: I certify the above information and transfer of Ernst Rubinstein  is necessary for the continuing treatment of the diagnosis listed and that she requires Intermediate Nursing Care for less 30 days.      Update Admission H&P: No change in H&P    PHYSICIAN SIGNATURE:  Electronically signed by Cyndi Corona MD on 8/23/20 at 11:49 AM EDT

## 2020-08-23 NOTE — PLAN OF CARE
Problem: Impaired respiratory status  Goal: Clear lung sounds  Outcome: Ongoing  Note: Duoneb given to improve aeration. Breath sounds clear/diminished.

## 2020-08-23 NOTE — PROGRESS NOTES
Discharge teaching and instructions for diagnosis/procedure of UTI completed with patient using teachback method. AVS reviewed. Printed prescriptions given to patient. Patient voiced understanding regarding prescriptions, follow up appointments, and care of self at home. Discharged in a wheelchair to  assisted living per family.

## 2020-08-24 NOTE — CARE COORDINATION
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 [facility] with AD[facility]'s and medications. Nurse declined the need to review medications. She has discharge papers. Denies questions or concerns at this time. Facility: [facility]  Non-face-to-face services provided:  Obtained and reviewed discharge summary and/or continuity of care documents    Care Transitions 24 Hour Call    Do you have all of your prescriptions and are they filled?:  No  Patient DME:  Merl Ratel, Shower chair, Other, Wheelchair, Straight cane  Other Patient DME:  walker with seat  Do you have support at home?:  Alone  Are you an active caregiver in your home?:  No  Care Transitions Interventions         Follow Up  No future appointments.     Kay Reddy RN

## 2020-08-25 NOTE — PROGRESS NOTES
Post-Discharge Transitional Care Management Services      Braden Curtis   YOB: 1930    Date of Visit:  7/20/2020  30 Day Post-Discharge Date: 8/15/20  Chief Complaint   Patient presents with    [facility] - D/C'd from [facility] on 7/15/20 - fall     Admit date  7/9/2020               Discharge date:  7/15/2020  Disposition: NH        Discharge Assessment and Plan:     1. ARNOLD on CKD, stage III: Resolved s/p IV hydration.   - At discharge, will change Bumex from daily to prn for increase in daily weight, edema, SOB. Recheck BMP in 3-5 days and follow up with PCP.      2. Acute cystitis: POA. Pt was treated with 5 days IV Rocephin. Urine was never sent for culture. She improved clinically with ceftriaxone, will continue cefdinir at discharge since there is no culture to base antibiotic therapy on. Follow up with PCP if symptoms persist or worsen.      3. Metabolic encephalothy: D/t #2.  - Neurology seen and signed off. MRI is negative for acute pathology. LFTs and Ammonia wnl. Pt with baseline dementia, per family she is improved since arrival and close to baseline.      4. Mechanical fall with chin lacteration: Imaging negative for acute fracture. Pt discharged to NH. [facility] stopped d/t fall risk.      5. Elevated troponin: Mild, likely demand related. Trended down. No CP or EKG changes.     6. Chronic atrial fibrillation: rate controlled with BB. ASA 81mg. JAZZMINE-VASc score 6 with 9.7% of stroke. HAS-BLED score 2 with 4.1% risk of bleeding. No OAC at this time due to fall risk, Neurology agrees. Follow up with Cardio/PCP.      7. Chronic diastolic HFpEF: EF 45-11%. No signs of decompensation. Continue spironolactone and BB at discharge, Bumex changed to prn. Monitor daily weight, low sodium diet. Follow up with Cardio/PCP as scheduled.      8. Chronic normocytic anemia: Hgb stable, repeat CBC OP. Recommend anemia workup with PCP      9.  CAD s/p CABG x 3 vessels in 2011: noted, cont ASA, statin, Imdur, BB.      10. Hypothyroidism: cont synthroid. TSH wnl.      11. Essential HTN: stable, cont home meds.         Chief Complaint on presentation: Fall      Initial H&P / Hospital Course:   \"Admitted following a fall. Confused. Baseline alert and oriented per AL. CT negative. Noted to have a UTI. Started on IV Rocephin. Had very slow improvement in mentation. Neurology consulted . MRI completed  with no acute findings.  mentation much improved and near baseline. Patient now requesting ECF placement.  \"      Subjective (day of discharge): I took over care day of planned discharge. Pt is resting comfortably. Per daughter and nursing, she is close to her baseline mentation. She responded well to medical management and is discharged to NH in stable condition.         Allergies   Allergen Reactions    Lisinopril Other (See Comments)    Morphine     Narcan [Naloxone] Other (See Comments)    Adhesive Tape Rash     plastic     Outpatient Medications Marked as Taking for the 20 encounter (Office Visit) with Daya Hill, DO   Medication Sig Dispense Refill    nystatin (MYCOSTATIN) 592810 UNIT/GM powder Apply 1 Dose topically 3 times daily as needed      pantoprazole (PROTONIX) 40 MG tablet Take 1 tablet by mouth 2 times daily      sulfamethoxazole-trimethoprim (BACTRIM DS) 800-160 MG per tablet Take 1 tablet by mouth 2 times daily for 3 days 6 tablet 0    citalopram (CELEXA) 40 MG tablet TAKE 1 TABLET BY MOUTH DAILY 90 tablet 3    bumetanide (BUMEX) 0.5 MG tablet Take 1 tablet by mouth daily as needed (increase in daily weight >3lbs, swelling, shortness of breath) TAKE 1 TABLET BY MOUTH DAILY 90 tablet 0    [] cefdinir (OMNICEF) 300 MG capsule Take 1 capsule by mouth 2 times daily for 5 days 10 capsule 0    latanoprost (XALATAN) 0.005 % ophthalmic solution Place 1 drop into both eyes nightly      propylene glycol-glycerin (EQ ARTIFICIAL TEARS) 1-0.3 % SOLN ophthalmic solution Place 1 drop into both eyes 4 times daily as needed for Dry Eyes or Irritation 1 Bottle 2    sucralfate (CARAFATE) 1 GM tablet Take 1 g by mouth 3 times daily       metoprolol tartrate (LOPRESSOR) 25 MG tablet Take 0.5 tablets by mouth 2 times daily      levothyroxine (SYNTHROID) 88 MCG tablet Take 1 tablet by mouth Daily      albuterol (ACCUNEB) 0.63 MG/3ML nebulizer solution Take 1 ampule by nebulization 4 times daily as needed for Wheezing or Shortness of Breath      Calcium Carb-Cholecalciferol 600-500 MG-UNIT CAPS Take 1 capsule by mouth daily      acetaminophen (TYLENOL) 325 MG tablet Take 650 mg by mouth every 4 hours as needed for Pain      atorvastatin (LIPITOR) 20 MG tablet Take 1 tablet by mouth nightly 30 tablet 3    spironolactone (ALDACTONE) 25 MG tablet Take 12.5 mg by mouth daily      Multiple Vitamin (MULTI-VITAMINS) TABS TAKE 1 TABLET BY MOUTH DAILY 30 tablet 0    isosorbide mononitrate (IMDUR) 30 MG extended release tablet TAKE 1 TABLET BY MOUTH DAILY 90 tablet 3    aspirin 81 MG EC tablet Take 81 mg by mouth daily.  magnesium oxide (MAG-OX) 400 (241.3 MG) MG TABS tablet Take 1 tablet by mouth daily. 30 tablet 3    polyethylene glycol (GLYCOLAX) powder Take 17 g by mouth daily. Vitals:    07/20/20 1515   BP: (!) 122/52   Site: Left Upper Arm   Position: Sitting   Cuff Size: Medium Adult   Pulse: 68   Resp: 18   Temp: 97.2 °F (36.2 °C)   TempSrc: Temporal   Weight: 111 lb 6.4 oz (50.5 kg)     Body mass index is 24.11 kg/m². Wt Readings from Last 3 Encounters:   07/20/20 111 lb 6.4 oz (50.5 kg)   07/14/20 113 lb 1.6 oz (51.3 kg)   12/17/19 115 lb 6.4 oz (52.3 kg)     BP Readings from Last 3 Encounters:   07/20/20 (!) 122/52   07/15/20 (!) 151/66   12/17/19 112/60        Patient was [facility] to [facility] from 7/9/20 to 7/15/20 for ARNOLD, cystitis, AMS change. Inpatient course: Discharge summary reviewed- see chart.     Current status: stable, still confused    Review of Systems:  A comprehensive review of systems was negative except for what was noted in the HPI. Physical Exam:  General Appearance: alert and oriented to person, place and time, well developed and well- nourished, in no acute distress  Skin: warm and dry, no rash or erythema  Head: normocephalic and atraumatic  Eyes: pupils equal, round, and reactive to light, extraocular eye movements intact, conjunctivae normal  ENT: tympanic membrane, external ear and ear canal normal bilaterally, nose without deformity, nasal mucosa and turbinates normal without polyps  Neck: supple and non-tender without mass, no thyromegaly or thyroid nodules, no cervical lymphadenopathy  Pulmonary/Chest: clear to auscultation bilaterally- no wheezes, rales or rhonchi, normal air movement, no respiratory distress  Cardiovascular: normal rate, regular rhythm, normal S1 and S2, no murmurs, rubs, clicks, or gallops, distal pulses intact, no carotid bruits  Abdomen: soft, non-tender, non-distended, normal bowel sounds, no masses or organomegaly  Extremities: no cyanosis, clubbing or edema  Neuro:  Pt is still confused in the office today    Initial post-discharge communication occurred between nurse care coordinator and caregiver- daughter on 7/16/20- see documentation in chart: telephone encounter. Assessment/Plan:  Alysa Montgomery was seen today for follow-up from hospital.    Diagnoses and all orders for this visit:    Altered mental status, unspecified altered mental status type    Acute cystitis without hematuria  -     Urinalysis Reflex to Culture; Future  -     sulfamethoxazole-trimethoprim (BACTRIM DS) 800-160 MG per tablet; Take 1 tablet by mouth 2 times daily for 3 days    ARNOLD (acute kidney injury) ([facility]  -     Basic Metabolic Panel;  Future    Anemia, unspecified type  -     CBC Auto Differential; Future    Metabolic encephalopathy    Elevated troponin    Chronic atrial fibrillation    Chronic heart failure with preserved ejection fraction [facility])          Diagnostic test results reviewed: inpatient labs, EKG, chest x-ray, x-ray-hand, knee, pelvis, CT-head, cervical spine, facial bones and MRI-brain    Patient risk of morbidity and mortality: high    Medical Decision Making: high complexity    [facility] correspondence reviewed  -  Pt still confused today in the office  -  Check labs above and repeat UA (no culture done in the hospital)  -  Start Bactrim now  -  RTO prn for now, will call with results and recommendations once I see the labs     Ginger Dietrich was evaluated today and a DME order was entered for a wheeled walker with seat because she requires this to successfully complete daily living tasks of eating, bathing, toileting, personal cares, ambulating, grooming and hygiene. A wheeled walker with seat is necessary due to the patient's unsteady gait, upper body weakness, inability to  and ambulation device, ambulating only short distances by pushing a walker, and the need to sit for a short time before resuming ambulation. These tasks cannot be completed with a lesser ambulation device such as a cane, crutch, or standard walker. The need for this equipment was discussed with the patient and she understands and is in agreement. Impaired

## 2020-09-19 NOTE — ED PROVIDER NOTES
[facility]       CHIEF COMPLAINT       Chief Complaint   Patient presents with    Urinary Tract Infection     Confusion       Nurses Notes reviewed and I agree except as noted in the HPI. HISTORY OF PRESENT ILLNESS   Alysha Mitchell is a 80 y.o. female who presents with her daughter with concerns for increasing confusion and recurrent UTI over the past 4 days. She states she was treated every month for the past 4 months for UTI in the ER following a fall or confusion. The history is provided by a relative and the patient. Dysuria    This is a new problem. The current episode started more than 2 days ago. The problem occurs every urination. The problem has been gradually worsening. The quality of the pain is described as burning. The pain is mild. There has been no fever. Associated symptoms include frequency and urgency. Pertinent negatives include no chills and no hematuria. She has tried nothing for the symptoms. Her past medical history is significant for recurrent UTIs. REVIEW OF SYSTEMS     Review of Systems   Constitutional: Negative for chills and fever. Respiratory: Negative for cough and shortness of breath. Cardiovascular: Negative for chest pain. Gastrointestinal: Negative for diarrhea. Genitourinary: Positive for dysuria, frequency and urgency. Negative for hematuria. Musculoskeletal: Negative for myalgias. Neurological: Negative for dizziness, weakness and headaches. Psychiatric/Behavioral: Positive for confusion.        PAST MEDICAL HISTORY         Diagnosis Date    [facility]OLD [facility]     Anxiety and depression 6/26/2018    Anxiety and depression     Atrial fibrillation ([facility]     Blood transfusion reaction     CAD (coronary artery disease)     Severe triple vessel disease    Cerebrovascular disease 6/19/2018    CKD (chronic kidney disease)     COPD (chronic obstructive pulmonary disease) ([facility]     Dementia ([facility]     Diastolic congestive heart failure (HCC)     DJD (degenerative joint disease), cervical 6/19/2018    Generalized weakness     GERD (gastroesophageal reflux disease)     Hiatal hernia     History of blood transfusion     Hyperlipidemia     Hypertension     Hypoalbuminemia     Hypothyroidism     Mild protein-calorie malnutrition (HCC)     NSTEMI (non-ST elevated myocardial infarction) ([facility] 7/2014    Pancreatitis due to biliary obstruction     Pneumonia, organism unspecified(486)     Scoliosis     SDH (subdural hematoma) ([facility]     [place] hole decompresson on 12/25/2014    Thyroid disease     Unspecified cerebral artery occlusion with cerebral infarction     Uterine cancer ([facility] 1966       SURGICALHISTORY     Patient  has a past surgical history that includes Hysterectomy; Coronary artery bypass graft (9/27/11); Appendectomy; back surgery; Colonoscopy; Endoscopy, colon, diagnostic; eye surgery; Cardiac surgery; skin biopsy; Debbe Manna (12/25/2014); Dilatation, esophagus; vascular surgery; Refractive surgery (Left); pr egd balloon dilation esophagus <30 mm diam (N/A, 11/22/2017); and pr esophagogastroduodenoscopy transoral diagnostic (11/22/2017).     CURRENT MEDICATIONS       Discharge Medication List as of 9/19/2020 12:55 PM      CONTINUE these medications which have NOT CHANGED    Details   spironolactone (ALDACTONE) 25 MG tablet Take 0.5 tablets by mouth daily Hold if SBP<110, Disp-30 tablet,R-3NO PRINT      metoprolol tartrate (LOPRESSOR) 25 MG tablet Take 0.5 tablets by mouth 2 times daily Hold if SBP<120 or HR<60, Disp-60 tablet,R-3NO PRINT      !! acetaminophen (TYLENOL) 500 MG tablet Take 500 mg by mouth every 6 hours while awakeHistorical Med      albuterol (PROVENTIL) (2.5 MG/3ML) 0.083% nebulizer solution Take 2.5 mg by nebulization 4 times dailyHistorical Med      diphenhydrAMINE (BENADRYL) 25 MG capsule Take 25 mg by mouth every 8 hours as needed for Itching, Allergies or Administered Date(s) Administered    Influenza Vaccine, unspecified formulation 10/01/2016    Influenza Virus Vaccine 10/24/2010, 10/06/2014, 10/01/2015    Influenza, High Dose (Fluzone 65 yrs and older) 10/07/2015, 10/03/2016, 10/18/2017, 10/29/2018    Influenza, MDCK Quadv, IM, PF (Flucelvax 4 yrs and older) 10/24/2019    PPD Test 07/26/2014, 11/03/2014, 03/19/2015, 05/19/2016, 06/19/2018, 06/26/2018    Pneumococcal Conjugate 13-valent ([id number]) 10/07/2015    Pneumococcal Conjugate Vaccine 10/24/2002    Pneumococcal Polysaccharide ([id number]) 10/24/2002, 10/01/2015    Tdap (Boostrix, Adacel) 07/09/2020       FAMILY HISTORY     Patient's family history includes Cancer in her brother; Heart Disease in her brother, brother, daughter, father, mother, and son. SOCIAL HISTORY     Patient  reports that she has never smoked. She has never used smokeless tobacco. She reports that she does not drink alcohol or use drugs. PHYSICAL EXAM     ED TRIAGE VITALS  BP: (!) 118/59, Temp: 97.8 °F (36.6 °C), Pulse: 68, Resp: 20, SpO2: 96 %,Estimated body mass index is 23.37 kg/m² as calculated from the following:    Height as of 8/21/20: 4' 9\" (1.448 m). Weight as of this encounter: 108 lb (49 kg). ,No LMP recorded. Patient has had a hysterectomy. Physical Exam  Vitals signs and nursing note reviewed. Constitutional:       General: She is not in acute distress. Appearance: Normal appearance. She is not ill-appearing. HENT:      Head: Normocephalic. Cardiovascular:      Rate and Rhythm: Normal rate and regular rhythm. Heart sounds: Normal heart sounds. Pulmonary:      Effort: Pulmonary effort is normal.      Breath sounds: Normal breath sounds. Abdominal:      General: Bowel sounds are normal.      Tenderness: There is no abdominal tenderness. Musculoskeletal: Normal range of motion. Skin:     General: Skin is warm and dry.    Neurological:      Mental Status: She is alert and oriented to person, place, and time. Psychiatric:         Mood and Affect: Mood normal.         Speech: Speech normal.         Behavior: Behavior normal. Behavior is cooperative. Thought Content: Thought content normal.         Judgment: Judgment normal.       DIAGNOSTIC RESULTS     Labs:  Results for orders placed or performed during the hospital encounter of 09/19/20   Urinalysis   Result Value Ref Range    Glucose, Ur Negative NEGATIVE mg/dl    Bilirubin Urine Negative NEGATIVE    Ketones, Urine Negative NEGATIVE    Specific Gravity, UA 1.020 1.002 - 1.030    Blood, Urine Negative NEGATIVE    pH, UA 6.50 5.0 - 9.0    Protein, UA Negative NEGATIVE mg/dl    Urobilinogen, Urine 0.20 0.2 - 1.0 eu/dl    Nitrite, Urine Negative NEGATIVE    Leukocyte Esterase, Urine Trace (A) NEGATIVE    Color, UA Yellow STRAW-YELLOW    Character, Urine Clear CLEAR-SL CLOUD       IMAGING:  None    EKG:  None    URGENT CARE COURSE:     Vitals:    09/19/20 1231   BP: (!) 118/59   Pulse: 68   Resp: 20   Temp: 97.8 °F (36.6 °C)   TempSrc: Temporal   SpO2: 96%   Weight: 108 lb (49 kg)       Medications - No data to display         PROCEDURES:  None    FINAL IMPRESSION      1. Recurrent UTI    2. Confusion      DISPOSITION/ PLAN   DISPOSITION Decision To Discharge 09/19/2020 12:53:20 PM     Given patient's recent history of recurrent UTI and current confusion, although urine was negative for infection, will treat with Keflex appropriate for Klebsiella in the past.  Recommend referral to urology. May take over-the-counter acetaminophen as needed for fever management. Patient instructions provided verbal and printed format. All questions were answered. Patient discharged with daughter in stable condition.     PATIENT REFERRED TO:  DO Lucretia Saha, [address] / [facility] [address]      DISCHARGE MEDICATIONS:  Discharge Medication List as of 9/19/2020 12:55 PM      START taking these medications    Details   cephALEXin (KEFLEX) 500 MG capsule Take 1 capsule by mouth 4 times daily for 7 days, Disp-28 capsule,R-0Print             Discharge Medication List as of 9/19/2020 12:55 PM          Discharge Medication List as of 9/19/2020 12:55 PM          [facility], APRN - CNP    (Please note that portions of this note were completed with a voice recognition program. Efforts were made to edit the dictations but occasionally words are mis-transcribed.)            [facility], APRN - CNP  09/19/20 [id number]

## 2020-09-20 PROBLEM — N39.0 UTI (URINARY TRACT INFECTION): Status: RESOLVED | Noted: 2020-01-01 | Resolved: 2020-01-01

## 2020-09-21 NOTE — LETTER
September 21, 2020    Sherryle P[address]  [address] [address]    Dear Bert Schulte,    This letter is regarding your Emergency Department (ED) visit at [address] [address] on 9/19/20. Marisol Coelho wanted to make sure that you understand your discharge instructions and that you were able to fill any prescriptions that may have been ordered for you. Please contact the office at the above phone number if the ED advised you to follow up with Marisol Coelho, or if you have any further questions or needs. Also did you know -   *Visiting the ED for a non-emergency could result in higher co-pays than you would normally be subject to paying? [facility]) practices can often offer you an appointment on the same day that you call for acute issues. *We have some [address] [facility] offices that offer Walk-in appointments; check our website for availability in your community, [email or web address].      *Evisits are now available for patients for through [address] If you do not have MyChart and are interested, please contact the office and a staff member may assist you or go to [email or web address].     Sincerely,   Hope DO Maxime and your [facility]

## 2020-09-24 PROBLEM — F01.50 VASCULAR DEMENTIA WITHOUT BEHAVIORAL DISTURBANCE (HCC): Status: ACTIVE | Noted: 2020-01-01

## 2020-10-01 NOTE — TELEPHONE ENCOUNTER
Patient scheduled for her renal ultrasound on 10- at 5:30pm with an arrival of 5:00pm at [facility]

## 2020-10-01 NOTE — PROGRESS NOTES
Royal Gonzalez MD  Urology Clinic Consultation / New Patient Visit    Patient:  Jaquelin Grace  YOB: 1930  Date: 10/1/2020    HISTORY OF PRESENT ILLNESS:   The patient is a 80 y.o. female who presents today for evaluation of the following problems:      1. Recurrent UTI    2. Other urinary incontinence    3. Urinary incontinence, mixed    4. Midline cystocele         Overall the problem(s) : are worsening. Associated Symptoms: No dysuria, gross hematuria. Pain Severity:      Summary of old records: bladder liftee 2x in the 80s  (Patient's old records, notes and chart reviewed and summarized above.)      Onset months  Recurrent infections almost every month, more than before  Severity is described as moderate. The course of symptoms over time is chronic and worsening.   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 instability due to old age  [de-identified] urinary tract symptoms: urine incontinence:  mixed , wears pads        Urinalysis today:  Results for POC orders placed in visit on 10/01/20   POCT Urinalysis No Micro (Auto)   Result Value Ref Range    Glucose, Ur Negative NEGATIVE mg/dl    Bilirubin Urine Negative     Ketones, Urine Negative NEGATIVE    Specific Gravity, Urine 1.020 1.002 - 1.030    Blood, UA POC Trace-lysed NEGATIVE    pH, Urine 6.50 5.0 - 9.0    Protein, Urine Trace (A) NEGATIVE mg/dl    Urobilinogen, Urine 0.20 0.0 - 1.0 eu/dl    Nitrite, Urine Negative NEGATIVE    Leukocyte Clumps, Urine Negative NEGATIVE    Color, Urine Yellow YELLOW-STRAW    Character, Urine Clear [id number].[id number]   poct post void residual   Result Value Ref Range    post void residual 0 ml       Last BUN and creatinine:  Lab Results   Component Value Date    BUN 34 (H) 08/23/2020     Lab Results   Component Value Date    CREATININE 1.2 08/23/2020       Imaging Reviewed during this Office Visit:   (results were independently reviewed by physician and radiology report verified)    PAST MEDICAL, FAMILY AND SOCIAL HISTORY:  Past Medical History:   Diagnosis Date    [facility]     Anxiety and depression 6/26/2018    Anxiety and depression     Atrial fibrillation ([facility]     Blood transfusion reaction     CAD (coronary artery disease)     Severe triple vessel disease    Cerebrovascular disease 6/19/2018    CKD (chronic kidney disease)     COPD (chronic obstructive pulmonary disease) (HCC)     Dementia (HCC)     Diastolic congestive heart failure (HCC)     DJD (degenerative joint disease), cervical 6/19/2018    Generalized weakness     GERD (gastroesophageal reflux disease)     Hiatal hernia     History of blood transfusion     Hyperlipidemia     Hypertension     Hypoalbuminemia     Hypothyroidism     Mild protein-calorie malnutrition (HCC)     NSTEMI (non-ST elevated myocardial infarction) ([facility] 7/2014    Pancreatitis due to biliary obstruction     Pneumonia, organism unspecified(486)     Scoliosis     SDH [facility]subdural hematoma) [facility]     Teresa hole decompresson on 12/25/2014    Thyroid disease     Unspecified cerebral artery occlusion with cerebral infarction     Uterine cancer ([facility] 1966     Past Surgical History:   Procedure Laterality Date    APPENDECTOMY      BACK SURGERY      TERESA HOLE  12/25/2014    drainage of left SDH    CARDIAC SURGERY      COLONOSCOPY      CORONARY ARTERY BYPASS GRAFT  9/27/11    LIMA to LAD, SVG to 1st Marginal, Distal Right Coronary Artery, 1st Diagonal    DILATATION, ESOPHAGUS      ENDOSCOPY, COLON, DIAGNOSTIC      EYE SURGERY      bilateral cataracts    HYSTERECTOMY      W[place] EGD BALLOON DILATION ESOPHAGUS <30 MM DIAM N/A 11/22/2017    EGD ESOPHAGOGASTRODUODENOSCOPY DILATATION performed by Aakash Barreto MD at [facility]    W[place] ESOPHAGOGASTRODUODENOSCOPY TRANSORAL DIAGNOSTIC  11/22/2017    EGD ESOPHAGOGASTRODUODENOSCOPY performed by Aakash Barreto MD at [place] [address] DAILY 90 tablet 3    sucralfate (CARAFATE) 1 GM tablet Take 1 g by mouth 3 times daily       levothyroxine (SYNTHROID) 88 MCG tablet Take 1 tablet by mouth Daily      Calcium Carb-Cholecalciferol 600-500 MG-UNIT CAPS Take 1 capsule by mouth daily      acetaminophen (TYLENOL) 325 MG tablet Take 650 mg by mouth every 4 hours as needed for Pain       atorvastatin (LIPITOR) 20 MG tablet Take 1 tablet by mouth nightly 30 tablet 3    isosorbide mononitrate (IMDUR) 30 MG extended release tablet TAKE 1 TABLET BY MOUTH DAILY 90 tablet 3    aspirin 81 MG EC tablet Take 81 mg by mouth daily.  magnesium oxide (MAG-OX) 400 (241.3 MG) MG TABS tablet Take 1 tablet by mouth daily. 30 tablet 3    polyethylene glycol (GLYCOLAX) powder Take 17 g by mouth daily. Bactrim [sulfamethoxazole-trimethoprim]; Lisinopril; Morphine; Narcan [naloxone]; Sulfa antibiotics; and Adhesive tape  Social History     Tobacco Use   Smoking Status Never Smoker   Smokeless Tobacco Never Used       Social History     Substance and Sexual Activity   Alcohol Use No    Alcohol/week: 0.0 standard drinks       REVIEW OF SYSTEMS:  Constitutional: negative  Eyes: negative  Respiratory: negative  Cardiovascular: negative  Gastrointestinal: negative  Genitourinary: negative except for what is in HPI  Musculoskeletal: negative  Skin: negative   Neurological: negative  Hematological/Lymphatic: negative  Psychological: negative    Physical Exam:    This a 80 y.o. female      Vitals:    10/01/20 1547   Temp: 97.3 °F (36.3 °C)     Constitutional: Patient in no acute distress. Neuro: Alert and oriented to person, place and time. Psych: mood and affect normal  HEENT negative  Lungs: Respiratory effort is normal  Cardiovascular: Normal peripheral pulses  Abdomen: Soft, non-tender, non-distended  Lymphatics: No palpable lymphadenopathy. Bladder non-tender and not distended. Wheelchair bound      Assessment and Plan      1. Recurrent UTI    2.  Other urinary incontinence    3. Urinary incontinence, mixed    4. Midline cystocele           Plan:      Recurrent UTIs: Discussed double voiding techniques to improve bladder emptying. Discussed staying well hydrated, >60 oz/day. Cranberry pills BID. Discussed good blood sugar control. Discussed post-coital antibiotics, daily prophylaxis, and self-start regimen.     Will start Macrobid Prophylaxis, Rx sent  Cranberry pills BID  Check Renal and bladder US and KUB to rule out nidus for infections    Follow up for cystoscopy and pelvis exam              Shankar Raymond MD  [facility]

## 2020-10-06 NOTE — ED NOTES
Bedside shift report given to this RN at this time by Birdie. Patient is up in bed and updated on plan of care at this time. Patient is alert and oriented x2 and respirations are regular and unlabored. Call light within reach     Chico Penny RN  10/06/20 8507

## 2020-10-06 NOTE — TELEPHONE ENCOUNTER
Jorge Garcia from [facility] stated the patient is currently on Macrobid 100 mg daily. She will start Zithromax tomorrow for pneumonia. Does hold the Macrobid while on Zithromax? She is also on Hiprex. Please advise. Thank you.

## 2020-10-06 NOTE — ED NOTES
Pt up in bed with blankets over bottom half. Patient updated on plan of care at this time and expresses no concerns regarding treatment. Patient VSS. Respirations are regular and unlabored, patient is alert and oriented x4. Patient bed rails up x2 and call light within reach. Will continue to monitor.        Thierry Gregg RN  10/06/20 9771

## 2020-10-06 NOTE — ED NOTES
Pt presents to ED via [facility] for c/o fever and UTI symptoms. Pt is a resident of [address] [place] [address] Dr. Phillips reports that pt is pleasantly confused normally d/t dementia but seemed slightly more confused so sent pt in for eval. Upon arrival, pt is alert to name and birth date only. Pt asks multiple times where she is and what the name of \"this place\" is. Pt denies any pain upon initial assessment. EKG completed upon arrival. O2 sat 87-89% on room air. Pt placed on 2L via nc and O2 sats up to 98%. Monitor in place and VS as noted. Medical student at bedside for assessment.      Terence Mason RN  10/06/20 6856

## 2020-10-06 NOTE — ED NOTES
Pt up in bed with blankets over bottom half. Patient updated on plan of care at this time and expresses no concerns regarding treatment. Patient VSS. Respirations are regular and unlabored, patient is alert and oriented x2. Patient bed rails up x2 and call light within reach. Will continue to monitor.        Amira Kinney RN  10/06/20 3840

## 2020-10-06 NOTE — ED NOTES
ED nurse-to-nurse bedside report    Chief Complaint   Patient presents with    Fever    Urinary Tract Infection      LOC: alert to only name  Vital signs   Vitals:    10/06/20 0649 10/06/20 0700   BP: (!) 145/76 136/74   Pulse: 126 119   Resp: 18 26   Temp: 97.8 °F (36.6 °C)    TempSrc: Oral    SpO2: 93% 98%   Weight: 108 lb (49 kg)       Pain:    Pain Interventions: None  Pain Goal: NA  Oxygen: No    Current needs required Room Air   Telemetry: No  LDAs:    Continuous Infusions:   Mobility: Requires assistance * 1  Laura Fall Risk Score: Fall Risk 12/17/2019 6/8/2018 6/27/2017 12/29/2016 12/22/2015   2 or more falls in past year? yes yes no no no   Fall with injury in past year?  yes yes no no no     Fall Interventions: Siderails, posey alarm  Report given to: TIMMY Isaacs RN  10/06/20 8298

## 2020-10-06 NOTE — ED PROVIDER NOTES
[facility] [address] COMPLAINT       Chief Complaint   Patient presents with    Fever    Urinary Tract Infection       Nurses Notes reviewed and I agree except as noted in the HPI. HISTORY OF PRESENT ILLNESS    Zaria Ken is a 80 y.o. female who presents to the Emergency Department for the evaluation of change in mental status. Pt sent from [facility] d/t slight change in baseline mental status and subjective fever, concern for UTI. She has h/o dementia and Afib. The HPI was provided by [facility]CF report. REVIEW OF SYSTEMS     Review of Systems   Unable to perform ROS: Dementia       PAST MEDICAL HISTORY    has a past medical history of ARNOLD (acute kidney injury) ([address] Anxiety and depression, Anxiety and depression, Atrial fibrillation ([address] Blood transfusion reaction, CAD (coronary artery disease), Cerebrovascular disease, CKD (chronic kidney disease), COPD (chronic obstructive pulmonary disease) ([address] Dementia ([address] Diastolic congestive heart failure ([address] DJD (degenerative joint disease), cervical, Generalized weakness, GERD (gastroesophageal reflux disease), Hiatal hernia, History of blood transfusion, Hyperlipidemia, Hypertension, Hypoalbuminemia, Hypothyroidism, Mild protein-calorie malnutrition ([address] NSTEMI (non-ST elevated myocardial infarction) ([address] Pancreatitis due to biliary obstruction, Pneumonia, organism unspecified(486), Scoliosis, SDH (subdural hematoma) ([address] Thyroid disease, Unspecified cerebral artery occlusion with cerebral infarction, and Uterine cancer ([address] SURGICAL HISTORY      has a past surgical history that includes Hysterectomy; Coronary artery bypass graft (9/27/11); Appendectomy; back surgery; Colonoscopy; Endoscopy, colon, diagnostic; eye surgery; Cardiac surgery; skin biopsy; Neftali Daniel (12/25/2014); Dilatation, esophagus; vascular surgery;  Refractive surgery (Left); pr egd balloon dilation esophagus <30 mm diam (N/A, 11/22/2017); and pr esophagogastroduodenoscopy transoral diagnostic (11/22/2017).     CURRENT MEDICATIONS       Discharge Medication List as of 10/6/2020 11:02 AM      CONTINUE these medications which have NOT CHANGED    Details   Nutritional Supplements (ENSURE COMPLETE SHAKE) LIQD Take 1 Can by mouth 2 times dailyHistorical Med      nitrofurantoin, macrocrystal-monohydrate, (MACROBID) 100 MG capsule Take 1 capsule by mouth daily, Disp-90 capsule,R-3Normal      methenamine (HIPREX) 1 g tablet Take 1 tablet by mouth 2 times daily (with meals), Disp-60 tablet,R-0Normal      metoprolol tartrate (LOPRESSOR) 25 MG tablet Take 0.5 tablets by mouth daily Hold if SBP<120 or HR<60, Disp-60 tablet,R-3Adjust Sig      spironolactone (ALDACTONE) 25 MG tablet Take 0.5 tablets by mouth daily Hold if SBP<110, Disp-30 tablet,R-3NO PRINT      !! acetaminophen (TYLENOL) 500 MG tablet Take 500 mg by mouth every 6 hours while awakeHistorical Med      albuterol (PROVENTIL) (2.5 MG/3ML) 0.083% nebulizer solution Take 2.5 mg by nebulization 4 times dailyHistorical Med      diphenhydrAMINE (BENADRYL) 25 MG capsule Take 25 mg by mouth every 8 hours as needed for Itching, Allergies or SleepHistorical Med      bumetanide (BUMEX) 0.5 MG tablet Take 0.5 mg by mouth daily as needed (for swelling, SOB or daily weight gain > 3 lbs)Historical Med      carboxymethylcellulose 1 % ophthalmic solution Place 1 drop into both eyes 4 times dailyHistorical Med      Tafluprost, PF, (ZIOPTAN) 0.0015 % SOLN Place 1 drop into both eyes daily (Zioptan 0.0015%)Historical Med      Multiple Vitamin (DAILY NADIA) TABS Take 1 tablet by mouth dailyHistorical Med      nystatin (MYCOSTATIN) 707943 UNIT/GM powder Apply 1 Dose topically 3 times daily as needed, Topical, 3 TIMES DAILY PRN Starting Thu 4/16/2020, Historical Med      pantoprazole (PROTONIX) 40 MG tablet Take 1 tablet by mouth 2 times dailyHistorical Med      citalopram (CELEXA) 40 MG tablet TAKE 1 TABLET BY MOUTH DAILY, Disp-90 tablet,R-3Normal      sucralfate (CARAFATE) 1 GM tablet Take 1 g by mouth 3 times daily Historical Med      levothyroxine (SYNTHROID) 88 MCG tablet Take 1 tablet by mouth DailyHistorical Med      Calcium Carb-Cholecalciferol 600-500 MG-UNIT CAPS Take 1 capsule by mouth dailyHistorical Med      !! acetaminophen (TYLENOL) 325 MG tablet Take 650 mg by mouth every 4 hours as needed for Pain Historical Med      atorvastatin (LIPITOR) 20 MG tablet Take 1 tablet by mouth nightly, Disp-30 tablet, R-3Normal      isosorbide mononitrate (IMDUR) 30 MG extended release tablet TAKE 1 TABLET BY MOUTH DAILY, Disp-90 tablet, R-3Normal      aspirin 81 MG EC tablet Take 81 mg by mouth daily. magnesium oxide (MAG-OX) 400 (241.3 MG) MG TABS tablet Take 1 tablet by mouth daily. , Disp-30 tablet, R-3      polyethylene glycol (GLYCOLAX) powder Take 17 g by mouth daily. !! - Potential duplicate medications found. Please discuss with provider. ALLERGIES     is allergic to bactrim [sulfamethoxazole-trimethoprim]; lisinopril; morphine; narcan [naloxone]; sulfa antibiotics; and adhesive tape. FAMILY HISTORY     She indicated that her mother is . She indicated that her father is . She indicated that all of her three brothers are . She indicated that her daughter is alive. She indicated that her son is alive. family history includes Cancer in her brother; Heart Disease in her brother, brother, daughter, father, mother, and son. SOCIAL HISTORY      reports that she has never smoked. She has never used smokeless tobacco. She reports that she does not drink alcohol or use drugs. PHYSICAL EXAM     INITIAL VITALS:  weight is 108 lb (49 kg). Her oral temperature is 97.8 °F (36.6 °C). Her blood pressure is 144/76 (abnormal) and her pulse is 118. Her respiration is 22 and oxygen saturation is 91%.     Physical Exam  Constitutional:       General: She is not in acute CBC WITH AUTO DIFFERENTIAL - Abnormal; Notable for the following components:       Result Value    RBC 3.26 (*)     Hemoglobin 9.0 (*)     Hematocrit 31.2 (*)     MCHC 28.8 (*)     RDW-CV 17.3 (*)     RDW-SD 60.7 (*)     Platelets 878 (*)     Lymphocytes Absolute 0.5 (*)     All other components within normal limits   COMPREHENSIVE METABOLIC PANEL W/ REFLEX TO MG FOR LOW K - Abnormal; Notable for the following components:    Glucose 136 (*)     BUN 33 (*)     All other components within normal limits   GLOMERULAR FILTRATION RATE, ESTIMATED - Abnormal; Notable for the following components:    Est, Glom Filt Rate 47 (*)     All other components within normal limits   PROCALCITONIN - Abnormal; Notable for the following components:    Procalcitonin 0.15 (*)     All other components within normal limits   URINE WITH REFLEXED MICRO - Abnormal; Notable for the following components:    Protein, UA 30 (*)     All other components within normal limits   LIPASE   TROPONIN   ANION GAP   OSMOLALITY   SCAN OF BLOOD SMEAR   COVID-19       EMERGENCY DEPARTMENT COURSE:   Vitals:    Vitals:    10/06/20 1015 10/06/20 1100 10/06/20 1149 10/06/20 1215   BP: 117/65 135/63  (!) 144/76   Pulse: 112 110  118   Resp: 29 19 27 22   Temp:       TempSrc:       SpO2: (!) 89% 91% 90% 91%   Weight:           6:49 AM EDT: The patient was seen and evaluated. Labs and imaging are ordered. Patient has history of dementia. I contacted her daughter who is power of . She states the assisted living facility that she resides and is concerned as the patient has had increased confusion. Possible episodes of hallucinations. She also reports that the patient has had frequent urinary tract infections and is currently under care from urology to determine cause. The patient's urine today does not appear to be infected. Nitrite and leukocyte esterase negative, no white cells, no bacteria noted. This was a cath specimen.     Patient did have program.  Efforts were made to edit the dictations but occasionally words are mis-transcribed.)    The patient was given an opportunity to see the Emergency Attending. The patient voiced understanding that I was a Mid-LevelProvider and was in agreement with being seen independently by myself.           Merary Bernabe, NADINE - CNP  10/06/20 0667

## 2020-10-06 NOTE — ED NOTES
Pt up in bed with blankets over bottom half. Patient updated on plan of care at this time and expresses no concerns regarding treatment. Patient VSS. Respirations are regular and unlabored, patient is alert and oriented x2. Patient bed rails up x2 and call light within reach. Will continue to monitor. Family at the bedside.        Nena Muniz RN  10/06/20 0015

## 2020-10-06 NOTE — ED NOTES
Pt up in bed with blankets over bottom half. Patient updated on plan of care at this time and expresses no concerns regarding treatment. Patient VSS. Respirations are regular and unlabored, patient is alert and oriented x2. Patient bed rails up x2 and call light within reach. Will continue to monitor. Family at the bedside.        Arvin Robison RN  10/06/20 5697

## 2020-10-06 NOTE — ED NOTES
Pt up in bed with blankets over bottom half. Patient updated on plan of care at this time and expresses no concerns regarding treatment. Patient VSS. Respirations are regular and unlabored, patient is alert and oriented x2. Patient bed rails up x2 and call light within reach. Will continue to monitor. Family at the bedside.        Doris Layton RN  10/06/20 5805

## 2020-10-07 NOTE — TELEPHONE ENCOUNTER
Message sent to Dr Rhett howard via [employer] to clarify if he wants the patient on hiprex and Macrobid.

## 2020-10-07 NOTE — TELEPHONE ENCOUNTER
Received message from Dr Amanda Ospina to continue hiprex and macrobid. Livier Francis at [facility] notified and voiced understanding.

## 2022-04-27 NOTE — PROGRESS NOTES
Date: 04/27/2022    Lucero Simon  2004    To whom it may concern,    Please excuse my patient, Lucero Simon, from work/school due to illness  Excuse Dates: 04/27/2022 through 05/04/2022  Pt may return to work/school on 05/05/2022  Comments/Notes: No restrictions        Sincerely,   [facility]  Dr Javed Alicia, Dr Verona Arenas, Dr Gina Hernandes,  A   [facility] Officer Subjective:      Patient ID: Vinayak Hummel is a 80 y.o. female. HPI:    Chief Complaint   Patient presents with    Urinary Tract Infection     constantly getting them every month, on atb now for uti, started keflex on 9/19     Pt here for recurrent UTI's. She has been getting this every few weeks for the last few months. Each time she gets one, causing significant AMS for which she never returns to baseline per daughter. Recent hospitalizations for complicated UTI. MRI at that time showed significant vascular changes. Certainly there is underlying dementia, vascular likely. Impression         1. No evidence of acute intracranial abnormality. 2. Multiple lacunar infarcts superimposed on moderate to severe chronic microvascular angiopathy and moderate volume loss. BPs ok. BP Readings from Last 3 Encounters:   09/24/20 108/60   09/19/20 (!) 118/59   08/23/20 135/69     Weight is down. Daughter attributes this to her living situation, they have to eat in their rooms and not with other residents. She is not eating as much. Wt Readings from Last 3 Encounters:   09/24/20 108 lb 1.6 oz (49 kg)   09/19/20 108 lb (49 kg)   08/23/20 114 lb 1.6 oz (51.8 kg)     Recently in the ED again for AMS secondary to presumed UTI. No culture sent from most recent visit. She is currently on Keflex per house doc.     Patient Active Problem List   Diagnosis    Physical deconditioning    (HFpEF) heart failure with preserved ejection fraction (HCC)    Hyperlipidemia    Atrial fibrillation with slow ventricular response (HCC)    Easy bruising    Hypothyroidism    VHD (valvular heart disease)    Coronary artery disease involving native coronary artery of native heart without angina pectoris    Essential hypertension    Systolic congestive heart failure (HCC)    Generalized weakness    Chronic systolic congestive heart failure (HCC)    Chronic diastolic heart failure (HCC)    Thrombocytopenia ([facility] [address]  CKD (chronic kidney disease), stage III (HCC)    Pulmonary hypertension ([address]    Fall at home, subsequent encounter    Recurrent falls    DJD (degenerative joint disease), cervical    CVA (cerebral vascular accident) ([address]    Anxiety and depression    ARNOLD (acute kidney injury) ([address]    Sepsis ([address]    Rash and nonspecific skin eruption    Altered mental status    Chronic diastolic congestive heart failure (HCC)    Chronic kidney disease    Paroxysmal A-fib ([facility] [address]    Normocytic anemia     Past Surgical History:   Procedure Laterality Date    APPENDECTOMY      BACK SURGERY      TERESA HOLE  12/25/2014    drainage of left SDH    CARDIAC SURGERY      COLONOSCOPY      CORONARY ARTERY BYPASS GRAFT  9/27/11    LIMA to LAD, SVG to 1st Marginal, Distal Right Coronary Artery, 1st Diagonal    DILATATION, ESOPHAGUS      ENDOSCOPY, COLON, DIAGNOSTIC      EYE SURGERY      bilateral cataracts    HYSTERECTOMY      MT EGD BALLOON DILATION ESOPHAGUS <30 MM DIAM N/A 11/22/2017    EGD ESOPHAGOGASTRODUODENOSCOPY DILATATION performed by Rex Sung MD at [facility]    MT ESOPHAGOGASTRODUODENOSCOPY TRANSORAL DIAGNOSTIC  11/22/2017    EGD ESOPHAGOGASTRODUODENOSCOPY [facility] Left     SKIN BIOPSY      VASCULAR SURGERY      cabg harvests from left leg     Prior to Admission medications    Medication Sig Start Date End Date Taking?  Authorizing Provider   metoprolol tartrate (LOPRESSOR) 25 MG tablet Take 0.5 tablets by mouth daily Hold if SBP<120 or HR<60 9/22/20  Yes Floyd Foreman DO   cephALEXin [facility]) 500 MG capsule Take 1 capsule by mouth 4 times daily for 7 days 9/19/20 9/26/20 Yes NADINE Hua - CNP   spironolactone (ALDACTONE) 25 MG tablet Take 0.5 tablets by mouth daily Hold if SBP<110 8/23/20  Yes Stephanie Rivera MD   acetaminophen (TYLENOL) 500 MG tablet Take 500 mg by mouth every 6 hours while awake   Yes Historical Provider, MD   albuterol (PROVENTIL) (2.5 MG/3ML) 0.083% nebulizer solution Take 2.5 mg by nebulization 4 times daily   Yes Historical Provider, MD   diphenhydrAMINE (BENADRYL) 25 MG capsule Take 25 mg by mouth every 8 hours as needed for Itching, Allergies or Sleep   Yes Historical Provider, MD   bumetanide (BUMEX) 0.5 MG tablet Take 0.5 mg by mouth daily as needed (for swelling, SOB or daily weight gain > 3 lbs)   Yes Historical Provider, MD   carboxymethylcellulose 1 % ophthalmic solution Place 1 drop into both eyes 4 times daily   Yes Historical Provider, MD   Tafluprost, PF, (ZIOPTAN) 0.0015 % SOLN Place 1 drop into both eyes daily (Marisel Shafer 0.0015%)   Yes Historical Provider, MD   Multiple Vitamin (DAILY NADIA) TABS Take 1 tablet by mouth daily   Yes Historical Provider, MD   nystatin (MYCOSTATIN) 664031 UNIT/GM powder Apply 1 Dose topically 3 times daily as needed 4/16/20  Yes Historical Provider, MD   pantoprazole (PROTONIX) 40 MG tablet Take 1 tablet by mouth 2 times daily 7/11/20  Yes Historical Provider, MD   citalopram (CELEXA) 40 MG tablet TAKE 1 TABLET BY MOUTH DAILY 7/16/20  Yes Myra Peralta DO   sucralfate (CARAFATE) 1 GM tablet Take 1 g by mouth 3 times daily    Yes Historical Provider, MD   levothyroxine (SYNTHROID) 88 MCG tablet Take 1 tablet by mouth Daily 7/14/18  Yes Marisol Gómez MD   Calcium Carb-Cholecalciferol 600-500 MG-UNIT CAPS Take 1 capsule by mouth daily   Yes Historical Provider, MD   acetaminophen (TYLENOL) 325 MG tablet Take 650 mg by mouth every 4 hours as needed for Pain    Yes Historical Provider, MD   atorvastatin (LIPITOR) 20 MG tablet Take 1 tablet by mouth nightly 2/20/18  Yes Unique Agudelo MD   isosorbide mononitrate (IMDUR) 30 MG extended release tablet TAKE 1 TABLET BY MOUTH DAILY 9/25/17  Yes Edwin Kelly MD   aspirin 81 MG EC tablet Take 81 mg by mouth daily.    Yes Historical Provider, MD   magnesium oxide (MAG-OX) 400 (241.3 MG) MG TABS tablet Take 1 tablet by mouth daily. 1/13/15  Yes Nolvia Reynoso MD   polyethylene glycol [facility]) powder Take 17 g by mouth daily. Yes Historical Provider, MD         Review of Systems   Constitutional: Positive for fatigue and unexpected weight change. Negative for fever. HENT: Negative. Respiratory: Negative. Cardiovascular: Negative. Gastrointestinal: Negative. Genitourinary:        Recurrent UTI   Musculoskeletal: Negative. Neurological: Positive for weakness. Psychiatric/Behavioral: Positive for confusion. All other systems reviewed and are negative. Objective:   Physical Exam  Vitals signs and nursing note reviewed. Constitutional:       Appearance: She is well-developed. She is not toxic-appearing. HENT:      Head: Normocephalic and atraumatic. Right Ear: Tympanic membrane normal.      Left Ear: Tympanic membrane normal.   Cardiovascular:      Rate and Rhythm: Normal rate and regular rhythm. Heart sounds: Normal heart sounds. No murmur. Pulmonary:      Effort: Pulmonary effort is normal.      Breath sounds: Normal breath sounds. Abdominal:      General: Bowel sounds are normal.      Palpations: Abdomen is soft. Skin:     General: Skin is warm and dry. Neurological:      Mental Status: She is alert. Mental status is at baseline. Psychiatric:         Behavior: Behavior normal.         Cognition and Memory: Cognition is impaired. Memory is impaired. Assessment:       Diagnosis Orders   1. Altered mental status, unspecified altered mental status type     2. Recurrent UTI  Adam Whitlock MD, Urology, [facility] [address]    methenamine (HIPREX) 1 g tablet   3. Vascular dementia without behavioral disturbance ([address]     4. Thrombocytopenia ([address]     5. ARNOLD (acute kidney injury) ([address]     6. Anemia, unspecified type     7. Paroxysmal A-fib ([address]     8.  History of CVA (cerebrovascular accident)             Plan:      -  Chronic medical problems stable  - Continue current medications including Keflex  -  Start Hiprex  -  Refer to Urology  -  RTO prn        Myra Found, DO

## 2022-09-23 NOTE — TELEPHONE ENCOUNTER
Referral faxed to [facility] at [phone].
Need for Mobility Assisted Device

## (undated) DEVICE — ENDO KIT: Brand: MEDLINE INDUSTRIES, INC.

## (undated) DEVICE — SET ADMIN 25ML L117IN PMP MOD CK VLV RLER CLMP 2 SMRTSITE

## (undated) DEVICE — IV START KIT: Brand: MEDLINE INDUSTRIES, INC.

## (undated) DEVICE — CONMED SCOPE SAVER BITE BLOCK, 20X27 MM: Brand: SCOPE SAVER

## (undated) DEVICE — TUBING IV STOPCOCK 48 CM 3 W

## (undated) DEVICE — CATHETER ETER IV 22GA L1IN POLYUR STR RADPQ INTROCAN SFTY

## (undated) DEVICE — SOLUTION IV 1000ML 0.45% SOD CHL PH 5 INJ USP VIAFLX PLAS

## (undated) DEVICE — 2000CC GUARDIAN II: Brand: GUARDIAN